# Patient Record
Sex: FEMALE | Race: WHITE | Employment: OTHER | URBAN - METROPOLITAN AREA
[De-identification: names, ages, dates, MRNs, and addresses within clinical notes are randomized per-mention and may not be internally consistent; named-entity substitution may affect disease eponyms.]

---

## 2017-07-26 ENCOUNTER — HOSPITAL ENCOUNTER (OUTPATIENT)
Dept: GENERAL RADIOLOGY | Age: 77
Discharge: HOME | End: 2017-07-26
Attending: FAMILY MEDICINE
Payer: MEDICARE

## 2017-07-26 ENCOUNTER — OFFICE VISIT (OUTPATIENT)
Dept: FAMILY MEDICINE CLINIC | Facility: CLINIC | Age: 77
End: 2017-07-26

## 2017-07-26 VITALS
DIASTOLIC BLOOD PRESSURE: 70 MMHG | WEIGHT: 167 LBS | HEART RATE: 80 BPM | HEIGHT: 59 IN | RESPIRATION RATE: 16 BRPM | BODY MASS INDEX: 33.67 KG/M2 | OXYGEN SATURATION: 97 % | SYSTOLIC BLOOD PRESSURE: 122 MMHG

## 2017-07-26 DIAGNOSIS — M25.511 ACUTE PAIN OF RIGHT SHOULDER: ICD-10-CM

## 2017-07-26 DIAGNOSIS — M25.511 ACUTE PAIN OF RIGHT SHOULDER: Primary | ICD-10-CM

## 2017-07-26 PROCEDURE — 99203 OFFICE O/P NEW LOW 30 MIN: CPT | Performed by: FAMILY MEDICINE

## 2017-07-26 PROCEDURE — 96372 THER/PROPH/DIAG INJ SC/IM: CPT | Performed by: FAMILY MEDICINE

## 2017-07-26 PROCEDURE — 73030 X-RAY EXAM OF SHOULDER: CPT | Performed by: FAMILY MEDICINE

## 2017-07-26 RX ORDER — SIMVASTATIN 40 MG
40 TABLET ORAL NIGHTLY
COMMUNITY
End: 2017-08-17

## 2017-07-26 RX ORDER — METHYLPREDNISOLONE SODIUM SUCCINATE 125 MG/2ML
125 INJECTION, POWDER, LYOPHILIZED, FOR SOLUTION INTRAMUSCULAR; INTRAVENOUS ONCE
Status: COMPLETED | OUTPATIENT
Start: 2017-07-26 | End: 2017-07-26

## 2017-07-26 RX ORDER — WARFARIN SODIUM 5 MG/1
5 TABLET ORAL NIGHTLY
COMMUNITY
End: 2017-08-17

## 2017-07-26 RX ORDER — ALBUTEROL SULFATE 90 UG/1
2 AEROSOL, METERED RESPIRATORY (INHALATION) EVERY 6 HOURS PRN
COMMUNITY
End: 2017-08-17

## 2017-07-26 RX ORDER — LEVOTHYROXINE SODIUM 88 UG/1
88 TABLET ORAL
COMMUNITY
End: 2017-08-17

## 2017-07-26 RX ORDER — LISINOPRIL 5 MG/1
5 TABLET ORAL DAILY
COMMUNITY
End: 2017-08-17

## 2017-07-26 RX ORDER — PREDNISONE 20 MG/1
40 TABLET ORAL DAILY
Qty: 10 TABLET | Refills: 0 | Status: SHIPPED | OUTPATIENT
Start: 2017-07-27 | End: 2017-08-01

## 2017-07-26 RX ADMIN — METHYLPREDNISOLONE SODIUM SUCCINATE 125 MG: 125 INJECTION, POWDER, LYOPHILIZED, FOR SOLUTION INTRAMUSCULAR; INTRAVENOUS at 14:20:00

## 2017-07-26 NOTE — PATIENT INSTRUCTIONS
Frozen Shoulder (Adhesive Capsulitis)    Frozen shoulder is when pain and stiffness make it difficult to move your shoulder normally. This condition is also called adhesive capsulitis. The shoulder is a ball-and-socket joint.  The ball on the upper arm b and increase your range of motion. In rare cases, surgery may be needed to remove the scar tissue. Over time, most people with frozen shoulder get back nearly all range of motion without pain. Recovery may take a few months up to 1 year.  You may still feel pain and restore shoulder flexibility. But it often takes a significant amount of time before you notice results. Try to be patient.   To warm up, do the “pendulum.” While standing, let the hand on your frozen side dangle freely as you hold the back of a ch

## 2017-07-26 NOTE — PROGRESS NOTES
[facility] Family Medicine Office Note  Chief Complaint:   Patient presents with:  Establish Care: c/o right shoulder pain started yesterday       HPI:   This is a 65year old female coming in for  HPI  Acute shoulder pain  The patient has compla mg by mouth daily. Disp:  Rfl:    Warfarin Sodium 5 MG Oral Tab Take 5 mg by mouth nightly. Disp:  Rfl:    Levothyroxine Sodium 88 MCG Oral Tab Take 88 mcg by mouth before breakfast. Disp:  Rfl:    simvastatin 40 MG Oral Tab Take 40 mg by mouth nightly.  Chava Francis equal, round, and reactive to light. No scleral icterus. Cardiovascular: Normal rate and regular rhythm. No murmur heard. Pulmonary/Chest: Effort normal and breath sounds normal. No respiratory distress. She has no wheezes. She has no rales.    Abdomi

## 2017-07-31 ENCOUNTER — TELEPHONE (OUTPATIENT)
Dept: CASE MANAGEMENT | Age: 77
End: 2017-07-31

## 2017-07-31 NOTE — TELEPHONE ENCOUNTER
----- Message from Eduarda Byrne MD sent at 7/28/2017  7:20 AM CDT -----  Results reviewed. XR shows arthritis and age related changes. Follow up in with PT then follow up in 1-2 weeks. Please inform patient.

## 2017-07-31 NOTE — TELEPHONE ENCOUNTER
Called patient, went over results below. Pt stated she is changing her insurance so once that is all figured out, she is going to start PT and FU in the office. Pt stated she has started home exercises and her shoulder does feel better.  Pt stated she is ab

## 2017-08-17 ENCOUNTER — OFFICE VISIT (OUTPATIENT)
Dept: FAMILY MEDICINE CLINIC | Facility: CLINIC | Age: 77
End: 2017-08-17

## 2017-08-17 ENCOUNTER — TELEPHONE (OUTPATIENT)
Dept: FAMILY MEDICINE CLINIC | Facility: CLINIC | Age: 77
End: 2017-08-17

## 2017-08-17 ENCOUNTER — APPOINTMENT (OUTPATIENT)
Dept: LAB | Age: 77
End: 2017-08-17
Attending: FAMILY MEDICINE
Payer: MEDICARE

## 2017-08-17 VITALS
DIASTOLIC BLOOD PRESSURE: 70 MMHG | WEIGHT: 167 LBS | SYSTOLIC BLOOD PRESSURE: 140 MMHG | HEART RATE: 76 BPM | HEIGHT: 59 IN | RESPIRATION RATE: 16 BRPM | OXYGEN SATURATION: 96 % | BODY MASS INDEX: 33.67 KG/M2

## 2017-08-17 DIAGNOSIS — I48.20 CHRONIC ATRIAL FIBRILLATION (HCC): Primary | ICD-10-CM

## 2017-08-17 DIAGNOSIS — E78.2 MIXED HYPERLIPIDEMIA: ICD-10-CM

## 2017-08-17 DIAGNOSIS — I48.20 CHRONIC ATRIAL FIBRILLATION (HCC): ICD-10-CM

## 2017-08-17 DIAGNOSIS — M19.011 PRIMARY OSTEOARTHRITIS OF RIGHT SHOULDER: Primary | ICD-10-CM

## 2017-08-17 DIAGNOSIS — E03.9 ACQUIRED HYPOTHYROIDISM: ICD-10-CM

## 2017-08-17 DIAGNOSIS — I10 ESSENTIAL HYPERTENSION: ICD-10-CM

## 2017-08-17 LAB
INR BLD: 1.72 (ref 0.89–1.11)
PSA SERPL DL<=0.01 NG/ML-MCNC: 20.4 SECONDS (ref 12–14.3)

## 2017-08-17 PROCEDURE — 99214 OFFICE O/P EST MOD 30 MIN: CPT | Performed by: FAMILY MEDICINE

## 2017-08-17 PROCEDURE — 36415 COLL VENOUS BLD VENIPUNCTURE: CPT | Performed by: FAMILY MEDICINE

## 2017-08-17 RX ORDER — LEVOTHYROXINE SODIUM 88 UG/1
88 TABLET ORAL
Qty: 30 TABLET | Refills: 0 | Status: SHIPPED | OUTPATIENT
Start: 2017-08-17 | End: 2017-08-17

## 2017-08-17 RX ORDER — WARFARIN SODIUM 5 MG/1
5 TABLET ORAL NIGHTLY
Qty: 30 TABLET | Refills: 0 | Status: SHIPPED | OUTPATIENT
Start: 2017-08-17 | End: 2017-09-12

## 2017-08-17 RX ORDER — SIMVASTATIN 40 MG
40 TABLET ORAL NIGHTLY
Qty: 30 TABLET | Refills: 0 | Status: SHIPPED | OUTPATIENT
Start: 2017-08-17 | End: 2017-08-17

## 2017-08-17 RX ORDER — LEVOTHYROXINE SODIUM 88 UG/1
88 TABLET ORAL
Qty: 90 TABLET | Refills: 0 | Status: SHIPPED | OUTPATIENT
Start: 2017-08-17 | End: 2017-08-21

## 2017-08-17 RX ORDER — SIMVASTATIN 40 MG
40 TABLET ORAL NIGHTLY
Qty: 90 TABLET | Refills: 0 | Status: SHIPPED | OUTPATIENT
Start: 2017-08-17 | End: 2017-08-21

## 2017-08-17 RX ORDER — LISINOPRIL 5 MG/1
5 TABLET ORAL DAILY
Qty: 30 TABLET | Refills: 0 | Status: SHIPPED | OUTPATIENT
Start: 2017-08-17 | End: 2017-08-17

## 2017-08-17 RX ORDER — ALBUTEROL SULFATE 90 UG/1
2 AEROSOL, METERED RESPIRATORY (INHALATION) EVERY 6 HOURS PRN
Qty: 1 INHALER | Refills: 0 | Status: SHIPPED | OUTPATIENT
Start: 2017-08-17 | End: 2017-08-17

## 2017-08-17 RX ORDER — ALBUTEROL SULFATE 90 UG/1
2 AEROSOL, METERED RESPIRATORY (INHALATION) EVERY 6 HOURS PRN
Qty: 2 INHALER | Refills: 3 | Status: SHIPPED | OUTPATIENT
Start: 2017-08-17 | End: 2017-08-21

## 2017-08-17 RX ORDER — LISINOPRIL 5 MG/1
5 TABLET ORAL DAILY
Qty: 90 TABLET | Refills: 0 | Status: SHIPPED | OUTPATIENT
Start: 2017-08-17 | End: 2017-08-21

## 2017-08-17 NOTE — TELEPHONE ENCOUNTER
Discussed INR results with patient - too low  Increase to 7.5mg once a week (Thursdays) and 5mg every other day  Repeat INR on 8/28/17. Pt verbalized understanding.

## 2017-08-17 NOTE — PROGRESS NOTES
[address] [facility] Family Medicine Office Note  Chief Complaint:   Patient presents with:   Follow - Up: follow up shoulder pain      HPI:   This is a 65year old female coming in for  HPI  Follow up Shoulder pain  Patient states pain in range of motion o Take 1 tablet (5 mg total) by mouth daily.  Disp: 90 tablet Rfl: 0   Levothyroxine Sodium 88 MCG Oral Tab Take 1 tablet (88 mcg total) by mouth before breakfast. Disp: 90 tablet Rfl: 0   simvastatin 40 MG Oral Tab Take 1 tablet (40 mg total) by mouth nightl Effort normal and breath sounds normal.   Musculoskeletal:        Right shoulder: She exhibits normal range of motion, no tenderness, no bony tenderness, no swelling, no crepitus, no pain, no spasm and normal strength.    Lymphadenopathy:     She has no cer effects or complications from the treatments as a result of today.      Problem List:  Patient Active Problem List:     Chronic atrial fibrillation ([facility] [address]     Acquired hypothyroidism     Mixed hyperlipidemia     Essential hypertension

## 2017-08-21 RX ORDER — WARFARIN SODIUM 5 MG/1
5 TABLET ORAL NIGHTLY
Qty: 90 TABLET | Refills: 0 | Status: CANCELLED | OUTPATIENT
Start: 2017-08-21

## 2017-08-21 NOTE — TELEPHONE ENCOUNTER
Patient is requesting medication be sent to [facility] mail order pharmacy

## 2017-08-21 NOTE — TELEPHONE ENCOUNTER
Did you call patient's daughter? She states that she is returning a call. No record of any other calls in triage. Please advise. Thank you!

## 2017-08-22 RX ORDER — ALBUTEROL SULFATE 90 UG/1
2 AEROSOL, METERED RESPIRATORY (INHALATION) EVERY 6 HOURS PRN
Qty: 3 INHALER | Refills: 3 | Status: SHIPPED | OUTPATIENT
Start: 2017-08-22 | End: 2018-05-25

## 2017-08-22 RX ORDER — LEVOTHYROXINE SODIUM 88 UG/1
88 TABLET ORAL
Qty: 90 TABLET | Refills: 3 | Status: SHIPPED | OUTPATIENT
Start: 2017-08-22 | End: 2018-05-21

## 2017-08-22 RX ORDER — SIMVASTATIN 40 MG
40 TABLET ORAL NIGHTLY
Qty: 90 TABLET | Refills: 3 | Status: SHIPPED | OUTPATIENT
Start: 2017-08-22 | End: 2018-05-21

## 2017-08-22 RX ORDER — LISINOPRIL 5 MG/1
5 TABLET ORAL DAILY
Qty: 90 TABLET | Refills: 3 | Status: SHIPPED | OUTPATIENT
Start: 2017-08-22 | End: 2018-05-21

## 2017-08-22 NOTE — TELEPHONE ENCOUNTER
I had to call both numbers listed but got ahold of patient as documented.  Have not called patient since 8/18

## 2017-08-22 NOTE — TELEPHONE ENCOUNTER
Not refilling warfarin at this time based on INR.    Pt has repeat INR in 1 week  Will adjust dose at that time and then refill

## 2017-08-22 NOTE — TELEPHONE ENCOUNTER
Spoke with pt's dtr in law, Yen Sorto. Yen Sorto informed of MD message below. Pt's dtr in law informed that pt can have all her labs drawn while she is here 8/28/17. Pt's dtr in law states understanding and has no further questions at this time.

## 2017-08-22 NOTE — TELEPHONE ENCOUNTER
Medications failed protocol please approve or deny  LOV- 8/17/2017, meds filled at local pharmacy, patient needs refills sent to Mail order pharmacy

## 2017-08-24 DIAGNOSIS — E03.9 ACQUIRED HYPOTHYROIDISM: ICD-10-CM

## 2017-08-24 DIAGNOSIS — E78.2 MIXED HYPERLIPIDEMIA: Primary | ICD-10-CM

## 2017-08-29 ENCOUNTER — APPOINTMENT (OUTPATIENT)
Dept: LAB | Age: 77
End: 2017-08-29
Attending: FAMILY MEDICINE
Payer: MEDICARE

## 2017-08-29 ENCOUNTER — PRIOR ORIGINAL RECORDS (OUTPATIENT)
Dept: OTHER | Age: 77
End: 2017-08-29

## 2017-08-29 DIAGNOSIS — E03.9 ACQUIRED HYPOTHYROIDISM: ICD-10-CM

## 2017-08-29 DIAGNOSIS — E78.2 MIXED HYPERLIPIDEMIA: ICD-10-CM

## 2017-08-29 DIAGNOSIS — I48.20 CHRONIC ATRIAL FIBRILLATION (HCC): ICD-10-CM

## 2017-08-29 LAB
ALBUMIN SERPL-MCNC: 3.3 G/DL (ref 3.5–4.8)
ALP LIVER SERPL-CCNC: 59 U/L (ref 55–142)
ALT SERPL-CCNC: 23 U/L (ref 14–54)
AST SERPL-CCNC: 16 U/L (ref 15–41)
BILIRUB SERPL-MCNC: 0.4 MG/DL (ref 0.1–2)
BUN BLD-MCNC: 30 MG/DL (ref 8–20)
CALCIUM BLD-MCNC: 9.5 MG/DL (ref 8.3–10.3)
CHLORIDE: 108 MMOL/L (ref 101–111)
CHOLEST SMN-MCNC: 160 MG/DL (ref ?–200)
CO2: 30 MMOL/L (ref 22–32)
CREAT BLD-MCNC: 1.29 MG/DL (ref 0.55–1.02)
GLUCOSE BLD-MCNC: 86 MG/DL (ref 70–99)
HDLC SERPL-MCNC: 62 MG/DL (ref 45–?)
HDLC SERPL: 2.58 {RATIO} (ref ?–4.44)
INR BLD: 1.99 (ref 0.89–1.11)
LDLC SERPL CALC-MCNC: 73 MG/DL (ref ?–130)
LDLC SERPL-MCNC: 25 MG/DL (ref 5–40)
M PROTEIN MFR SERPL ELPH: 6.6 G/DL (ref 6.1–8.3)
NONHDLC SERPL-MCNC: 98 MG/DL (ref ?–130)
POTASSIUM SERPL-SCNC: 4.9 MMOL/L (ref 3.6–5.1)
PSA SERPL DL<=0.01 NG/ML-MCNC: 22.9 SECONDS (ref 12–14.3)
SODIUM SERPL-SCNC: 140 MMOL/L (ref 136–144)
TRIGLYCERIDES: 126 MG/DL (ref ?–150)
TSI SER-ACNC: 2.34 MIU/ML (ref 0.35–5.5)

## 2017-08-29 PROCEDURE — 36415 COLL VENOUS BLD VENIPUNCTURE: CPT | Performed by: FAMILY MEDICINE

## 2017-08-30 ENCOUNTER — TELEPHONE (OUTPATIENT)
Dept: FAMILY MEDICINE CLINIC | Facility: CLINIC | Age: 77
End: 2017-08-30

## 2017-08-30 DIAGNOSIS — I48.20 CHRONIC ATRIAL FIBRILLATION (HCC): Primary | ICD-10-CM

## 2017-08-30 LAB
ALBUMIN: 3.3 G/DL
ALKALINE PHOSPHATATE(ALK PHOS): 59 IU/L
ALT (SGPT): 23 U/L
AST (SGOT): 16 U/L
BILIRUBIN TOTAL: 0.4 MG/DL
BUN: 30 MG/DL
CALCIUM: 9.5 MG/DL
CHLORIDE: 108 MEQ/L
CHOLESTEROL, TOTAL: 160 MG/DL
CREATININE, SERUM: 1.29 MG/DL
GLUCOSE: 86 MG/DL
GLUCOSE: 86 MG/DL
HDL CHOLESTEROL: 62 MG/DL
INR: 1.99
LDL CHOLESTEROL: 73 MG/DL
POTASSIUM, SERUM: 4.9 MEQ/L
PROTEIN, TOTAL: 6.6 G/DL
SGOT (AST): 16 IU/L
SGPT (ALT): 23 IU/L
SODIUM: 140 MEQ/L
THYROID STIMULATING HORMONE: 2.34 MLU/L
TRIGLYCERIDES: 126 MG/DL

## 2017-08-30 NOTE — TELEPHONE ENCOUNTER
Called patient and spoke with her. Advised patient of results and POC below. Patient states understanding. Future lab order placed in Epic and flowsheet updated for patient.

## 2017-08-30 NOTE — TELEPHONE ENCOUNTER
----- Message from Marla Diez MD sent at 8/29/2017  8:17 PM CDT -----  Results reviewed. Please inform patient normal cholesterol. INR too low - increase warfarin to 7.5mg Mondays and Thursdays.  Repeat INR 9/6

## 2017-09-06 ENCOUNTER — TELEPHONE (OUTPATIENT)
Dept: FAMILY MEDICINE CLINIC | Facility: CLINIC | Age: 77
End: 2017-09-06

## 2017-09-06 ENCOUNTER — APPOINTMENT (OUTPATIENT)
Dept: LAB | Age: 77
End: 2017-09-06
Attending: FAMILY MEDICINE
Payer: MEDICARE

## 2017-09-06 DIAGNOSIS — I48.20 CHRONIC ATRIAL FIBRILLATION (HCC): ICD-10-CM

## 2017-09-06 LAB
INR BLD: 2.12 (ref 0.89–1.11)
PSA SERPL DL<=0.01 NG/ML-MCNC: 24.1 SECONDS (ref 12–14.3)

## 2017-09-06 PROCEDURE — 36415 COLL VENOUS BLD VENIPUNCTURE: CPT | Performed by: FAMILY MEDICINE

## 2017-09-06 NOTE — TELEPHONE ENCOUNTER
----- Message from rEan Witt MD sent at 9/6/2017  4:20 PM CDT -----  Results reviewed. Within range - continue warfarin 7.5mg Mon/Thur. Repeat in 1 week. Please inform patient.

## 2017-09-06 NOTE — TELEPHONE ENCOUNTER
Called and spoke with pt. Pt informed of lab result below. Pt states understanding. Pt states that she will be seeing the cardiologist tomorrow and will let us know if he changes any of her meds.

## 2017-09-07 ENCOUNTER — PRIOR ORIGINAL RECORDS (OUTPATIENT)
Dept: OTHER | Age: 77
End: 2017-09-07

## 2017-09-07 NOTE — TELEPHONE ENCOUNTER
Pt completed ANIA to have 08/29/17 lab results faxed to Dr. Leanna Hardwick.  Confirmation @ 6:58pm.

## 2017-09-12 ENCOUNTER — LAB ENCOUNTER (OUTPATIENT)
Dept: LAB | Age: 77
End: 2017-09-12
Attending: FAMILY MEDICINE
Payer: MEDICARE

## 2017-09-12 ENCOUNTER — TELEPHONE (OUTPATIENT)
Dept: FAMILY MEDICINE CLINIC | Facility: CLINIC | Age: 77
End: 2017-09-12

## 2017-09-12 DIAGNOSIS — I48.20 CHRONIC ATRIAL FIBRILLATION (HCC): Primary | ICD-10-CM

## 2017-09-12 LAB
INR BLD: 1.83 (ref 0.89–1.11)
PSA SERPL DL<=0.01 NG/ML-MCNC: 21.4 SECONDS (ref 12–14.3)

## 2017-09-12 PROCEDURE — 36415 COLL VENOUS BLD VENIPUNCTURE: CPT | Performed by: FAMILY MEDICINE

## 2017-09-12 RX ORDER — WARFARIN SODIUM 2.5 MG/1
2.5 TABLET ORAL NIGHTLY
Qty: 30 TABLET | Refills: 0 | Status: SHIPPED | OUTPATIENT
Start: 2017-09-12 | End: 2017-11-02

## 2017-09-12 RX ORDER — WARFARIN SODIUM 5 MG/1
5 TABLET ORAL NIGHTLY
Qty: 30 TABLET | Refills: 0 | Status: SHIPPED | OUTPATIENT
Start: 2017-09-12 | End: 2017-11-02

## 2017-09-12 NOTE — TELEPHONE ENCOUNTER
Pt informed. Pt has 5mg at home. I sent in a RX for 2.5mg so pt doesn't have to split the 5mgs to make 7.5mg. Pt aware on MWF to take a 5mg and a 2.5mg tab to equal 7.5mg. Pt aware to repeat an INR in 1 week.

## 2017-09-12 NOTE — TELEPHONE ENCOUNTER
Called and spoke with pt. Pt informed of lab results below. Pt states that she saw Dr. Yeimi Chris and he informed her that he wanted Dr. Janina Ashley to continue managing her coumadin. Please advise.  Thank you

## 2017-09-12 NOTE — TELEPHONE ENCOUNTER
----- Message from Audrey Solo MD sent at 9/12/2017  3:40 PM CDT -----  Results reviewed. Subtherapeutic. Increase dose to 7.5mg MWF. Repeat INR in 1 week. Has she seen Cardiology - need to have coumadin clinic take over management.    Please inform patien

## 2017-09-13 NOTE — TELEPHONE ENCOUNTER
I spoke with Dr Petra Johnson about pt having ASA on her med list.  Dr Petra Johnson states pt should DC ASA. I called pt ans she states was was taking the ASA. Pt informed to DC. Pt states understanding and will DC it. Standing order placed-oked by Dr Petra Johnson.

## 2017-09-18 ENCOUNTER — APPOINTMENT (OUTPATIENT)
Dept: LAB | Age: 77
End: 2017-09-18
Attending: FAMILY MEDICINE
Payer: MEDICARE

## 2017-09-18 DIAGNOSIS — I48.20 CHRONIC ATRIAL FIBRILLATION (HCC): ICD-10-CM

## 2017-09-18 LAB
INR BLD: 2.17 (ref 0.89–1.11)
PSA SERPL DL<=0.01 NG/ML-MCNC: 24.5 SECONDS (ref 12–14.3)

## 2017-09-18 PROCEDURE — 36415 COLL VENOUS BLD VENIPUNCTURE: CPT | Performed by: FAMILY MEDICINE

## 2017-09-19 ENCOUNTER — TELEPHONE (OUTPATIENT)
Dept: FAMILY MEDICINE CLINIC | Facility: CLINIC | Age: 77
End: 2017-09-19

## 2017-09-19 NOTE — TELEPHONE ENCOUNTER
----- Message from Sybil Person MD sent at 9/19/2017  8:01 AM CDT -----  Results reviewed. Increase to 7.5mg MWF and Saturday. 5mg Sun,Tues,Thur. Repeat INR in 1 week  Please inform patient.

## 2017-09-19 NOTE — TELEPHONE ENCOUNTER
Spoke with patient regarding INR results and MD order for Coumadin dosing. Aware of INR recheck in 1 week. Verbalized understanding.

## 2017-09-25 ENCOUNTER — APPOINTMENT (OUTPATIENT)
Dept: LAB | Age: 77
End: 2017-09-25
Attending: FAMILY MEDICINE
Payer: MEDICARE

## 2017-09-25 DIAGNOSIS — I48.20 CHRONIC ATRIAL FIBRILLATION (HCC): ICD-10-CM

## 2017-09-25 LAB
INR BLD: 2.36 (ref 0.89–1.11)
PSA SERPL DL<=0.01 NG/ML-MCNC: 26.2 SECONDS (ref 12–14.3)

## 2017-09-25 PROCEDURE — 36415 COLL VENOUS BLD VENIPUNCTURE: CPT | Performed by: FAMILY MEDICINE

## 2017-09-26 ENCOUNTER — TELEPHONE (OUTPATIENT)
Dept: FAMILY MEDICINE CLINIC | Facility: CLINIC | Age: 77
End: 2017-09-26

## 2017-09-26 NOTE — TELEPHONE ENCOUNTER
----- Message from Corky Lara MD sent at 9/25/2017  7:01 PM CDT -----  Results reviewed. INR within goal. Repeat in 2 weeks.

## 2017-09-26 NOTE — TELEPHONE ENCOUNTER
Discussed INR result with pt. Verified current dose is M,W,F,Sa 7.5mg. Iniguez,Tu,Th 5mg. Pt instructed to continue current dose. Patient aware to repeat a INR in 2 weeks. Flowsheet updated.

## 2017-09-27 ENCOUNTER — OFFICE VISIT (OUTPATIENT)
Dept: FAMILY MEDICINE CLINIC | Facility: CLINIC | Age: 77
End: 2017-09-27

## 2017-09-27 VITALS
SYSTOLIC BLOOD PRESSURE: 130 MMHG | HEIGHT: 59 IN | WEIGHT: 165 LBS | DIASTOLIC BLOOD PRESSURE: 70 MMHG | HEART RATE: 76 BPM | RESPIRATION RATE: 16 BRPM | OXYGEN SATURATION: 98 % | TEMPERATURE: 98 F | BODY MASS INDEX: 33.26 KG/M2

## 2017-09-27 DIAGNOSIS — M15.9 PRIMARY OSTEOARTHRITIS INVOLVING MULTIPLE JOINTS: Primary | ICD-10-CM

## 2017-09-27 DIAGNOSIS — M16.11 ARTHRITIS OF RIGHT HIP: ICD-10-CM

## 2017-09-27 DIAGNOSIS — M25.361 KNEE INSTABILITY, RIGHT: ICD-10-CM

## 2017-09-27 PROCEDURE — 99214 OFFICE O/P EST MOD 30 MIN: CPT | Performed by: FAMILY MEDICINE

## 2017-09-27 RX ORDER — ACETAMINOPHEN AND CODEINE PHOSPHATE 300; 30 MG/1; MG/1
1 TABLET ORAL DAILY PRN
Qty: 30 TABLET | Refills: 0 | Status: SHIPPED
Start: 2017-09-27 | End: 2017-11-16 | Stop reason: ALTCHOICE

## 2017-09-27 RX ORDER — GLUCOSAMINE SULF/CHONDROITIN A 500-250 MG
CAPSULE ORAL
Refills: 0 | COMMUNITY
Start: 2017-09-27 | End: 2020-02-24

## 2017-09-27 NOTE — PROGRESS NOTES
Adam Cazares is a 65year old female. Patient presents with: Follow - Up: discuss arthritis pain increasing c/o back and hip pain      HPI:   Arthritis  The patient is here to recheck her arthritis.  Pt has pain in the following joints: right hip an MG Oral Tab Take 1 tablet (5 mg total) by mouth nightly. Disp: 30 tablet Rfl: 0   simvastatin 40 MG Oral Tab Take 1 tablet (40 mg total) by mouth nightly.  Disp: 90 tablet Rfl: 3   Albuterol Sulfate  (90 Base) MCG/ACT Inhalation Aero Soln Inhale 2 pu hip with full ROM, back is nontender to palpation, knees with no effusion or swelling, normal ROM, +crepitus bilaterally  PSYCH: normal mood and affect    ASSESSMENT AND PLAN:     1.  Primary osteoarthritis involving multiple joints  - start glucosamine-cho

## 2017-10-03 ENCOUNTER — TELEPHONE (OUTPATIENT)
Dept: FAMILY MEDICINE CLINIC | Facility: CLINIC | Age: 77
End: 2017-10-03

## 2017-10-03 ENCOUNTER — HOSPITAL ENCOUNTER (OUTPATIENT)
Dept: CV DIAGNOSTICS | Age: 77
Discharge: HOME | End: 2017-10-03
Attending: INTERNAL MEDICINE
Payer: MEDICARE

## 2017-10-03 ENCOUNTER — OFFICE VISIT (OUTPATIENT)
Dept: PHYSICAL THERAPY | Age: 77
End: 2017-10-03
Attending: FAMILY MEDICINE
Payer: MEDICARE

## 2017-10-03 DIAGNOSIS — I25.10 ATHEROSCLEROSIS OF NATIVE CORONARY ARTERY OF NATIVE HEART WITHOUT ANGINA PECTORIS: ICD-10-CM

## 2017-10-03 DIAGNOSIS — I65.23 CAROTID STENOSIS, BILATERAL: ICD-10-CM

## 2017-10-03 DIAGNOSIS — R06.00 DYSPNEA, PAROXYSMAL NOCTURNAL: ICD-10-CM

## 2017-10-03 DIAGNOSIS — I48.20 ATRIAL FIBRILLATION, CHRONIC (HCC): ICD-10-CM

## 2017-10-03 DIAGNOSIS — M25.361 KNEE INSTABILITY, RIGHT: ICD-10-CM

## 2017-10-03 DIAGNOSIS — I65.23 BILATERAL CAROTID ARTERY STENOSIS: ICD-10-CM

## 2017-10-03 DIAGNOSIS — M15.9 PRIMARY OSTEOARTHRITIS INVOLVING MULTIPLE JOINTS: ICD-10-CM

## 2017-10-03 PROCEDURE — 97162 PT EVAL MOD COMPLEX 30 MIN: CPT

## 2017-10-03 PROCEDURE — 97110 THERAPEUTIC EXERCISES: CPT

## 2017-10-03 NOTE — PROGRESS NOTES
LOWER EXTREMITY EVALUATION:   Referring Physician: Dr. 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 Pain provoked in R gluteal more with trunk movements other than piriformis tightness and tenderness.  Possible lumbar spine involvement in R gluteal pain with trunk ROM findings, symptoms tested/5  Abduction: R 3-/5; L 3-/5  ER: R 4/5; L 4/5  IR: R 4/5; L 4/5 Flexion: R 4+/5; L 4+/5  Extension: R 4/5; L 4/5    DF: R 5/5; L 5/5  PF: R 5/5; L 5/5       Balance:  To be tested    Today’s Treatment and Response:   Evaluation followed by patient e options and has agreed to actively participate in planning and for this course of care. Thank you for your referral. Please co-sign or sign and return this letter via fax as soon as possible to [phone].  If you have any questions, please contact me

## 2017-10-03 NOTE — TELEPHONE ENCOUNTER
I spoke with pt's daughter, she was confused as to whether or not PT, Dr. Altamirano Boards referrals were placed because she called [employer] & they stated they don't have any orders. I advised pt to contact [facility] to confirm authorization of referrals, # given to dtr.

## 2017-10-03 NOTE — TELEPHONE ENCOUNTER
Pt's daughter is caling about the referral for pt. She states it doesn't have a number on there. When I looked at it it shows cancelled. Pls call daughter.

## 2017-10-04 ENCOUNTER — TELEPHONE (OUTPATIENT)
Dept: FAMILY MEDICINE CLINIC | Facility: CLINIC | Age: 77
End: 2017-10-04

## 2017-10-04 DIAGNOSIS — M16.11 ARTHRITIS OF RIGHT HIP: Primary | ICD-10-CM

## 2017-10-04 NOTE — TELEPHONE ENCOUNTER
Patient aware that order for hip xray has been placed. Advised that she had xrays done on both knees 9/27.

## 2017-10-04 NOTE — TELEPHONE ENCOUNTER
Patient saw PT yesterday and would like pt to have a xray of her right hip done along with her knee. Please advised pt when order is in so she can schedule her appointment.

## 2017-10-05 ENCOUNTER — HOSPITAL ENCOUNTER (OUTPATIENT)
Dept: CV DIAGNOSTICS | Age: 77
Discharge: HOME | End: 2017-10-05
Attending: INTERNAL MEDICINE
Payer: MEDICARE

## 2017-10-05 DIAGNOSIS — I48.91 ATRIAL FIBRILLATION (HCC): ICD-10-CM

## 2017-10-05 DIAGNOSIS — I25.10 CORONARY ARTERY DISEASE: ICD-10-CM

## 2017-10-05 PROCEDURE — 93017 CV STRESS TEST TRACING ONLY: CPT | Performed by: INTERNAL MEDICINE

## 2017-10-05 PROCEDURE — 78452 HT MUSCLE IMAGE SPECT MULT: CPT | Performed by: INTERNAL MEDICINE

## 2017-10-05 PROCEDURE — 93018 CV STRESS TEST I&R ONLY: CPT | Performed by: INTERNAL MEDICINE

## 2017-10-06 ENCOUNTER — HOSPITAL ENCOUNTER (OUTPATIENT)
Dept: GENERAL RADIOLOGY | Age: 77
Discharge: HOME | End: 2017-10-06
Attending: FAMILY MEDICINE
Payer: MEDICARE

## 2017-10-06 DIAGNOSIS — M25.361 KNEE INSTABILITY, RIGHT: ICD-10-CM

## 2017-10-06 DIAGNOSIS — M16.11 ARTHRITIS OF RIGHT HIP: ICD-10-CM

## 2017-10-06 PROCEDURE — 73565 X-RAY EXAM OF KNEES: CPT | Performed by: FAMILY MEDICINE

## 2017-10-06 PROCEDURE — 73502 X-RAY EXAM HIP UNI 2-3 VIEWS: CPT | Performed by: FAMILY MEDICINE

## 2017-10-09 ENCOUNTER — TELEPHONE (OUTPATIENT)
Dept: FAMILY MEDICINE CLINIC | Facility: CLINIC | Age: 77
End: 2017-10-09

## 2017-10-09 NOTE — TELEPHONE ENCOUNTER
----- Message from Sybil Person MD sent at 10/7/2017  1:18 PM CDT -----  Results reviewed. Please inform patient mild arthritis and bone spur noted.  Cont tx plan    XR HIP W OR WO PELVIS 2 OR 3 VIEWS, RIGHT (CPT=73502)   Order: [id number]   Status:  Final r

## 2017-10-10 ENCOUNTER — PRIOR ORIGINAL RECORDS (OUTPATIENT)
Dept: OTHER | Age: 77
End: 2017-10-10

## 2017-10-10 ENCOUNTER — OFFICE VISIT (OUTPATIENT)
Dept: PHYSICAL THERAPY | Age: 77
End: 2017-10-10
Attending: FAMILY MEDICINE
Payer: MEDICARE

## 2017-10-10 DIAGNOSIS — M15.9 PRIMARY OSTEOARTHRITIS INVOLVING MULTIPLE JOINTS: ICD-10-CM

## 2017-10-10 DIAGNOSIS — M25.361 KNEE INSTABILITY, RIGHT: ICD-10-CM

## 2017-10-10 PROCEDURE — 97110 THERAPEUTIC EXERCISES: CPT

## 2017-10-10 NOTE — PROGRESS NOTES
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 Silvana Bonilla 11/16  Fall Risk: standard         Precautions: A- Fib, carotid stenosis, COPD         Sub TX#: 8/   VS:   /68mmHG  HR 66           Recumb stepper 5 min         Hamstring strumming  (Sore on R medial hamstring \"feel like vein.)         Hamstring stretching 30 sec x 4 B         Kinesiotape for patella fat pad/reduction of edema.  See climb stairs with less pain. Past medical history was reviewed with Marva Davenport.  Significant findings include atrial fib, R carotid stenosis per patient (receiving US to check this,) COPD, Hypothyroidism, HTN    Pain is Worse:   Knee:  Going down stairs vs up s 90; L 90  Extension: R 0; L 0  Abduction: R WNL; L WNL   Flexion: R 130; L 120  Extension: R 0; L -5        PROM:   Hip Knee   Flexion: R 95; L 95  Extension: R 5; L 5  Abduction: R WNL; L WNL   ER: R 30; L 30   Flexion: R 130; L 125  Extension: R 0; L -5 with shoulders over hip 1 block without increase pain in buttock. Frequency / Duration: Patient will be seen for 2 x/week or a total of 12 visits over a 90 day period. Treatment will include: Manual Therapy; Therapeutic Exercises;  Neuromuscular Re-ed

## 2017-10-11 ENCOUNTER — PRIOR ORIGINAL RECORDS (OUTPATIENT)
Dept: OTHER | Age: 77
End: 2017-10-11

## 2017-10-12 ENCOUNTER — OFFICE VISIT (OUTPATIENT)
Dept: PHYSICAL THERAPY | Age: 77
End: 2017-10-12
Attending: FAMILY MEDICINE
Payer: MEDICARE

## 2017-10-12 ENCOUNTER — TELEPHONE (OUTPATIENT)
Dept: FAMILY MEDICINE CLINIC | Facility: CLINIC | Age: 77
End: 2017-10-12

## 2017-10-12 DIAGNOSIS — M15.9 PRIMARY OSTEOARTHRITIS INVOLVING MULTIPLE JOINTS: ICD-10-CM

## 2017-10-12 DIAGNOSIS — I48.20 CHRONIC ATRIAL FIBRILLATION (HCC): Primary | ICD-10-CM

## 2017-10-12 DIAGNOSIS — M25.361 KNEE INSTABILITY, RIGHT: ICD-10-CM

## 2017-10-12 NOTE — TELEPHONE ENCOUNTER
Patient called back and spoke with her. Advised patient of results and POC below. Patient will follow-up with Dr. Anaya Suazo. Patient states understanding. Patient also needs another referral for Dr. Anaya Suazo.   Referral placed in Epic for patient as reque

## 2017-10-12 NOTE — TELEPHONE ENCOUNTER
Results received from Dr. Josiah Pantoja office for carotid ultrasound. Per Dr. Irena Corona patient needs follow-up with cardiology. Holding results in triage. LM on home phone for pt to call the office back.

## 2017-10-16 ENCOUNTER — APPOINTMENT (OUTPATIENT)
Dept: LAB | Age: 77
End: 2017-10-16
Attending: FAMILY MEDICINE
Payer: MEDICARE

## 2017-10-16 DIAGNOSIS — I48.20 CHRONIC ATRIAL FIBRILLATION (HCC): ICD-10-CM

## 2017-10-16 PROCEDURE — 85610 PROTHROMBIN TIME: CPT

## 2017-10-16 PROCEDURE — 36415 COLL VENOUS BLD VENIPUNCTURE: CPT

## 2017-10-17 ENCOUNTER — TELEPHONE (OUTPATIENT)
Dept: FAMILY MEDICINE CLINIC | Facility: CLINIC | Age: 77
End: 2017-10-17

## 2017-10-17 ENCOUNTER — OFFICE VISIT (OUTPATIENT)
Dept: PHYSICAL THERAPY | Age: 77
End: 2017-10-17
Attending: FAMILY MEDICINE
Payer: MEDICARE

## 2017-10-17 DIAGNOSIS — M15.9 PRIMARY OSTEOARTHRITIS INVOLVING MULTIPLE JOINTS: ICD-10-CM

## 2017-10-17 DIAGNOSIS — M25.361 KNEE INSTABILITY, RIGHT: ICD-10-CM

## 2017-10-17 PROCEDURE — 97110 THERAPEUTIC EXERCISES: CPT

## 2017-10-17 NOTE — TELEPHONE ENCOUNTER
----- Message from Daniella Morales MD sent at 10/16/2017  7:53 PM CDT -----  Results reviewed. At goal. Cont current dose - repeat in 2 weeks. Please inform patient.

## 2017-10-17 NOTE — PROGRESS NOTES
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 Maulik Newton 11/16  Fall Risk: standard         Precautions: A- Fib, carotid stenosis, COPD         Sub Recumb stepper 5 min 5 min       Hamstring strumming  (Sore on R medial hamstring \"feel like vein.) Hamstring Stretching 30 sec x 3 Shuttle DLP  6 cords 3 x 10       Hamstring stretching 30 sec x 4 B Hip flexor stretch sidelying 30 sec x 5 Standing hip ex order for R hip but not in system. Pt describes pain level 6/10. Better 0/10. Current functional limitations include walking, sitting, sit to stand transfers, stairs.    Rafael Escalera describes prior level of function no limitation with sit to stand and stairs ROM:   Lumbar: Forward bendin degrees with pain in Right gluteal. Report some dizziness after forward bending. Backward bendin degrees no pain  ROT: R 50% with pain on R lumbar spine; 50% Left rotation and no pain.   Sidebending: R 30 degree wi assist with gait and decrease strain in on spine and knees  Patient will be able to ascend and descend one flight of stairs with alternating sequencing and one hand hold assist.  Patient will demonstrate increase hip strength to 4+/5 to decrease strain on

## 2017-10-24 ENCOUNTER — OFFICE VISIT (OUTPATIENT)
Dept: PHYSICAL THERAPY | Age: 77
End: 2017-10-24
Attending: FAMILY MEDICINE
Payer: MEDICARE

## 2017-10-24 DIAGNOSIS — M15.9 PRIMARY OSTEOARTHRITIS INVOLVING MULTIPLE JOINTS: ICD-10-CM

## 2017-10-24 DIAGNOSIS — M25.361 KNEE INSTABILITY, RIGHT: ICD-10-CM

## 2017-10-24 PROCEDURE — 97110 THERAPEUTIC EXERCISES: CPT

## 2017-10-24 NOTE — PROGRESS NOTES
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 Petra Johnson 11/16  Fall Risk: standard         Precautions: A- Fib, carotid stenosis, COPD         Sub Date:               TX#: 7/ Date:               TX#: 8/   VS:   /68mmHG  HR 66   VS:   66 bpm VS: 72  4 in - 6in     Recumb stepper 5 min Recumb stepper 5 min 5 min  5 min      Hamstring strumming  (Sore on R medial hamstring \"feel like vein.) Hamst Heart 120 bpm in office and 140/84 mmHg. Feel short of breathing walking distance into PT office. Feel like pretzel stuck in throat. Have not told doctor about the throat symptoms that comes and goes.  Advise patient to address with MD that this symptom com and gait on even and uneven surfaces with stretching and strengthening. Pain should be expected to reduce 50% or more. Precautions:  Cardiac  OBJECTIVE:   Observation: L shoulder and pelvis higher.   Gait: Decrease step length  Palpation: Tender in glu Moderate Complexity, Ex1      Total Timed Treatment: 45 min     Total Treatment Time: 45 min     PLAN OF CARE:    Goals:    Patient will demonstrate independence in performing home exercise program.  Patient will demonstrate increase hamstring flexibility To:1/1/2018

## 2017-10-26 ENCOUNTER — OFFICE VISIT (OUTPATIENT)
Dept: PHYSICAL THERAPY | Age: 77
End: 2017-10-26
Attending: FAMILY MEDICINE
Payer: MEDICARE

## 2017-10-26 ENCOUNTER — TELEPHONE (OUTPATIENT)
Dept: FAMILY MEDICINE CLINIC | Facility: CLINIC | Age: 77
End: 2017-10-26

## 2017-10-26 DIAGNOSIS — I87.1 SUBCLAVIAN VEIN STENOSIS: Primary | ICD-10-CM

## 2017-10-26 DIAGNOSIS — M15.9 PRIMARY OSTEOARTHRITIS INVOLVING MULTIPLE JOINTS: ICD-10-CM

## 2017-10-26 DIAGNOSIS — M25.361 KNEE INSTABILITY, RIGHT: ICD-10-CM

## 2017-10-26 PROCEDURE — 97112 NEUROMUSCULAR REEDUCATION: CPT

## 2017-10-26 PROCEDURE — 97110 THERAPEUTIC EXERCISES: CPT

## 2017-10-26 NOTE — TELEPHONE ENCOUNTER
Pt's daughter-in-law Poncho Zaldivar (OK per HIPAA) called regarding referrals.     Pt was seen by Dr. Thi Mcgowan, who referred her to Dr. Dangelo Barrera in cardiac surgery at [facility]. Pt and daughter were confused about exact reason for visit, the paper

## 2017-10-26 NOTE — PROGRESS NOTES
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 Adriane Mcintosh 11/16  Fall Risk: standard         Precautions: A- Fib, carotid stenosis, COPD         Sub 120/68mmHG  HR 66   VS:   66 bpm VS: 72  See above     Recumb stepper 5 min Recumb stepper 5 min 5 min  5 min Bike 6 min     Hamstring strumming  (Sore on R medial hamstring \"feel like vein.) Hamstring Stretching 30 sec x 3 Shuttle DLP  6 cords 3 x 10 Charley can cause R knee to \"pop out of place. \" Pain in knee occurred for 1 year and deny known cause. Deny numbness and tingling in LE. Currently, receiving cardio tests due to racing heart. Had cardiac US today. Heart 120 bpm in office and 140/84 mmHg.  Feel s tenderness and tightness throughout hip ER muscles and ITB. Edema at knee and R greater trochanteric bursitis. Will monitor vital signs in PT and symptoms. Patient will benefit from PT in improving transfers and gait on even and uneven surfaces with stretch Patient has stress test tomorrow. Patient was instructed in and issued a HEP for: \"Motion is lotion. \" Knee and hip gentle ROM for in sitting to assist with decreasing pain when getting up.     Charges: PT Eval Moderate Complexity, Ex1      Total Timed T certify the need for these services furnished under this plan of treatment and while under my care.     X___________________________________________________ Date____________________    Certification From: 88/1/2574  To:1/1/2018

## 2017-10-30 ENCOUNTER — TELEPHONE (OUTPATIENT)
Dept: FAMILY MEDICINE CLINIC | Facility: CLINIC | Age: 77
End: 2017-10-30

## 2017-10-30 ENCOUNTER — OFFICE VISIT (OUTPATIENT)
Dept: PHYSICAL THERAPY | Age: 77
End: 2017-10-30
Attending: FAMILY MEDICINE
Payer: MEDICARE

## 2017-10-30 DIAGNOSIS — I48.20 CHRONIC ATRIAL FIBRILLATION (HCC): Primary | ICD-10-CM

## 2017-10-30 PROCEDURE — 97110 THERAPEUTIC EXERCISES: CPT

## 2017-10-30 NOTE — PROGRESS NOTES
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 Misa Hood 11/16  Fall Risk: standard         Precautions: A- Fib, carotid sten 40-59% impaired, limited, or restricted   Discharge G Code: Mobility: Walking and Moving Around CJ20-39% impaired, limited, or restricted      Plan:   Discharge to continue with HEP for functional strengthening and gluteal strengthening.        Patient was comfort. Instruct rep progression and to look for fatigue    Hip flex x 14 R,  X 12 L  Hip abd x 12 R, L x 12  Hip ext x 12 B 6 in step  X 15 B    4 in step down x 15 B Review step and band exercises    Kinesiotape for patella fat pad/reduction of edema. shin on stairs with a fall and landed on R hip. Sensitive in shin    Imaging ordered for knees. Patient thought she had order for R hip but not in system. Pt describes pain level 6/10. Better 0/10.    Current functional limitations include walking, sitt LE    VS:120 bpm after sitting for at least 20 minutes. MD is aware of elevated heart rate and part of getting testing. Trunk ROM:   Lumbar: Forward bendin degrees with pain in Right gluteal. Report some dizziness after forward bending.    Backward functional mobility and decrease hip, knee and lumbar strain.    Patient will demonstrate increase hip extension to 15 degrees to assist with gait and decrease strain in on spine and knees  Patient will be able to ascend and descend one flight of stairs wit

## 2017-10-30 NOTE — TELEPHONE ENCOUNTER
Camelia Tripathi from [employer] called regarding pt standing PT/INR order, need frequency of testing (daily, weekly, etc)    Dr. Yen Fuentes states that the order can be entered as weekly, as the pt has no need to have it checked more frequently than that.  lalo Jeffers

## 2017-10-31 ENCOUNTER — HOSPITAL ENCOUNTER (OUTPATIENT)
Dept: CT IMAGING | Age: 77
Discharge: HOME | End: 2017-10-31
Attending: INTERNAL MEDICINE
Payer: MEDICARE

## 2017-10-31 ENCOUNTER — APPOINTMENT (OUTPATIENT)
Dept: PHYSICAL THERAPY | Age: 77
End: 2017-10-31
Attending: FAMILY MEDICINE
Payer: MEDICARE

## 2017-10-31 DIAGNOSIS — I65.23 BILATERAL CAROTID ARTERY STENOSIS: ICD-10-CM

## 2017-10-31 PROCEDURE — 82565 ASSAY OF CREATININE: CPT

## 2017-10-31 PROCEDURE — 70498 CT ANGIOGRAPHY NECK: CPT | Performed by: INTERNAL MEDICINE

## 2017-11-01 ENCOUNTER — TELEPHONE (OUTPATIENT)
Dept: FAMILY MEDICINE CLINIC | Facility: CLINIC | Age: 77
End: 2017-11-01

## 2017-11-01 NOTE — TELEPHONE ENCOUNTER
I called pt back and pt states she started vitamin E about a week ago. Pt started it on her own, no physician told her to take it.       Should she stop the vitamin E and keep taking the same warfarin 7.5mg MWFSat or still decrease the warfarin dose and st

## 2017-11-01 NOTE — TELEPHONE ENCOUNTER
----- Message from Elsie Caldwell MD sent at 11/1/2017  8:22 AM CDT -----  Results reviewed. Please inform patient to decrease dose only take 7.5 mg M and F - not wednesday.  Repeat in 1 week

## 2017-11-01 NOTE — TELEPHONE ENCOUNTER
Pt informed to dc the Vitamin E and take 5mg of warfarin tonight instead of 7.5mg. Then pt should go back to normal dose of 7.5mg MWFSat and 5mg all other days and recheck an INR in 1 week.

## 2017-11-01 NOTE — TELEPHONE ENCOUNTER
Discussed INR result with pt. When I verified current dose, pt states she is taking 7.5mg on M,W,F & Sat.  5mg all other days. What day do you want pt only take 5mg? Sat so the days are spread out?      I spoke to Dr Shawn Wooten and she said lets do 7.5mg on M

## 2017-11-02 RX ORDER — WARFARIN SODIUM 2.5 MG/1
2.5 TABLET ORAL NIGHTLY
Qty: 30 TABLET | Refills: 5 | Status: SHIPPED | OUTPATIENT
Start: 2017-11-02 | End: 2017-12-22 | Stop reason: DRUGHIGH

## 2017-11-02 RX ORDER — WARFARIN SODIUM 5 MG/1
5 TABLET ORAL NIGHTLY
Qty: 30 TABLET | Refills: 5 | Status: SHIPPED | OUTPATIENT
Start: 2017-11-02 | End: 2017-12-22 | Stop reason: DRUGHIGH

## 2017-11-02 NOTE — TELEPHONE ENCOUNTER
Pt needs refill on both warfarin 2.5 mg and 5 mg tablets.  Advised pt to call pharmacy but pt states there are no refills and Dr must put in refill

## 2017-11-08 ENCOUNTER — PRIOR ORIGINAL RECORDS (OUTPATIENT)
Dept: OTHER | Age: 77
End: 2017-11-08

## 2017-11-09 ENCOUNTER — TELEPHONE (OUTPATIENT)
Dept: FAMILY MEDICINE CLINIC | Facility: CLINIC | Age: 77
End: 2017-11-09

## 2017-11-09 NOTE — TELEPHONE ENCOUNTER
----- Message from Saulo Monae MD sent at 11/9/2017  1:42 PM CST -----  Results reviewed. Please inform patient decrease dose again. Only 7.5mg MWF, 5mg all other days.  (Was 7.5mg MWFsat)

## 2017-11-16 ENCOUNTER — OFFICE VISIT (OUTPATIENT)
Dept: FAMILY MEDICINE CLINIC | Facility: CLINIC | Age: 77
End: 2017-11-16

## 2017-11-16 VITALS
DIASTOLIC BLOOD PRESSURE: 80 MMHG | WEIGHT: 166 LBS | RESPIRATION RATE: 16 BRPM | SYSTOLIC BLOOD PRESSURE: 126 MMHG | BODY MASS INDEX: 33.47 KG/M2 | HEART RATE: 71 BPM | TEMPERATURE: 98 F | OXYGEN SATURATION: 97 % | HEIGHT: 59 IN

## 2017-11-16 DIAGNOSIS — I77.9 BILATERAL CAROTID ARTERY DISEASE (HCC): ICD-10-CM

## 2017-11-16 DIAGNOSIS — M19.90 ARTHRITIS: ICD-10-CM

## 2017-11-16 DIAGNOSIS — N18.30 CKD (CHRONIC KIDNEY DISEASE) STAGE 3, GFR 30-59 ML/MIN (HCC): ICD-10-CM

## 2017-11-16 DIAGNOSIS — E03.9 ACQUIRED HYPOTHYROIDISM: ICD-10-CM

## 2017-11-16 DIAGNOSIS — E78.2 MIXED HYPERLIPIDEMIA: ICD-10-CM

## 2017-11-16 DIAGNOSIS — I10 ESSENTIAL HYPERTENSION: ICD-10-CM

## 2017-11-16 DIAGNOSIS — Z00.00 HEALTH MAINTENANCE EXAMINATION: Primary | ICD-10-CM

## 2017-11-16 DIAGNOSIS — I48.20 CHRONIC ATRIAL FIBRILLATION (HCC): ICD-10-CM

## 2017-11-16 DIAGNOSIS — Z23 NEEDS FLU SHOT: ICD-10-CM

## 2017-11-16 DIAGNOSIS — Z12.11 SCREENING FOR COLON CANCER: ICD-10-CM

## 2017-11-16 PROCEDURE — G0008 ADMIN INFLUENZA VIRUS VAC: HCPCS | Performed by: FAMILY MEDICINE

## 2017-11-16 PROCEDURE — 96160 PT-FOCUSED HLTH RISK ASSMT: CPT | Performed by: FAMILY MEDICINE

## 2017-11-16 PROCEDURE — 90653 IIV ADJUVANT VACCINE IM: CPT | Performed by: FAMILY MEDICINE

## 2017-11-16 NOTE — PROGRESS NOTES
HPI:   Cisco White is a 65year old female who presents for a MA (Med[employer]are Advantage) [facility] (Once per calendar year).     Annual Physical due on 01/30/1942  Fall Risk Screening due on 01/30/2004  Adult Pneumonia Vaccine(1 of 2 - PCV13) due on 01 with a 5mg tab to equal 7.5mg total   Warfarin Sodium 5 MG Oral Tab Take 1 tablet (5 mg total) by mouth nightly. Glucosamine-Chondroitin 500-250 MG Oral Cap Take 1 cap TID   simvastatin 40 MG Oral Tab Take 1 tablet (40 mg total) by mouth nightly.    [facility] denies hx of anemia  ENDOCRINE: denies thyroid history  ALL/ASTHMA: denies hx of allergy or asthma    EXAM:   /80   Pulse 71   Temp 98.2 °F (36.8 °C) (Oral)   Resp 16   Ht 59\"   Wt 166 lb   SpO2 97%   BMI 33.53 kg/m²  Estimated body mass index is 33 immunizations    Chronic atrial fibrillation (HCC)  - stable  - cont present management  -     CARDIO - INTERNAL    Acquired hypothyroidism  - stable  - cont present management    Mixed hyperlipidemia  - stable  - cont present management  -     CARDIO - IN positive mental well-being?: Visiting Family    If you are a male age 38-65 or a female age 47-67, do you take aspirin?: No    Have you had any immunizations at another office such as Influenza, Hepatitis B, Tetanus, or Pneumococcal?: No     Sulema Antoine No results found for: A1C No flowsheet data found.     Fasting Blood Sugar (FSB)Annually   Glucose (mg/dL)   Date Value   08/29/2017 86   ----------       Cardiovascular Disease Screening     LDL Annually LDL Cholesterol (mg/dL)   Date Value   08/29/2017 73 End-stage renal disease   Hemophiliacs who received Factor VIII or IX concentrates   Clients of institutions for the mentally retarded   Persons who live in the same house as a HepB virus carrier   Homosexual men   Illicit injectable drug abusers     Tet

## 2017-11-17 ENCOUNTER — TELEPHONE (OUTPATIENT)
Dept: FAMILY MEDICINE CLINIC | Facility: CLINIC | Age: 77
End: 2017-11-17

## 2017-11-25 ENCOUNTER — TELEPHONE (OUTPATIENT)
Dept: FAMILY MEDICINE CLINIC | Facility: CLINIC | Age: 77
End: 2017-11-25

## 2017-11-25 NOTE — TELEPHONE ENCOUNTER
INR low normal  Increase to 7.5mg MWFSat  Repeat INR around Dec 6    Patient verbalized understanding - will take 2.5mg tonight.

## 2017-11-27 NOTE — TELEPHONE ENCOUNTER
Alexandra Fontenot MD   [facility] Clinical Staff 2 days ago     Please update flow sheet or med hx (Routing comment)

## 2017-11-30 ENCOUNTER — PRIOR ORIGINAL RECORDS (OUTPATIENT)
Dept: OTHER | Age: 77
End: 2017-11-30

## 2017-12-08 ENCOUNTER — TELEPHONE (OUTPATIENT)
Dept: FAMILY MEDICINE CLINIC | Facility: CLINIC | Age: 77
End: 2017-12-08

## 2017-12-08 NOTE — TELEPHONE ENCOUNTER
Spoke with patient. Made her aware of current INR level. Patient says she's actually been taking 7.5mg of warfarin 4 days a week- on Monday, Wednesday, Friday, Saturday and 5mg on Tuesday, Thursday, Saturday.  According to flow sheet from 11/2217 patient wa

## 2017-12-08 NOTE — TELEPHONE ENCOUNTER
----- Message from Deni Bull DO sent at 12/7/2017  9:02 PM CST -----  Currently Monday Wednesday Friday 7.5 mg of Coumadin and other days 5 mg of Coumadin and still elevated at 3.4.   Patient to hold Coumadin ×2 days then decrease Monday Wednesday 7.5

## 2017-12-11 NOTE — TELEPHONE ENCOUNTER
Called patient and spoke with her. Advised patient of information below. Patient states understanding.

## 2017-12-11 NOTE — TELEPHONE ENCOUNTER
Based on INR  7.5mg MWF   5 mg all other days  Ok to start low dose aspirin per cardiology now that she had held coumadin x2 days.    Agree with repeat INR around 12/20

## 2017-12-21 ENCOUNTER — TELEPHONE (OUTPATIENT)
Dept: FAMILY MEDICINE CLINIC | Facility: CLINIC | Age: 77
End: 2017-12-21

## 2017-12-21 DIAGNOSIS — I48.20 CHRONIC ATRIAL FIBRILLATION (HCC): Primary | ICD-10-CM

## 2017-12-21 RX ORDER — WARFARIN SODIUM 6 MG/1
6 TABLET ORAL DAILY
Qty: 30 TABLET | Refills: 0 | Status: SHIPPED | OUTPATIENT
Start: 2017-12-21 | End: 2018-01-11

## 2017-12-21 NOTE — TELEPHONE ENCOUNTER
Pt states for the last 2 weeks she has been taking 7.5mg on M, W, F and 5mg all other days. Pt informed to take an extra 2.5mg tonight. Pt agrees but states she is worried about taking 7.5mg 3 days in a row-W, Th, Fri.   She wants to know if instead of Fr

## 2017-12-21 NOTE — TELEPHONE ENCOUNTER
Notes Recorded by Libby Gilford, MD on 12/21/2017 at 7:56 AM CST  For now extra 2.5mg tonight    ----- Message from Cordelia Alexandre MD sent at 12/21/2017  7:55 AM CST -----  Results reviewed.  Cannot seem to control INR well enough - refer to coumadin clinic

## 2017-12-22 NOTE — TELEPHONE ENCOUNTER
I called and spoke to pt and informed her of the following directions:  7.5 mg last night-already done  5mg Today  7.5mg Saturday  Starting Sunday 6mg per day-RX sent in  INR every Wednesday x4 weeks

## 2017-12-22 NOTE — TELEPHONE ENCOUNTER
7.5 mg tonight  5mg Friday  7.5mg Saturday  Starting Sunday 6mg per day  INR every Wednesday x4 weeks

## 2017-12-29 ENCOUNTER — TELEPHONE (OUTPATIENT)
Dept: FAMILY MEDICINE CLINIC | Facility: CLINIC | Age: 77
End: 2017-12-29

## 2017-12-29 NOTE — TELEPHONE ENCOUNTER
Spoke with patient. Advised of INR level. She did verify that she is taking Coumadin 6mg every day. Advised her to continue to take this dose every day and have her INR checked every Wednesday for the next 3 weeks. Verbalized understanding.

## 2018-01-04 ENCOUNTER — TELEPHONE (OUTPATIENT)
Dept: FAMILY MEDICINE CLINIC | Facility: CLINIC | Age: 78
End: 2018-01-04

## 2018-01-04 LAB
INR: 2.8
PT: 28.2 SEC (ref 9–11.5)

## 2018-01-04 NOTE — TELEPHONE ENCOUNTER
Called and informed patient of message below. Patient states she will repeat lab test in 1 week, she stated understanding and did not have any additional questions.

## 2018-01-04 NOTE — TELEPHONE ENCOUNTER
----- Message from Sanford Calderon MD sent at 1/4/2018 12:16 PM CST -----  Results reviewed. Tests show INR w/in goal. Repeat in 1 week - if still normal then every 2 weeks. Please inform patient.

## 2018-01-11 ENCOUNTER — MED REC SCAN ONLY (OUTPATIENT)
Dept: FAMILY MEDICINE CLINIC | Facility: CLINIC | Age: 78
End: 2018-01-11

## 2018-01-11 ENCOUNTER — TELEPHONE (OUTPATIENT)
Dept: FAMILY MEDICINE CLINIC | Facility: CLINIC | Age: 78
End: 2018-01-11

## 2018-01-11 DIAGNOSIS — Z79.01 ANTICOAGULATED ON COUMADIN: Primary | ICD-10-CM

## 2018-01-11 LAB
INR: 2.4
PT: 23.9 SEC (ref 9–11.5)

## 2018-01-11 RX ORDER — WARFARIN SODIUM 6 MG/1
6 TABLET ORAL DAILY
Qty: 30 TABLET | Refills: 0 | Status: SHIPPED | OUTPATIENT
Start: 2018-01-11 | End: 2018-02-13

## 2018-01-11 NOTE — TELEPHONE ENCOUNTER
----- Message from Zenon Spatz, MD sent at 1/11/2018  7:55 AM CST -----  Results reviewed.  wnl goal. Repeat in 2 weeks

## 2018-01-11 NOTE — TELEPHONE ENCOUNTER
Spoke to patient. Advised of results and plan of care below. Order placed for repeat INR in 2 weeks. Patient requesting refill of warfarin 6 mg. [id number] [address] Duenas for refill?

## 2018-01-25 ENCOUNTER — TELEPHONE (OUTPATIENT)
Dept: FAMILY MEDICINE CLINIC | Facility: CLINIC | Age: 78
End: 2018-01-25

## 2018-01-25 DIAGNOSIS — Z79.01 ANTICOAGULATED ON COUMADIN: Primary | ICD-10-CM

## 2018-01-25 LAB
INR: 1.9
PT: 19.4 SEC (ref 9–11.5)

## 2018-01-25 NOTE — TELEPHONE ENCOUNTER
----- Message from Kaylan Junior MD sent at 1/25/2018  8:07 AM CST -----  Results reviewed. Please inform patient INR too low - take extra 1/2 tab tonight (9mg) then resume 6mg daily - repeat INR in 1 week.

## 2018-01-25 NOTE — TELEPHONE ENCOUNTER
Spoke to patient. Advised of results and plan of care below. Flowsheet updated. Order placed for 1 week re-draw. Patient states understanding. No further questions.

## 2018-02-01 LAB
INR: 2.6
PT: 25.8 SEC (ref 9–11.5)

## 2018-02-02 ENCOUNTER — TELEPHONE (OUTPATIENT)
Dept: FAMILY MEDICINE CLINIC | Facility: CLINIC | Age: 78
End: 2018-02-02

## 2018-02-02 NOTE — TELEPHONE ENCOUNTER
Discussed INR result with pt. Verified current dose is 6mg daily. Pt instructed to continue current dose. Patient aware to repeat a INR in 1 week. Flowsheet updated.

## 2018-02-08 LAB
INR: 2.1
PT: 21.6 SEC (ref 9–11.5)

## 2018-02-10 ENCOUNTER — TELEPHONE (OUTPATIENT)
Dept: FAMILY MEDICINE CLINIC | Facility: CLINIC | Age: 78
End: 2018-02-10

## 2018-02-10 NOTE — TELEPHONE ENCOUNTER
INR was low normal - asked pt to repeat on Monday  She lost list of foods to avoid while taking coumadin - please review with patient  Grapefruit, dark green leafy vegetables (spinache, kale, sharita greens), broccoli, brussel sprouts,   Foods that are ok

## 2018-02-12 NOTE — TELEPHONE ENCOUNTER
Pt had her INR drawn today but uses [facility] so we will have the results until tomorrow. We discussed foods to avoid,  foods that are ok in moderation-consistency, and ok foods. Pt on 6mg daily and will continue for now. We will call tomorrow with results.

## 2018-02-12 NOTE — TELEPHONE ENCOUNTER
LMTCB.  I need to remind pt to do INR today. Also need to go over coumadin diet. Pt had a list from a previous Dr.   Dr Misa Hood states that pt mentioned (in the over the weekend call) that she loves certain vegetables such as pea pods.   Dr Misa Hood states it i

## 2018-02-13 ENCOUNTER — TELEPHONE (OUTPATIENT)
Dept: FAMILY MEDICINE CLINIC | Facility: CLINIC | Age: 78
End: 2018-02-13

## 2018-02-13 LAB
INR: 1.8
PT: 18.2 SEC (ref 9–11.5)

## 2018-02-13 RX ORDER — WARFARIN SODIUM 6 MG/1
6 TABLET ORAL DAILY
Qty: 30 TABLET | Refills: 1 | Status: SHIPPED | OUTPATIENT
Start: 2018-02-13 | End: 2018-03-08

## 2018-02-13 RX ORDER — WARFARIN SODIUM 1 MG/1
1 TABLET ORAL NIGHTLY
Qty: 30 TABLET | Refills: 1 | Status: SHIPPED | OUTPATIENT
Start: 2018-02-13 | End: 2018-04-11 | Stop reason: ALTCHOICE

## 2018-02-13 NOTE — TELEPHONE ENCOUNTER
Discussed INR result with pt. Verified current dose is 6mg daily. Pt instructed to take a total of 9mg tonight (1 1/2 tabs of 6mg). Pt aware to increase her dose to 7mg a day starting tomorrow. Pt needs a refill of 6mg. RXs of both sent in.   Patient a

## 2018-02-14 ENCOUNTER — TELEPHONE (OUTPATIENT)
Dept: FAMILY MEDICINE CLINIC | Facility: CLINIC | Age: 78
End: 2018-02-14

## 2018-02-14 ENCOUNTER — OFFICE VISIT (OUTPATIENT)
Dept: FAMILY MEDICINE CLINIC | Facility: CLINIC | Age: 78
End: 2018-02-14

## 2018-02-14 ENCOUNTER — HOSPITAL ENCOUNTER (OUTPATIENT)
Dept: GENERAL RADIOLOGY | Age: 78
Discharge: HOME | End: 2018-02-14
Attending: FAMILY MEDICINE
Payer: MEDICARE

## 2018-02-14 VITALS
RESPIRATION RATE: 18 BRPM | WEIGHT: 169 LBS | TEMPERATURE: 98 F | BODY MASS INDEX: 34 KG/M2 | SYSTOLIC BLOOD PRESSURE: 120 MMHG | HEART RATE: 69 BPM | DIASTOLIC BLOOD PRESSURE: 72 MMHG | OXYGEN SATURATION: 96 %

## 2018-02-14 DIAGNOSIS — R06.2 WHEEZING: ICD-10-CM

## 2018-02-14 DIAGNOSIS — R05.9 COUGH: Primary | ICD-10-CM

## 2018-02-14 DIAGNOSIS — I48.20 CHRONIC ATRIAL FIBRILLATION (HCC): ICD-10-CM

## 2018-02-14 DIAGNOSIS — R05.9 COUGH: ICD-10-CM

## 2018-02-14 PROBLEM — I77.9 BILATERAL CAROTID ARTERY DISEASE: Status: ACTIVE | Noted: 2018-02-14

## 2018-02-14 PROBLEM — I77.9 BILATERAL CAROTID ARTERY DISEASE (HCC): Status: ACTIVE | Noted: 2018-02-14

## 2018-02-14 PROCEDURE — 94640 AIRWAY INHALATION TREATMENT: CPT | Performed by: FAMILY MEDICINE

## 2018-02-14 PROCEDURE — 99214 OFFICE O/P EST MOD 30 MIN: CPT | Performed by: FAMILY MEDICINE

## 2018-02-14 PROCEDURE — 87798 DETECT AGENT NOS DNA AMP: CPT | Performed by: FAMILY MEDICINE

## 2018-02-14 PROCEDURE — 87581 M.PNEUMON DNA AMP PROBE: CPT | Performed by: FAMILY MEDICINE

## 2018-02-14 PROCEDURE — 87486 CHLMYD PNEUM DNA AMP PROBE: CPT | Performed by: FAMILY MEDICINE

## 2018-02-14 PROCEDURE — 71046 X-RAY EXAM CHEST 2 VIEWS: CPT | Performed by: FAMILY MEDICINE

## 2018-02-14 PROCEDURE — 87633 RESP VIRUS 12-25 TARGETS: CPT | Performed by: FAMILY MEDICINE

## 2018-02-14 PROCEDURE — 96372 THER/PROPH/DIAG INJ SC/IM: CPT | Performed by: FAMILY MEDICINE

## 2018-02-14 RX ORDER — METHYLPREDNISOLONE SODIUM SUCCINATE 125 MG/2ML
125 INJECTION, POWDER, LYOPHILIZED, FOR SOLUTION INTRAMUSCULAR; INTRAVENOUS ONCE
Status: COMPLETED | OUTPATIENT
Start: 2018-02-14 | End: 2018-02-14

## 2018-02-14 RX ORDER — CEFDINIR 300 MG/1
300 CAPSULE ORAL 2 TIMES DAILY
Qty: 20 CAPSULE | Refills: 0 | Status: SHIPPED | OUTPATIENT
Start: 2018-02-14 | End: 2018-02-16

## 2018-02-14 RX ORDER — ALBUTEROL SULFATE 2.5 MG/3ML
2.5 SOLUTION RESPIRATORY (INHALATION) EVERY 4 HOURS PRN
Qty: 1 BOX | Refills: 2 | Status: SHIPPED | OUTPATIENT
Start: 2018-02-14 | End: 2018-12-17

## 2018-02-14 RX ORDER — NEBULIZER ACCESSORIES
KIT MISCELLANEOUS
Qty: 1 PACKAGE | Refills: 0 | Status: SHIPPED | OUTPATIENT
Start: 2018-02-14 | End: 2019-07-29 | Stop reason: CLARIF

## 2018-02-14 RX ORDER — IPRATROPIUM BROMIDE AND ALBUTEROL SULFATE 2.5; .5 MG/3ML; MG/3ML
3 SOLUTION RESPIRATORY (INHALATION) ONCE
Status: COMPLETED | OUTPATIENT
Start: 2018-02-14 | End: 2018-02-14

## 2018-02-14 RX ORDER — AZITHROMYCIN 250 MG/1
TABLET, FILM COATED ORAL
Qty: 6 TABLET | Refills: 0 | Status: SHIPPED | OUTPATIENT
Start: 2018-02-14 | End: 2018-02-21

## 2018-02-14 RX ADMIN — IPRATROPIUM BROMIDE AND ALBUTEROL SULFATE 3 ML: 2.5; .5 SOLUTION RESPIRATORY (INHALATION) at 13:47:00

## 2018-02-14 RX ADMIN — METHYLPREDNISOLONE SODIUM SUCCINATE 125 MG: 125 INJECTION, POWDER, LYOPHILIZED, FOR SOLUTION INTRAMUSCULAR; INTRAVENOUS at 13:47:00

## 2018-02-14 NOTE — TELEPHONE ENCOUNTER
Spoke with patient's daughter Franca Delmar, [place] per HIPAA.  Informed her per Dr Misa Hood xray was negative for Pneumonia, however with patient's symptoms Dr would like her to start the antibiotic called into pharmacy, Franca Jacobsen states understanding and will pass informa

## 2018-02-14 NOTE — PROGRESS NOTES
Gloria Cohen is a 77year old female. Patient presents with:  Back Pain: lungs  Cough: x5 days  Wheezing      HPI:   Cough and wheezing  Patient complains of productive cough and wheezing x5 days. Sputum is green and yellow in color.  She denies feve directed Disp: 1 Package Rfl: 0   Warfarin Sodium 6 MG Oral Tab Take 1 tablet (6 mg total) by mouth daily.  (Patient taking differently: Take 7 mg by mouth daily.  ) Disp: 30 tablet Rfl: 1   Warfarin Sodium 1 MG Oral Tab Take 1 tablet (1 mg total) by mouth rashes,no suspicious lesions  HEENT: atraumatic, normocephalic,PERRL, EOMI, nl conjunctiva, ears and throat are clear  NECK: supple,no adenopathy   LUNGS: no tachypnea or retractions, diffuse wheezing; mild bibasilar rhonchi  CARDIO: RRR without murmur  EX Respiratory Therapy Supplies (NEBULIZER/TUBING/MOUTHPIECE) Does not apply Kit 1 Package 0      Sig: Use as directed             Patient/Caregiver Education: Patient/Caregiver Education: There are no barriers to learning. Medical education done.    Outcome:

## 2018-02-14 NOTE — TELEPHONE ENCOUNTER
Pt was placed on Zpack today so Dr Alysa Shen states to stay on 6mg daily while on the abt. Pharmacy aware ok to fill Z pack while on warfarin.

## 2018-02-15 LAB

## 2018-02-16 ENCOUNTER — TELEPHONE (OUTPATIENT)
Dept: FAMILY MEDICINE CLINIC | Facility: CLINIC | Age: 78
End: 2018-02-16

## 2018-02-16 RX ORDER — OSELTAMIVIR PHOSPHATE 75 MG/1
75 CAPSULE ORAL 2 TIMES DAILY
Qty: 10 CAPSULE | Refills: 0 | Status: SHIPPED | OUTPATIENT
Start: 2018-02-16 | End: 2018-02-21

## 2018-02-16 NOTE — TELEPHONE ENCOUNTER
----- Message from Sanford Calderon MD sent at 2/16/2018  7:58 AM CST -----  Results reviewed. Please inform patient stop abx and start tamiflu 75mg po bid x5 days.   Follow up next week

## 2018-02-16 NOTE — TELEPHONE ENCOUNTER
I called pt at [phone] and verified 2 patient identifiers: name & . I discussed results and recommendations at length with patient. All orders placed. All questions answered.   Pt aware to stop the cefdinir and finish the Zpak per conversation w

## 2018-02-21 ENCOUNTER — TELEPHONE (OUTPATIENT)
Dept: FAMILY MEDICINE CLINIC | Facility: CLINIC | Age: 78
End: 2018-02-21

## 2018-02-21 ENCOUNTER — OFFICE VISIT (OUTPATIENT)
Dept: FAMILY MEDICINE CLINIC | Facility: CLINIC | Age: 78
End: 2018-02-21

## 2018-02-21 VITALS
TEMPERATURE: 98 F | SYSTOLIC BLOOD PRESSURE: 120 MMHG | BODY MASS INDEX: 33.87 KG/M2 | RESPIRATION RATE: 16 BRPM | HEART RATE: 76 BPM | WEIGHT: 168 LBS | OXYGEN SATURATION: 98 % | HEIGHT: 59 IN | DIASTOLIC BLOOD PRESSURE: 70 MMHG

## 2018-02-21 DIAGNOSIS — R79.1 SUPRATHERAPEUTIC INR: ICD-10-CM

## 2018-02-21 DIAGNOSIS — I10 ESSENTIAL HYPERTENSION: ICD-10-CM

## 2018-02-21 DIAGNOSIS — E78.2 MIXED HYPERLIPIDEMIA: ICD-10-CM

## 2018-02-21 DIAGNOSIS — J11.1 INFLUENZA: Primary | ICD-10-CM

## 2018-02-21 DIAGNOSIS — I48.20 CHRONIC ATRIAL FIBRILLATION (HCC): ICD-10-CM

## 2018-02-21 LAB
INR: 4.1
PT: 40.1 SEC (ref 9–11.5)

## 2018-02-21 PROCEDURE — 99214 OFFICE O/P EST MOD 30 MIN: CPT | Performed by: FAMILY MEDICINE

## 2018-02-21 NOTE — TELEPHONE ENCOUNTER
I spoke to Dr Shawn Wooten regarding what dose to do until seen by Dr Lesley Martinez and established with coumadin clinic. Since pt was recently on Zpak and Tamiflu, Dr Shawn Wooten agrees to give it 2 more weeks to see if the INR can be controlled.   Pt was subtherapeutic pr

## 2018-02-21 NOTE — TELEPHONE ENCOUNTER
Patient without signs or symptoms of bleeding  Slight change to warfarin schedule -   No warfarin tonight  Then for next 3 days warfarin 6mg  Start 7mg on Sunday   Discussed with patient - handout corrected and given

## 2018-02-21 NOTE — TELEPHONE ENCOUNTER
----- Message from Zenon Spatz, MD sent at 2/21/2018  8:03 AM CST -----  Results reviewed. Please inform patient hold coumadin tonight - refer to coumadin clinic for further treatment - have not been able to keep controlled.

## 2018-02-26 ENCOUNTER — TELEPHONE (OUTPATIENT)
Dept: FAMILY MEDICINE CLINIC | Facility: CLINIC | Age: 78
End: 2018-02-26

## 2018-02-26 ENCOUNTER — APPOINTMENT (OUTPATIENT)
Dept: LAB | Age: 78
End: 2018-02-26
Attending: FAMILY MEDICINE
Payer: MEDICARE

## 2018-02-26 DIAGNOSIS — I48.20 CHRONIC ATRIAL FIBRILLATION (HCC): Primary | ICD-10-CM

## 2018-02-26 DIAGNOSIS — R79.1 SUPRATHERAPEUTIC INR: ICD-10-CM

## 2018-02-26 DIAGNOSIS — I48.20 CHRONIC ATRIAL FIBRILLATION (HCC): ICD-10-CM

## 2018-02-26 DIAGNOSIS — R04.0 BLEEDING FROM THE NOSE: ICD-10-CM

## 2018-02-26 LAB
INR BLD: 3.07 (ref 0.89–1.12)
PSA SERPL DL<=0.01 NG/ML-MCNC: 31.7 SECONDS (ref 11.8–14.1)

## 2018-02-26 PROCEDURE — 36415 COLL VENOUS BLD VENIPUNCTURE: CPT

## 2018-02-26 PROCEDURE — 85610 PROTHROMBIN TIME: CPT

## 2018-02-26 NOTE — TELEPHONE ENCOUNTER
Patient took 7mg coumadin last night, after she got out of bed she had nosebleed, Dr Janina Ashley told her to call if she had nosebleed, please f/u with pt

## 2018-02-26 NOTE — TELEPHONE ENCOUNTER
Discussed INR result with pt. Pt instructed to take 6mg daily  Patient aware to repeat a INR on Thurs. Flowsheet updated.

## 2018-02-26 NOTE — TELEPHONE ENCOUNTER
I spoke with Dr Julianna Sosa. Dr Julianna Sosa states ER if nose is actively bleeding. If not, [address] for STAT INR. I called and spoke to pt. Pt nose bleed has stopped. Pt aware to go to [facility] for STAT INR.

## 2018-03-02 ENCOUNTER — TELEPHONE (OUTPATIENT)
Dept: FAMILY MEDICINE CLINIC | Facility: CLINIC | Age: 78
End: 2018-03-02

## 2018-03-02 LAB
INR: 4
PT: 39.6 SEC (ref 9–11.5)

## 2018-03-02 NOTE — TELEPHONE ENCOUNTER
Discussed INR result with pt. Verified current dose is 6mg daily. Pt instructed to hold tonight's dose and decrease to 5mg a day. Patient aware to repeat a INR on Mon.  ER warnings given for high INR. Flowsheet updated.

## 2018-03-02 NOTE — TELEPHONE ENCOUNTER
----- Message from Ana Rojas MD sent at 3/2/2018  4:08 PM CST -----  Hold tonight's coumadin - starting tomorrow - 5mg daily - repeat INR on Monday

## 2018-03-06 ENCOUNTER — TELEPHONE (OUTPATIENT)
Dept: FAMILY MEDICINE CLINIC | Facility: CLINIC | Age: 78
End: 2018-03-06

## 2018-03-06 LAB
INR: 2.1
PT: 21.3 SEC (ref 9–11.5)

## 2018-03-06 NOTE — TELEPHONE ENCOUNTER
PSR ok to lynblanquita me. LMTCB on home and cell. I will try pt again before I leave and if no response, I isabel leave detailed instructions for pt.

## 2018-03-06 NOTE — TELEPHONE ENCOUNTER
Discussed INR result with pt. Verified current dose is 5mg daily. Pt instructed to increase to 6mg on T, Th, & Sat and 5 mg all other days  Patient aware to repeat a INR in 1 week. Flowsheet updated.

## 2018-03-06 NOTE — TELEPHONE ENCOUNTER
----- Message from Joe Durant MD sent at 3/6/2018  7:46 AM CST -----  Results reviewed. Tests show INR within goal. Tonight take 6mg. Tue/Thur/Sat - take 6mg   Mon/Wed/Fri/Sun - take 5mg   Repeat INR in 1 week  Please inform patient.

## 2018-03-08 RX ORDER — WARFARIN SODIUM 5 MG/1
5 TABLET ORAL NIGHTLY
Qty: 30 TABLET | Refills: 1 | Status: SHIPPED | OUTPATIENT
Start: 2018-03-08 | End: 2018-05-21

## 2018-03-08 RX ORDER — WARFARIN SODIUM 6 MG/1
6 TABLET ORAL DAILY
Qty: 30 TABLET | Refills: 1 | Status: SHIPPED | OUTPATIENT
Start: 2018-03-08 | End: 2018-05-21

## 2018-03-08 NOTE — TELEPHONE ENCOUNTER
Medication(s) to Refill:   Pending Prescriptions Disp Refills    Warfarin Sodium 6 MG Oral Tab 30 tablet 1     Sig: Take 1 tablet (6 mg total) by mouth daily.       Warfarin Sodium 5 MG Oral Tab 30 tablet 0     Sig: Take 1 tablet (5 mg total) by mouth night

## 2018-03-08 NOTE — TELEPHONE ENCOUNTER
Pt needs refills of Warfarin Sodium of both 5mg & 6mg patient stated. Pls send to [facility] [address] in [place].

## 2018-03-13 ENCOUNTER — TELEPHONE (OUTPATIENT)
Dept: FAMILY MEDICINE CLINIC | Facility: CLINIC | Age: 78
End: 2018-03-13

## 2018-03-13 LAB
INR: 2.3
PT: 23.6 SEC (ref 9–11.5)

## 2018-03-13 NOTE — TELEPHONE ENCOUNTER
----- Message from Maykel Barbosa MD sent at 3/13/2018  8:03 AM CDT -----  Results reviewed. INR stable - cont current dose - repeat in 1 week. Please inform patient.

## 2018-03-13 NOTE — TELEPHONE ENCOUNTER
Called and spoke with pt. Pt informed of lab results below. Pt states that she will be out of town next week until Tuesday and that she thought she would be able to wait one month before having her labs redrawn.  Spoke with Dr. Kirt Escobar and informed her of pat

## 2018-03-21 LAB
INR: 2.5
PT: 24.9 SEC (ref 9–11.5)

## 2018-03-22 ENCOUNTER — TELEPHONE (OUTPATIENT)
Dept: FAMILY MEDICINE CLINIC | Facility: CLINIC | Age: 78
End: 2018-03-22

## 2018-03-23 ENCOUNTER — TELEPHONE (OUTPATIENT)
Dept: FAMILY MEDICINE CLINIC | Facility: CLINIC | Age: 78
End: 2018-03-23

## 2018-03-23 NOTE — TELEPHONE ENCOUNTER
----- Message from Justo Meng DO sent at 3/23/2018  3:46 PM CDT -----  Results within goal for A. fib. To new current Coumadin dosing repeat in 1 week.

## 2018-03-23 NOTE — TELEPHONE ENCOUNTER
PSR I asked patient to call back just to verify she our message about her coumadin. Thanks. VM left for patient notifying her that her level is 2.5, continue current coumadin regimen, and repeat INR level in 1 week.  I asked her to call the office back

## 2018-03-26 NOTE — TELEPHONE ENCOUNTER
Discussed INR result with pt. Verified current dose is 6mg Iniguez, Tues, Thurs, & Sat. She takes 5mg MWF. Our records had her doing the 5mg on more day-Sun. Pt states she has been doing this for 2-3 weeks.   Pt instructed to continue the way she is taking i

## 2018-03-28 LAB
INR: 2.8
PT: 27.9 SEC (ref 9–11.5)

## 2018-03-30 ENCOUNTER — TELEPHONE (OUTPATIENT)
Dept: FAMILY MEDICINE CLINIC | Facility: CLINIC | Age: 78
End: 2018-03-30

## 2018-03-30 NOTE — TELEPHONE ENCOUNTER
----- Message from Keyla Bueno MD sent at 3/30/2018 12:19 PM CDT -----  inr  therapeutic  Continue with current coumadin dose Repeat inr in 2 weeks

## 2018-03-30 NOTE — TELEPHONE ENCOUNTER
Left message to continue same dose of coumadin and repeat in 2 weeks. Patient asked to call back to let us know she got this instruction.

## 2018-04-04 ENCOUNTER — HOSPITAL ENCOUNTER (OUTPATIENT)
Dept: GENERAL RADIOLOGY | Age: 78
Discharge: HOME | End: 2018-04-04
Attending: PHYSICIAN ASSISTANT
Payer: MEDICARE

## 2018-04-04 ENCOUNTER — OFFICE VISIT (OUTPATIENT)
Dept: FAMILY MEDICINE CLINIC | Facility: CLINIC | Age: 78
End: 2018-04-04

## 2018-04-04 VITALS
HEIGHT: 59 IN | OXYGEN SATURATION: 97 % | WEIGHT: 174 LBS | SYSTOLIC BLOOD PRESSURE: 122 MMHG | TEMPERATURE: 98 F | DIASTOLIC BLOOD PRESSURE: 80 MMHG | HEART RATE: 80 BPM | RESPIRATION RATE: 16 BRPM | BODY MASS INDEX: 35.08 KG/M2

## 2018-04-04 DIAGNOSIS — M54.41 ACUTE RIGHT-SIDED LOW BACK PAIN WITH RIGHT-SIDED SCIATICA: ICD-10-CM

## 2018-04-04 DIAGNOSIS — M54.41 ACUTE RIGHT-SIDED LOW BACK PAIN WITH RIGHT-SIDED SCIATICA: Primary | ICD-10-CM

## 2018-04-04 PROBLEM — E66.01 SEVERE OBESITY (BMI 35.0-39.9) WITH COMORBIDITY (HCC): Chronic | Status: ACTIVE | Noted: 2018-04-04

## 2018-04-04 PROCEDURE — 72110 X-RAY EXAM L-2 SPINE 4/>VWS: CPT | Performed by: PHYSICIAN ASSISTANT

## 2018-04-04 PROCEDURE — 99213 OFFICE O/P EST LOW 20 MIN: CPT | Performed by: PHYSICIAN ASSISTANT

## 2018-04-04 NOTE — PROGRESS NOTES
CHIEF COMPLAINT:     Patient presents with:  Hip Pain: right hip,fell 2 months ago. fell in the house. HPI:   Karyn Gan is a 77year old female who presents with complaints of right hip pain for two months.   The patient explains she fell 2 mo total) by mouth before breakfast. Disp: 90 tablet Rfl: 3   Multiple Vitamins-Minerals (COMPLETE DAILY/LUTEIN) Oral Tab Take by mouth. Disp:  Rfl:    Calcium Carb-Cholecalciferol (CALCIUM 600+D3 OR) Take by mouth.  Disp:  Rfl:    Omega-3 Fatty Acids (FISH OI not inflamed. No sinus tenderness. THROAT: oral mucosa pink, moist and intact. No visible dental caries. Posterior pharynx without erythema or exudates. NECK: supple, non-tender. LUNGS: clear to auscultation bilaterally, no wheezes or rhonchi.  Breathin agreed. ASSESSMENT AND PLAN:     ASSESSMENT:  Acute right-sided low back pain with right-sided sciatica  (primary encounter diagnosis)    PLAN:    Patient Instructions   1. Tylenol  2. Rest  3.  Follow up with PCP or ortho spine ASAP

## 2018-04-11 ENCOUNTER — OFFICE VISIT (OUTPATIENT)
Dept: FAMILY MEDICINE CLINIC | Facility: CLINIC | Age: 78
End: 2018-04-11

## 2018-04-11 ENCOUNTER — HOSPITAL ENCOUNTER (OUTPATIENT)
Dept: GENERAL RADIOLOGY | Age: 78
Discharge: HOME | End: 2018-04-11
Attending: PHYSICIAN ASSISTANT
Payer: MEDICARE

## 2018-04-11 ENCOUNTER — TELEPHONE (OUTPATIENT)
Dept: FAMILY MEDICINE CLINIC | Facility: CLINIC | Age: 78
End: 2018-04-11

## 2018-04-11 VITALS
OXYGEN SATURATION: 98 % | BODY MASS INDEX: 34.88 KG/M2 | HEIGHT: 59 IN | DIASTOLIC BLOOD PRESSURE: 68 MMHG | TEMPERATURE: 98 F | RESPIRATION RATE: 16 BRPM | SYSTOLIC BLOOD PRESSURE: 114 MMHG | WEIGHT: 173 LBS | HEART RATE: 84 BPM

## 2018-04-11 DIAGNOSIS — I48.20 CHRONIC ATRIAL FIBRILLATION (HCC): ICD-10-CM

## 2018-04-11 DIAGNOSIS — E03.9 ACQUIRED HYPOTHYROIDISM: ICD-10-CM

## 2018-04-11 DIAGNOSIS — E78.2 MIXED HYPERLIPIDEMIA: ICD-10-CM

## 2018-04-11 DIAGNOSIS — Z13.0 SCREENING FOR ENDOCRINE, METABOLIC AND IMMUNITY DISORDER: ICD-10-CM

## 2018-04-11 DIAGNOSIS — N18.30 CKD (CHRONIC KIDNEY DISEASE) STAGE 3, GFR 30-59 ML/MIN (HCC): ICD-10-CM

## 2018-04-11 DIAGNOSIS — I10 ESSENTIAL HYPERTENSION: ICD-10-CM

## 2018-04-11 DIAGNOSIS — M25.551 PAIN OF RIGHT HIP JOINT: Primary | ICD-10-CM

## 2018-04-11 DIAGNOSIS — Z13.228 SCREENING FOR ENDOCRINE, METABOLIC AND IMMUNITY DISORDER: ICD-10-CM

## 2018-04-11 DIAGNOSIS — M25.551 PAIN OF RIGHT HIP JOINT: ICD-10-CM

## 2018-04-11 DIAGNOSIS — Z13.29 SCREENING FOR ENDOCRINE, METABOLIC AND IMMUNITY DISORDER: ICD-10-CM

## 2018-04-11 PROCEDURE — 99214 OFFICE O/P EST MOD 30 MIN: CPT | Performed by: PHYSICIAN ASSISTANT

## 2018-04-11 PROCEDURE — 73502 X-RAY EXAM HIP UNI 2-3 VIEWS: CPT | Performed by: PHYSICIAN ASSISTANT

## 2018-04-11 RX ORDER — CYCLOBENZAPRINE HCL 5 MG
5 TABLET ORAL NIGHTLY
Qty: 20 TABLET | Refills: 0 | Status: SHIPPED | OUTPATIENT
Start: 2018-04-11 | End: 2018-04-11 | Stop reason: CLARIF

## 2018-04-11 NOTE — TELEPHONE ENCOUNTER
I spoke to Fabby and pt is ok to repeat in 1 month. Pt has had 4 therapeutic readings in a row since the dose change on 3/5/18.   Dr Leena Pedro had stated on 3/13/18 that if she had one more therapeutic reading its ok to go to a month but other providers hav

## 2018-04-11 NOTE — TELEPHONE ENCOUNTER
----- Message from Yonathan Chong PA-C sent at 4/11/2018 10:53 AM CDT -----  INR at goal. Continue current dose-repeat in two weeks

## 2018-04-11 NOTE — PROGRESS NOTES
Patient presents with:  Hip Pain: right hip, fell a few months ago     HPI:     Liliana Combs is a 77year old female who presents today with a chief complaint of right hip pain. Pain radiates to the thigh. She fell two months ago.  She fell as she ruperto HIP + PELVIS MIN 4 VIEWS RIGHT (CPT=73503);  Future-No acute fracture   -     OP REFERRAL TO [facility]  - Moist heat to area   - Tylenol prn pain   - Follow up in one month, sooner if worsening pain     Chronic atrial fibrillation ([address]

## 2018-04-24 ENCOUNTER — OFFICE VISIT (OUTPATIENT)
Dept: PHYSICAL THERAPY | Age: 78
End: 2018-04-24
Attending: INTERNAL MEDICINE
Payer: MEDICARE

## 2018-04-24 DIAGNOSIS — M25.551 PAIN OF RIGHT HIP JOINT: ICD-10-CM

## 2018-04-24 PROCEDURE — 97163 PT EVAL HIGH COMPLEX 45 MIN: CPT | Performed by: PHYSICAL THERAPIST

## 2018-04-24 PROCEDURE — 97110 THERAPEUTIC EXERCISES: CPT | Performed by: PHYSICAL THERAPIST

## 2018-04-24 NOTE — PROGRESS NOTES
LOWER EXTREMITY EVALUATION:   Referring Physician: Dr. Beryl Camara  Diagnosis: Pain of right hip      Date of Service: 4/24/2018     PATIENT SUMMARY   Marcus Mancilla is a 77year old y/o female who presents to therapy today with complaints of right hip pa mobility. There is right lateral hip pain. Right LE range at 25% of of normal for right hip   T/L ROM: Forward bend - hands to mid tibia, right thoracic rotation produced right hip pain.   Left SB - right hip pain    Flexibility:  Bilateral quad tightness to actively participate in planning and for this course of care.     Thank you for your referral. If you have any questions, please contact me at Dept: [phone]    Sincerely,  Electronically signed by therapist: Jl Talavera PT    Physician's certif

## 2018-04-27 ENCOUNTER — TELEPHONE (OUTPATIENT)
Dept: FAMILY MEDICINE CLINIC | Facility: CLINIC | Age: 78
End: 2018-04-27

## 2018-04-27 DIAGNOSIS — I48.20 CHRONIC ATRIAL FIBRILLATION (HCC): Primary | ICD-10-CM

## 2018-04-27 NOTE — TELEPHONE ENCOUNTER
There is a current standing order, however, I placed a new on just in case because pt states [facility] state they can not see one. I faxed a copy to [address] [place] [address] at [phone]. Pt aware.

## 2018-04-27 NOTE — TELEPHONE ENCOUNTER
Pt called to have Prothrombin Time test order entered into system for her Coumadin; she states she will have it done on either the 5th or the 14th but will be sure it is done before her MA Supervisit on the 21st. Please call on Home number once entered int

## 2018-04-30 ENCOUNTER — OFFICE VISIT (OUTPATIENT)
Dept: PHYSICAL THERAPY | Age: 78
End: 2018-04-30
Attending: FAMILY MEDICINE
Payer: MEDICARE

## 2018-04-30 PROCEDURE — 97140 MANUAL THERAPY 1/> REGIONS: CPT | Performed by: PHYSICAL THERAPIST

## 2018-04-30 PROCEDURE — 97110 THERAPEUTIC EXERCISES: CPT | Performed by: PHYSICAL THERAPIST

## 2018-04-30 NOTE — PROGRESS NOTES
Dx: Right hip pain          Authorized # of Visits:  8         Next MD visit: none scheduled  Fall Risk: standard         Precautions: n/a             Subjective: The patient states she performed her HEP and feels she is a bit better.  Pain is still with th

## 2018-05-02 ENCOUNTER — OFFICE VISIT (OUTPATIENT)
Dept: PHYSICAL THERAPY | Age: 78
End: 2018-05-02
Attending: FAMILY MEDICINE
Payer: MEDICARE

## 2018-05-02 PROCEDURE — 97110 THERAPEUTIC EXERCISES: CPT | Performed by: PHYSICAL THERAPIST

## 2018-05-02 PROCEDURE — 97140 MANUAL THERAPY 1/> REGIONS: CPT | Performed by: PHYSICAL THERAPIST

## 2018-05-02 NOTE — PROGRESS NOTES
Dx: Right hip pain          Authorized # of Visits:  8         Next MD visit: none scheduled  Fall Risk: standard         Precautions: n/a             Subjective: The patient states she is feeling much better.  She still has some pain when she first stands

## 2018-05-14 ENCOUNTER — PRIOR ORIGINAL RECORDS (OUTPATIENT)
Dept: OTHER | Age: 78
End: 2018-05-14

## 2018-05-15 ENCOUNTER — TELEPHONE (OUTPATIENT)
Dept: FAMILY MEDICINE CLINIC | Facility: CLINIC | Age: 78
End: 2018-05-15

## 2018-05-16 ENCOUNTER — OFFICE VISIT (OUTPATIENT)
Dept: PHYSICAL THERAPY | Age: 78
End: 2018-05-16
Attending: FAMILY MEDICINE
Payer: MEDICARE

## 2018-05-16 ENCOUNTER — TELEPHONE (OUTPATIENT)
Dept: FAMILY MEDICINE CLINIC | Facility: CLINIC | Age: 78
End: 2018-05-16

## 2018-05-16 PROCEDURE — 97140 MANUAL THERAPY 1/> REGIONS: CPT | Performed by: PHYSICAL THERAPIST

## 2018-05-16 PROCEDURE — 97110 THERAPEUTIC EXERCISES: CPT | Performed by: PHYSICAL THERAPIST

## 2018-05-16 NOTE — TELEPHONE ENCOUNTER
----- Message from Ni Corona MD sent at 5/16/2018  9:37 AM CDT -----  Running high, hold coumadin for 2 days, resume at current dose, recheck INR in 1 week.

## 2018-05-16 NOTE — TELEPHONE ENCOUNTER
Discussed INR result with pt. Verified current dose is 5mg MWF & 6mg all other days. Pt states she took new supplement for muscle cramps the other day-[facility] muscle cramps-she does not know whats in it, she [place]  She will hold off on chase

## 2018-05-16 NOTE — PROGRESS NOTES
Discharge Summary    Pt has attended 4 visits in Physical Therapy. Subjective: The patient states she is ready for discharge. She has no pain. Assessment: The patient's strength and ROM are WNL.  She has a slight deficit in right hip figure four RO Inf/lat glides, long axis distraction right and left hip Inf/lat glides, long axis distraction right and left hip Inf/lat glide, distraction right hip       Soft tissue mob bilat IT with the \"stick\"  Bridge 20 reps  Bridge 10 reps        Leg load with

## 2018-05-21 ENCOUNTER — APPOINTMENT (OUTPATIENT)
Dept: PHYSICAL THERAPY | Age: 78
End: 2018-05-21
Attending: FAMILY MEDICINE
Payer: MEDICARE

## 2018-05-21 ENCOUNTER — OFFICE VISIT (OUTPATIENT)
Dept: FAMILY MEDICINE CLINIC | Facility: CLINIC | Age: 78
End: 2018-05-21

## 2018-05-21 VITALS
SYSTOLIC BLOOD PRESSURE: 136 MMHG | HEART RATE: 81 BPM | RESPIRATION RATE: 16 BRPM | TEMPERATURE: 98 F | OXYGEN SATURATION: 98 % | BODY MASS INDEX: 35.88 KG/M2 | HEIGHT: 59 IN | WEIGHT: 178 LBS | DIASTOLIC BLOOD PRESSURE: 74 MMHG

## 2018-05-21 DIAGNOSIS — N18.30 CKD (CHRONIC KIDNEY DISEASE) STAGE 3, GFR 30-59 ML/MIN (HCC): ICD-10-CM

## 2018-05-21 DIAGNOSIS — I77.9 BILATERAL CAROTID ARTERY DISEASE (HCC): ICD-10-CM

## 2018-05-21 DIAGNOSIS — J44.9 CHRONIC OBSTRUCTIVE PULMONARY DISEASE, UNSPECIFIED COPD TYPE (HCC): ICD-10-CM

## 2018-05-21 DIAGNOSIS — M47.816 ARTHRITIS, LUMBAR SPINE: ICD-10-CM

## 2018-05-21 DIAGNOSIS — E66.01 SEVERE OBESITY (BMI 35.0-39.9) WITH COMORBIDITY (HCC): ICD-10-CM

## 2018-05-21 DIAGNOSIS — I48.20 CHRONIC ATRIAL FIBRILLATION (HCC): Primary | ICD-10-CM

## 2018-05-21 DIAGNOSIS — Z13.820 SCREENING FOR OSTEOPOROSIS: ICD-10-CM

## 2018-05-21 DIAGNOSIS — E78.2 MIXED HYPERLIPIDEMIA: ICD-10-CM

## 2018-05-21 DIAGNOSIS — E03.9 ACQUIRED HYPOTHYROIDISM: ICD-10-CM

## 2018-05-21 DIAGNOSIS — I10 ESSENTIAL HYPERTENSION: ICD-10-CM

## 2018-05-21 PROCEDURE — 96160 PT-FOCUSED HLTH RISK ASSMT: CPT | Performed by: PHYSICIAN ASSISTANT

## 2018-05-21 PROCEDURE — G0439 PPPS, SUBSEQ VISIT: HCPCS | Performed by: PHYSICIAN ASSISTANT

## 2018-05-21 RX ORDER — SIMVASTATIN 40 MG
40 TABLET ORAL NIGHTLY
Qty: 90 TABLET | Refills: 3 | Status: SHIPPED | OUTPATIENT
Start: 2018-05-21 | End: 2019-04-04

## 2018-05-21 RX ORDER — LISINOPRIL 5 MG/1
5 TABLET ORAL DAILY
Qty: 90 TABLET | Refills: 3 | Status: ON HOLD | OUTPATIENT
Start: 2018-05-21 | End: 2019-03-13

## 2018-05-21 RX ORDER — WARFARIN SODIUM 6 MG/1
6 TABLET ORAL DAILY
Qty: 90 TABLET | Refills: 0 | Status: SHIPPED | OUTPATIENT
Start: 2018-05-21 | End: 2018-06-14

## 2018-05-21 RX ORDER — WARFARIN SODIUM 5 MG/1
5 TABLET ORAL NIGHTLY
Qty: 90 TABLET | Refills: 0 | Status: SHIPPED | OUTPATIENT
Start: 2018-05-21 | End: 2018-06-14

## 2018-05-21 RX ORDER — LEVOTHYROXINE SODIUM 88 UG/1
88 TABLET ORAL
Qty: 90 TABLET | Refills: 3 | Status: SHIPPED | OUTPATIENT
Start: 2018-05-21 | End: 2019-04-04

## 2018-05-21 NOTE — PROGRESS NOTES
REASON FOR VISIT:    Sydnee Spurling is a 77year old female who presents for a MA Supervisit.        Patient Care Team: Patient Care Team:  Cassie Campa MD as PCP - General (Family Medicine)  Susan Motley PT as Physical Therapist  Pancho Brown ( MCG Oral Tab Take 1 tablet (88 mcg total) by mouth before breakfast. Disp: 90 tablet Rfl: 3   Multiple Vitamins-Minerals (COMPLETE DAILY/LUTEIN) Oral Tab Take by mouth. Disp:  Rfl:    Calcium Carb-Cholecalciferol (CALCIUM 600+D3 OR) Take by mouth.  Disp:  R without help  Taking medications as prescribed: Able without help  Are you able to afford your medications?: Yes  Hearing Problems?: No     Functional Status     Hearing Problems?: No  Vision Problems? : No  Difficulty walking?: Yes  Difficulty dressing or Sigmoidoscopy Screen every 5 years No results found for this or any previous visit.     Fecal Occult Blood Annually No results found for: FOB, OCCULTSTOOL   Glaucoma Screening     Ophthalmology Visit Annually 01/2018   Immunizations     Zoster (Not covered CARDIOVASCULAR: denies chest pain on exertion  GI: denies abdominal pain, denies heartburn  NEURO: denies headaches  PSYCHE: denies depression or anxiety    EXAM:   /74   Pulse 81   Temp 98 °F (36.7 °C) (Oral)   Resp 16   Ht 59\"   Wt 178 lb   SpO2 Future    Chronic obstructive pulmonary disease, unspecified COPD type ([facility] [address]        - Stable with albuterol prn    Acquired hypothyroidism  -     Levothyroxine Sodium 88 MCG Oral Tab;  Take 1 tablet (88 mcg total) by mouth before breakfast.  - Labs at goal

## 2018-05-23 ENCOUNTER — TELEPHONE (OUTPATIENT)
Dept: FAMILY MEDICINE CLINIC | Facility: CLINIC | Age: 78
End: 2018-05-23

## 2018-05-23 ENCOUNTER — APPOINTMENT (OUTPATIENT)
Dept: PHYSICAL THERAPY | Age: 78
End: 2018-05-23
Attending: FAMILY MEDICINE
Payer: MEDICARE

## 2018-05-23 DIAGNOSIS — I48.20 CHRONIC ATRIAL FIBRILLATION (HCC): Primary | ICD-10-CM

## 2018-05-23 NOTE — TELEPHONE ENCOUNTER
Order placed and faxed back to [address] [place] [address] at [phone]. Confirmation received.

## 2018-05-25 ENCOUNTER — TELEPHONE (OUTPATIENT)
Dept: FAMILY MEDICINE CLINIC | Facility: CLINIC | Age: 78
End: 2018-05-25

## 2018-05-25 DIAGNOSIS — I48.20 CHRONIC ATRIAL FIBRILLATION (HCC): Primary | ICD-10-CM

## 2018-05-25 NOTE — TELEPHONE ENCOUNTER
Notes recorded by Candace Farrar MD on 5/25/2018 at 2:24 PM CDT  Continue with current dose of coumadin, Repeat INR in 5 days

## 2018-05-25 NOTE — TELEPHONE ENCOUNTER
Discussed INR result with pt. Verified current dose is 5mg MWF & 6mg all other days. Pt instructed to continue her current dose. Patient aware to repeat a INR in 5 days. Flowsheet updated.

## 2018-05-29 ENCOUNTER — APPOINTMENT (OUTPATIENT)
Dept: PHYSICAL THERAPY | Age: 78
End: 2018-05-29
Attending: FAMILY MEDICINE
Payer: MEDICARE

## 2018-05-29 NOTE — TELEPHONE ENCOUNTER
Medication(s) to Refill:   Pending Prescriptions Disp Refills    VENTOLIN  (90 Base) MCG/ACT Inhalation Aero Soln [Pharmacy Med Name: VENTOLIN  (90 Base) MCG/ACT Aerosol Solution] 54 g 3     Sig: INHALE 2 PUFFS INTO THE LUNGS EVERY 6 (SIX) HO

## 2018-05-31 ENCOUNTER — TELEPHONE (OUTPATIENT)
Dept: FAMILY MEDICINE CLINIC | Facility: CLINIC | Age: 78
End: 2018-05-31

## 2018-05-31 NOTE — TELEPHONE ENCOUNTER
----- Message from Dave Laguerre PA-C sent at 5/31/2018  9:38 AM CDT -----  INR at goal. Continue current dose and repeat in one month

## 2018-05-31 NOTE — TELEPHONE ENCOUNTER
Patient advised that her INR is 2.4. I instructed her to continue her current regimen and repeat her INR in 1 month. Verbalized understanding. Patient verified that she is taking 5mg MWF and 6mg all other days.

## 2018-06-06 ENCOUNTER — HOSPITAL ENCOUNTER (OUTPATIENT)
Dept: BONE DENSITY | Age: 78
Discharge: HOME | End: 2018-06-06
Attending: PHYSICIAN ASSISTANT
Payer: MEDICARE

## 2018-06-06 DIAGNOSIS — Z13.820 SCREENING FOR OSTEOPOROSIS: ICD-10-CM

## 2018-06-06 DIAGNOSIS — M47.816 ARTHRITIS, LUMBAR SPINE: ICD-10-CM

## 2018-06-06 PROCEDURE — 77080 DXA BONE DENSITY AXIAL: CPT | Performed by: PHYSICIAN ASSISTANT

## 2018-06-07 ENCOUNTER — TELEPHONE (OUTPATIENT)
Dept: FAMILY MEDICINE CLINIC | Facility: CLINIC | Age: 78
End: 2018-06-07

## 2018-06-07 DIAGNOSIS — I48.20 CHRONIC ATRIAL FIBRILLATION (HCC): ICD-10-CM

## 2018-06-07 NOTE — TELEPHONE ENCOUNTER
Called and spoke with Larry with [facility]. Per Larry, pt does not need a PA complete however the pharmacist would like clarification of Rx.  Pharmacist informed that pt takes 5mg Monday, Wednesday and Friday and provider would like a 90 day s

## 2018-06-07 NOTE — TELEPHONE ENCOUNTER
Patient received a letter from [facility] for a prior authoization for the following two medications:  Warfarin Sodium 5 MG Oral Tab 90 tablet 0 5/21/2018 8/19/2018   Sig :  Take 1 tablet (5 mg total) by mouth nightly.      Route:   Oral       Warfar

## 2018-06-11 ENCOUNTER — TELEPHONE (OUTPATIENT)
Dept: FAMILY MEDICINE CLINIC | Facility: CLINIC | Age: 78
End: 2018-06-11

## 2018-06-11 NOTE — TELEPHONE ENCOUNTER
----- Message from Dank Hughes PA-C sent at 6/11/2018  1:53 PM CDT -----  Osteopenia -continue calcium and vitamin d.  Repeat in 2 years

## 2018-06-14 RX ORDER — WARFARIN SODIUM 5 MG/1
5 TABLET ORAL
COMMUNITY
Start: 2018-06-15 | End: 2018-08-09

## 2018-06-14 RX ORDER — WARFARIN SODIUM 6 MG/1
6 TABLET ORAL DAILY
Qty: 90 TABLET | Refills: 0 | COMMUNITY
Start: 2018-06-14 | End: 2018-08-09

## 2018-06-14 NOTE — TELEPHONE ENCOUNTER
Patient called back for a status of her Warfarin and when the medication will be at the pharmacy. Patient has only 1 week left. Please advise.

## 2018-06-14 NOTE — TELEPHONE ENCOUNTER
Called and spoke to [facility] at [address]. Per [facility], prescriptions were processed and shipped on 6/11/18. May take 3-5 days after shipment to receive. Estimated delivery date of tomorrow.  If not received after Monday, patient should call [facility]

## 2018-06-21 ENCOUNTER — PRIOR ORIGINAL RECORDS (OUTPATIENT)
Dept: OTHER | Age: 78
End: 2018-06-21

## 2018-06-25 LAB
ALBUMIN: 3.8 G/DL
ALKALINE PHOSPHATATE(ALK PHOS): 63 IU/L
BILIRUBIN TOTAL: 0.4 MG/DL
BUN: 28 MG/DL
CALCIUM: 9.1 MG/DL
CHLORIDE: 107 MEQ/L
CHOLESTEROL, TOTAL: 176 MG/DL
CREATININE, SERUM: 1.21 MG/DL
GLUCOSE: 88 MG/DL
HDL CHOLESTEROL: 60 MG/DL
HEMATOCRIT: 37.1 %
HEMOGLOBIN: 12.4 G/DL
LDL CHOLESTEROL: 92 MG/DL
PLATELETS: 249 K/UL
POTASSIUM, SERUM: 4.5 MEQ/L
PROTEIN, TOTAL: 6.4 G/DL
RED BLOOD COUNT: 4.18 X 10-6/U
SGOT (AST): 19 IU/L
SGPT (ALT): 14 IU/L
SODIUM: 142 MEQ/L
THYROID STIMULATING HORMONE: 1.39 MLU/L
TRIGLYCERIDES: 139 MG/DL
VITAMIN D 25-OH: 40 NG/ML
WHITE BLOOD COUNT: 7.1 X 10-3/U

## 2018-06-28 ENCOUNTER — TELEPHONE (OUTPATIENT)
Dept: FAMILY MEDICINE CLINIC | Facility: CLINIC | Age: 78
End: 2018-06-28

## 2018-06-28 NOTE — TELEPHONE ENCOUNTER
Called and spoke to patient. Reviewed results and plan of care below. Confirmed current Coumadin dose. Patient states understanding. No further questions. Flowsheet updated.

## 2018-06-28 NOTE — TELEPHONE ENCOUNTER
----- Message from Fabiano Vital MD sent at 6/28/2018 11:52 AM CDT -----  Results reviewed. INR stable at goal. Repeat in 1 mo  Please inform patient.

## 2018-07-03 ENCOUNTER — MED REC SCAN ONLY (OUTPATIENT)
Dept: FAMILY MEDICINE CLINIC | Facility: CLINIC | Age: 78
End: 2018-07-03

## 2018-07-16 ENCOUNTER — TELEPHONE (OUTPATIENT)
Dept: FAMILY MEDICINE CLINIC | Facility: CLINIC | Age: 78
End: 2018-07-16

## 2018-07-16 NOTE — TELEPHONE ENCOUNTER
Avis from [address] [place] [address] called and asked for diagnosis for the patient's Vitamin D test. She can be contacted at [phone].

## 2018-07-16 NOTE — TELEPHONE ENCOUNTER
I called [facility].  Pt had vitamin D lab done in May and not covered. Screening code was used. DX HTN and fatigue added.

## 2018-07-26 LAB
INR: 2.4
PT: 23.6 SEC (ref 9–11.5)

## 2018-07-27 ENCOUNTER — TELEPHONE (OUTPATIENT)
Dept: FAMILY MEDICINE CLINIC | Facility: CLINIC | Age: 78
End: 2018-07-27

## 2018-07-27 NOTE — TELEPHONE ENCOUNTER
Discussed INR result with pt. Verified current dose is 5mg MWF & 6mg all other days. Pt instructed to continue current dose. Patient aware to repeat a INR in 1 month. Flowsheet updated.

## 2018-08-09 DIAGNOSIS — I48.20 CHRONIC ATRIAL FIBRILLATION (HCC): ICD-10-CM

## 2018-08-09 RX ORDER — BLOOD GLUCOSE CONTROL HIGH,LOW
EACH MISCELLANEOUS
Qty: 1 EACH | Refills: 2 | Status: CANCELLED | OUTPATIENT
Start: 2018-08-09

## 2018-08-09 RX ORDER — BLOOD SUGAR DIAGNOSTIC
STRIP MISCELLANEOUS
Qty: 100 STRIP | Refills: 2 | Status: CANCELLED | OUTPATIENT
Start: 2018-08-09

## 2018-08-09 RX ORDER — LANCETS
EACH MISCELLANEOUS
Qty: 100 EACH | Refills: 2 | Status: CANCELLED | OUTPATIENT
Start: 2018-08-09

## 2018-08-10 RX ORDER — WARFARIN SODIUM 5 MG/1
5 TABLET ORAL
Qty: 30 TABLET | Refills: 0 | Status: SHIPPED | OUTPATIENT
Start: 2018-08-10 | End: 2018-10-11

## 2018-08-10 RX ORDER — WARFARIN SODIUM 6 MG/1
6 TABLET ORAL DAILY
Qty: 90 TABLET | Refills: 0 | Status: SHIPPED | OUTPATIENT
Start: 2018-08-10 | End: 2018-10-11

## 2018-08-10 NOTE — TELEPHONE ENCOUNTER
Medication(s) to Refill:   Pending Prescriptions Disp Refills    Warfarin Sodium 5 MG Oral Tab 30 tablet 0     Sig: Take 1 tablet (5 mg total) by mouth 3 (three) times a week. Monday, Wednesday, and Friday.       Warfarin Sodium 6 MG Oral Tab 90 tablet 0

## 2018-08-14 ENCOUNTER — HOSPITAL ENCOUNTER (OUTPATIENT)
Dept: CV DIAGNOSTICS | Age: 78
Discharge: HOME | End: 2018-08-14
Attending: INTERNAL MEDICINE
Payer: MEDICARE

## 2018-08-14 ENCOUNTER — PRIOR ORIGINAL RECORDS (OUTPATIENT)
Dept: OTHER | Age: 78
End: 2018-08-14

## 2018-08-14 ENCOUNTER — MYAURORA ACCOUNT LINK (OUTPATIENT)
Dept: OTHER | Age: 78
End: 2018-08-14

## 2018-08-14 DIAGNOSIS — I65.23 BILATERAL CAROTID ARTERY STENOSIS: ICD-10-CM

## 2018-08-14 DIAGNOSIS — R09.89 CAROTID BRUIT, UNSPECIFIED LATERALITY: ICD-10-CM

## 2018-08-16 ENCOUNTER — PRIOR ORIGINAL RECORDS (OUTPATIENT)
Dept: OTHER | Age: 78
End: 2018-08-16

## 2018-08-30 ENCOUNTER — TELEPHONE (OUTPATIENT)
Dept: FAMILY MEDICINE CLINIC | Facility: CLINIC | Age: 78
End: 2018-08-30

## 2018-08-30 DIAGNOSIS — I48.20 CHRONIC ATRIAL FIBRILLATION (HCC): Primary | ICD-10-CM

## 2018-08-30 LAB
INR: 3.1
PT: 29.9 SEC (ref 9–11.5)

## 2018-08-30 NOTE — TELEPHONE ENCOUNTER
Spoke with pt regarding results and instructions listed below. Pt verbalizes understanding. FYI: Pt states she may not be able to make it in on Tuesday to recheck PT/INR, pt states if so she will come into the lab Wednesday to have it drawn.

## 2018-08-30 NOTE — TELEPHONE ENCOUNTER
LVM for pt to call back to review test results. PSR: OK to [facility] triage. Thank you! PER CHEIKH Johnston FOR PT TO HAVE DONE Tuesday DUE TO THE HOLIDAY. Repeat Pt/INR placed for Tuesday.

## 2018-08-30 NOTE — TELEPHONE ENCOUNTER
----- Message from Lisa Topete MD sent at 8/30/2018  9:52 AM CDT -----  INR mildly elevated - no change in medication - watch diet closely - keep consistent  Repeat on Monday

## 2018-09-06 ENCOUNTER — TELEPHONE (OUTPATIENT)
Dept: FAMILY MEDICINE CLINIC | Facility: CLINIC | Age: 78
End: 2018-09-06

## 2018-09-06 LAB
INR: 2.6
PT: 25.7 SEC (ref 9–11.5)

## 2018-09-06 NOTE — TELEPHONE ENCOUNTER
----- Message from Eduarda Byrne MD sent at 9/6/2018  8:54 AM CDT -----  Results reviewed. INR at goal - repeat in 2 weeks. Please inform patient.

## 2018-09-20 ENCOUNTER — TELEPHONE (OUTPATIENT)
Dept: FAMILY MEDICINE CLINIC | Facility: CLINIC | Age: 78
End: 2018-09-20

## 2018-09-20 DIAGNOSIS — I48.91 ATRIAL FIBRILLATION, UNSPECIFIED TYPE (HCC): Primary | ICD-10-CM

## 2018-09-20 LAB
INR: 2.9
PT: 28.1 SEC (ref 9–11.5)

## 2018-09-20 NOTE — TELEPHONE ENCOUNTER
Spoke with patient regarding results below. Patient verbalizes understanding to all instructions and has no further questions.

## 2018-09-20 NOTE — TELEPHONE ENCOUNTER
----- Message from Daniella Morales MD sent at 9/20/2018  9:04 AM CDT -----  Results reviewed. Tests show no significant abnormalities.  At goal  Repeat in 2 weeks

## 2018-10-05 ENCOUNTER — TELEPHONE (OUTPATIENT)
Dept: FAMILY MEDICINE CLINIC | Facility: CLINIC | Age: 78
End: 2018-10-05

## 2018-10-05 DIAGNOSIS — I48.20 CHRONIC ATRIAL FIBRILLATION (HCC): Primary | ICD-10-CM

## 2018-10-05 NOTE — TELEPHONE ENCOUNTER
----- Message from Lisa Topete MD sent at 10/4/2018  9:46 PM CDT -----  No change in plan- repeat in 1 week

## 2018-10-05 NOTE — TELEPHONE ENCOUNTER
Spoke with pt regarding results and instructions listed below. Pt verbalizes understanding. Repeat INR placed for 1 week. Dose verified with pt. Flowsheet updated.

## 2018-10-11 ENCOUNTER — OFFICE VISIT (OUTPATIENT)
Dept: FAMILY MEDICINE CLINIC | Facility: CLINIC | Age: 78
End: 2018-10-11

## 2018-10-11 VITALS
HEART RATE: 88 BPM | HEIGHT: 59 IN | BODY MASS INDEX: 37.09 KG/M2 | TEMPERATURE: 98 F | SYSTOLIC BLOOD PRESSURE: 138 MMHG | OXYGEN SATURATION: 98 % | WEIGHT: 184 LBS | RESPIRATION RATE: 16 BRPM | DIASTOLIC BLOOD PRESSURE: 70 MMHG

## 2018-10-11 DIAGNOSIS — E03.9 ACQUIRED HYPOTHYROIDISM: Primary | ICD-10-CM

## 2018-10-11 DIAGNOSIS — J44.9 CHRONIC OBSTRUCTIVE PULMONARY DISEASE, UNSPECIFIED COPD TYPE (HCC): ICD-10-CM

## 2018-10-11 DIAGNOSIS — I10 ESSENTIAL HYPERTENSION: ICD-10-CM

## 2018-10-11 DIAGNOSIS — E78.2 MIXED HYPERLIPIDEMIA: ICD-10-CM

## 2018-10-11 DIAGNOSIS — I48.20 CHRONIC ATRIAL FIBRILLATION (HCC): ICD-10-CM

## 2018-10-11 PROCEDURE — 99214 OFFICE O/P EST MOD 30 MIN: CPT | Performed by: FAMILY MEDICINE

## 2018-10-11 PROCEDURE — G0008 ADMIN INFLUENZA VIRUS VAC: HCPCS | Performed by: FAMILY MEDICINE

## 2018-10-11 PROCEDURE — 90653 IIV ADJUVANT VACCINE IM: CPT | Performed by: FAMILY MEDICINE

## 2018-10-11 RX ORDER — WARFARIN SODIUM 5 MG/1
5 TABLET ORAL
Qty: 30 TABLET | Refills: 0 | Status: SHIPPED | OUTPATIENT
Start: 2018-10-12 | End: 2018-12-18

## 2018-10-11 RX ORDER — WARFARIN SODIUM 6 MG/1
6 TABLET ORAL DAILY
Qty: 30 TABLET | Refills: 0 | Status: SHIPPED | OUTPATIENT
Start: 2018-10-11 | End: 2018-12-18 | Stop reason: DRUGHIGH

## 2018-10-11 NOTE — PROGRESS NOTES
[facility] Office Note  Chief Complaint:   Patient presents with:  Medication Follow-Up      HPI:   This is a 77year old female coming in for  HPI  Afib  On anticoagulation   Within INR goal   No epistaxis, hematuria, hematochezi samplesLot: [id number]: 12/30/18 Disp: 2 Inhaler Rfl: 0   VENTOLIN  (90 Base) MCG/ACT Inhalation Aero Soln INHALE 2 PUFFS INTO THE LUNGS EVERY 6 (SIX) HOURS AS NEEDED FOR WHEEZING OR SHORTNESS OF BREATH.  Disp: 54 g Rfl: 3   Levothyroxine Sodium 8 97.7 °F (36.5 °C) (Oral)   Resp 16   Ht 59\"   Wt 184 lb   SpO2 98%   BMI 37.16 kg/m²  Estimated body mass index is 37.16 kg/m² as calculated from the following:    Height as of this encounter: 59\". Weight as of this encounter: 184 lb.    Vital signs re Return in about 3 months (around 1/11/2019) for COPD, blood pressure.     Meds & Refills for this Visit:  Requested Prescriptions     Signed Prescriptions Disp Refills   • Warfarin Sodium 5 MG Oral Tab 30 tablet 0     Sig: Take 1 tablet (5 mg total) b

## 2018-10-12 ENCOUNTER — TELEPHONE (OUTPATIENT)
Dept: FAMILY MEDICINE CLINIC | Facility: CLINIC | Age: 78
End: 2018-10-12

## 2018-10-12 DIAGNOSIS — I48.20 CHRONIC ATRIAL FIBRILLATION (HCC): ICD-10-CM

## 2018-10-12 NOTE — TELEPHONE ENCOUNTER
----- Message from Kena Edwards MD sent at 10/11/2018  9:12 AM CDT -----  Results reviewed. Tests show no significant abnormalities. INR at goal. - repeat in 2 weeks. Please inform patient.

## 2018-10-12 NOTE — TELEPHONE ENCOUNTER
Patient aware of lab result. Advised to make no change in her dose, recheck in 2 weeks. Verbalized understanding. Flow sheet updated.

## 2018-10-13 RX ORDER — WARFARIN SODIUM 6 MG/1
TABLET ORAL
Qty: 52 TABLET | Refills: 0 | OUTPATIENT
Start: 2018-10-13

## 2018-10-13 RX ORDER — WARFARIN SODIUM 5 MG/1
TABLET ORAL
Qty: 39 TABLET | Refills: 0 | OUTPATIENT
Start: 2018-10-13

## 2018-10-13 NOTE — TELEPHONE ENCOUNTER
Medication(s) to Refill:   Requested Prescriptions     Pending Prescriptions Disp Refills   • WARFARIN SODIUM 5 MG Oral Tab [Pharmacy Med Name: WARFARIN SODIUM 5 MG Tablet] 39 tablet 0     Sig: TAKE 1 TABLET ON MONDAY, WEDNESDAY AND FRIDAY   • WARFARIN SOD

## 2018-10-26 ENCOUNTER — TELEPHONE (OUTPATIENT)
Dept: FAMILY MEDICINE CLINIC | Facility: CLINIC | Age: 78
End: 2018-10-26

## 2018-10-26 NOTE — TELEPHONE ENCOUNTER
----- Message from Brooklynn Villanueva MD sent at 10/26/2018  8:20 AM CDT -----  At goal - no change - repeat in 2 weeks

## 2018-10-26 NOTE — TELEPHONE ENCOUNTER
Patient aware of INR results. No change to dose. Recheck in 2 weeks. Verbalized understanding. No questions.

## 2018-11-30 ENCOUNTER — TELEPHONE (OUTPATIENT)
Dept: FAMILY MEDICINE CLINIC | Facility: CLINIC | Age: 78
End: 2018-11-30

## 2018-11-30 DIAGNOSIS — I48.20 CHRONIC ATRIAL FIBRILLATION (HCC): Primary | ICD-10-CM

## 2018-11-30 NOTE — TELEPHONE ENCOUNTER
Pt aware warfarin 5mg every day and repeat an INR on Wed. Pt states she will not get her INR drawn until she is back to [place]. She states she will go on 12/17. I urged pt to go to the lab on Wed. She states she has no ride.   She states her son had to take

## 2018-11-30 NOTE — TELEPHONE ENCOUNTER
I called pt and she states she had an INR done in [place] on 11/26/18 and has not heard any response. She states she is taking 5mg M,W, F and 6mg all other days.   I called [facility] to request it because there is no INR in Epic and Dr Irena Corona did not receive anything

## 2018-12-03 ENCOUNTER — MED REC SCAN ONLY (OUTPATIENT)
Dept: FAMILY MEDICINE CLINIC | Facility: CLINIC | Age: 78
End: 2018-12-03

## 2018-12-17 ENCOUNTER — OFFICE VISIT (OUTPATIENT)
Dept: FAMILY MEDICINE CLINIC | Facility: CLINIC | Age: 78
End: 2018-12-17

## 2018-12-17 VITALS
HEIGHT: 59 IN | SYSTOLIC BLOOD PRESSURE: 132 MMHG | OXYGEN SATURATION: 98 % | RESPIRATION RATE: 16 BRPM | BODY MASS INDEX: 37.5 KG/M2 | TEMPERATURE: 98 F | WEIGHT: 186 LBS | DIASTOLIC BLOOD PRESSURE: 72 MMHG | HEART RATE: 78 BPM

## 2018-12-17 DIAGNOSIS — R06.2 WHEEZING: ICD-10-CM

## 2018-12-17 DIAGNOSIS — J40 BRONCHITIS: Primary | ICD-10-CM

## 2018-12-17 DIAGNOSIS — R05.9 COUGH: ICD-10-CM

## 2018-12-17 PROCEDURE — 96372 THER/PROPH/DIAG INJ SC/IM: CPT | Performed by: FAMILY MEDICINE

## 2018-12-17 PROCEDURE — 94640 AIRWAY INHALATION TREATMENT: CPT | Performed by: FAMILY MEDICINE

## 2018-12-17 PROCEDURE — 99214 OFFICE O/P EST MOD 30 MIN: CPT | Performed by: FAMILY MEDICINE

## 2018-12-17 RX ORDER — IPRATROPIUM BROMIDE AND ALBUTEROL SULFATE 2.5; .5 MG/3ML; MG/3ML
3 SOLUTION RESPIRATORY (INHALATION) ONCE
Status: COMPLETED | OUTPATIENT
Start: 2018-12-17 | End: 2018-12-17

## 2018-12-17 RX ORDER — METHYLPREDNISOLONE SODIUM SUCCINATE 125 MG/2ML
125 INJECTION, POWDER, LYOPHILIZED, FOR SOLUTION INTRAMUSCULAR; INTRAVENOUS ONCE
Status: COMPLETED | OUTPATIENT
Start: 2018-12-17 | End: 2018-12-17

## 2018-12-17 RX ORDER — PREDNISONE 20 MG/1
40 TABLET ORAL DAILY
Qty: 10 TABLET | Refills: 0 | Status: SHIPPED | OUTPATIENT
Start: 2018-12-18 | End: 2018-12-23

## 2018-12-17 RX ORDER — ALBUTEROL SULFATE 2.5 MG/3ML
2.5 SOLUTION RESPIRATORY (INHALATION) EVERY 4 HOURS PRN
Qty: 1 BOX | Refills: 2 | Status: ON HOLD | OUTPATIENT
Start: 2018-12-17 | End: 2019-03-14

## 2018-12-17 RX ADMIN — IPRATROPIUM BROMIDE AND ALBUTEROL SULFATE 3 ML: 2.5; .5 SOLUTION RESPIRATORY (INHALATION) at 16:22:00

## 2018-12-17 RX ADMIN — METHYLPREDNISOLONE SODIUM SUCCINATE 125 MG: 125 INJECTION, POWDER, LYOPHILIZED, FOR SOLUTION INTRAMUSCULAR; INTRAVENOUS at 16:23:00

## 2018-12-17 NOTE — PROGRESS NOTES
[facility] Family [facility] Office Note  Chief Complaint:   Patient presents with:  Cough: x2 weeks, wheezing  Chest Congestion      HPI:   This is a 77year old female coming in for  HPI  Cough and chest congestion   2 weeks  With hx of COPD  Wit tablet Rfl: 0   VENTOLIN  (90 Base) MCG/ACT Inhalation Aero Soln INHALE 2 PUFFS INTO THE LUNGS EVERY 6 (SIX) HOURS AS NEEDED FOR WHEEZING OR SHORTNESS OF BREATH.  Disp: 54 g Rfl: 3   Levothyroxine Sodium 88 MCG Oral Tab Take 1 tablet (88 mcg total) b well groomed. Physical Exam   Constitutional: She is oriented to person, place, and time. She appears well-developed and well-nourished.    HENT:   Right Ear: Tympanic membrane and ear canal normal.   Left Ear: Tympanic membrane and ear canal normal.   Nos Active Problem List:     Chronic atrial fibrillation (HCC)     Acquired hypothyroidism     Mixed hyperlipidemia     Essential hypertension     Primary osteoarthritis of right shoulder     CKD (chronic kidney disease) stage 3, GFR 30-59 ml/min ([facility])     Art

## 2018-12-17 NOTE — PATIENT INSTRUCTIONS
Use albuterol nebulizer every 4 hours   Viral Bronchitis (Adult)    You have a viral bronchitis. Bronchitis is inflammation and swelling of the lining of the lungs. This is often caused by an infection.  Symptoms include a dry, hacking cough that is worse a lungs.  · Over-the-counter cough, cold, and sore-throat medicines will not shorten the length of the illness, but they may help to reduce symptoms. Don't use decongestants if you have high blood pressure.   Follow-up care  Follow up with your healthcare pro

## 2018-12-18 ENCOUNTER — TELEPHONE (OUTPATIENT)
Dept: FAMILY MEDICINE CLINIC | Facility: CLINIC | Age: 78
End: 2018-12-18

## 2018-12-18 DIAGNOSIS — I48.20 CHRONIC ATRIAL FIBRILLATION (HCC): ICD-10-CM

## 2018-12-18 RX ORDER — WARFARIN SODIUM 5 MG/1
5 TABLET ORAL DAILY
Qty: 30 TABLET | Refills: 2 | Status: SHIPPED | OUTPATIENT
Start: 2018-12-18 | End: 2019-03-30

## 2018-12-18 NOTE — TELEPHONE ENCOUNTER
Discussed INR result with pt. Verified current dose is 5mg daily. Pt instructed to continue current dose. Patient aware to repeat a INR in 1 month. Flowsheet updated.

## 2018-12-18 NOTE — TELEPHONE ENCOUNTER
----- Message from Saulo Monae MD sent at 12/18/2018  3:20 PM CST -----  Results reviewed. INR at goal - continue current medication   Repeat in 1 mo  Please inform patient.

## 2018-12-19 ENCOUNTER — HOSPITAL ENCOUNTER (OUTPATIENT)
Dept: GENERAL RADIOLOGY | Age: 78
Discharge: HOME | End: 2018-12-19
Attending: FAMILY MEDICINE
Payer: MEDICARE

## 2018-12-19 ENCOUNTER — OFFICE VISIT (OUTPATIENT)
Dept: FAMILY MEDICINE CLINIC | Facility: CLINIC | Age: 78
End: 2018-12-19

## 2018-12-19 ENCOUNTER — TELEPHONE (OUTPATIENT)
Dept: FAMILY MEDICINE CLINIC | Facility: CLINIC | Age: 78
End: 2018-12-19

## 2018-12-19 VITALS
RESPIRATION RATE: 16 BRPM | DIASTOLIC BLOOD PRESSURE: 64 MMHG | WEIGHT: 186 LBS | BODY MASS INDEX: 37.5 KG/M2 | OXYGEN SATURATION: 95 % | SYSTOLIC BLOOD PRESSURE: 162 MMHG | HEART RATE: 92 BPM | HEIGHT: 59 IN | TEMPERATURE: 98 F

## 2018-12-19 DIAGNOSIS — J40 BRONCHITIS: ICD-10-CM

## 2018-12-19 DIAGNOSIS — R06.89 DECREASED BREATH SOUNDS: ICD-10-CM

## 2018-12-19 DIAGNOSIS — I48.20 CHRONIC ATRIAL FIBRILLATION (HCC): Primary | ICD-10-CM

## 2018-12-19 DIAGNOSIS — R07.89 OTHER CHEST PAIN: ICD-10-CM

## 2018-12-19 DIAGNOSIS — J40 BRONCHITIS: Primary | ICD-10-CM

## 2018-12-19 DIAGNOSIS — I10 ESSENTIAL HYPERTENSION: ICD-10-CM

## 2018-12-19 DIAGNOSIS — I48.20 CHRONIC ATRIAL FIBRILLATION (HCC): ICD-10-CM

## 2018-12-19 PROCEDURE — 99214 OFFICE O/P EST MOD 30 MIN: CPT | Performed by: FAMILY MEDICINE

## 2018-12-19 PROCEDURE — 71046 X-RAY EXAM CHEST 2 VIEWS: CPT | Performed by: FAMILY MEDICINE

## 2018-12-19 RX ORDER — CEFDINIR 300 MG/1
300 CAPSULE ORAL 2 TIMES DAILY
Qty: 20 CAPSULE | Refills: 0 | Status: SHIPPED | OUTPATIENT
Start: 2018-12-19 | End: 2018-12-29

## 2018-12-19 NOTE — TELEPHONE ENCOUNTER
I called pt at [phone] and verified 2 patient identifiers: name & . I discussed results and recommendations at length with patient. Encouraged her to take some deep breaths several times a day. All orders placed.      Do we need to check an INR s

## 2018-12-19 NOTE — PATIENT INSTRUCTIONS
Uncertain Causes of Chest Pain    Chest pain can happen for a number of reasons. Sometimes the cause can't be determined.  If your condition does not seem serious, and your pain does not appear to be coming from your heart, your healthcare provider may re [address] [facility] [place], [address] [place] [address]. All rights reserved. This information is not intended as a substitute for professional medical care. Always follow your healthcare professional's instructions.

## 2018-12-19 NOTE — TELEPHONE ENCOUNTER
----- Message from Sari Malcolm MD sent at 12/19/2018  3:48 PM CST -----  Results reviewed. Please inform patient we will start antibiotic to cover for possible pneumonia - cefdinir 300mg po bid x10 day.

## 2018-12-20 NOTE — TELEPHONE ENCOUNTER
Cefdinir does not interact with warfarin - but we can check in 2 weeks - if INR still 2-3 then monthly

## 2018-12-23 NOTE — PROGRESS NOTES
[facility] Family [facility] Office Note  Chief Complaint:   Patient presents with:  Cough: f/u, still wheezing      HPI:   This is a 77year old female coming in for  HPI  Bronchitis  Pt states she is improving  Cough is getting better  Some wheez daily. Disp: 90 tablet Rfl: 3   simvastatin 40 MG Oral Tab Take 1 tablet (40 mg total) by mouth nightly.  Disp: 90 tablet Rfl: 3   Respiratory Therapy Supplies (NEBULIZER/TUBING/MOUTHPIECE) Does not apply Kit Use as directed Disp: 1 Package Rfl: 0   Glucosa rhythm. No murmur heard. Pulmonary/Chest: Effort normal. No accessory muscle usage. No tachypnea. No respiratory distress. She has decreased breath sounds in the left upper field and the left lower field. She has wheezes.    Neurological: She is alert an ([facility].)

## 2018-12-24 ENCOUNTER — TELEPHONE (OUTPATIENT)
Dept: FAMILY MEDICINE CLINIC | Facility: CLINIC | Age: 78
End: 2018-12-24

## 2018-12-24 NOTE — TELEPHONE ENCOUNTER
I called pt at [phone] and verified 2 patient identifiers: name & . I discussed results and recommendations at length with patient. All questions answered. home

## 2018-12-24 NOTE — TELEPHONE ENCOUNTER
----- Message from Ana Rojas MD sent at 12/21/2018  4:12 PM CST -----  Results reviewed. Tests show no significant abnormalities. Please inform patient.

## 2018-12-26 ENCOUNTER — NURSE ONLY (OUTPATIENT)
Dept: FAMILY MEDICINE CLINIC | Facility: CLINIC | Age: 78
End: 2018-12-26

## 2018-12-26 ENCOUNTER — MED REC SCAN ONLY (OUTPATIENT)
Dept: FAMILY MEDICINE CLINIC | Facility: CLINIC | Age: 78
End: 2018-12-26

## 2018-12-26 VITALS — SYSTOLIC BLOOD PRESSURE: 120 MMHG | DIASTOLIC BLOOD PRESSURE: 68 MMHG

## 2018-12-31 ENCOUNTER — TELEPHONE (OUTPATIENT)
Dept: FAMILY MEDICINE CLINIC | Facility: CLINIC | Age: 78
End: 2018-12-31

## 2018-12-31 DIAGNOSIS — I48.20 CHRONIC ATRIAL FIBRILLATION (HCC): ICD-10-CM

## 2018-12-31 NOTE — TELEPHONE ENCOUNTER
Discussed with [facility]. Pt tests anywhere from 1 week-1 month. She will put the order as weekly so there will be no issues.

## 2018-12-31 NOTE — TELEPHONE ENCOUNTER
Patient is supposed to have her Coumadin done today and there is no order. Please advise patient when is so she can go and get it done.

## 2018-12-31 NOTE — TELEPHONE ENCOUNTER
See TE 12/31/18 [facility] called and needs clairfication for standing order for lab work. Please call them back at [phone].

## 2019-01-01 LAB
INR: 2.7
PT: 26.1 SEC (ref 9–11.5)

## 2019-01-02 ENCOUNTER — TELEPHONE (OUTPATIENT)
Dept: FAMILY MEDICINE CLINIC | Facility: CLINIC | Age: 79
End: 2019-01-02

## 2019-01-09 DIAGNOSIS — I48.20 CHRONIC ATRIAL FIBRILLATION (HCC): ICD-10-CM

## 2019-01-09 RX ORDER — WARFARIN SODIUM 5 MG/1
TABLET ORAL
Qty: 30 TABLET | Refills: 0 | OUTPATIENT
Start: 2019-01-09

## 2019-01-09 NOTE — TELEPHONE ENCOUNTER
Medication(s) to Refill:   Requested Prescriptions     Pending Prescriptions Disp Refills   • WARFARIN SODIUM 5 MG Oral Tab [Pharmacy Med Name: WARFARIN SODIUM 5 MG Tablet] 30 tablet 0     Sig: TAKE 1 TABLET (5 MG TOTAL) 3 (THREE) TIMES A WEEK ON MONDAY, W

## 2019-01-10 NOTE — TELEPHONE ENCOUNTER
Patient is on a different dose - 5mg daily  It looks like this was sent on 12/18 - pharmacy should have this rx on file with 2 refills - please verify

## 2019-01-11 NOTE — TELEPHONE ENCOUNTER
Called the pharmacy and patient does have 2 refills. Contacted patient to let her know that she does have two refills. [facility] had called her to see if she wanted to fill her warfarin through them.  Patient let them know she doesn't have a 90 day prescriptio

## 2019-01-12 ENCOUNTER — OFFICE VISIT (OUTPATIENT)
Dept: FAMILY MEDICINE CLINIC | Facility: CLINIC | Age: 79
End: 2019-01-12
Payer: MEDICARE

## 2019-01-12 ENCOUNTER — HOSPITAL ENCOUNTER (OUTPATIENT)
Dept: GENERAL RADIOLOGY | Age: 79
Discharge: HOME | End: 2019-01-12
Attending: FAMILY MEDICINE
Payer: MEDICARE

## 2019-01-12 ENCOUNTER — LAB ENCOUNTER (OUTPATIENT)
Dept: LAB | Age: 79
End: 2019-01-12
Attending: FAMILY MEDICINE
Payer: MEDICARE

## 2019-01-12 VITALS
BODY MASS INDEX: 38 KG/M2 | WEIGHT: 186 LBS | TEMPERATURE: 98 F | RESPIRATION RATE: 18 BRPM | SYSTOLIC BLOOD PRESSURE: 132 MMHG | HEART RATE: 63 BPM | OXYGEN SATURATION: 95 % | DIASTOLIC BLOOD PRESSURE: 78 MMHG

## 2019-01-12 DIAGNOSIS — R60.0 BILATERAL LEG EDEMA: ICD-10-CM

## 2019-01-12 DIAGNOSIS — R05.8 COUGH WITH SPUTUM: ICD-10-CM

## 2019-01-12 DIAGNOSIS — R06.02 SHORTNESS OF BREATH: ICD-10-CM

## 2019-01-12 DIAGNOSIS — R06.02 SHORTNESS OF BREATH: Primary | ICD-10-CM

## 2019-01-12 LAB
ALBUMIN SERPL-MCNC: 3.3 G/DL (ref 3.1–4.5)
ALBUMIN/GLOB SERPL: 1 {RATIO} (ref 1–2)
ALP LIVER SERPL-CCNC: 72 U/L (ref 55–142)
ALT SERPL-CCNC: 18 U/L (ref 14–54)
ANION GAP SERPL CALC-SCNC: 8 MMOL/L (ref 0–18)
AST SERPL-CCNC: 21 U/L (ref 15–41)
BASOPHILS # BLD AUTO: 0.03 X10(3) UL (ref 0–0.1)
BASOPHILS NFR BLD AUTO: 0.4 %
BILIRUB SERPL-MCNC: 0.4 MG/DL (ref 0.1–2)
BNP SERPL-MCNC: 169 PG/ML (ref 2–99)
BUN BLD-MCNC: 40 MG/DL (ref 8–20)
BUN/CREAT SERPL: 27 (ref 10–20)
CALCIUM BLD-MCNC: 8.7 MG/DL (ref 8.3–10.3)
CHLORIDE SERPL-SCNC: 109 MMOL/L (ref 101–111)
CO2 SERPL-SCNC: 25 MMOL/L (ref 22–32)
CREAT BLD-MCNC: 1.48 MG/DL (ref 0.55–1.02)
EOSINOPHIL # BLD AUTO: 0.47 X10(3) UL (ref 0–0.3)
EOSINOPHIL NFR BLD AUTO: 6.1 %
ERYTHROCYTE [DISTWIDTH] IN BLOOD BY AUTOMATED COUNT: 12.7 % (ref 11.5–16)
GLOBULIN PLAS-MCNC: 3.2 G/DL (ref 2.8–4.4)
GLUCOSE BLD-MCNC: 113 MG/DL (ref 70–99)
HCT VFR BLD AUTO: 38.2 % (ref 34–50)
HGB BLD-MCNC: 12 G/DL (ref 12–16)
IMMATURE GRANULOCYTE COUNT: 0.01 X10(3) UL (ref 0–1)
IMMATURE GRANULOCYTE RATIO %: 0.1 %
LYMPHOCYTES # BLD AUTO: 1.76 X10(3) UL (ref 0.9–4)
LYMPHOCYTES NFR BLD AUTO: 22.8 %
M PROTEIN MFR SERPL ELPH: 6.5 G/DL (ref 6.4–8.2)
MCH RBC QN AUTO: 29.3 PG (ref 27–33.2)
MCHC RBC AUTO-ENTMCNC: 31.4 G/DL (ref 31–37)
MCV RBC AUTO: 93.2 FL (ref 81–100)
MONOCYTES # BLD AUTO: 1.15 X10(3) UL (ref 0.1–1)
MONOCYTES NFR BLD AUTO: 14.9 %
NEUTROPHIL ABS PRELIM: 4.3 X10 (3) UL (ref 1.3–6.7)
NEUTROPHILS # BLD AUTO: 4.3 X10(3) UL (ref 1.3–6.7)
NEUTROPHILS NFR BLD AUTO: 55.7 %
OSMOLALITY SERPL CALC.SUM OF ELEC: 305 MOSM/KG (ref 275–295)
PLATELET # BLD AUTO: 263 10(3)UL (ref 150–450)
POTASSIUM SERPL-SCNC: 4.2 MMOL/L (ref 3.6–5.1)
RBC # BLD AUTO: 4.1 X10(6)UL (ref 3.8–5.1)
RED CELL DISTRIBUTION WIDTH-SD: 43.5 FL (ref 35.1–46.3)
SODIUM SERPL-SCNC: 142 MMOL/L (ref 136–144)
WBC # BLD AUTO: 7.7 X10(3) UL (ref 4–13)

## 2019-01-12 PROCEDURE — 80053 COMPREHEN METABOLIC PANEL: CPT

## 2019-01-12 PROCEDURE — 85025 COMPLETE CBC W/AUTO DIFF WBC: CPT

## 2019-01-12 PROCEDURE — 99214 OFFICE O/P EST MOD 30 MIN: CPT | Performed by: FAMILY MEDICINE

## 2019-01-12 PROCEDURE — 36415 COLL VENOUS BLD VENIPUNCTURE: CPT

## 2019-01-12 PROCEDURE — 83880 ASSAY OF NATRIURETIC PEPTIDE: CPT

## 2019-01-12 PROCEDURE — 71046 X-RAY EXAM CHEST 2 VIEWS: CPT | Performed by: FAMILY MEDICINE

## 2019-01-12 RX ORDER — PREDNISONE 10 MG/1
TABLET ORAL
Qty: 22 TABLET | Refills: 0 | Status: SHIPPED
Start: 2019-01-12 | End: 2019-01-22

## 2019-01-12 NOTE — PROGRESS NOTES
Keyur Damon is a 77year old female who presents for cough and wheezing. Pt was seen by Dr. Bel Coffman on 12/17/18 and 12/19/18 for cough and wheezing. Treated for bronchitis. Was treated with prednisone and Cefdinir.   Pt reports sx improved but did

## 2019-01-12 NOTE — PROGRESS NOTES
[facility] Family Medicine Office Note  Chief Complaint:   Patient presents with:  Wheezing: started 3 weeks ago, after taking antibiotics it came back   Breathing Problem: albuterol at home, works for a little while       HPI:   This is a 77 yea daily. Disp: 30 tablet Rfl: 2   albuterol sulfate (2.5 MG/3ML) 0.083% Inhalation Nebu Soln Take 3 mL (2.5 mg total) by nebulization every 4 (four) hours as needed for Wheezing or Shortness of Breath.  Disp: 1 Box Rfl: 2   VENTOLIN  (90 Base) MCG/ACT Wt 186 lb   SpO2 95%   BMI 37.57 kg/m²  Estimated body mass index is 37.57 kg/m² as calculated from the following:    Height as of 12/19/18: 59\". Weight as of this encounter: 186 lb. Vital signs reviewed. Appears stated age, well groomed.   Physical Ex Refills for this Visit:  Requested Prescriptions     Signed Prescriptions Disp Refills   • predniSONE 10 MG Oral Tab 22 tablet 0     Sig: Take 4 tablets (40 mg total) by mouth daily for 3 days, THEN 2 tablets (20 mg total) daily for 3 days, THEN 1 tablet (

## 2019-01-15 ENCOUNTER — TELEPHONE (OUTPATIENT)
Dept: FAMILY MEDICINE CLINIC | Facility: CLINIC | Age: 79
End: 2019-01-15

## 2019-01-15 DIAGNOSIS — N18.30 CKD (CHRONIC KIDNEY DISEASE) STAGE 3, GFR 30-59 ML/MIN (HCC): Primary | ICD-10-CM

## 2019-01-15 DIAGNOSIS — I48.20 CHRONIC ATRIAL FIBRILLATION (HCC): ICD-10-CM

## 2019-01-15 NOTE — TELEPHONE ENCOUNTER
----- Message from Kena Edwards MD sent at 1/13/2019  8:57 PM CST -----  Results reviewed. Acute kidney disease - finish oral steroids.  Keep self hydrated - water and small amounts of electrolyte hydration solution like pedialyte or gatorade  Repeat bmp in

## 2019-01-15 NOTE — TELEPHONE ENCOUNTER
It was a venous draw. I called [facility] and it wont be ready until tomorrow. It hasnt even been picked up and taken to the lab yet. It can not be made STAT now, its too later for that.   Do you want a fingerstick INR done today at [facility]?  If so, I will a

## 2019-01-15 NOTE — TELEPHONE ENCOUNTER
Spoke with pt regarding results and instructions listed below. Pt states she had a bad nose bleed last night. Pt had INR drawn today at [facility] location.    Pt denies increased bruising and dizziness, states it lasted about two hours and this has onl

## 2019-01-15 NOTE — TELEPHONE ENCOUNTER
Can we check with [facility] about [facility]R result - was it finger stick or lab draw  Please have them call with results

## 2019-01-15 NOTE — TELEPHONE ENCOUNTER
Due to symptomatic bleeding - have patient take half tablet tonight (2.5mg)  Will await for rsults tomorrow  If any further bleeding occurs - go to ER

## 2019-01-16 LAB
INR: 4.2
PT: 39.6 SEC (ref 9–11.5)

## 2019-01-16 NOTE — TELEPHONE ENCOUNTER
Hold dose tonight, then resume tomorrow.  New regimen 2.5MG Mon/Fri, 5mg all other days  Repeat Tues

## 2019-01-16 NOTE — TELEPHONE ENCOUNTER
Discussed INR result with pt. Verified pt took only 2.5mg last night. Pt instructed to hold today's dose, then take 2.5mg M & F and 5 mg all other days. Patient aware to repeat a INR on Tues. Flowsheet updated.

## 2019-01-23 LAB
INR: 2
PT: 19.5 SEC (ref 9–11.5)

## 2019-01-24 ENCOUNTER — TELEPHONE (OUTPATIENT)
Dept: FAMILY MEDICINE CLINIC | Facility: CLINIC | Age: 79
End: 2019-01-24

## 2019-01-24 NOTE — TELEPHONE ENCOUNTER
Spoke to pt regarding INR and recheck in 1 week. Pt will CPM - warfarin 2.5mg M/Fr, 5mg all other days. Pt verbalizes understanding. She also states she will get BMP done as well.

## 2019-01-24 NOTE — TELEPHONE ENCOUNTER
----- Message from Gwen Diez MD sent at 1/24/2019  9:01 AM CST -----  Repeat in 1 week   INR at goal

## 2019-02-01 ENCOUNTER — APPOINTMENT (OUTPATIENT)
Dept: LAB | Age: 79
End: 2019-02-01
Attending: FAMILY MEDICINE
Payer: MEDICARE

## 2019-02-01 DIAGNOSIS — N18.30 CKD (CHRONIC KIDNEY DISEASE) STAGE 3, GFR 30-59 ML/MIN (HCC): ICD-10-CM

## 2019-02-01 DIAGNOSIS — I48.20 CHRONIC ATRIAL FIBRILLATION (HCC): ICD-10-CM

## 2019-02-01 LAB
ANION GAP SERPL CALC-SCNC: 6 MMOL/L (ref 0–18)
BUN BLD-MCNC: 24 MG/DL (ref 8–20)
BUN/CREAT SERPL: 19.8 (ref 10–20)
CALCIUM BLD-MCNC: 9.1 MG/DL (ref 8.3–10.3)
CHLORIDE SERPL-SCNC: 110 MMOL/L (ref 101–111)
CO2 SERPL-SCNC: 27 MMOL/L (ref 22–32)
CREAT BLD-MCNC: 1.21 MG/DL (ref 0.55–1.02)
GLUCOSE BLD-MCNC: 83 MG/DL (ref 70–99)
INR BLD: 2.2 (ref 0.9–1.1)
OSMOLALITY SERPL CALC.SUM OF ELEC: 299 MOSM/KG (ref 275–295)
POTASSIUM SERPL-SCNC: 4.4 MMOL/L (ref 3.6–5.1)
PSA SERPL DL<=0.01 NG/ML-MCNC: 25.2 SECONDS (ref 12.4–14.7)
SODIUM SERPL-SCNC: 143 MMOL/L (ref 136–144)

## 2019-02-01 PROCEDURE — 36415 COLL VENOUS BLD VENIPUNCTURE: CPT

## 2019-02-01 PROCEDURE — 80048 BASIC METABOLIC PNL TOTAL CA: CPT

## 2019-02-01 PROCEDURE — 85610 PROTHROMBIN TIME: CPT

## 2019-02-04 ENCOUNTER — TELEPHONE (OUTPATIENT)
Dept: FAMILY MEDICINE CLINIC | Facility: CLINIC | Age: 79
End: 2019-02-04

## 2019-02-04 DIAGNOSIS — I48.20 CHRONIC ATRIAL FIBRILLATION (HCC): ICD-10-CM

## 2019-02-04 NOTE — TELEPHONE ENCOUNTER
----- Message from Leni Constantino MD sent at 2/4/2019 12:32 PM CST -----  INR at goal, repeat in 1 month   Cont current regimen

## 2019-02-04 NOTE — TELEPHONE ENCOUNTER
I called pt at [phone] and verified 2 patient identifiers: name & . I discussed results with pt. I asked to confirm her dose and Pt admits that for the last week she took 5mg every day.   She states she forgot and accidentally took a whole tab (5m

## 2019-02-19 ENCOUNTER — APPOINTMENT (OUTPATIENT)
Dept: LAB | Age: 79
End: 2019-02-19
Attending: FAMILY MEDICINE
Payer: MEDICARE

## 2019-02-19 ENCOUNTER — TELEPHONE (OUTPATIENT)
Dept: FAMILY MEDICINE CLINIC | Facility: CLINIC | Age: 79
End: 2019-02-19

## 2019-02-19 DIAGNOSIS — I48.20 CHRONIC ATRIAL FIBRILLATION (HCC): ICD-10-CM

## 2019-02-19 LAB
INR BLD: 2.28 (ref 0.9–1.1)
PSA SERPL DL<=0.01 NG/ML-MCNC: 25.9 SECONDS (ref 12.4–14.7)

## 2019-02-19 PROCEDURE — 85610 PROTHROMBIN TIME: CPT

## 2019-02-19 PROCEDURE — 36415 COLL VENOUS BLD VENIPUNCTURE: CPT

## 2019-02-19 NOTE — TELEPHONE ENCOUNTER
Discussed INR result with pt. Verified current dose is 5mg daily. Pt instructed to continue current dose. Patient aware to repeat a INR in 4 weeks. Flowsheet updated.

## 2019-02-19 NOTE — TELEPHONE ENCOUNTER
----- Message from Cordelia Alexandre MD sent at 2/19/2019  3:25 PM CST -----  INR at goal, repeat in 4 weeks.     Cont current regimen

## 2019-02-28 ENCOUNTER — PRIOR ORIGINAL RECORDS (OUTPATIENT)
Dept: OTHER | Age: 79
End: 2019-02-28

## 2019-02-28 VITALS
HEART RATE: 80 BPM | BODY MASS INDEX: 33.67 KG/M2 | WEIGHT: 167 LBS | SYSTOLIC BLOOD PRESSURE: 138 MMHG | DIASTOLIC BLOOD PRESSURE: 76 MMHG | HEIGHT: 59 IN

## 2019-02-28 VITALS
SYSTOLIC BLOOD PRESSURE: 128 MMHG | HEIGHT: 59 IN | DIASTOLIC BLOOD PRESSURE: 64 MMHG | BODY MASS INDEX: 36.08 KG/M2 | HEART RATE: 78 BPM | WEIGHT: 179 LBS

## 2019-02-28 VITALS
SYSTOLIC BLOOD PRESSURE: 130 MMHG | WEIGHT: 167 LBS | BODY MASS INDEX: 33.67 KG/M2 | HEART RATE: 76 BPM | HEIGHT: 59 IN | DIASTOLIC BLOOD PRESSURE: 70 MMHG

## 2019-03-01 ENCOUNTER — LAB ENCOUNTER (OUTPATIENT)
Dept: LAB | Age: 79
End: 2019-03-01
Attending: FAMILY MEDICINE
Payer: MEDICARE

## 2019-03-01 DIAGNOSIS — E78.00 PURE HYPERCHOLESTEROLEMIA: Primary | ICD-10-CM

## 2019-03-01 LAB
CHOLEST SMN-MCNC: 162 MG/DL (ref ?–200)
HDLC SERPL-MCNC: 59 MG/DL (ref 40–59)
LDLC SERPL CALC-MCNC: 79 MG/DL (ref ?–100)
NONHDLC SERPL-MCNC: 103 MG/DL (ref ?–130)
TRIGL SERPL-MCNC: 120 MG/DL (ref 30–149)
VLDLC SERPL CALC-MCNC: 24 MG/DL (ref 0–30)

## 2019-03-01 PROCEDURE — 80061 LIPID PANEL: CPT

## 2019-03-01 PROCEDURE — 36415 COLL VENOUS BLD VENIPUNCTURE: CPT

## 2019-03-02 ENCOUNTER — NURSE ONLY (OUTPATIENT)
Dept: FAMILY MEDICINE CLINIC | Facility: CLINIC | Age: 79
End: 2019-03-02
Payer: MEDICARE

## 2019-03-02 VITALS
HEIGHT: 59 IN | TEMPERATURE: 98 F | HEART RATE: 116 BPM | BODY MASS INDEX: 38.3 KG/M2 | SYSTOLIC BLOOD PRESSURE: 80 MMHG | DIASTOLIC BLOOD PRESSURE: 42 MMHG | OXYGEN SATURATION: 98 % | WEIGHT: 190 LBS

## 2019-03-02 DIAGNOSIS — N30.01 ACUTE CYSTITIS WITH HEMATURIA: Primary | ICD-10-CM

## 2019-03-02 DIAGNOSIS — J44.1 CHRONIC OBSTRUCTIVE PULMONARY DISEASE WITH ACUTE EXACERBATION (HCC): ICD-10-CM

## 2019-03-02 DIAGNOSIS — R07.9 CHEST PAIN, UNSPECIFIED TYPE: ICD-10-CM

## 2019-03-02 DIAGNOSIS — I95.9 HYPOTENSION, UNSPECIFIED HYPOTENSION TYPE: ICD-10-CM

## 2019-03-02 DIAGNOSIS — R06.02 SOB (SHORTNESS OF BREATH): ICD-10-CM

## 2019-03-02 DIAGNOSIS — R00.0 TACHYCARDIA: ICD-10-CM

## 2019-03-02 LAB
BILIRUBIN: NEGATIVE
GLUCOSE (URINE DIPSTICK): NEGATIVE MG/DL
KETONES (URINE DIPSTICK): NEGATIVE MG/DL
MULTISTIX LOT#: ABNORMAL NUMERIC
NITRITE, URINE: NEGATIVE
PH, URINE: 5.5 (ref 4.5–8)
PROTEIN (URINE DIPSTICK): 100 MG/DL
SPECIFIC GRAVITY: 1.01 (ref 1–1.03)
URINE-COLOR: YELLOW
UROBILINOGEN,SEMI-QN: 0.2 MG/DL (ref 0–1.9)

## 2019-03-02 PROCEDURE — 87186 SC STD MICRODIL/AGAR DIL: CPT | Performed by: PHYSICIAN ASSISTANT

## 2019-03-02 PROCEDURE — 87088 URINE BACTERIA CULTURE: CPT | Performed by: PHYSICIAN ASSISTANT

## 2019-03-02 PROCEDURE — 81003 URINALYSIS AUTO W/O SCOPE: CPT | Performed by: PHYSICIAN ASSISTANT

## 2019-03-02 PROCEDURE — 99214 OFFICE O/P EST MOD 30 MIN: CPT | Performed by: PHYSICIAN ASSISTANT

## 2019-03-02 PROCEDURE — 87086 URINE CULTURE/COLONY COUNT: CPT | Performed by: PHYSICIAN ASSISTANT

## 2019-03-02 PROCEDURE — 94640 AIRWAY INHALATION TREATMENT: CPT | Performed by: PHYSICIAN ASSISTANT

## 2019-03-02 RX ORDER — ALBUTEROL SULFATE 2.5 MG/3ML
2.5 SOLUTION RESPIRATORY (INHALATION) ONCE
Status: COMPLETED | OUTPATIENT
Start: 2019-03-02 | End: 2019-03-02

## 2019-03-02 RX ORDER — CEPHALEXIN 500 MG/1
500 TABLET ORAL 2 TIMES DAILY
Qty: 14 TABLET | Refills: 0 | Status: SHIPPED | OUTPATIENT
Start: 2019-03-02 | End: 2019-03-09

## 2019-03-02 RX ORDER — PREDNISONE 20 MG/1
20 TABLET ORAL 2 TIMES DAILY
Qty: 10 TABLET | Refills: 0 | Status: SHIPPED | OUTPATIENT
Start: 2019-03-02 | End: 2019-03-07

## 2019-03-02 RX ADMIN — ALBUTEROL SULFATE 2.5 MG: 2.5 SOLUTION RESPIRATORY (INHALATION) at 11:21:00

## 2019-03-02 NOTE — PATIENT INSTRUCTIONS
Steroid with food   Albuterol neb every 4-6 hours as needed   Rest   Push Fluids   Monitor blood pressure at home.  If continues to be low/headache/vision changes/dizziness/weakness -Go to ED   ED for worsening chest pan or SOB   Close follow up with PCP

## 2019-03-02 NOTE — PROGRESS NOTES
CHIEF COMPLAINT:   Patient presents with:  UTI: Pain after voiding X 2 days. Patient denies any blood in urine, abdominal pain. Chest Pressure: Chest pressure started today. Jaw pain started this morning. hx of COPD.  Patient denies any cough        HPI: albuterol sulfate (2.5 MG/3ML) 0.083% Inhalation Nebu Soln Take 3 mL (2.5 mg total) by nebulization every 4 (four) hours as needed for Wheezing or Shortness of Breath.  Disp: 1 Box Rfl: 2   VENTOLIN  (90 Base) MCG/ACT Inhalation Aero Soln INHALE 2 GENERAL: well developed. Audible wheezing   SKIN: no rashes, no suspicious lesions  EYES: Conjunctiva clear. PERRL  HENT: Atraumatic, normocephalic. TM's pearly gray, no bulging, no fluid b/l. Nostrils patent, nasal mucosa pink and non-inflamed.   No e Patient Instructions. Meds & Refills for this Visit:  Requested Prescriptions     Signed Prescriptions Disp Refills   • predniSONE 20 MG Oral Tab 10 tablet 0     Sig: Take 1 tablet (20 mg total) by mouth 2 (two) times daily for 5 days.    • Cephalexin

## 2019-03-07 VITALS
DIASTOLIC BLOOD PRESSURE: 60 MMHG | SYSTOLIC BLOOD PRESSURE: 130 MMHG | BODY MASS INDEX: 38.1 KG/M2 | HEIGHT: 59 IN | HEART RATE: 72 BPM | WEIGHT: 189 LBS

## 2019-03-12 ENCOUNTER — HOSPITAL ENCOUNTER (INPATIENT)
Facility: HOSPITAL | Age: 79
LOS: 2 days | Discharge: HOME | DRG: 309 | End: 2019-03-14
Attending: EMERGENCY MEDICINE | Admitting: HOSPITALIST
Payer: MEDICARE

## 2019-03-12 ENCOUNTER — TELEPHONE (OUTPATIENT)
Dept: CARDIOLOGY | Age: 79
End: 2019-03-12

## 2019-03-12 ENCOUNTER — OFFICE VISIT (OUTPATIENT)
Dept: FAMILY MEDICINE CLINIC | Facility: CLINIC | Age: 79
End: 2019-03-12
Payer: MEDICARE

## 2019-03-12 ENCOUNTER — APPOINTMENT (OUTPATIENT)
Dept: GENERAL RADIOLOGY | Facility: HOSPITAL | Age: 79
DRG: 309 | End: 2019-03-12
Attending: EMERGENCY MEDICINE
Payer: MEDICARE

## 2019-03-12 ENCOUNTER — APPOINTMENT (OUTPATIENT)
Dept: LAB | Age: 79
DRG: 309 | End: 2019-03-12
Attending: FAMILY MEDICINE
Payer: MEDICARE

## 2019-03-12 VITALS
HEART RATE: 121 BPM | OXYGEN SATURATION: 97 % | HEIGHT: 59 IN | RESPIRATION RATE: 16 BRPM | TEMPERATURE: 98 F | DIASTOLIC BLOOD PRESSURE: 64 MMHG | SYSTOLIC BLOOD PRESSURE: 125 MMHG | WEIGHT: 190 LBS | BODY MASS INDEX: 38.3 KG/M2

## 2019-03-12 DIAGNOSIS — E03.9 ACQUIRED HYPOTHYROIDISM: ICD-10-CM

## 2019-03-12 DIAGNOSIS — E66.01 SEVERE OBESITY (BMI 35.0-39.9) WITH COMORBIDITY (HCC): ICD-10-CM

## 2019-03-12 DIAGNOSIS — I48.20 CHRONIC ATRIAL FIBRILLATION (HCC): ICD-10-CM

## 2019-03-12 DIAGNOSIS — Z00.00 ENCOUNTER FOR ANNUAL HEALTH EXAMINATION: Primary | ICD-10-CM

## 2019-03-12 DIAGNOSIS — M19.011 PRIMARY OSTEOARTHRITIS OF RIGHT SHOULDER: ICD-10-CM

## 2019-03-12 DIAGNOSIS — I48.91 ATRIAL FIBRILLATION WITH RVR (HCC): ICD-10-CM

## 2019-03-12 DIAGNOSIS — J41.0 SIMPLE CHRONIC BRONCHITIS (HCC): ICD-10-CM

## 2019-03-12 DIAGNOSIS — M47.816 ARTHRITIS, LUMBAR SPINE: ICD-10-CM

## 2019-03-12 DIAGNOSIS — E78.2 MIXED HYPERLIPIDEMIA: ICD-10-CM

## 2019-03-12 DIAGNOSIS — I10 ESSENTIAL HYPERTENSION: ICD-10-CM

## 2019-03-12 DIAGNOSIS — M19.90 ARTHRITIS: ICD-10-CM

## 2019-03-12 DIAGNOSIS — N18.30 CKD (CHRONIC KIDNEY DISEASE) STAGE 3, GFR 30-59 ML/MIN (HCC): ICD-10-CM

## 2019-03-12 DIAGNOSIS — I65.23 BILATERAL CAROTID ARTERY STENOSIS: ICD-10-CM

## 2019-03-12 DIAGNOSIS — I48.91 ATRIAL FIBRILLATION WITH RAPID VENTRICULAR RESPONSE (HCC): Primary | ICD-10-CM

## 2019-03-12 LAB
ALBUMIN SERPL-MCNC: 3.1 G/DL (ref 3.4–5)
ALBUMIN/GLOB SERPL: 0.8 {RATIO} (ref 1–2)
ALP LIVER SERPL-CCNC: 69 U/L (ref 55–142)
ALT SERPL-CCNC: 21 U/L (ref 13–56)
ANION GAP SERPL CALC-SCNC: 8 MMOL/L (ref 0–18)
AST SERPL-CCNC: 20 U/L (ref 15–37)
BASOPHILS # BLD AUTO: 0.02 X10(3) UL (ref 0–0.2)
BASOPHILS NFR BLD AUTO: 0.2 %
BILIRUB SERPL-MCNC: 0.2 MG/DL (ref 0.1–2)
BUN BLD-MCNC: 32 MG/DL (ref 7–18)
BUN/CREAT SERPL: 24.8 (ref 10–20)
CALCIUM BLD-MCNC: 8.9 MG/DL (ref 8.5–10.1)
CHLORIDE SERPL-SCNC: 109 MMOL/L (ref 98–107)
CO2 SERPL-SCNC: 26 MMOL/L (ref 21–32)
CREAT BLD-MCNC: 1.29 MG/DL (ref 0.55–1.02)
DEPRECATED RDW RBC AUTO: 43.3 FL (ref 35.1–46.3)
EOSINOPHIL # BLD AUTO: 0.72 X10(3) UL (ref 0–0.7)
EOSINOPHIL NFR BLD AUTO: 7.1 %
ERYTHROCYTE [DISTWIDTH] IN BLOOD BY AUTOMATED COUNT: 13.1 % (ref 11–15)
GLOBULIN PLAS-MCNC: 3.7 G/DL (ref 2.8–4.4)
GLUCOSE BLD-MCNC: 91 MG/DL (ref 70–99)
HCT VFR BLD AUTO: 39.6 % (ref 35–48)
HGB BLD-MCNC: 13.1 G/DL (ref 12–16)
IMM GRANULOCYTES # BLD AUTO: 0.04 X10(3) UL (ref 0–1)
IMM GRANULOCYTES NFR BLD: 0.4 %
INR BLD: 2.88 (ref 0.9–1.1)
INR BLD: 2.96 (ref 0.9–1.1)
LYMPHOCYTES # BLD AUTO: 2.37 X10(3) UL (ref 1–4)
LYMPHOCYTES NFR BLD AUTO: 23.2 %
M PROTEIN MFR SERPL ELPH: 6.8 G/DL (ref 6.4–8.2)
MCH RBC QN AUTO: 29.9 PG (ref 26–34)
MCHC RBC AUTO-ENTMCNC: 33.1 G/DL (ref 31–37)
MCV RBC AUTO: 90.4 FL (ref 80–100)
MONOCYTES # BLD AUTO: 1.38 X10(3) UL (ref 0.1–1)
MONOCYTES NFR BLD AUTO: 13.5 %
NEUTROPHILS # BLD AUTO: 5.68 X10 (3) UL (ref 1.5–7.7)
NEUTROPHILS # BLD AUTO: 5.68 X10(3) UL (ref 1.5–7.7)
NEUTROPHILS NFR BLD AUTO: 55.6 %
OSMOLALITY SERPL CALC.SUM OF ELEC: 302 MOSM/KG (ref 275–295)
PLATELET # BLD AUTO: 235 10(3)UL (ref 150–450)
POTASSIUM SERPL-SCNC: 4.5 MMOL/L (ref 3.5–5.1)
PSA SERPL DL<=0.01 NG/ML-MCNC: 32.1 SECONDS (ref 12.5–14.7)
PSA SERPL DL<=0.01 NG/ML-MCNC: 32.9 SECONDS (ref 12.5–14.7)
RBC # BLD AUTO: 4.38 X10(6)UL (ref 3.8–5.3)
SODIUM SERPL-SCNC: 143 MMOL/L (ref 136–145)
TROPONIN I SERPL-MCNC: <0.045 NG/ML (ref ?–0.04)
TSI SER-ACNC: 1.83 MIU/ML (ref 0.36–3.74)
WBC # BLD AUTO: 10.2 X10(3) UL (ref 4–11)

## 2019-03-12 PROCEDURE — 85610 PROTHROMBIN TIME: CPT

## 2019-03-12 PROCEDURE — 96160 PT-FOCUSED HLTH RISK ASSMT: CPT | Performed by: FAMILY MEDICINE

## 2019-03-12 PROCEDURE — 99397 PER PM REEVAL EST PAT 65+ YR: CPT | Performed by: FAMILY MEDICINE

## 2019-03-12 PROCEDURE — G0439 PPPS, SUBSEQ VISIT: HCPCS | Performed by: FAMILY MEDICINE

## 2019-03-12 PROCEDURE — 99222 1ST HOSP IP/OBS MODERATE 55: CPT | Performed by: HOSPITALIST

## 2019-03-12 PROCEDURE — 71045 X-RAY EXAM CHEST 1 VIEW: CPT | Performed by: EMERGENCY MEDICINE

## 2019-03-12 PROCEDURE — 84443 ASSAY THYROID STIM HORMONE: CPT

## 2019-03-12 PROCEDURE — 93000 ELECTROCARDIOGRAM COMPLETE: CPT | Performed by: FAMILY MEDICINE

## 2019-03-12 PROCEDURE — 36415 COLL VENOUS BLD VENIPUNCTURE: CPT

## 2019-03-12 RX ORDER — PRAVASTATIN SODIUM 10 MG
10 TABLET ORAL NIGHTLY
Status: DISCONTINUED | OUTPATIENT
Start: 2019-03-12 | End: 2019-03-14

## 2019-03-12 RX ORDER — LEVOTHYROXINE SODIUM 88 UG/1
88 TABLET ORAL
Status: DISCONTINUED | OUTPATIENT
Start: 2019-03-13 | End: 2019-03-14

## 2019-03-12 RX ORDER — WARFARIN SODIUM 5 MG/1
5 TABLET ORAL NIGHTLY
Status: DISCONTINUED | OUTPATIENT
Start: 2019-03-12 | End: 2019-03-14

## 2019-03-12 RX ORDER — LISINOPRIL 5 MG/1
5 TABLET ORAL DAILY
Status: DISCONTINUED | OUTPATIENT
Start: 2019-03-12 | End: 2019-03-13

## 2019-03-12 NOTE — ED PROVIDER NOTES
Patient Seen in: [facility] Emergency Department    History   Patient presents with:  Arrythmia/Palpitations (cardiovascular)    Stated Complaint: Palpitations    HPI    21-year-old female who has a history of COPD as well as atrial fibrillation has BMI 38.17 kg/m²         Physical Exam    HEENT : NCAT, EOMI, PEERL, throat clear, neck supple, no JVD, trachea midline, No LAD  Heart: S1S2 normal. No murmurs, irregular rate and rhythm, tachycardia  Lungs: Clear to auscultation bilaterally  Abdomen: Soft Agreed              Xr Chest Ap Portable  (cpt=71045)    Result Date: 3/12/2019  PROCEDURE:  XR CHEST AP PORTABLE  (CPT=71045)  TECHNIQUE:  AP chest radiograph was obtained.   COMPARISON:  Mervin Panchal, XR CHEST PA + LAT CHEST (CPT=71046), [facility]14[facility]2018[facility] I48.91 3/12/2019 Unknown

## 2019-03-12 NOTE — ED NOTES
Nurse to Nurse report given to [facility]. Pt to floor via cart on monitor accompanied by RN.

## 2019-03-12 NOTE — CONSULTS
[facility]  Cardiology Consultation    [facility] Patient Status:  Emergency    1940 MRN [id number]   Location [address] Attending Jose Rebollar MD   Hosp Day # 0 PCP Jovani Crystal MD     [facility] for [facility] SERVICES, [facility] m)   Wt 189 lb (85.7 kg)   SpO2 97%   BMI 38.17 kg/m²   Temp (24hrs), Av.7 °F (36.5 °C), Min:97.7 °F (36.5 °C), Max:97.7 °F (36.5 °C)     No intake or output data in the 24 hours ending 19 1830  Wt Readings from Last 3 Encounters:  19 : 189

## 2019-03-12 NOTE — PATIENT INSTRUCTIONS
[employer] [facility] SCREENING SCHEDULE   Tests on this list are recommended by your physician but may not be covered, or covered at this frequency, by your insurer. Please check with your insurance carrier before scheduling to verify coverage.    PREVENT are 73-68 years old and have smoked more than 100 cigarettes in their lifetime   • Anyone with a family history    Colorectal Cancer Screening  Covered up to Age 76     Colonoscopy Screen   Covered every 10 years- more often if abnormal There are no preven Immunizations      Influenza  Covered Annually No orders found for this or any previous visit. Please get every year    Pneumococcal 13 (Prevnar)  Covered Once after 65 No orders found for this or any previous visit.  Please get once after your 65th birth

## 2019-03-12 NOTE — ED INITIAL ASSESSMENT (HPI)
Hx of afib. States she woke up this am feeling like her pulse was elevated, noted it to be 140 on her finger pulse ox. Denies chest pain or any increase of shortness of breath beyond her COPD baseline.

## 2019-03-12 NOTE — PROGRESS NOTES
HPI:   Magy Oleary is a [de-identified]year old female who presents for a MA (Medicare Advantage) [facility] (Once per calendar year).     Pneumococcal PPSV23/PCV13 65+ Years / Low and Medium Risk(1 of 2 - PCV13) due on 01/30/2004  Fall Risk Screening due on 0 all  Feeling down, depressed, or hopeless (over the last two weeks)?: Not at all  PHQ-2 SCORE: 0     Advanced Directive: She has a Living Will on file in [address] [place] [address] Rd. She does NOT have a Power of  for [employer] on file in [address] [place] [address] Rd.    Advance care plannin 03/01/2019          Last Chemistry Labs:   Lab Results   Component Value Date    AST 21 01/12/2019    ALT 18 01/12/2019    CA 9.1 02/01/2019    ALB 3.3 01/12/2019    TSH 2.340 08/29/2017    CREATSERUM 1.21 (H) 02/01/2019    GLU 83 02/01/2019        CBC  (m hernia surgery; Carotid endarterectomy (Right, 2015); other surgical history; and other surgical history. Her family history includes Cancer in her brother; Heart Disorder in her father and sister.    SOCIAL HISTORY:   She  reports that she quit smoking midline,mucosa normal, no drainage or sinus tenderness   Throat: Lips, mucosa, and tongue normal   Neck: Supple, symmetrical, trachea midline, no adenopathy;  thyroid: not enlarged, symmetric, no tenderness/mass/nodules; no carotid bruit     Back:   Symmet with RVR ([address]  ER eval for RVR  Will likely need rate or rhythm control - possibly amiodarone or diltiazem   Did not tolerate BB in the past  Recheck INR - confirm therapeutic   Treat any underlying cause - COPD exacerbation or thyroid change            D visit. No flowsheet data found. Fecal Occult Blood Annually No results found for: FOB No flowsheet data found. Glaucoma Screening      Ophthalmology Visit Annually: Diabetics, FHx Glaucoma, AA>50, > 65 No flowsheet data found.     Bone Densit SPECIFIC DISEASE MONITORING Internal Lab or Procedure External Lab or Procedure      Annual Monitoring of Persistent     Medications (ACE/ARB, digoxin diuretics, anticonvulsants.)    Potassium  Annually Potassium (mmol/L)   Date Value   02/01/2019 4.4

## 2019-03-13 ENCOUNTER — APPOINTMENT (OUTPATIENT)
Dept: GENERAL RADIOLOGY | Facility: HOSPITAL | Age: 79
DRG: 309 | End: 2019-03-13
Attending: HOSPITALIST
Payer: MEDICARE

## 2019-03-13 ENCOUNTER — APPOINTMENT (OUTPATIENT)
Dept: CV DIAGNOSTICS | Facility: HOSPITAL | Age: 79
DRG: 309 | End: 2019-03-13
Attending: HOSPITALIST
Payer: MEDICARE

## 2019-03-13 ENCOUNTER — MED REC SCAN ONLY (OUTPATIENT)
Dept: FAMILY MEDICINE CLINIC | Facility: CLINIC | Age: 79
End: 2019-03-13

## 2019-03-13 PROCEDURE — 71045 X-RAY EXAM CHEST 1 VIEW: CPT | Performed by: HOSPITALIST

## 2019-03-13 PROCEDURE — 99233 SBSQ HOSP IP/OBS HIGH 50: CPT | Performed by: HOSPITALIST

## 2019-03-13 PROCEDURE — 93306 TTE W/DOPPLER COMPLETE: CPT | Performed by: HOSPITALIST

## 2019-03-13 RX ORDER — LISINOPRIL 10 MG/1
10 TABLET ORAL ONCE
Status: COMPLETED | OUTPATIENT
Start: 2019-03-13 | End: 2019-03-13

## 2019-03-13 RX ORDER — LISINOPRIL 10 MG/1
10 TABLET ORAL DAILY
Status: DISCONTINUED | OUTPATIENT
Start: 2019-03-13 | End: 2019-03-14

## 2019-03-13 RX ORDER — LISINOPRIL 10 MG/1
10 TABLET ORAL DAILY
Qty: 30 TABLET | Refills: 1 | Status: SHIPPED | OUTPATIENT
Start: 2019-03-13 | End: 2019-05-29 | Stop reason: DRUGHIGH

## 2019-03-13 RX ORDER — METHYLPREDNISOLONE SODIUM SUCCINATE 125 MG/2ML
80 INJECTION, POWDER, LYOPHILIZED, FOR SOLUTION INTRAMUSCULAR; INTRAVENOUS ONCE
Status: COMPLETED | OUTPATIENT
Start: 2019-03-13 | End: 2019-03-13

## 2019-03-13 RX ORDER — AMIODARONE HYDROCHLORIDE 200 MG/1
400 TABLET ORAL 2 TIMES DAILY WITH MEALS
Status: DISCONTINUED | OUTPATIENT
Start: 2019-03-13 | End: 2019-03-14

## 2019-03-13 RX ORDER — PREDNISONE 20 MG/1
40 TABLET ORAL
Status: DISCONTINUED | OUTPATIENT
Start: 2019-03-14 | End: 2019-03-14

## 2019-03-13 RX ORDER — IPRATROPIUM BROMIDE AND ALBUTEROL SULFATE 2.5; .5 MG/3ML; MG/3ML
3 SOLUTION RESPIRATORY (INHALATION)
Status: DISCONTINUED | OUTPATIENT
Start: 2019-03-13 | End: 2019-03-13

## 2019-03-13 RX ORDER — AMIODARONE HYDROCHLORIDE 200 MG/1
TABLET ORAL
Qty: 45 TABLET | Refills: 2 | Status: SHIPPED | OUTPATIENT
Start: 2019-03-13 | End: 2019-05-12

## 2019-03-13 RX ORDER — IPRATROPIUM BROMIDE AND ALBUTEROL SULFATE 2.5; .5 MG/3ML; MG/3ML
3 SOLUTION RESPIRATORY (INHALATION)
Status: DISCONTINUED | OUTPATIENT
Start: 2019-03-13 | End: 2019-03-14

## 2019-03-13 NOTE — HISTORICAL OFFICE NOTE
Luisa Escoto MD - 2019 12:00 AM CST  Alexus Babcock  : 1940  ACCOUNT: [id number]  [phone]  PCP: Dr. Diogo Monk  vascular surgery: dr Raman [place]: 2019  DICTATED BY: [Dr. Augustine Baires Weight Family  CAD Equivalent - Carotid Artery Disease    REVIEW OF SYSTEMS:   CONS: no fever, chills, or recent weight changes. EYES: denies significant visual changes. ENMT: denies difficulties with hearing, otherwise negative.  CV: see HPI; denies claudi rhythm, normal S1, S2 without S3; no pathologic murmurs. CAROTIDS: carotid bruit right. ABD AORTA: abdominal aorta not enlarged. FEM: femoral pulses intact. PEDAL: pedal pulses intact. EXT: no peripheral edema; right arm pulses slightly diminished.      DEC

## 2019-03-13 NOTE — PAYOR COMM NOTE
--------------  ADMISSION REVIEW     Melisa Cantrell MA [facility]  Subscriber #:  [id number]  Authorization Number: Pend    Admit date: 3/12/19  Admit time: 1902       Admitting Physician: Mone De Santiago MD  Attending Physician:  Leandra Rodriguez MD  Primary Care Phys Positive for stated complaint: Palpitations  Other systems are as noted in HPI. Constitutional and vital signs reviewed. All other systems reviewed and negative except as noted above.     Physical Exam     ED Triage Vitals [03/12/19 1622]   BP (!) 166 Abnormality         Status                     ---------                               -----------         ------                     CBC W/ DIFFERENTIAL[[id number]]          Abnormal            Final result                 Please view res MDM   As above  Admission disposition: 3/12/2019  6:09 PM                 Disposition and Plan     Clinical Impression:  Atrial fibrillation with rapid ventricular response ([facility] [address]  (primary encounter diagnosis)    Disposition:  Admit  3/12/2019  6:09 pm impairment         Past Surgical History:   Past Surgical History:   Procedure Laterality Date   • CAROTID ENDARTERECTOMY Right 2015   • HERNIA SURGERY     • OTHER SURGICAL HISTORY      lower right leg, skin graph   • OTHER SURGICAL HISTORY      cataract s Calcium Carb-Cholecalciferol (CALCIUM 600+D3 OR) Take by mouth. Disp:  Rfl:    Omega-3 Fatty Acids (FISH OIL) 1200 MG Oral Capsule Delayed Release Take by mouth.  Disp:  Rfl:        Review of Systems:   A comprehensive 14 point review of systems was complet ASSESSMENT / PLAN:     2 [de-identified]years old female presented with palpitations chest discomfort A. fib with rapid ventricular response that responded to IV Cardizem. 2. Continue cardiac telemetry monitoring with EKG and troponin in a.m.   3. Continue anticoagul Route User    3/13/2019 0950 Given 10 mg Oral Deborah Heart, RN      methylPREDNISolone Sodium Succ (Solu-MEDROL) injection 80 mg     Date Action Dose Route User    3/13/2019 1045 Given 80 mg Intravenous Deborah Heart, RN      Pravastatin Sodium (PRAVA cataract surgery            Family History   Problem Relation Age of Onset   • Heart Disorder Father     • Heart Disorder Sister     • Cancer Brother           lung cancer       she has never used smokeless tobacco. She reports that she does not drink alco 69 03/12/2019     BILT 0.2 03/12/2019     TP 6.8 03/12/2019     AST 20 03/12/2019     ALT 21 03/12/2019     INR 2.88 03/12/2019     PTP 32.1 03/12/2019     TROP <0.045 03/12/2019         Imaging:  ECG afib with RVR     Impression:  Active Problems:    Mary Enciso

## 2019-03-13 NOTE — PROGRESS NOTES
[facility]  Progress Note     Clemencia Gregg Patient Status:  Inpatient    1940 MRN [id number]   [facility] 8NE-A Attending Raissa Corona MD   Hosp Day # 1 PCP John Baeza MD     Chief Complaint: AF > NSR    S: Patient a mL Nebulization Q4H WA (5 times daily)   • Levothyroxine Sodium  88 mcg Oral Before breakfast   • lisinopril  5 mg Oral Daily   • Fluticasone Furoate-Vilanterol  1 puff Inhalation Daily   • Pravastatin Sodium  10 mg Oral Nightly   • Warfarin Sodium  5 mg O

## 2019-03-13 NOTE — PROGRESS NOTES
Pt. Alert and o x 4 , on RA. Resp. Pattern easy and non labored. Pt. Had a 4.20 sec pause , then conceretd to SR/SB ; EKG done. Cardizem gtt discontinued ; HR 58 to 70's. Pt. felt slight ligheadedness with the pause episode. Cont.  Monitor per tele/l

## 2019-03-13 NOTE — PROGRESS NOTES
[facility]  Cardiology Progress Note    [facility] Patient Status:  Inpatient    1940 MRN [id number]   [facility] 8NE-A Attending Leandra Rodriguez MD   Hosp Day # 1 PCP Artie Rush MD     Subjective: still with mild SOB, d normal   Lungs: Diminished bases  Abdomen: Soft, non-tender, + BS  Extremities:  BLE edema, mild   Skin: Warm and dry.      Medications:  • ipratropium-albuterol  3 mL Nebulization Q4H WA (5 times daily)   • amiodarone HCl  400 mg Oral BID with meals   • li

## 2019-03-13 NOTE — H&P
[facility]  History and Physical     [facility] Patient Status:  Inpatient    1940 MRN [id number]   [facility] 8NE-A Attending Clementina Morales MD   Hosp Day # 0 PCP Morris Henley MD     Chief Complaint: Palpitations    Hi (two) times daily. Disp: 1 Inhaler Rfl: 0   Warfarin Sodium 5 MG Oral Tab Take 1 tablet (5 mg total) by mouth daily.  Disp: 30 tablet Rfl: 2   albuterol sulfate (2.5 MG/3ML) 0.083% Inhalation Nebu Soln Take 3 mL (2.5 mg total) by nebulization every 4 (four) gallops. Equal pulses. Chest and Back: No tenderness or deformity. Abdomen: Soft, nontender, nondistended. Positive bowel sounds. No rebound, guarding or organomegaly. Neurologic: No focal neurological deficits. CNII-XII grossly intact.   Musculoskelet the clinical documentation in H+P. Based on patients current state of illness, I anticipate that, after discharge, patient will require TBD.

## 2019-03-13 NOTE — PROGRESS NOTES
CXR negative  Discussed with cardiology, no diuretics  Will give Solumedrol, continue Breo  Nebs ordered but yet to receive first treatment  Discussed with MICHELE Harding MD

## 2019-03-14 VITALS
RESPIRATION RATE: 16 BRPM | WEIGHT: 187.63 LBS | DIASTOLIC BLOOD PRESSURE: 46 MMHG | HEIGHT: 59 IN | OXYGEN SATURATION: 94 % | TEMPERATURE: 98 F | SYSTOLIC BLOOD PRESSURE: 158 MMHG | HEART RATE: 90 BPM | BODY MASS INDEX: 37.83 KG/M2

## 2019-03-14 LAB
ATRIAL RATE: 134 BPM
ATRIAL RATE: 48 BPM
P AXIS: 59 DEGREES
P AXIS: 89 DEGREES
P-R INTERVAL: 126 MS
P-R INTERVAL: 174 MS
Q-T INTERVAL: 254 MS
Q-T INTERVAL: 388 MS
QRS DURATION: 74 MS
QRS DURATION: 74 MS
QTC CALCULATION (BEZET): 346 MS
QTC CALCULATION (BEZET): 379 MS
R AXIS: 39 DEGREES
R AXIS: 44 DEGREES
T AXIS: 57 DEGREES
T AXIS: 74 DEGREES
VENTRICULAR RATE: 134 BPM
VENTRICULAR RATE: 48 BPM

## 2019-03-14 PROCEDURE — 99239 HOSP IP/OBS DSCHRG MGMT >30: CPT | Performed by: HOSPITALIST

## 2019-03-14 RX ORDER — PREDNISONE 20 MG/1
TABLET ORAL
Qty: 11 TABLET | Refills: 0 | Status: SHIPPED | OUTPATIENT
Start: 2019-03-15 | End: 2019-03-24

## 2019-03-14 RX ORDER — IPRATROPIUM BROMIDE AND ALBUTEROL SULFATE 2.5; .5 MG/3ML; MG/3ML
3 SOLUTION RESPIRATORY (INHALATION) EVERY 4 HOURS PRN
Qty: 60 VIAL | Refills: 0 | Status: SHIPPED | OUTPATIENT
Start: 2019-03-14 | End: 2019-06-17

## 2019-03-14 NOTE — PROGRESS NOTES
Discharge     Patient cleared for discharge by all services. Discharge education and handout provided to patient. Follow-up appointments reviewed. Medications reviewed and prescriptions given to patient. All questions answered.  Patient and family v

## 2019-03-14 NOTE — PROGRESS NOTES
[facility]  Progress Note     [facility] Patient Status:  Inpatient    1940 MRN [id number]   [facility] 8NE-A Attending Rishi Nick MD   Hosp Day # 2 PCP Kirill Lara MD     Chief Complaint: AF > NSR    S: Patient d Levothyroxine Sodium  88 mcg Oral Before breakfast   • Fluticasone Furoate-Vilanterol  1 puff Inhalation Daily   • Pravastatin Sodium  10 mg Oral Nightly   • Warfarin Sodium  5 mg Oral Nightly       ASSESSMENT / PLAN:     1.  Dyspnea may be related to mild

## 2019-03-14 NOTE — PROGRESS NOTES
[facility]  Cardiology Progress Note    [facility] Patient Status:  Inpatient    1940 MRN [id number]   [facility] 8NE-A Attending Vannessa Soria MD   Hosp Day # 2 PCP Margarette Morrison MD     Subjective:  Sitting up in bed, re dilated. 6. Pulmonary arteries: Systolic pressure was moderately to markedly     increased, in the range of 55mm Hg to 60mm Hg.     Impressions:  This study is compared with previous dated 10-03-17: Compared  to the previous study, systemic pressure has in

## 2019-03-14 NOTE — DISCHARGE SUMMARY
[facility] HOSPITALIST  DISCHARGE SUMMARY     [facility] Patient Status:  Inpatient    1940 MRN [id number]   [facility] 8NE-A Attending Dalila Ahumada, MD   Hosp Day # 2 PCP Giovanni Hope MD     Date of Admission: 3/12/2019  Date of THEN 1 tablet (200 mg total) daily for 16 days, THEN 1 tablet (200 mg total) daily.    Stop taking on:  5/12/2019  Quantity:  45 tablet  Refills:  2     ipratropium-albuterol 0.5-2.5 (3) MG/3ML Soln  Commonly known as:  DUONEB      Take 3 mL by nebulization 2 (two) times daily. Quantity:  1 Inhaler  Refills:  0     Nebulizer/Tubing/Mouthpiece Kit      Use as directed   Quantity:  1 Package  Refills:  0     simvastatin 40 MG Tabs  Commonly known as:  ZOCOR      Take 1 tablet (40 mg total) by mouth nightly.

## 2019-03-15 ENCOUNTER — TELEPHONE (OUTPATIENT)
Dept: FAMILY MEDICINE CLINIC | Facility: CLINIC | Age: 79
End: 2019-03-15

## 2019-03-15 ENCOUNTER — PATIENT OUTREACH (OUTPATIENT)
Dept: CASE MANAGEMENT | Age: 79
End: 2019-03-15

## 2019-03-15 DIAGNOSIS — I48.91 ATRIAL FIBRILLATION WITH RAPID VENTRICULAR RESPONSE (HCC): ICD-10-CM

## 2019-03-15 DIAGNOSIS — Z02.9 ENCOUNTERS FOR UNSPECIFIED ADMINISTRATIVE PURPOSE: ICD-10-CM

## 2019-03-15 PROCEDURE — 1111F DSCHRG MED/CURRENT MED MERGE: CPT

## 2019-03-15 NOTE — PROGRESS NOTES
Initial Post Discharge Follow Up   Discharge Date: 3/14/19  Contact Date: 3/15/2019    Consent Verification:  Assessment Completed With: Patient  HIPAA Verified?  Yes    Discharge Dx:     Dyspnea may be related to mild volume overload d/t AF with RVR and days, THEN 1 tablet (20 mg total) daily with breakfast for 3 days, THEN 0.5 tablets (10 mg total) daily with breakfast for 3 days. Disp: 11 tablet Rfl: 0   lisinopril 10 MG Oral Tab Take 1 tablet (10 mg total) by mouth daily.  Disp: 30 tablet Rfl: 1   amiod Yes  • May I go over your medications with you to make sure we are not missing anything? yes  • Are there any reasons that keep you from taking your medication as prescribed? No  Are you having any concerns with constipation?  No complaints    Referrals/orde or orders sent to PCP.      For patients with TCC appointments:     []  Advised patient to bring all medications and blood glucose meter/supplies if applicable

## 2019-03-15 NOTE — TELEPHONE ENCOUNTER
NCM spoke with pt for TCM today. Pt states she is doing fine at this time and no complaints of pain or discomfort. Pt states her breathing is ok has used her ventolin inhaler once this morning. Reviewed medications with pt and taking as prescribed.   Pt

## 2019-03-18 ENCOUNTER — TELEPHONE (OUTPATIENT)
Dept: FAMILY MEDICINE CLINIC | Facility: CLINIC | Age: 79
End: 2019-03-18

## 2019-03-18 NOTE — TELEPHONE ENCOUNTER
Ok to hold simvastatin for now -  ON tapering dose of prednisone so she can resume in simvastatin on Thursday 3/21 (will be on 10mg then)  Follow up with cardiology    FYI   Simvastatin likely to have greater interaction with other cardiac medications than

## 2019-03-18 NOTE — TELEPHONE ENCOUNTER
Please disregard below message. Pt states she was put on prednisone and the pharmacist told her to hold the simvastatin while on it,. She has held the simvastatin for 2 days now. She just wants to confirm that is what she is supposed to do.     Also pt c

## 2019-03-18 NOTE — TELEPHONE ENCOUNTER
Patient is supposed to double up on her Cholesterol medicine for the two weeks then go back to one. Patient was put on new heart medication. Patient has been jittery and wants to know what she needs to do. Please advise.

## 2019-03-19 RX ORDER — AMOXICILLIN 500 MG
1 CAPSULE ORAL DAILY
COMMUNITY
Start: 2017-09-07 | End: 2019-08-23 | Stop reason: SDUPTHER

## 2019-03-19 RX ORDER — ALBUTEROL SULFATE 90 UG/1
AEROSOL, METERED RESPIRATORY (INHALATION)
COMMUNITY
End: 2019-06-06 | Stop reason: SDUPTHER

## 2019-03-19 RX ORDER — EZETIMIBE 10 MG/1
1 TABLET ORAL DAILY
COMMUNITY
Start: 2018-06-21 | End: 2019-12-05 | Stop reason: ALTCHOICE

## 2019-03-19 RX ORDER — IBUPROFEN 200 MG
CAPSULE ORAL
COMMUNITY
Start: 2017-09-07

## 2019-03-19 RX ORDER — LISINOPRIL 5 MG/1
1 TABLET ORAL DAILY
COMMUNITY
Start: 2017-09-07 | End: 2019-06-06 | Stop reason: SDUPTHER

## 2019-03-19 RX ORDER — WARFARIN SODIUM 5 MG/1
TABLET ORAL
COMMUNITY
Start: 2018-06-21 | End: 2019-06-06 | Stop reason: SDUPTHER

## 2019-03-19 RX ORDER — WARFARIN SODIUM 6 MG/1
TABLET ORAL
COMMUNITY
Start: 2018-06-21 | End: 2019-08-23 | Stop reason: SDUPTHER

## 2019-03-19 RX ORDER — LEVOTHYROXINE SODIUM 88 UG/1
TABLET ORAL
COMMUNITY
End: 2019-08-23 | Stop reason: SDUPTHER

## 2019-03-19 RX ORDER — SIMVASTATIN 40 MG
1 TABLET ORAL DAILY
COMMUNITY
Start: 2017-09-07 | End: 2019-04-04 | Stop reason: ALTCHOICE

## 2019-03-20 ENCOUNTER — OFFICE VISIT (OUTPATIENT)
Dept: FAMILY MEDICINE CLINIC | Facility: CLINIC | Age: 79
End: 2019-03-20
Payer: MEDICARE

## 2019-03-20 VITALS
WEIGHT: 187 LBS | DIASTOLIC BLOOD PRESSURE: 60 MMHG | TEMPERATURE: 98 F | HEIGHT: 59 IN | RESPIRATION RATE: 16 BRPM | OXYGEN SATURATION: 97 % | SYSTOLIC BLOOD PRESSURE: 120 MMHG | BODY MASS INDEX: 37.7 KG/M2 | HEART RATE: 69 BPM

## 2019-03-20 DIAGNOSIS — I48.20 CHRONIC ATRIAL FIBRILLATION (HCC): ICD-10-CM

## 2019-03-20 DIAGNOSIS — J44.1 COPD EXACERBATION (HCC): Primary | ICD-10-CM

## 2019-03-20 PROBLEM — I48.91 ATRIAL FIBRILLATION WITH RAPID VENTRICULAR RESPONSE (HCC): Status: RESOLVED | Noted: 2019-03-12 | Resolved: 2019-03-20

## 2019-03-20 PROCEDURE — 99496 TRANSJ CARE MGMT HIGH F2F 7D: CPT | Performed by: FAMILY MEDICINE

## 2019-03-20 PROCEDURE — 1111F DSCHRG MED/CURRENT MED MERGE: CPT | Performed by: FAMILY MEDICINE

## 2019-03-20 NOTE — PAYOR COMM NOTE
--------------  DISCHARGE REVIEW    Dashawn [place]ment[place] [facility]  [place] #:  [id number]  Authorization Number: Pend    Admit date: 3/12/19  Admit time:  1902  Discharge Date: 3/14/2019  2:40 PM     Admitting Physician: Eliazar Schneider MD  Attending Physician:  Sasha initiated. Coumadin continued, monitor INR. Additionally, patient was given steroids and nebs with improvement in respiratory status. Home today with steroid taper and nebs. Lace+ Score: 65  59-90 High Risk  29-58 Medium Risk  0-28   Low Risk.     TCM F BREATH. Quantity:  54 g  Refills:  3        CONTINUE taking these medications      Instructions Prescription details   CALCIUM 600+D3 OR      Take by mouth. Refills:  0     COMPLETE DAILY/LUTEIN Tabs      Take by mouth.    Refills:  0     Fish Oil 1200 COMMENTS

## 2019-03-20 NOTE — PATIENT INSTRUCTIONS
Treatment for COPD    Your healthcare provider will prescribe the best treatments for your COPD. Treatment  Recommendations include the following:  · Medicines. Some medicines help relieve symptoms when you have them.  Others are taken daily to control i balanced portions of whole grains, lean meats and fish, and low-fat dairy products. Date Last Reviewed: 5/1/2016  © 3090-6356 [employer]. [address] [facility], [address] [place] [address]. All rights reserved.  This information is not intended as a

## 2019-03-21 NOTE — PROGRESS NOTES
HPI:    Karli Gray is a [de-identified]year old female here today for hospital follow up.    She was discharged from Inpatient hospital, [facility] to Home   Admission Date: 3/12/19   Discharge Date: 3/14/19  Hospital Discharge Diagnoses (since 2/18/2019) : 186 lb  12/19/18 : 186 lb        Allergies:  She has No Known Allergies.     Current Meds:    Current Outpatient Medications on File Prior to Visit:  ipratropium-albuterol 0.5-2.5 (3) MG/3ML Inhalation Solution Take 3 mL by nebulization every 4 (four) judith Hearing impairment, High blood pressure, High cholesterol, Hyperlipidemia, Hypothyroidism, Osteoarthritis, and Visual impairment. She  has a past surgical history that includes hernia surgery;  Carotid endarterectomy (Right, 2015); other surgical history extremities  EXTREMITIES: no cyanosis, clubbing or edema  NEURO: Oriented times three, cranial nerves are intact, motor and sensory are grossly intact    ASSESSMENT/ PLAN:   Diagnoses and all orders for this visit:    [facility]  -     Blaine

## 2019-03-28 ENCOUNTER — OFFICE VISIT (OUTPATIENT)
Dept: FAMILY MEDICINE CLINIC | Facility: CLINIC | Age: 79
End: 2019-03-28
Payer: MEDICARE

## 2019-03-28 ENCOUNTER — HOSPITAL ENCOUNTER (OUTPATIENT)
Dept: ULTRASOUND IMAGING | Age: 79
Discharge: HOME | End: 2019-03-28
Attending: FAMILY MEDICINE
Payer: MEDICARE

## 2019-03-28 VITALS
WEIGHT: 188 LBS | BODY MASS INDEX: 37.9 KG/M2 | HEART RATE: 76 BPM | TEMPERATURE: 98 F | RESPIRATION RATE: 16 BRPM | HEIGHT: 59 IN | SYSTOLIC BLOOD PRESSURE: 130 MMHG | DIASTOLIC BLOOD PRESSURE: 70 MMHG | OXYGEN SATURATION: 96 %

## 2019-03-28 DIAGNOSIS — M79.89 RIGHT LEG SWELLING: ICD-10-CM

## 2019-03-28 DIAGNOSIS — I73.9 PERIPHERAL VASCULAR DISEASE (HCC): ICD-10-CM

## 2019-03-28 DIAGNOSIS — I48.20 CHRONIC ATRIAL FIBRILLATION (HCC): Primary | ICD-10-CM

## 2019-03-28 DIAGNOSIS — J41.0 SIMPLE CHRONIC BRONCHITIS (HCC): ICD-10-CM

## 2019-03-28 PROCEDURE — 93971 EXTREMITY STUDY: CPT | Performed by: FAMILY MEDICINE

## 2019-03-28 PROCEDURE — 99214 OFFICE O/P EST MOD 30 MIN: CPT | Performed by: FAMILY MEDICINE

## 2019-03-30 DIAGNOSIS — I48.20 CHRONIC ATRIAL FIBRILLATION (HCC): ICD-10-CM

## 2019-03-31 NOTE — PROGRESS NOTES
[address] [facility] Office Note  Chief Complaint:   Patient presents with:  COPD: f/u      HPI:   This is a [de-identified]year old female coming in for  HPI  COPD - follow up   Chronic cough   Acutely dyspneic 3-4 weeks ago  At [facility] appt - started on Furo-Formoterol Fum (DULERA) 200-5 MCG/ACT Inhalation Aerosol Inhale 1 puff into the lungs 2 (two) times daily.  1 sampleLot: [id number]: 04/28/20 Disp: 1 Inhaler Rfl: 0   ipratropium-albuterol 0.5-2.5 (3) MG/3ML Inhalation Solution Take 3 mL by nebulizatio swelling. Negative for chest pain and palpitations. Gastrointestinal: Negative for abdominal pain, nausea and vomiting. Genitourinary: Negative for dysuria, frequency and urgency. Musculoskeletal: Negative for arthralgias and myalgias.    Skin: Solis Route Inhalation Aero Soln 2 Inhaler 0     Sig: Inhale 2 puffs into the lungs daily. 2 samples  Lot: [id number]  Exp: 09/20         Patient/Caregiver Education: Patient/Caregiver Education: There are no barriers to learning. Medical education done.    Outcome: Torin

## 2019-04-01 RX ORDER — WARFARIN SODIUM 5 MG/1
TABLET ORAL
Qty: 30 TABLET | Refills: 2 | Status: SHIPPED | OUTPATIENT
Start: 2019-04-01 | End: 2019-05-29

## 2019-04-01 NOTE — TELEPHONE ENCOUNTER
Medication(s) to Refill:   Requested Prescriptions     Pending Prescriptions Disp Refills   • WARFARIN SODIUM 5 MG Oral Tab [Pharmacy Med Name: WARFARIN 5MG        TAB] 30 tablet 2     Sig: TAKE 1 TABLET BY MOUTH ONCE DAILY         Reason for Medication Re

## 2019-04-04 ENCOUNTER — OFFICE VISIT (OUTPATIENT)
Dept: CARDIOLOGY | Age: 79
End: 2019-04-04

## 2019-04-04 VITALS
SYSTOLIC BLOOD PRESSURE: 112 MMHG | BODY MASS INDEX: 38.3 KG/M2 | HEART RATE: 64 BPM | WEIGHT: 190 LBS | HEIGHT: 59 IN | DIASTOLIC BLOOD PRESSURE: 60 MMHG

## 2019-04-04 DIAGNOSIS — Z51.81 ENCOUNTER FOR MONITORING AMIODARONE THERAPY: ICD-10-CM

## 2019-04-04 DIAGNOSIS — Z79.899 ENCOUNTER FOR MONITORING AMIODARONE THERAPY: ICD-10-CM

## 2019-04-04 DIAGNOSIS — E78.2 MIXED HYPERLIPIDEMIA: ICD-10-CM

## 2019-04-04 DIAGNOSIS — I25.10 CORONARY ARTERY DISEASE INVOLVING NATIVE CORONARY ARTERY OF NATIVE HEART WITHOUT ANGINA PECTORIS: ICD-10-CM

## 2019-04-04 DIAGNOSIS — I48.0 PAF (PAROXYSMAL ATRIAL FIBRILLATION) (CMD): Primary | ICD-10-CM

## 2019-04-04 DIAGNOSIS — I65.23 ASYMPTOMATIC CAROTID ARTERY STENOSIS, BILATERAL: ICD-10-CM

## 2019-04-04 DIAGNOSIS — E03.9 ACQUIRED HYPOTHYROIDISM: ICD-10-CM

## 2019-04-04 DIAGNOSIS — E78.00 PURE HYPERCHOLESTEROLEMIA: ICD-10-CM

## 2019-04-04 DIAGNOSIS — I48.3 TYPICAL ATRIAL FLUTTER (CMD): ICD-10-CM

## 2019-04-04 PROCEDURE — 99214 OFFICE O/P EST MOD 30 MIN: CPT | Performed by: INTERNAL MEDICINE

## 2019-04-04 RX ORDER — AMIODARONE HYDROCHLORIDE 200 MG/1
200 TABLET ORAL DAILY
Qty: 90 TABLET | Refills: 3 | Status: SHIPPED | OUTPATIENT
Start: 2019-04-04 | End: 2019-06-06 | Stop reason: SDUPTHER

## 2019-04-04 RX ORDER — AMIODARONE HYDROCHLORIDE 200 MG/1
200 TABLET ORAL DAILY
COMMUNITY
Start: 2019-03-13 | End: 2019-04-04 | Stop reason: SDUPTHER

## 2019-04-04 RX ORDER — PRAVASTATIN SODIUM 20 MG
20 TABLET ORAL DAILY
Qty: 90 TABLET | Refills: 3 | Status: SHIPPED | OUTPATIENT
Start: 2019-04-04 | End: 2019-06-06 | Stop reason: SDUPTHER

## 2019-04-04 RX ORDER — FUROSEMIDE 20 MG/1
20 TABLET ORAL DAILY
Qty: 90 TABLET | Refills: 3 | Status: SHIPPED | OUTPATIENT
Start: 2019-04-04 | End: 2019-06-06 | Stop reason: SDUPTHER

## 2019-04-04 SDOH — HEALTH STABILITY: MENTAL HEALTH: HOW OFTEN DO YOU HAVE A DRINK CONTAINING ALCOHOL?: NEVER

## 2019-04-04 NOTE — TELEPHONE ENCOUNTER
Medication(s) to Refill:   Requested Prescriptions     Pending Prescriptions Disp Refills   • SIMVASTATIN 40 MG Oral Tab [Pharmacy Med Name: SIMVASTATIN 40 MG Tablet] 90 tablet 3     Sig: TAKE 1 TABLET (40 MG TOTAL) BY MOUTH NIGHTLY.    • LISINOPRIL 5 MG Or

## 2019-04-05 RX ORDER — SIMVASTATIN 40 MG
40 TABLET ORAL NIGHTLY
Qty: 90 TABLET | Refills: 3 | Status: SHIPPED | OUTPATIENT
Start: 2019-04-05 | End: 2019-04-11 | Stop reason: ALTCHOICE

## 2019-04-05 RX ORDER — ALBUTEROL SULFATE 90 UG/1
AEROSOL, METERED RESPIRATORY (INHALATION)
Qty: 54 G | Refills: 3 | Status: SHIPPED | OUTPATIENT
Start: 2019-04-05 | End: 2019-04-11 | Stop reason: ALTCHOICE

## 2019-04-05 RX ORDER — LISINOPRIL 5 MG/1
TABLET ORAL
Qty: 90 TABLET | Refills: 3 | Status: SHIPPED | OUTPATIENT
Start: 2019-04-05 | End: 2019-07-29 | Stop reason: CLARIF

## 2019-04-05 RX ORDER — LEVOTHYROXINE SODIUM 88 UG/1
88 TABLET ORAL
Qty: 90 TABLET | Refills: 3 | Status: SHIPPED | OUTPATIENT
Start: 2019-04-05 | End: 2019-07-29 | Stop reason: CLARIF

## 2019-04-11 ENCOUNTER — TELEPHONE (OUTPATIENT)
Dept: FAMILY MEDICINE CLINIC | Facility: CLINIC | Age: 79
End: 2019-04-11

## 2019-04-11 RX ORDER — ALBUTEROL SULFATE 90 UG/1
2 AEROSOL, METERED RESPIRATORY (INHALATION) EVERY 6 HOURS PRN
Qty: 54 G | Refills: 3 | Status: SHIPPED | OUTPATIENT
Start: 2019-04-11 | End: 2019-10-18 | Stop reason: ALTCHOICE

## 2019-04-11 RX ORDER — PRAVASTATIN SODIUM 20 MG
20 TABLET ORAL NIGHTLY
Refills: 0 | COMMUNITY
Start: 2019-04-11 | End: 2019-05-29

## 2019-04-11 NOTE — TELEPHONE ENCOUNTER
D/c simvastatin then - cont pravastatin per cardiology  Have pharmacy d/c simvastatin order   [id number] [address]

## 2019-04-11 NOTE — TELEPHONE ENCOUNTER
PA received from [facility] [employer] for Simvastatin 40mg daily. Spoke to Waqas at [facility] - she states the reason for PA request was that pt had a pravastatin 20mg claim in the system from [facility] in [place].  She also states Albuterol order just needs to be changed to

## 2019-04-12 ENCOUNTER — TELEPHONE (OUTPATIENT)
Dept: FAMILY MEDICINE CLINIC | Facility: CLINIC | Age: 79
End: 2019-04-12

## 2019-04-12 ENCOUNTER — APPOINTMENT (OUTPATIENT)
Dept: LAB | Age: 79
End: 2019-04-12
Attending: FAMILY MEDICINE
Payer: MEDICARE

## 2019-04-12 DIAGNOSIS — I48.20 CHRONIC ATRIAL FIBRILLATION (HCC): Primary | ICD-10-CM

## 2019-04-12 DIAGNOSIS — I48.20 CHRONIC ATRIAL FIBRILLATION (HCC): ICD-10-CM

## 2019-04-12 PROCEDURE — 85610 PROTHROMBIN TIME: CPT

## 2019-04-12 PROCEDURE — 36415 COLL VENOUS BLD VENIPUNCTURE: CPT

## 2019-04-15 ENCOUNTER — TELEPHONE (OUTPATIENT)
Dept: FAMILY MEDICINE CLINIC | Facility: CLINIC | Age: 79
End: 2019-04-15

## 2019-04-15 NOTE — TELEPHONE ENCOUNTER
Discussed INR result with pt. Verified current dose is 5mg daily.   Since the last INR, pt has stopped the simvastatin, started Pravastatin, decreased amiodarone from 400mg bid down to 200mg daily, started Biotin 10,000, started lasix 20mg for 3 days and t

## 2019-04-15 NOTE — TELEPHONE ENCOUNTER
----- Message from Celso Chen MD sent at 4/15/2019  2:02 PM CDT -----  Results reviewed. Please inform patient - INR elevated - did she start any new medications or change her diet?   Hold coumadin today - INR on Wednesday - go to ER if any bleeding sx

## 2019-04-16 ENCOUNTER — OFFICE VISIT (OUTPATIENT)
Dept: FAMILY MEDICINE CLINIC | Facility: CLINIC | Age: 79
End: 2019-04-16
Payer: MEDICARE

## 2019-04-16 ENCOUNTER — APPOINTMENT (OUTPATIENT)
Dept: LAB | Age: 79
End: 2019-04-16
Attending: FAMILY MEDICINE
Payer: MEDICARE

## 2019-04-16 VITALS
SYSTOLIC BLOOD PRESSURE: 120 MMHG | WEIGHT: 188 LBS | HEART RATE: 84 BPM | HEIGHT: 59 IN | RESPIRATION RATE: 16 BRPM | OXYGEN SATURATION: 98 % | DIASTOLIC BLOOD PRESSURE: 60 MMHG | TEMPERATURE: 98 F | BODY MASS INDEX: 37.9 KG/M2

## 2019-04-16 DIAGNOSIS — I48.20 CHRONIC ATRIAL FIBRILLATION (HCC): ICD-10-CM

## 2019-04-16 DIAGNOSIS — R39.9 UTI SYMPTOMS: ICD-10-CM

## 2019-04-16 DIAGNOSIS — N39.0 URINARY TRACT INFECTION WITH HEMATURIA, SITE UNSPECIFIED: ICD-10-CM

## 2019-04-16 DIAGNOSIS — R39.9 UTI SYMPTOMS: Primary | ICD-10-CM

## 2019-04-16 DIAGNOSIS — R79.1 SUPRATHERAPEUTIC INR: ICD-10-CM

## 2019-04-16 DIAGNOSIS — R31.9 URINARY TRACT INFECTION WITH HEMATURIA, SITE UNSPECIFIED: ICD-10-CM

## 2019-04-16 PROCEDURE — 36415 COLL VENOUS BLD VENIPUNCTURE: CPT

## 2019-04-16 PROCEDURE — 81003 URINALYSIS AUTO W/O SCOPE: CPT | Performed by: FAMILY MEDICINE

## 2019-04-16 PROCEDURE — 80048 BASIC METABOLIC PNL TOTAL CA: CPT

## 2019-04-16 PROCEDURE — 87086 URINE CULTURE/COLONY COUNT: CPT | Performed by: FAMILY MEDICINE

## 2019-04-16 PROCEDURE — 85610 PROTHROMBIN TIME: CPT

## 2019-04-16 PROCEDURE — 99214 OFFICE O/P EST MOD 30 MIN: CPT | Performed by: FAMILY MEDICINE

## 2019-04-16 PROCEDURE — 87088 URINE BACTERIA CULTURE: CPT | Performed by: FAMILY MEDICINE

## 2019-04-16 PROCEDURE — 81001 URINALYSIS AUTO W/SCOPE: CPT

## 2019-04-16 PROCEDURE — 87186 SC STD MICRODIL/AGAR DIL: CPT | Performed by: FAMILY MEDICINE

## 2019-04-16 RX ORDER — CEFDINIR 300 MG/1
300 CAPSULE ORAL 2 TIMES DAILY
Qty: 14 CAPSULE | Refills: 0 | Status: SHIPPED | OUTPATIENT
Start: 2019-04-16 | End: 2019-04-23

## 2019-04-16 NOTE — PATIENT INSTRUCTIONS
Special diet considerations for Warfarin (Coumadin)  Grapefruit juice, cranberry juice, bernardo juice, and pomegranate juice may interact with warfarin and lead to unwanted side effects. Avoid the use of these juice products while taking warfarin.     Foods th

## 2019-04-16 NOTE — PROGRESS NOTES
[facility] Office Note  Chief Complaint:   Patient presents with:  Dysuria: x1 week, no discharge      HPI:   This is a [de-identified]year old female coming in for  HPI  Dysuria x1 week  +frequent urination, no frequent thirst  +right flank Disp:  Rfl: 0   LISINOPRIL 5 MG Oral Tab TAKE 1 TABLET (5 MG TOTAL) BY MOUTH DAILY. Disp: 90 tablet Rfl: 3   LEVOTHYROXINE SODIUM 88 MCG Oral Tab TAKE 1 TABLET (88 MCG TOTAL) BY MOUTH BEFORE BREAKFAST.  Disp: 90 tablet Rfl: 3   WARFARIN SODIUM 5 MG Oral Tab palpitations. Gastrointestinal: Negative for abdominal pain, blood in stool, nausea and vomiting. Genitourinary: Positive for dysuria, flank pain, frequency and urgency. Negative for hematuria. Musculoskeletal: Positive for back pain.  Negative for ar cefdinir 300 MG Oral Cap 14 capsule 0     Sig: Take 1 capsule (300 mg total) by mouth 2 (two) times daily for 7 days. Patient/Caregiver Education: Patient/Caregiver Education: There are no barriers to learning. Medical education done.    Outcome: Pa

## 2019-04-17 ENCOUNTER — TELEPHONE (OUTPATIENT)
Dept: FAMILY MEDICINE CLINIC | Facility: CLINIC | Age: 79
End: 2019-04-17

## 2019-04-17 DIAGNOSIS — N17.9 AKI (ACUTE KIDNEY INJURY) (HCC): Primary | ICD-10-CM

## 2019-04-17 NOTE — TELEPHONE ENCOUNTER
----- Message from Aydin Teresa MD sent at 4/17/2019  8:47 AM CDT -----  Results reviewed.  Please inform patient   INR improving - held last night's dose, resume previous dosing 5mg daily  Repeat inr and bmp next Monday morning   ABIGAIL - possibly from recent

## 2019-04-17 NOTE — TELEPHONE ENCOUNTER
I called pt at [phone] and verified 2 patient identifiers: name & . I discussed results and recommendations at length with patient. Pt aware not to hold today's warfarin dose like originally planned. Ok to resume 5mg daily.   Continue lasix per D

## 2019-04-19 ENCOUNTER — TELEPHONE (OUTPATIENT)
Dept: FAMILY MEDICINE CLINIC | Facility: CLINIC | Age: 79
End: 2019-04-19

## 2019-04-19 NOTE — TELEPHONE ENCOUNTER
----- Message from Ana Rojas MD sent at 4/19/2019 11:04 AM CDT -----  Results reviewed. Please inform patient to finish abx- should treat UTI effectively.

## 2019-04-22 ENCOUNTER — APPOINTMENT (OUTPATIENT)
Dept: LAB | Age: 79
End: 2019-04-22
Attending: FAMILY MEDICINE
Payer: MEDICARE

## 2019-04-22 DIAGNOSIS — I48.20 CHRONIC ATRIAL FIBRILLATION (HCC): ICD-10-CM

## 2019-04-22 DIAGNOSIS — N17.9 AKI (ACUTE KIDNEY INJURY) (HCC): ICD-10-CM

## 2019-04-22 PROCEDURE — 80048 BASIC METABOLIC PNL TOTAL CA: CPT

## 2019-04-22 PROCEDURE — 36415 COLL VENOUS BLD VENIPUNCTURE: CPT

## 2019-04-22 PROCEDURE — 85610 PROTHROMBIN TIME: CPT

## 2019-04-24 ENCOUNTER — MED REC SCAN ONLY (OUTPATIENT)
Dept: FAMILY MEDICINE CLINIC | Facility: CLINIC | Age: 79
End: 2019-04-24

## 2019-04-24 ENCOUNTER — ANTI-COAG VISIT (OUTPATIENT)
Dept: FAMILY MEDICINE CLINIC | Facility: CLINIC | Age: 79
End: 2019-04-24

## 2019-04-24 ENCOUNTER — TELEPHONE (OUTPATIENT)
Dept: FAMILY MEDICINE CLINIC | Facility: CLINIC | Age: 79
End: 2019-04-24

## 2019-04-24 DIAGNOSIS — I48.20 CHRONIC ATRIAL FIBRILLATION (HCC): ICD-10-CM

## 2019-04-24 DIAGNOSIS — N17.9 AKI (ACUTE KIDNEY INJURY) (HCC): Primary | ICD-10-CM

## 2019-04-24 PROCEDURE — 93793 ANTICOAG MGMT PT WARFARIN: CPT | Performed by: FAMILY MEDICINE

## 2019-04-24 NOTE — PROGRESS NOTES
Notes recorded by Zofia Shahid MD on 4/24/2019 at 12:17 PM CDT  INR elevated, pt should only take 2.5mg Wednesdays (starting tonight) repeat Tuesday

## 2019-04-24 NOTE — TELEPHONE ENCOUNTER
Notes recorded by Hayder Pressley MD on 4/24/2019 at 12:17 PM CDT  INR elevated, pt should only take 2.5mg Wednesdays (starting tonight) repeat Tuesday    Still with ABIGAIL - track weights, if has worsening swelling - followup in clinic, repeat bmp next week as

## 2019-04-24 NOTE — PROGRESS NOTES
Discussed INR result with pt. Verified current dose is 5mg daily except for she held dose on 4/15/19. Pt instructed to take 2.5mg on W and 5mg all other days. Patient aware to repeat a INR on Tues 4/30/19. Flowsheet updated.

## 2019-04-24 NOTE — TELEPHONE ENCOUNTER
I called pt at [phone] and verified 2 patient identifiers: name & . I discussed results and recommendations at length with patient. All orders placed. Pt will monitor her weight every am and monitor for swelling.   She will f/up if it is worsenin

## 2019-04-30 ENCOUNTER — APPOINTMENT (OUTPATIENT)
Dept: LAB | Age: 79
End: 2019-04-30
Attending: FAMILY MEDICINE
Payer: MEDICARE

## 2019-04-30 DIAGNOSIS — N17.9 AKI (ACUTE KIDNEY INJURY) (HCC): ICD-10-CM

## 2019-04-30 PROCEDURE — 80048 BASIC METABOLIC PNL TOTAL CA: CPT

## 2019-05-01 ENCOUNTER — TELEPHONE (OUTPATIENT)
Dept: FAMILY MEDICINE CLINIC | Facility: CLINIC | Age: 79
End: 2019-05-01

## 2019-05-01 ENCOUNTER — ANTI-COAG VISIT (OUTPATIENT)
Dept: FAMILY MEDICINE CLINIC | Facility: CLINIC | Age: 79
End: 2019-05-01

## 2019-05-01 DIAGNOSIS — N17.9 AKI (ACUTE KIDNEY INJURY) (HCC): Primary | ICD-10-CM

## 2019-05-01 DIAGNOSIS — I48.20 CHRONIC ATRIAL FIBRILLATION (HCC): ICD-10-CM

## 2019-05-01 PROCEDURE — 93793 ANTICOAG MGMT PT WARFARIN: CPT | Performed by: FAMILY MEDICINE

## 2019-05-01 NOTE — PROGRESS NOTES
Discussed INR result with pt. Verified current dose is 2.5mg Wed and 5mg all other days. Pt instructed to hold today's dose and then decrease to 2.5mg on Mon/Thurs and 5mg all other days. Patient aware to repeat a INR on Tues 5/7/19 .   Flowsheet updated

## 2019-05-01 NOTE — TELEPHONE ENCOUNTER
I called pt at [phone] and verified 2 patient identifiers: name & . I discussed results and recommendations at length with patient. All orders placed. All questions answered.

## 2019-05-01 NOTE — TELEPHONE ENCOUNTER
----- Message from Saulo Monae MD sent at 5/1/2019 12:52 PM CDT -----  Results reviewed. Please inform patient mild hypoglycemia - avoid prlonged fasting  Renal function improving back to baseline - repeat bmp in 3 mo.     PROTHROMBIN TIME (PT)   Notes rec

## 2019-05-01 NOTE — PROGRESS NOTES
INR still too high  Hold today's dose  Change to warfarin Monday/Thursday 2.5mg (starting tomorrow)  5mg all other days  Repeat INR on Tuesday

## 2019-05-07 ENCOUNTER — ANTI-COAG VISIT (OUTPATIENT)
Dept: FAMILY MEDICINE CLINIC | Facility: CLINIC | Age: 79
End: 2019-05-07

## 2019-05-07 ENCOUNTER — APPOINTMENT (OUTPATIENT)
Dept: LAB | Age: 79
End: 2019-05-07
Attending: FAMILY MEDICINE
Payer: MEDICARE

## 2019-05-07 DIAGNOSIS — I48.20 CHRONIC ATRIAL FIBRILLATION (HCC): ICD-10-CM

## 2019-05-07 PROCEDURE — 36415 COLL VENOUS BLD VENIPUNCTURE: CPT

## 2019-05-07 PROCEDURE — 85610 PROTHROMBIN TIME: CPT

## 2019-05-07 PROCEDURE — 93793 ANTICOAG MGMT PT WARFARIN: CPT | Performed by: FAMILY MEDICINE

## 2019-05-07 NOTE — PROGRESS NOTES
Discussed INR result with pt. Verified current dose is 2.5mg M & Th & 5mg all other days. Pt instructed to continue current dose. Patient aware to repeat a INR in 1 week. Flowsheet updated.

## 2019-05-07 NOTE — PROGRESS NOTES
Notes recorded by Heraclio Cadena MD on 5/7/2019 at 3:22 PM CDT  INR at goal, repeat in 1 week    Cont current regimen

## 2019-05-14 ENCOUNTER — APPOINTMENT (OUTPATIENT)
Dept: LAB | Age: 79
End: 2019-05-14
Attending: FAMILY MEDICINE
Payer: MEDICARE

## 2019-05-14 DIAGNOSIS — I48.20 CHRONIC ATRIAL FIBRILLATION (HCC): ICD-10-CM

## 2019-05-14 PROCEDURE — 85610 PROTHROMBIN TIME: CPT

## 2019-05-14 PROCEDURE — 36415 COLL VENOUS BLD VENIPUNCTURE: CPT

## 2019-05-16 ENCOUNTER — ANTI-COAG VISIT (OUTPATIENT)
Dept: FAMILY MEDICINE CLINIC | Facility: CLINIC | Age: 79
End: 2019-05-16

## 2019-05-16 DIAGNOSIS — I48.20 CHRONIC ATRIAL FIBRILLATION (HCC): ICD-10-CM

## 2019-05-16 PROCEDURE — 93793 ANTICOAG MGMT PT WARFARIN: CPT | Performed by: FAMILY MEDICINE

## 2019-05-16 NOTE — PROGRESS NOTES
LM for pt to callback. Pt called back - notified to continue current dose (m/th 2.5mg, 5mg all other days), recheck INR in 2 weeks. Pt verbalizes understanding.

## 2019-05-28 ENCOUNTER — APPOINTMENT (OUTPATIENT)
Dept: LAB | Age: 79
End: 2019-05-28
Attending: FAMILY MEDICINE
Payer: MEDICARE

## 2019-05-28 DIAGNOSIS — I48.20 CHRONIC ATRIAL FIBRILLATION (HCC): ICD-10-CM

## 2019-05-28 PROCEDURE — 85610 PROTHROMBIN TIME: CPT

## 2019-05-28 PROCEDURE — 36415 COLL VENOUS BLD VENIPUNCTURE: CPT

## 2019-05-29 ENCOUNTER — TELEPHONE (OUTPATIENT)
Dept: FAMILY MEDICINE CLINIC | Facility: CLINIC | Age: 79
End: 2019-05-29

## 2019-05-29 ENCOUNTER — ANTI-COAG VISIT (OUTPATIENT)
Dept: FAMILY MEDICINE CLINIC | Facility: CLINIC | Age: 79
End: 2019-05-29

## 2019-05-29 DIAGNOSIS — I48.20 CHRONIC ATRIAL FIBRILLATION (HCC): ICD-10-CM

## 2019-05-29 PROCEDURE — 93793 ANTICOAG MGMT PT WARFARIN: CPT | Performed by: FAMILY MEDICINE

## 2019-05-29 RX ORDER — PRAVASTATIN SODIUM 20 MG
20 TABLET ORAL NIGHTLY
Qty: 90 TABLET | Refills: 1 | Status: SHIPPED | OUTPATIENT
Start: 2019-05-29 | End: 2019-06-11 | Stop reason: DRUGHIGH

## 2019-05-29 RX ORDER — WARFARIN SODIUM 5 MG/1
TABLET ORAL
Qty: 90 TABLET | Refills: 1 | Status: SHIPPED | OUTPATIENT
Start: 2019-05-29 | End: 2019-07-29 | Stop reason: CLARIF

## 2019-05-29 NOTE — PROGRESS NOTES
Discussed INR result with pt. Verified current dose is 2.5mg M & Th and 5mg all other days  Pt instructed to continue current dose. Patient aware to repeat a INR in 2 weeks. Flowsheet updated.

## 2019-05-29 NOTE — TELEPHONE ENCOUNTER
Pt asking for Lisinopril 5mg to be sent to [facility] mail order for a year supply. It actually already was sent and pt was informed to call [facility] and request a refill. However, I noticed on her med list we also list 10mg from Dr Angie Mitchell in March.     Pt st

## 2019-06-03 ENCOUNTER — OFFICE VISIT (OUTPATIENT)
Dept: FAMILY MEDICINE CLINIC | Facility: CLINIC | Age: 79
End: 2019-06-03
Payer: MEDICARE

## 2019-06-03 ENCOUNTER — HOSPITAL ENCOUNTER (OUTPATIENT)
Dept: GENERAL RADIOLOGY | Age: 79
Discharge: HOME | End: 2019-06-03
Attending: NURSE PRACTITIONER
Payer: MEDICARE

## 2019-06-03 VITALS
OXYGEN SATURATION: 94 % | HEIGHT: 59 IN | HEART RATE: 82 BPM | SYSTOLIC BLOOD PRESSURE: 128 MMHG | TEMPERATURE: 98 F | RESPIRATION RATE: 20 BRPM | WEIGHT: 195 LBS | BODY MASS INDEX: 39.31 KG/M2 | DIASTOLIC BLOOD PRESSURE: 70 MMHG

## 2019-06-03 DIAGNOSIS — S99.922A INJURY OF TOE ON LEFT FOOT, INITIAL ENCOUNTER: ICD-10-CM

## 2019-06-03 DIAGNOSIS — S99.922A INJURY OF TOE ON LEFT FOOT, INITIAL ENCOUNTER: Primary | ICD-10-CM

## 2019-06-03 DIAGNOSIS — L03.115 CELLULITIS OF RIGHT LOWER EXTREMITY: ICD-10-CM

## 2019-06-03 PROCEDURE — 99214 OFFICE O/P EST MOD 30 MIN: CPT | Performed by: NURSE PRACTITIONER

## 2019-06-03 PROCEDURE — 73660 X-RAY EXAM OF TOE(S): CPT | Performed by: NURSE PRACTITIONER

## 2019-06-03 RX ORDER — FUROSEMIDE 20 MG/1
TABLET ORAL
Refills: 3 | COMMUNITY
Start: 2019-04-04 | End: 2019-07-17

## 2019-06-03 RX ORDER — CEPHALEXIN 500 MG/1
500 CAPSULE ORAL 2 TIMES DAILY
Qty: 20 CAPSULE | Refills: 0 | Status: SHIPPED | OUTPATIENT
Start: 2019-06-03 | End: 2019-06-13

## 2019-06-03 NOTE — PROGRESS NOTES
Sri Officer is a [de-identified]year old female.   HPI:   Patient's presenting with an injury to: left great toe  Cause - Pt reports she dropped a 1/2 gallon container of ice cream on her left foot 4 days ago  Onset - 4 days ago  Location of pain - left great to Carb-Cholecalciferol (CALCIUM 600+D3 OR) Take by mouth. Disp:  Rfl:    Omega-3 Fatty Acids (FISH OIL) 1200 MG Oral Capsule Delayed Release Take by mouth.  Disp:  Rfl:    Tiotropium Bromide Monohydrate (SPIRIVA RESPIMAT) 1.25 MCG/ACT Inhalation Aero Soln Inh distress  SKIN: dime sized fluid filled blister noted to left great toe. There is a quarter sized area of surrounding erythema and the area feels warm to touch. There is also bruising noted to the left 1st, 2nd, and 3rd toes.   HEENT: atraumatic, normocepha Cellulitis of right lower extremity      Plan:     Injury of toe: Xray ordered. No fracture noted. Discussed results with pt. Advised comfort care. Cellulitis: bandaid applied over blister. Anitbiotic as below, cellulitis appears mild at this time.  Gareth Moon while sitting. This will help to reduce swelling. · Take all of the antibiotic medicine exactly as directed until it is gone. Do not miss any doses, especially during the first 7 days. Don’t stop taking the medicine when your symptoms get better.   · Keep sandal, be careful not to strike your foot against anything. Another injury could make the fracture worse. If you were given crutches, don’t put full weight on the injured foot until you can do so without pain, or as directed by your healthcare provider. Bad odor from the cast/splint or wound fluid stains the cast  · Tightness or pressure under the cast/splint gets worse  · Toe becomes cold, blue, numb, or tingly  · You can’t move the toe  · Signs of infection: fever, redness, warmth, swelling, or drainage

## 2019-06-03 NOTE — PATIENT INSTRUCTIONS
Cellulitis  Cellulitis is an infection of the deep layers of skin. A break in the skin, such as a cut or scratch, can let bacteria under the skin. If the bacteria get to deep layers of the skin, it can be serious.  If not treated, cellulitis can get into on antibiotics  Date Last Reviewed: 9/1/2016  © 8038-1606 [employer]. [address] [facility], [facility] [place]. All rights reserved. This information is not intended as a substitute for professional medical care.  Always follow your healthca used and it becomes wet or dirty, change it. You may replace it with paper, plastic, or cloth tape. Cloth tape and paper tapes must be kept dry. · You may use acetaminophen or ibuprofen to control pain, unless another pain medicine was prescribed.  If you

## 2019-06-06 ENCOUNTER — OFFICE VISIT (OUTPATIENT)
Dept: CARDIOLOGY | Age: 79
End: 2019-06-06

## 2019-06-06 VITALS
WEIGHT: 191 LBS | SYSTOLIC BLOOD PRESSURE: 110 MMHG | HEIGHT: 59 IN | BODY MASS INDEX: 38.51 KG/M2 | HEART RATE: 85 BPM | DIASTOLIC BLOOD PRESSURE: 58 MMHG

## 2019-06-06 DIAGNOSIS — I10 ESSENTIAL HYPERTENSION: ICD-10-CM

## 2019-06-06 DIAGNOSIS — E78.00 PURE HYPERCHOLESTEROLEMIA: Primary | ICD-10-CM

## 2019-06-06 DIAGNOSIS — I48.3 TYPICAL ATRIAL FLUTTER (CMD): ICD-10-CM

## 2019-06-06 DIAGNOSIS — I25.10 CORONARY ARTERY DISEASE INVOLVING NATIVE CORONARY ARTERY OF NATIVE HEART WITHOUT ANGINA PECTORIS: ICD-10-CM

## 2019-06-06 DIAGNOSIS — I65.23 ASYMPTOMATIC CAROTID ARTERY STENOSIS, BILATERAL: ICD-10-CM

## 2019-06-06 PROCEDURE — 3074F SYST BP LT 130 MM HG: CPT | Performed by: INTERNAL MEDICINE

## 2019-06-06 PROCEDURE — 3078F DIAST BP <80 MM HG: CPT | Performed by: INTERNAL MEDICINE

## 2019-06-06 PROCEDURE — 99214 OFFICE O/P EST MOD 30 MIN: CPT | Performed by: INTERNAL MEDICINE

## 2019-06-06 RX ORDER — AMIODARONE HYDROCHLORIDE 200 MG/1
100 TABLET ORAL DAILY
Qty: 45 TABLET | Refills: 0 | Status: SHIPPED | OUTPATIENT
Start: 2019-06-06 | End: 2019-07-10 | Stop reason: ALTCHOICE

## 2019-06-06 RX ORDER — LISINOPRIL 5 MG/1
5 TABLET ORAL DAILY
COMMUNITY
End: 2019-06-06 | Stop reason: SDUPTHER

## 2019-06-06 RX ORDER — FUROSEMIDE 20 MG/1
20 TABLET ORAL DAILY
COMMUNITY
End: 2019-06-06 | Stop reason: SDUPTHER

## 2019-06-06 RX ORDER — LISINOPRIL 5 MG/1
5 TABLET ORAL DAILY
Qty: 90 TABLET | Refills: 3 | Status: SHIPPED | OUTPATIENT
Start: 2019-06-06 | End: 2019-06-20 | Stop reason: DRUGHIGH

## 2019-06-06 RX ORDER — FUROSEMIDE 20 MG/1
20 TABLET ORAL DAILY
Qty: 90 TABLET | Refills: 3 | Status: SHIPPED | OUTPATIENT
Start: 2019-06-06 | End: 2019-08-23 | Stop reason: SDUPTHER

## 2019-06-07 ENCOUNTER — OFFICE VISIT (OUTPATIENT)
Dept: FAMILY MEDICINE CLINIC | Facility: CLINIC | Age: 79
End: 2019-06-07
Payer: MEDICARE

## 2019-06-07 VITALS
SYSTOLIC BLOOD PRESSURE: 160 MMHG | OXYGEN SATURATION: 97 % | DIASTOLIC BLOOD PRESSURE: 72 MMHG | WEIGHT: 194 LBS | RESPIRATION RATE: 16 BRPM | HEART RATE: 75 BPM | BODY MASS INDEX: 39.11 KG/M2 | HEIGHT: 59 IN

## 2019-06-07 DIAGNOSIS — L03.032 CELLULITIS OF GREAT TOE OF LEFT FOOT: Primary | ICD-10-CM

## 2019-06-07 DIAGNOSIS — I48.20 CHRONIC ATRIAL FIBRILLATION (HCC): ICD-10-CM

## 2019-06-07 DIAGNOSIS — E78.2 MIXED HYPERLIPIDEMIA: ICD-10-CM

## 2019-06-07 DIAGNOSIS — I10 ESSENTIAL HYPERTENSION: ICD-10-CM

## 2019-06-07 PROCEDURE — 99214 OFFICE O/P EST MOD 30 MIN: CPT | Performed by: NURSE PRACTITIONER

## 2019-06-07 RX ORDER — FUROSEMIDE 20 MG/1
20 TABLET ORAL DAILY
Status: ON HOLD | COMMUNITY
Start: 2019-06-06 | End: 2019-07-31

## 2019-06-07 RX ORDER — AMIODARONE HYDROCHLORIDE 200 MG/1
100 TABLET ORAL
COMMUNITY
Start: 2019-06-06 | End: 2019-07-29 | Stop reason: CLARIF

## 2019-06-07 RX ORDER — GINSENG 100 MG
1 CAPSULE ORAL 2 TIMES DAILY
Qty: 1 TUBE | Refills: 0 | Status: SHIPPED | OUTPATIENT
Start: 2019-06-07 | End: 2019-08-05 | Stop reason: ALTCHOICE

## 2019-06-07 NOTE — PROGRESS NOTES
Chief Complaint:   Patient presents with: Toe Pain: follow up from [facility]    HPI:   This is a [de-identified]year old female presenting for f/u of injury and infection to left great toe. 1 week ago she dropped a 1/2 gallon of ice cream onto her toe.  It developed a large 20 MG Oral Tab  Disp:  Rfl: 3   aspirin 81 MG Oral Tab Take 81 mg by mouth daily. Disp:  Rfl:    cephALEXin 500 MG Oral Cap Take 1 capsule (500 mg total) by mouth 2 (two) times daily for 10 days.  Disp: 20 capsule Rfl: 0   Warfarin Sodium 5 MG Oral Tab Take Constitutional: Negative for chills, fatigue and fever. HENT: Negative for congestion, ear discharge, ear pain, postnasal drip, sinus pressure, sore throat and trouble swallowing. Eyes: Negative for photophobia, pain, redness and visual disturbance. adenopathy. Neurological: She is alert and oriented to person, place, and time. She has normal strength. No sensory deficit. Skin: Skin is warm and dry. She is not diaphoretic. ASSESSMENT AND PLAN:   1.  Cellulitis of great toe of left foot  -Sebastiánbl

## 2019-06-07 NOTE — PATIENT INSTRUCTIONS
Bandage Change  If the bandage becomes wet or dirty, replace it. Otherwise, leave it in place for the first 24 hours. Then once a day:  · Remove the bandage and wash the area with soap and water.  Use a wet cotton swab to loosen and remove any blood or eber

## 2019-06-10 ENCOUNTER — LAB ENCOUNTER (OUTPATIENT)
Dept: LAB | Age: 79
End: 2019-06-10
Attending: FAMILY MEDICINE
Payer: MEDICARE

## 2019-06-10 DIAGNOSIS — I48.3 TYPICAL ATRIAL FLUTTER (HCC): Primary | ICD-10-CM

## 2019-06-10 DIAGNOSIS — I10 ESSENTIAL HYPERTENSION: ICD-10-CM

## 2019-06-10 DIAGNOSIS — I48.20 CHRONIC ATRIAL FIBRILLATION (HCC): ICD-10-CM

## 2019-06-10 DIAGNOSIS — E78.00 PURE HYPERCHOLESTEROLEMIA: ICD-10-CM

## 2019-06-10 DIAGNOSIS — E78.2 MIXED HYPERLIPIDEMIA: ICD-10-CM

## 2019-06-10 LAB
ALBUMIN SERPL-MCNC: 3.5 G/DL
ALBUMIN/GLOB SERPL: NORMAL {RATIO}
ALP SERPL-CCNC: 70 U/L
ALT SERPL-CCNC: 24 U/L
ANION GAP SERPL CALC-SCNC: NORMAL MMOL/L
AST SERPL-CCNC: 21 U/L
BILIRUB SERPL-MCNC: 0.3 MG/DL
BUN SERPL-MCNC: 43 MG/DL
BUN/CREAT SERPL: NORMAL
CALCIUM SERPL-MCNC: 9.1 MG/DL
CHLORIDE SERPL-SCNC: 106 MMOL/L
CHOLEST SERPL-MCNC: 193 MG/DL
CHOLEST/HDLC SERPL: NORMAL {RATIO}
CO2 SERPL-SCNC: NORMAL MMOL/L
CREAT SERPL-MCNC: 1.76 MG/DL
GLOBULIN SER-MCNC: 3.4 G/DL
GLUCOSE SERPL-MCNC: 87 MG/DL
HDLC SERPL-MCNC: 69 MG/DL
LDLC SERPL CALC-MCNC: 104 MG/DL
LENGTH OF FAST TIME PATIENT: NORMAL H
LENGTH OF FAST TIME PATIENT: NORMAL H
NONHDLC SERPL-MCNC: NORMAL MG/DL
POTASSIUM SERPL-SCNC: 4.5 MMOL/L
PROT SERPL-MCNC: 6.9 G/DL
SODIUM SERPL-SCNC: 143 MMOL/L
TRIGL SERPL-MCNC: 98 MG/DL
TSH SERPL-ACNC: 2.08 M[IU]/L
VLDLC SERPL CALC-MCNC: NORMAL MG/DL

## 2019-06-10 PROCEDURE — 84443 ASSAY THYROID STIM HORMONE: CPT

## 2019-06-10 PROCEDURE — 83880 ASSAY OF NATRIURETIC PEPTIDE: CPT

## 2019-06-10 PROCEDURE — 85610 PROTHROMBIN TIME: CPT

## 2019-06-10 PROCEDURE — 36415 COLL VENOUS BLD VENIPUNCTURE: CPT

## 2019-06-10 PROCEDURE — 80061 LIPID PANEL: CPT

## 2019-06-10 PROCEDURE — 80053 COMPREHEN METABOLIC PANEL: CPT

## 2019-06-11 ENCOUNTER — ANTI-COAG VISIT (OUTPATIENT)
Dept: FAMILY MEDICINE CLINIC | Facility: CLINIC | Age: 79
End: 2019-06-11

## 2019-06-11 ENCOUNTER — TELEPHONE (OUTPATIENT)
Dept: FAMILY MEDICINE CLINIC | Facility: CLINIC | Age: 79
End: 2019-06-11

## 2019-06-11 DIAGNOSIS — E78.00 HYPERCHOLESTEREMIA: Primary | ICD-10-CM

## 2019-06-11 DIAGNOSIS — I48.20 CHRONIC ATRIAL FIBRILLATION (HCC): ICD-10-CM

## 2019-06-11 PROCEDURE — 93793 ANTICOAG MGMT PT WARFARIN: CPT | Performed by: FAMILY MEDICINE

## 2019-06-11 RX ORDER — PRAVASTATIN SODIUM 40 MG
40 TABLET ORAL NIGHTLY
Qty: 90 TABLET | Refills: 0 | Status: SHIPPED | OUTPATIENT
Start: 2019-06-11 | End: 2019-08-26

## 2019-06-11 NOTE — TELEPHONE ENCOUNTER
----- Message from Shanthi Hernandez MD sent at 6/11/2019 12:31 PM CDT -----  Keep current dose of coumadin for now  Recheck pt/INRin 2 weeks    LDL not at goal  Increase Pravastatin to 40 mg daily  Recheck lipids in 1-2 months

## 2019-06-11 NOTE — PROGRESS NOTES
----- Message from Percy Chaudhary MD sent at 6/11/2019 12:31 PM CDT -----  Keep current dose of coumadin for now  Recheck pt/INRin 2 weeks     LDL not at goal  Increase Pravastatin to 40 mg daily  Recheck lipids in 1-2 months

## 2019-06-11 NOTE — PROGRESS NOTES
Discussed INR result with pt. Verified current dose is 2.5mg M &Th & 5mg all other days. Pt instructed to continue current dose. Patient aware to repeat a INR in 2 weeks. Flowsheet updated.

## 2019-06-11 NOTE — TELEPHONE ENCOUNTER
I called pt at [phone] and verified 2 patient identifiers: name & . I discussed results and recommendations at length with patient. All orders placed. All questions answered.

## 2019-06-13 ENCOUNTER — OFFICE VISIT (OUTPATIENT)
Dept: FAMILY MEDICINE CLINIC | Facility: CLINIC | Age: 79
End: 2019-06-13
Payer: MEDICARE

## 2019-06-13 VITALS
DIASTOLIC BLOOD PRESSURE: 58 MMHG | WEIGHT: 191 LBS | BODY MASS INDEX: 38.51 KG/M2 | OXYGEN SATURATION: 97 % | SYSTOLIC BLOOD PRESSURE: 118 MMHG | HEIGHT: 59 IN | HEART RATE: 75 BPM | RESPIRATION RATE: 16 BRPM

## 2019-06-13 DIAGNOSIS — L03.032 CELLULITIS OF GREAT TOE OF LEFT FOOT: Primary | ICD-10-CM

## 2019-06-13 PROCEDURE — 99213 OFFICE O/P EST LOW 20 MIN: CPT | Performed by: NURSE PRACTITIONER

## 2019-06-13 NOTE — PROGRESS NOTES
Chief Complaint:   Patient presents with: Toe Pain: follow up     HPI:   This is a [de-identified]year old female presenting for f/u of cellulitis of left great toe. Completed 10 day course of Keflex this morning. Using bacitracin ointment BID.  Reports improvement in Oral Cap Take 1 capsule (500 mg total) by mouth 2 (two) times daily for 10 days. Disp: 20 capsule Rfl: 0   Warfarin Sodium 5 MG Oral Tab Take as directed by Dr Mauro Holter based on INR readings.   Currently taking 2.5mg on M & Th, and 5mg all other days Disp: 90 disturbance. Respiratory: Negative for cough, chest tightness, shortness of breath and wheezing. Cardiovascular: Positive for leg swelling. Negative for chest pain and palpitations.    Gastrointestinal: Negative for heartburn, nausea, abdominal pain, c Non-pitting. ASSESSMENT AND PLAN:   1.  Cellulitis of great toe of left foot  -Continue bacitracin ointment BID to top of toe for the next 7 days.  -Keep clean and dry.   -May use ice prn.   -Monitor for signs of worsening infection: increasing rednes

## 2019-06-17 RX ORDER — IPRATROPIUM BROMIDE AND ALBUTEROL SULFATE 2.5; .5 MG/3ML; MG/3ML
3 SOLUTION RESPIRATORY (INHALATION) EVERY 4 HOURS PRN
Qty: 50 VIAL | Refills: 1 | Status: SHIPPED | OUTPATIENT
Start: 2019-06-17

## 2019-06-17 NOTE — TELEPHONE ENCOUNTER
Last office visit:  6-13-19      Requested medication: Ipratropium-Albuterol 0.5-3(2.5) Mg/3 Ml  She thinks this was what it was it was prescribed when she was in the hospital     Pharmacy:[facility] [address], [address] [place] [address]

## 2019-06-20 ENCOUNTER — TELEPHONE (OUTPATIENT)
Dept: CARDIOLOGY | Age: 79
End: 2019-06-20

## 2019-06-20 RX ORDER — LISINOPRIL 5 MG/1
2.5 TABLET ORAL DAILY
COMMUNITY
End: 2019-12-05 | Stop reason: ALTCHOICE

## 2019-06-20 RX ORDER — PRAVASTATIN SODIUM 20 MG
20 TABLET ORAL DAILY
COMMUNITY
End: 2019-06-24 | Stop reason: DRUGHIGH

## 2019-06-20 NOTE — TELEPHONE ENCOUNTER
----- Message from Corky Lara MD sent at 2/2/2018  7:38 AM CST -----  Results reviewed. INR within goal - repeat in 1 week. Please inform patient. normal performance

## 2019-06-24 RX ORDER — PRAVASTATIN SODIUM 40 MG
40 TABLET ORAL DAILY
COMMUNITY
End: 2019-08-23 | Stop reason: SDUPTHER

## 2019-06-25 ENCOUNTER — APPOINTMENT (OUTPATIENT)
Dept: LAB | Age: 79
End: 2019-06-25
Attending: FAMILY MEDICINE
Payer: MEDICARE

## 2019-06-25 DIAGNOSIS — I48.20 CHRONIC ATRIAL FIBRILLATION (HCC): ICD-10-CM

## 2019-06-25 PROCEDURE — 85610 PROTHROMBIN TIME: CPT

## 2019-06-25 PROCEDURE — 36415 COLL VENOUS BLD VENIPUNCTURE: CPT

## 2019-06-25 RX ORDER — AMLODIPINE BESYLATE 2.5 MG/1
2.5 TABLET ORAL DAILY
Qty: 90 TABLET | Refills: 1 | Status: SHIPPED | OUTPATIENT
Start: 2019-06-25 | End: 2020-06-04 | Stop reason: DRUGHIGH

## 2019-06-26 ENCOUNTER — ANTI-COAG VISIT (OUTPATIENT)
Dept: FAMILY MEDICINE CLINIC | Facility: CLINIC | Age: 79
End: 2019-06-26

## 2019-06-26 ENCOUNTER — CLINICAL ABSTRACT (OUTPATIENT)
Dept: CARDIOLOGY | Age: 79
End: 2019-06-26

## 2019-06-26 DIAGNOSIS — I48.20 CHRONIC ATRIAL FIBRILLATION (HCC): ICD-10-CM

## 2019-06-26 PROCEDURE — 93793 ANTICOAG MGMT PT WARFARIN: CPT | Performed by: FAMILY MEDICINE

## 2019-07-09 ENCOUNTER — TELEPHONE (OUTPATIENT)
Dept: CARDIOLOGY | Age: 79
End: 2019-07-09

## 2019-07-17 ENCOUNTER — OFFICE VISIT (OUTPATIENT)
Dept: FAMILY MEDICINE CLINIC | Facility: CLINIC | Age: 79
End: 2019-07-17
Payer: MEDICARE

## 2019-07-17 ENCOUNTER — HOSPITAL ENCOUNTER (OUTPATIENT)
Dept: GENERAL RADIOLOGY | Age: 79
Discharge: HOME | End: 2019-07-17
Attending: NURSE PRACTITIONER
Payer: MEDICARE

## 2019-07-17 VITALS
SYSTOLIC BLOOD PRESSURE: 126 MMHG | BODY MASS INDEX: 38.51 KG/M2 | TEMPERATURE: 98 F | HEART RATE: 64 BPM | RESPIRATION RATE: 19 BRPM | DIASTOLIC BLOOD PRESSURE: 66 MMHG | WEIGHT: 191 LBS | HEIGHT: 59 IN | OXYGEN SATURATION: 96 %

## 2019-07-17 DIAGNOSIS — M25.561 ACUTE PAIN OF RIGHT KNEE: ICD-10-CM
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
## 2019-07-17 PROCEDURE — 73590 X-RAY EXAM OF LOWER LEG: CPT | Performed by: NURSE PRACTITIONER

## 2019-07-17 PROCEDURE — 99213 OFFICE O/P EST LOW 20 MIN: CPT | Performed by: NURSE PRACTITIONER

## 2019-07-17 PROCEDURE — 73560 X-RAY EXAM OF KNEE 1 OR 2: CPT | Performed by: NURSE PRACTITIONER

## 2019-07-17 RX ORDER — AMLODIPINE BESYLATE 2.5 MG/1
2.5 TABLET ORAL DAILY
Status: ON HOLD | COMMUNITY
Start: 2019-06-25 | End: 2019-07-31

## 2019-07-17 NOTE — PROGRESS NOTES
Jaimee Murillo is a [de-identified]year old female. HPI:   Patient's presenting with an injury to: right knee. Pt reports he was coming up the steps and she missed a step yesterday. States she fell forward and hit her right knee on the steps.  She denies any head 1   amiodarone HCl 200 MG Oral Tab Take 100 mg by mouth. Disp:  Rfl:    bacitracin 500 UNIT/GM External Ointment Apply 1 Application topically 2 (two) times daily.  Disp: 1 Tube Rfl: 0   Albuterol Sulfate HFA (VENTOLIN HFA) 108 (90 Base) MCG/ACT Inhalation tingling sensation.      EXAM:   /66   Pulse 64   Temp 97.6 °F (36.4 °C) (Oral)   Resp 19   Ht 59\"   Wt 191 lb   SpO2 96%   BMI 38.58 kg/m²   GENERAL: well developed, well nourished,in no apparent distress  SKIN: bruising to right knee   HEENT: atrau fall, initial encounter     PATIENT STATED HISTORY: (As transcribed by Technologist)  Patient fell yesterday brusing to the medial aspect of knee with swelling.      FINDINGS:        No evidence of fracture or dislocation     Small suprapatellar joint effu towel or cloth. Never put ice or an ice pack directly on the skin. · Keeping your leg raised above your heart. This helps excess fluid flow out of your knee joint to reduce swelling.   · Compression. This means wrapping an elastic bandage or neoprene sleev

## 2019-07-17 NOTE — PATIENT INSTRUCTIONS
Your x-ray shows no fracture. You can use tylenol for pain. Rest, ice, and elevate your right leg. Follow up with Dr. Gerald Davalos for new or worsening symptoms or if no improvement over the next 2-3 days.      Reducing Knee Pain and Swelling    Many treatments

## 2019-07-18 ENCOUNTER — HOSPITAL ENCOUNTER (EMERGENCY)
Age: 79
Discharge: HOME | End: 2019-07-18
Attending: EMERGENCY MEDICINE
Payer: MEDICARE

## 2019-07-18 ENCOUNTER — TELEPHONE (OUTPATIENT)
Dept: FAMILY MEDICINE CLINIC | Facility: CLINIC | Age: 79
End: 2019-07-18

## 2019-07-18 VITALS
SYSTOLIC BLOOD PRESSURE: 112 MMHG | DIASTOLIC BLOOD PRESSURE: 48 MMHG | WEIGHT: 188 LBS | OXYGEN SATURATION: 98 % | TEMPERATURE: 98 F | RESPIRATION RATE: 18 BRPM | HEIGHT: 59 IN | HEART RATE: 80 BPM | BODY MASS INDEX: 37.9 KG/M2

## 2019-07-18 DIAGNOSIS — R79.1 SUPRATHERAPEUTIC INR: ICD-10-CM

## 2019-07-18 DIAGNOSIS — S80.11XA CONTUSION OF RIGHT LOWER EXTREMITY, INITIAL ENCOUNTER: Primary | ICD-10-CM

## 2019-07-18 LAB
INR BLD: 4.64 (ref 0.9–1.1)
PSA SERPL DL<=0.01 NG/ML-MCNC: 46.3 SECONDS (ref 12.2–14.4)

## 2019-07-18 PROCEDURE — 36415 COLL VENOUS BLD VENIPUNCTURE: CPT

## 2019-07-18 PROCEDURE — 85610 PROTHROMBIN TIME: CPT | Performed by: EMERGENCY MEDICINE

## 2019-07-18 PROCEDURE — 99283 EMERGENCY DEPT VISIT LOW MDM: CPT

## 2019-07-18 RX ORDER — ONDANSETRON 4 MG/1
4 TABLET, ORALLY DISINTEGRATING ORAL EVERY 6 HOURS PRN
Qty: 10 TABLET | Refills: 0 | Status: SHIPPED | OUTPATIENT
Start: 2019-07-18 | End: 2019-07-25

## 2019-07-18 RX ORDER — ONDANSETRON 4 MG/1
4 TABLET, ORALLY DISINTEGRATING ORAL ONCE
Status: COMPLETED | OUTPATIENT
Start: 2019-07-18 | End: 2019-07-18

## 2019-07-18 RX ORDER — HYDROCODONE BITARTRATE AND ACETAMINOPHEN 5; 325 MG/1; MG/1
1 TABLET ORAL ONCE
Status: COMPLETED | OUTPATIENT
Start: 2019-07-18 | End: 2019-07-18

## 2019-07-18 RX ORDER — HYDROCODONE BITARTRATE AND ACETAMINOPHEN 5; 325 MG/1; MG/1
1 TABLET ORAL EVERY 6 HOURS PRN
Qty: 12 TABLET | Refills: 0 | Status: SHIPPED | OUTPATIENT
Start: 2019-07-18 | End: 2019-07-22

## 2019-07-18 NOTE — TELEPHONE ENCOUNTER
Received message that pt's leg is now very swollen. Pt was seen yesterday for fall and knee injury. Pt did not have swelling at that time however is on warfarin and could have developed a hematoma. I called pt back twice with no answer.  I left message blaise

## 2019-07-18 NOTE — ED PROVIDER NOTES
Patient Seen in: [facility] [place]    History   Patient presents with:  Lower Extremity Injury (musculoskeletal)    Stated Complaint: fell yesterday;right leg pain    HPI    Patient is an [de-identified] female who is on Coumadin for systems reviewed and negative except as noted above.     Physical Exam     ED Triage Vitals [07/18/19 1807]   /40   Pulse 79   Resp 18   Temp 98 °F (36.7 °C)   Temp src    SpO2 96 %   O2 Device None (Room air)       Current:/40   Pulse 79   Temp extensive ecchymosis. There is mild tenderness but the compartments are soft. Distal pulses are good. There is no pain with passive range of motion of the lower extremity and do not have concern for compartment syndrome at this time.   Will try Norco for

## 2019-07-18 NOTE — ED INITIAL ASSESSMENT (HPI)
Pt states she fell down 1 step injuring her left lower leg on Tuesday. Pt was [facility] yesterday and states bruising is getting worse.

## 2019-07-19 ENCOUNTER — TELEPHONE (OUTPATIENT)
Dept: FAMILY MEDICINE CLINIC | Facility: CLINIC | Age: 79
End: 2019-07-19

## 2019-07-19 NOTE — TELEPHONE ENCOUNTER
Patient left a message that said she was in the ER on 7/18/19 and they told her not to take her coumadin yesterday. Patient is wondering if she is supposed to take in tonight. Please call patient.

## 2019-07-19 NOTE — TELEPHONE ENCOUNTER
I spoke to pt and daughter, [facility]. I confirmed that pt would normally take the 2.5mg yesterday. Pt aware to take 2.5mg tomorrow and start new regimen of 2.5mg MWF & 5mg all other days. Recheck an INR on Tues.

## 2019-07-19 NOTE — TELEPHONE ENCOUNTER
So she held a 2.5mg   I would like her to only take 2.5mg tonight, and change dosing to 2.5mg MWF, 5mg STRS

## 2019-07-22 ENCOUNTER — TELEPHONE (OUTPATIENT)
Dept: FAMILY MEDICINE CLINIC | Facility: CLINIC | Age: 79
End: 2019-07-22

## 2019-07-22 RX ORDER — HYDROCODONE BITARTRATE AND ACETAMINOPHEN 5; 325 MG/1; MG/1
1 TABLET ORAL EVERY 6 HOURS PRN
Qty: 28 TABLET | Refills: 0 | Status: SHIPPED | OUTPATIENT
Start: 2019-07-22 | End: 2019-07-29 | Stop reason: CLARIF

## 2019-07-22 NOTE — TELEPHONE ENCOUNTER
Dora Abreu is aware. She is going to her her son in law-Diogo Kurtz pick it up since he is off today. I strongly encouraged pt to make the appt for tomorrow and if no, go to IC or ER. Pt voiced understanding.

## 2019-07-22 NOTE — TELEPHONE ENCOUNTER
Patient went to the ER on Thursday because she fell and hurt her back and legs. She said she is bruised and in pain all over.  I asked pain scale right now and she stated she took her last pill and she is sitting down down so she does not have pain at this

## 2019-07-22 NOTE — TELEPHONE ENCOUNTER
We can't call or fax - refill would have to be picked up - is that something she will be able to get?

## 2019-07-22 NOTE — TELEPHONE ENCOUNTER
Pt was given #12 of Norco on 7/18/19 and is asking for a refill before her appt tomorrow. With 10 out of 10 pain on weight bearing, should I advise pt to go back to the ER/IC or do you want to see her tomorrow?

## 2019-07-23 ENCOUNTER — APPOINTMENT (OUTPATIENT)
Dept: LAB | Age: 79
End: 2019-07-23
Attending: FAMILY MEDICINE
Payer: MEDICARE

## 2019-07-23 ENCOUNTER — OFFICE VISIT (OUTPATIENT)
Dept: FAMILY MEDICINE CLINIC | Facility: CLINIC | Age: 79
End: 2019-07-23
Payer: MEDICARE

## 2019-07-23 VITALS
TEMPERATURE: 98 F | HEART RATE: 68 BPM | RESPIRATION RATE: 16 BRPM | OXYGEN SATURATION: 98 % | SYSTOLIC BLOOD PRESSURE: 112 MMHG | DIASTOLIC BLOOD PRESSURE: 60 MMHG

## 2019-07-23 DIAGNOSIS — R79.1 SUPRATHERAPEUTIC INR: ICD-10-CM

## 2019-07-23 DIAGNOSIS — Z79.01 CHRONIC ANTICOAGULATION: ICD-10-CM

## 2019-07-23 DIAGNOSIS — S80.11XA CONTUSION OF RIGHT LOWER LEG, INITIAL ENCOUNTER: Primary | ICD-10-CM

## 2019-07-23 DIAGNOSIS — I48.20 CHRONIC ATRIAL FIBRILLATION (HCC): ICD-10-CM

## 2019-07-23 LAB
INR BLD: 2.62 (ref 0.9–1.1)
PSA SERPL DL<=0.01 NG/ML-MCNC: 29.8 SECONDS (ref 12.5–14.7)

## 2019-07-23 PROCEDURE — 85610 PROTHROMBIN TIME: CPT

## 2019-07-23 PROCEDURE — 36415 COLL VENOUS BLD VENIPUNCTURE: CPT

## 2019-07-23 PROCEDURE — 99214 OFFICE O/P EST MOD 30 MIN: CPT | Performed by: FAMILY MEDICINE

## 2019-07-24 ENCOUNTER — ANTI-COAG VISIT (OUTPATIENT)
Dept: FAMILY MEDICINE CLINIC | Facility: CLINIC | Age: 79
End: 2019-07-24

## 2019-07-24 ENCOUNTER — TELEPHONE (OUTPATIENT)
Dept: FAMILY MEDICINE CLINIC | Facility: CLINIC | Age: 79
End: 2019-07-24

## 2019-07-24 ENCOUNTER — HOSPITAL ENCOUNTER (OUTPATIENT)
Dept: ULTRASOUND IMAGING | Age: 79
Discharge: HOME | End: 2019-07-24
Attending: FAMILY MEDICINE
Payer: MEDICARE

## 2019-07-24 DIAGNOSIS — S80.11XA CONTUSION OF RIGHT LOWER LEG, INITIAL ENCOUNTER: ICD-10-CM

## 2019-07-24 DIAGNOSIS — I48.20 CHRONIC ATRIAL FIBRILLATION (HCC): ICD-10-CM

## 2019-07-24 PROCEDURE — 93793 ANTICOAG MGMT PT WARFARIN: CPT | Performed by: FAMILY MEDICINE

## 2019-07-24 PROCEDURE — 93971 EXTREMITY STUDY: CPT | Performed by: FAMILY MEDICINE

## 2019-07-24 NOTE — TELEPHONE ENCOUNTER
I called pt at [phone] and verified 2 patient identifiers: name & . I discussed results and recommendations at length with patient. I offered to make pt her ortho appt. Pt prefers the [place] office on Friday.

## 2019-07-24 NOTE — TELEPHONE ENCOUNTER
A male from intake called and asked for a demographic sheet, OV note, order, ins card, med list, snap shot. All faxed to [phone] and confirmation received.

## 2019-07-24 NOTE — TELEPHONE ENCOUNTER
I called again asking for an update. I spoke to Charito Houston at the [facility] office and she took the message and will forward it to intake to put a rush on it.

## 2019-07-24 NOTE — TELEPHONE ENCOUNTER
Notes recorded by Kaya Herman MD on 7/24/2019 at 3:26 PM CDT  Large hematoma's - due to size and pain - recommend referral to ortho for possible drainage - warfarin use may complicate drainage but please see if she can see ortho physician or PA this week

## 2019-07-24 NOTE — PROGRESS NOTES
Notes recorded by Jamar Jones MD on 7/24/2019 at 12:37 PM CDT  INR at goal, repeat in 1 week    Cont  2.5mg MWF & 5mg other days

## 2019-07-24 NOTE — TELEPHONE ENCOUNTER
I called [facility] at [phone] to request that home health be started asap as pt is unable to ambulate. I was transferred to the supervisor and got her VM twice.   I left a detailed message and requested a call back and let us know when the pt

## 2019-07-24 NOTE — TELEPHONE ENCOUNTER
I called [facility]. No one is in [place] this week. Appt made with Dr Fara Paulino for [facility] at 11:00am [address] [place] [address]     Pt and daughter aware of appt.   Aware to inform Dr Fara Paulino of the hematoma and her pain in her knee and a

## 2019-07-24 NOTE — PROGRESS NOTES
[facility] Family Medicine Office Note  Chief Complaint:   Patient presents with:  Fall: occured 6 days ago, back and leg pain      HPI:   This is a [de-identified]year old female coming in for  HPI  Fall  Right leg contusion   Patient fell at home 6 days ag Family History   Problem Relation Age of Onset   • Heart Disorder Father    • Heart Disorder Sister    • Cancer Brother         lung cancer     Allergies:  No Known Allergies  Current Meds:    Current Outpatient Medications:  HYDROcodone-acetaminophen 5-32 Disp: 2 Inhaler Rfl: 0   Mometasone Furo-Formoterol Fum (DULERA) 200-5 MCG/ACT Inhalation Aerosol Inhale 1 puff into the lungs 2 (two) times daily.  1 sampleLot: [id number]: 04/28/20 Disp: 1 Inhaler Rfl: 0   Respiratory Therapy Supplies (NEBULIZER/TUBING/MO (63cm circumference). Right lower leg: She exhibits tenderness, swelling (53cm circumference) and edema. Left lower leg: She exhibits no tenderness, no swelling (45 cm circumference) and no edema.    Neurological: She is alert and oriented to

## 2019-07-26 ENCOUNTER — TELEPHONE (OUTPATIENT)
Dept: FAMILY MEDICINE CLINIC | Facility: CLINIC | Age: 79
End: 2019-07-26

## 2019-07-26 NOTE — TELEPHONE ENCOUNTER
Snow Garcia from [facility] reached out to pt to schedule her.  (we asked for them to put on a rush on the initial appt-see TE 7/24/19). Pt requested that they not come until Mon and they need the ok from us to delay care.   I stated-yes, that fine,

## 2019-07-29 ENCOUNTER — TELEPHONE (OUTPATIENT)
Dept: FAMILY MEDICINE CLINIC | Facility: CLINIC | Age: 79
End: 2019-07-29

## 2019-07-29 ENCOUNTER — HOSPITAL ENCOUNTER (OUTPATIENT)
Facility: HOSPITAL | Age: 79
Setting detail: OBSERVATION
Discharge: NURSING, REHAB OR LONG-TERM CARE | End: 2019-08-01
Attending: EMERGENCY MEDICINE | Admitting: HOSPITALIST
Payer: MEDICARE

## 2019-07-29 DIAGNOSIS — D64.9 ANEMIA, UNSPECIFIED TYPE: Primary | ICD-10-CM

## 2019-07-29 DIAGNOSIS — R79.1 SUPRATHERAPEUTIC INR: ICD-10-CM

## 2019-07-29 DIAGNOSIS — S80.11XD LEG HEMATOMA, RIGHT, SUBSEQUENT ENCOUNTER: ICD-10-CM

## 2019-07-29 PROBLEM — N17.9 ACUTE KIDNEY INJURY: Status: ACTIVE | Noted: 2019-07-29

## 2019-07-29 PROBLEM — N17.9 ACUTE KIDNEY INJURY (HCC): Status: ACTIVE | Noted: 2019-07-29

## 2019-07-29 PROBLEM — R79.89 AZOTEMIA: Status: ACTIVE | Noted: 2019-07-29

## 2019-07-29 PROBLEM — R73.9 HYPERGLYCEMIA: Status: ACTIVE | Noted: 2019-07-29

## 2019-07-29 LAB
ALBUMIN SERPL-MCNC: 2.9 G/DL (ref 3.4–5)
ALBUMIN/GLOB SERPL: 0.8 {RATIO} (ref 1–2)
ALP LIVER SERPL-CCNC: 75 U/L (ref 55–142)
ALT SERPL-CCNC: 22 U/L (ref 13–56)
ANION GAP SERPL CALC-SCNC: 6 MMOL/L (ref 0–18)
AST SERPL-CCNC: 26 U/L (ref 15–37)
BASOPHILS # BLD AUTO: 0.01 X10(3) UL (ref 0–0.2)
BASOPHILS NFR BLD AUTO: 0.1 %
BILIRUB SERPL-MCNC: 0.9 MG/DL (ref 0.1–2)
BUN BLD-MCNC: 41 MG/DL (ref 7–18)
BUN/CREAT SERPL: 22.5 (ref 10–20)
CALCIUM BLD-MCNC: 9.3 MG/DL (ref 8.5–10.1)
CHLORIDE SERPL-SCNC: 105 MMOL/L (ref 98–112)
CO2 SERPL-SCNC: 28 MMOL/L (ref 21–32)
CREAT BLD-MCNC: 1.82 MG/DL (ref 0.55–1.02)
DEPRECATED RDW RBC AUTO: 52.2 FL (ref 35.1–46.3)
EOSINOPHIL # BLD AUTO: 0.03 X10(3) UL (ref 0–0.7)
EOSINOPHIL NFR BLD AUTO: 0.2 %
ERYTHROCYTE [DISTWIDTH] IN BLOOD BY AUTOMATED COUNT: 17.4 % (ref 11–15)
GLOBULIN PLAS-MCNC: 3.7 G/DL (ref 2.8–4.4)
GLUCOSE BLD-MCNC: 113 MG/DL (ref 70–99)
HCT VFR BLD AUTO: 27.5 % (ref 35–48)
HGB BLD-MCNC: 8.4 G/DL (ref 12–16)
IMM GRANULOCYTES # BLD AUTO: 0.1 X10(3) UL (ref 0–1)
IMM GRANULOCYTES NFR BLD: 0.8 %
INR BLD: 5.33 (ref 0.9–1.1)
LYMPHOCYTES # BLD AUTO: 1.31 X10(3) UL (ref 1–4)
LYMPHOCYTES NFR BLD AUTO: 10 %
M PROTEIN MFR SERPL ELPH: 6.6 G/DL (ref 6.4–8.2)
MCH RBC QN AUTO: 28.9 PG (ref 26–34)
MCHC RBC AUTO-ENTMCNC: 30.5 G/DL (ref 31–37)
MCV RBC AUTO: 94.5 FL (ref 80–100)
MONOCYTES # BLD AUTO: 1.17 X10(3) UL (ref 0.1–1)
MONOCYTES NFR BLD AUTO: 8.9 %
NEUTROPHILS # BLD AUTO: 10.53 X10 (3) UL (ref 1.5–7.7)
NEUTROPHILS # BLD AUTO: 10.53 X10(3) UL (ref 1.5–7.7)
NEUTROPHILS NFR BLD AUTO: 80 %
OSMOLALITY SERPL CALC.SUM OF ELEC: 299 MOSM/KG (ref 275–295)
PLATELET # BLD AUTO: 463 10(3)UL (ref 150–450)
POTASSIUM SERPL-SCNC: 4.7 MMOL/L (ref 3.5–5.1)
PSA SERPL DL<=0.01 NG/ML-MCNC: 53 SECONDS (ref 12.5–14.7)
RBC # BLD AUTO: 2.91 X10(6)UL (ref 3.8–5.3)
SODIUM SERPL-SCNC: 139 MMOL/L (ref 136–145)
WBC # BLD AUTO: 13.2 X10(3) UL (ref 4–11)

## 2019-07-29 PROCEDURE — 99220 INITIAL OBSERVATION CARE,LEVL III: CPT | Performed by: HOSPITALIST

## 2019-07-29 RX ORDER — WARFARIN SODIUM 5 MG/1
2.5 TABLET ORAL
Status: ON HOLD | COMMUNITY
End: 2019-07-31

## 2019-07-29 RX ORDER — SODIUM CHLORIDE 9 MG/ML
INJECTION, SOLUTION INTRAVENOUS CONTINUOUS
Status: ACTIVE | OUTPATIENT
Start: 2019-07-29 | End: 2019-07-29

## 2019-07-29 RX ORDER — WARFARIN SODIUM 5 MG/1
5 TABLET ORAL SEE ADMIN INSTRUCTIONS
Status: ON HOLD | COMMUNITY
End: 2019-07-31

## 2019-07-29 RX ORDER — AMLODIPINE BESYLATE 2.5 MG/1
2.5 TABLET ORAL DAILY
Status: DISCONTINUED | OUTPATIENT
Start: 2019-07-30 | End: 2019-07-30

## 2019-07-29 RX ORDER — ATORVASTATIN CALCIUM 10 MG/1
10 TABLET, FILM COATED ORAL NIGHTLY
Status: DISCONTINUED | OUTPATIENT
Start: 2019-07-29 | End: 2019-08-01

## 2019-07-29 RX ORDER — IPRATROPIUM BROMIDE AND ALBUTEROL SULFATE 2.5; .5 MG/3ML; MG/3ML
3 SOLUTION RESPIRATORY (INHALATION) EVERY 4 HOURS PRN
Status: DISCONTINUED | OUTPATIENT
Start: 2019-07-29 | End: 2019-08-01

## 2019-07-29 RX ORDER — HYDROCODONE BITARTRATE AND ACETAMINOPHEN 5; 325 MG/1; MG/1
1 TABLET ORAL EVERY 6 HOURS PRN
Status: ON HOLD | COMMUNITY
End: 2019-08-01

## 2019-07-29 RX ORDER — ONDANSETRON 2 MG/ML
4 INJECTION INTRAMUSCULAR; INTRAVENOUS EVERY 4 HOURS PRN
Status: DISCONTINUED | OUTPATIENT
Start: 2019-07-29 | End: 2019-07-29

## 2019-07-29 RX ORDER — AMIODARONE HYDROCHLORIDE 200 MG/1
200 TABLET ORAL DAILY
Status: DISCONTINUED | OUTPATIENT
Start: 2019-07-30 | End: 2019-08-01

## 2019-07-29 RX ORDER — LISINOPRIL 5 MG/1
2.5 TABLET ORAL DAILY
Status: ON HOLD | COMMUNITY
End: 2019-07-31

## 2019-07-29 RX ORDER — LEVOTHYROXINE SODIUM 88 UG/1
88 TABLET ORAL
Status: DISCONTINUED | OUTPATIENT
Start: 2019-07-30 | End: 2019-08-01

## 2019-07-29 RX ORDER — ALBUTEROL SULFATE 90 UG/1
2 AEROSOL, METERED RESPIRATORY (INHALATION) EVERY 6 HOURS PRN
Status: DISCONTINUED | OUTPATIENT
Start: 2019-07-29 | End: 2019-08-01

## 2019-07-29 RX ORDER — LEVOTHYROXINE SODIUM 88 UG/1
88 TABLET ORAL
COMMUNITY
End: 2019-08-26

## 2019-07-29 RX ORDER — IPRATROPIUM BROMIDE AND ALBUTEROL SULFATE 2.5; .5 MG/3ML; MG/3ML
3 SOLUTION RESPIRATORY (INHALATION) EVERY 6 HOURS PRN
Status: ON HOLD | COMMUNITY
End: 2019-07-31

## 2019-07-29 RX ORDER — FUROSEMIDE 20 MG/1
20 TABLET ORAL
Status: DISCONTINUED | OUTPATIENT
Start: 2019-07-29 | End: 2019-07-30

## 2019-07-29 RX ORDER — HYDROCODONE BITARTRATE AND ACETAMINOPHEN 5; 325 MG/1; MG/1
1 TABLET ORAL EVERY 6 HOURS PRN
Status: DISCONTINUED | OUTPATIENT
Start: 2019-07-29 | End: 2019-07-30

## 2019-07-29 RX ORDER — LISINOPRIL 5 MG/1
5 TABLET ORAL DAILY
Status: DISCONTINUED | OUTPATIENT
Start: 2019-07-29 | End: 2019-07-31

## 2019-07-29 NOTE — ED PROVIDER NOTES
Patient Seen in: [facility] Emergency Department    History   Patient presents with:  Abnormal Labs    Stated Complaint: INR = 8.0    HPI    Patient is an 76-year-old female, presenting for evaluation of an elevated INR.     Patient is anticoagulated (Room air)       Current:/38   Pulse 86   Temp 97.8 °F (36.6 °C) (Oral)   Resp 16   Ht 149.9 cm (4' 11\")   Wt 85.3 kg   SpO2 95%   BMI 37.97 kg/m²     Physical Exam    Vital signs noted.    GENERAL: Patient is awake and alert, resting comfortably on WITH PLATELET    Narrative: The following orders were created for panel order CBC WITH DIFFERENTIAL WITH PLATELET.   Procedure                               Abnormality         Status                     ---------                               --------- ventral aspect of the distal 1/3 of the right lower extremity is noted.    Dictated by: Susie Lopez MD on 7/17/2019 at 10:40     Approved by: Susie Lopez MD            Us Venous Doppler Leg Right - Diag Img (cpt=93971)    Result Date: 7/24/201 favor a hematoma/seroma. Please correlate clinically. Dictated by: Rafael Conley MD on 7/24/2019 at 13:19     Approved by: Rafael Conley MD              [facility]     Patient was placed on the cart and evaluated.   Blood was drawn to confirm report hematoma, right, subsequent encounter S80.11XD 7/29/2019     Supratherapeutic INR R79.1 7/29/2019

## 2019-07-29 NOTE — TELEPHONE ENCOUNTER
Dr Levine Corporal states send her to the ER. I called pts home number and nurse Yulia answered. She states pt is coherent, A&O x3, not bleeding, /80. She states pt has a 3 x 1.2 cm wound on her right shin that looks like it could be infected.   It is pink a

## 2019-07-29 NOTE — TELEPHONE ENCOUNTER
Brenden Forbes, [facility] RN called. Pt's fingerstick INR = 8.0. Pt currently takes Coumadin 2.5mg MWF, 5 mg all other days. She states pt has no signs or c/o of bleeding.     Brenden oFrbes [phone]

## 2019-07-29 NOTE — TELEPHONE ENCOUNTER
Daughter, Jayda Perera, on HIPAA called the office while Miquel Gold working in the phone room. Kelsey figueroa stating that Jayda Perera wants to know if someone can call the ER and let them know they are coming. I asked Miquel Gold to send me the call immediatly.    I asked if I coul

## 2019-07-30 ENCOUNTER — APPOINTMENT (OUTPATIENT)
Dept: MRI IMAGING | Facility: HOSPITAL | Age: 79
End: 2019-07-30
Attending: PHYSICIAN ASSISTANT
Payer: MEDICARE

## 2019-07-30 LAB
ATRIAL RATE: 133 BPM
BASOPHILS # BLD AUTO: 0.01 X10(3) UL (ref 0–0.2)
BASOPHILS NFR BLD AUTO: 0.1 %
DEPRECATED RDW RBC AUTO: 54.1 FL (ref 35.1–46.3)
EOSINOPHIL # BLD AUTO: 0.2 X10(3) UL (ref 0–0.7)
EOSINOPHIL NFR BLD AUTO: 2.3 %
ERYTHROCYTE [DISTWIDTH] IN BLOOD BY AUTOMATED COUNT: 17.6 % (ref 11–15)
HCT VFR BLD AUTO: 25.4 % (ref 35–48)
HGB BLD-MCNC: 7.8 G/DL (ref 12–16)
IMM GRANULOCYTES # BLD AUTO: 0.05 X10(3) UL (ref 0–1)
IMM GRANULOCYTES NFR BLD: 0.6 %
INR BLD: 5.78 (ref 0.9–1.1)
LYMPHOCYTES # BLD AUTO: 1.7 X10(3) UL (ref 1–4)
LYMPHOCYTES NFR BLD AUTO: 19.7 %
MCH RBC QN AUTO: 29.3 PG (ref 26–34)
MCHC RBC AUTO-ENTMCNC: 30.7 G/DL (ref 31–37)
MCV RBC AUTO: 95.5 FL (ref 80–100)
MONOCYTES # BLD AUTO: 0.65 X10(3) UL (ref 0.1–1)
MONOCYTES NFR BLD AUTO: 7.5 %
NEUTROPHILS # BLD AUTO: 6 X10 (3) UL (ref 1.5–7.7)
NEUTROPHILS # BLD AUTO: 6 X10(3) UL (ref 1.5–7.7)
NEUTROPHILS NFR BLD AUTO: 69.8 %
P-R INTERVAL: 176 MS
PLATELET # BLD AUTO: 407 10(3)UL (ref 150–450)
PSA SERPL DL<=0.01 NG/ML-MCNC: 56.6 SECONDS (ref 12.5–14.7)
Q-T INTERVAL: 312 MS
QRS DURATION: 76 MS
QTC CALCULATION (BEZET): 464 MS
R AXIS: 40 DEGREES
RBC # BLD AUTO: 2.66 X10(6)UL (ref 3.8–5.3)
T AXIS: 75 DEGREES
TROPONIN I SERPL-MCNC: <0.045 NG/ML (ref ?–0.04)
VENTRICULAR RATE: 133 BPM
WBC # BLD AUTO: 8.6 X10(3) UL (ref 4–11)

## 2019-07-30 PROCEDURE — 99225 SUBSEQUENT OBSERVATION CARE: CPT | Performed by: INTERNAL MEDICINE

## 2019-07-30 PROCEDURE — 73721 MRI JNT OF LWR EXTRE W/O DYE: CPT | Performed by: PHYSICIAN ASSISTANT

## 2019-07-30 RX ORDER — METOPROLOL TARTRATE 5 MG/5ML
5 INJECTION INTRAVENOUS ONCE
Status: COMPLETED | OUTPATIENT
Start: 2019-07-30 | End: 2019-07-30

## 2019-07-30 RX ORDER — DIPHENHYDRAMINE HCL 25 MG
25 CAPSULE ORAL EVERY 6 HOURS PRN
Status: DISCONTINUED | OUTPATIENT
Start: 2019-07-30 | End: 2019-08-01

## 2019-07-30 NOTE — PROGRESS NOTES
[facility]  Progress Note     Karyn Gan Patient Status:  Observation    1940 MRN [id number]   [facility] 4NW-A Attending Claude Herzog,    Hosp Day # 0 PCP Sandra Louis MD     Chief Complaint: CP    S: Patient with a Imaging data reviewed in Epic.     Medications:   • sodium chloride 0.9%  250 mL Intravenous Once   • amiodarone HCl  200 mg Oral Daily   • amLODIPine Besylate  2.5 mg Oral Daily   • furosemide  20 mg Oral BID (Diuretic)   • Levothyroxine Sodium  88 mcg Ora Hypotension: Fluid bolus, hold lasix  HFpEF  HTN    Yobani Pantoja, 07/30/19, 12:46 PM

## 2019-07-30 NOTE — PROGRESS NOTES
07/30/19 0830 07/30/19 0833   Vital Signs   Pulse (!) 162 116   Heart Rate Source Monitor  --    Resp 24  --    Respiratory Quality Irregular  --    BP (!) 149/131 (!) 88/49   BP Location Left arm Left arm   BP Method Automatic Automatic   Patient Posit

## 2019-07-30 NOTE — H&P
[facility]  History and Physical     [facility] Patient Status:  Observation    1940 MRN [id number]   [facility] 4NW-A Attending Mayuri Celis MD   Hosp Day # 0 PCP Michelle Maldonado MD     Chief Complaint: Generalized weak not use drugs.     Family History:   Family History   Problem Relation Age of Onset   • Heart Disorder Father    • Hypertension Mother    • Heart Disorder Sister    • Cancer Brother         lung cancer    reviewed    Allergies:   Hydrocodone             HIV mouth daily. Disp:  Rfl:    Omega-3 Fatty Acids (FISH OIL) 1200 MG Oral Capsule Delayed Release Take 1 capsule by mouth daily.    Disp:  Rfl:    Glucosamine-Chondroitin 500-250 MG Oral Cap Take 1 cap TID Disp:  Rfl: 0       Review of Systems:   A comprehe Epic.      ASSESSMENT / PLAN:     2 [de-identified]years old female presents with supratherapeutic INR anemia and right leg contusion status post fall 2 weeks ago.   2. Coumadin on hold today and rechecked INR is 5.3.  3. Anemia most likely anemia of chronic disease a

## 2019-07-30 NOTE — DIETARY NOTE
Melchor Adams 89     Admitting diagnosis:  Supratherapeutic INR [R79.1]  Leg hematoma, right, subsequent encounter [S80.11XD]  Anemia, unspecified type [D64.9]    Ht: 149.9 cm (4' 11\")  Wt: 86.6 kg (191 lb).  This

## 2019-07-30 NOTE — CM/SW NOTE
07/30/19 1200   CM/SW Referral Data   Referral Source Social Work (self-referral)   Reason for Referral Discharge planning   Informant Patient; Children   Patient Info   Patient's Mental Status Alert;Oriented   Patient's Home Environment House   Patient

## 2019-07-30 NOTE — PHYSICAL THERAPY NOTE
PT attempted to see the pt for an eval, however pt currently awaiting MRI of hips to r/o fx and pt also has INR of 5.78 which is trending up from last reading. PT will HOLD and f/u if pt is medically appropriate on 7/31/19.

## 2019-07-30 NOTE — PROGRESS NOTES
NURSING ADMISSION NOTE      Patient admitted via Cart  Oriented to room. Safety precautions initiated. Bed in low position. Call light in reach. Pt admitted from ED, aaox4, family at bedside, admission process completed.

## 2019-07-30 NOTE — PLAN OF CARE
Pt A&OX4; 2L o2- baseline RA. Pt c/o dyspnea and dizziness in AM when attempting to use restroom- MD notified. Tele monitoring ordered- pt in AFib with 's. EKG ordered.   Troponin (-) Orthostatics completed- and positive; 250ml bolus given and BP'

## 2019-07-30 NOTE — PLAN OF CARE
Pt alert and oriented x4. VSS, afebrile. Pt denies pain at this time, states she occasionally has pain to her right knee. Denies SOB or nausea. Ambulatory to the bathroom with assist and walker.  Wound noted to pts right leg, per pt this is from a skin merlyn

## 2019-07-31 LAB
ANION GAP SERPL CALC-SCNC: 6 MMOL/L (ref 0–18)
BASOPHILS # BLD AUTO: 0.01 X10(3) UL (ref 0–0.2)
BASOPHILS NFR BLD AUTO: 0.1 %
BUN BLD-MCNC: 42 MG/DL (ref 7–18)
BUN/CREAT SERPL: 19.1 (ref 10–20)
CALCIUM BLD-MCNC: 8.3 MG/DL (ref 8.5–10.1)
CHLORIDE SERPL-SCNC: 108 MMOL/L (ref 98–112)
CO2 SERPL-SCNC: 26 MMOL/L (ref 21–32)
CREAT BLD-MCNC: 2.2 MG/DL (ref 0.55–1.02)
DEPRECATED RDW RBC AUTO: 57.1 FL (ref 35.1–46.3)
EOSINOPHIL # BLD AUTO: 0.18 X10(3) UL (ref 0–0.7)
EOSINOPHIL NFR BLD AUTO: 2 %
ERYTHROCYTE [DISTWIDTH] IN BLOOD BY AUTOMATED COUNT: 17.9 % (ref 11–15)
GLUCOSE BLD-MCNC: 97 MG/DL (ref 70–99)
HCT VFR BLD AUTO: 26.5 % (ref 35–48)
HGB BLD-MCNC: 8.2 G/DL (ref 12–16)
IMM GRANULOCYTES # BLD AUTO: 0.05 X10(3) UL (ref 0–1)
IMM GRANULOCYTES NFR BLD: 0.5 %
INR BLD: 1.96 (ref 0.9–1.1)
LYMPHOCYTES # BLD AUTO: 1.65 X10(3) UL (ref 1–4)
LYMPHOCYTES NFR BLD AUTO: 18 %
MCH RBC QN AUTO: 29.7 PG (ref 26–34)
MCHC RBC AUTO-ENTMCNC: 30.9 G/DL (ref 31–37)
MCV RBC AUTO: 96 FL (ref 80–100)
MONOCYTES # BLD AUTO: 0.5 X10(3) UL (ref 0.1–1)
MONOCYTES NFR BLD AUTO: 5.5 %
NEUTROPHILS # BLD AUTO: 6.78 X10 (3) UL (ref 1.5–7.7)
NEUTROPHILS # BLD AUTO: 6.78 X10(3) UL (ref 1.5–7.7)
NEUTROPHILS NFR BLD AUTO: 73.9 %
OSMOLALITY SERPL CALC.SUM OF ELEC: 300 MOSM/KG (ref 275–295)
PLATELET # BLD AUTO: 432 10(3)UL (ref 150–450)
POTASSIUM SERPL-SCNC: 4.2 MMOL/L (ref 3.5–5.1)
PSA SERPL DL<=0.01 NG/ML-MCNC: 23.5 SECONDS (ref 12.5–14.7)
RBC # BLD AUTO: 2.76 X10(6)UL (ref 3.8–5.3)
SODIUM SERPL-SCNC: 140 MMOL/L (ref 136–145)
WBC # BLD AUTO: 9.2 X10(3) UL (ref 4–11)

## 2019-07-31 PROCEDURE — 99225 SUBSEQUENT OBSERVATION CARE: CPT | Performed by: INTERNAL MEDICINE

## 2019-07-31 RX ORDER — ACETAMINOPHEN 325 MG/1
650 TABLET ORAL EVERY 6 HOURS PRN
Status: DISCONTINUED | OUTPATIENT
Start: 2019-07-31 | End: 2019-08-01

## 2019-07-31 RX ORDER — ACETAMINOPHEN 325 MG/1
650 TABLET ORAL EVERY 6 HOURS PRN
Qty: 30 TABLET | Refills: 0 | Status: SHIPPED | OUTPATIENT
Start: 2019-07-31 | End: 2019-08-30

## 2019-07-31 RX ORDER — AMIODARONE HYDROCHLORIDE 200 MG/1
200 TABLET ORAL DAILY
Qty: 30 TABLET | Refills: 0 | Status: SHIPPED | OUTPATIENT
Start: 2019-08-01 | End: 2019-08-26

## 2019-07-31 RX ORDER — WARFARIN SODIUM 3 MG/1
3 TABLET ORAL NIGHTLY
Qty: 30 TABLET | Refills: 0 | Status: SHIPPED | OUTPATIENT
Start: 2019-07-31 | End: 2019-08-26 | Stop reason: DRUGHIGH

## 2019-07-31 NOTE — PHYSICAL THERAPY NOTE
PHYSICAL THERAPY EVALUATION - INPATIENT     Room Number: 402/402-A  Evaluation Date: 7/31/2019  Type of Evaluation: Initial  Physician Order: PT Eval and Treat    Presenting Problem: elevated INR, anemia  Reason for Therapy: Mobility Dysfunction and [place]: Per daughter-in-law, pt is typically independent for functional mobilities without use of assistive device. The past few months, pt has occasionalyl used cane at modified independence.  Pt with fall two weeks ago; since then, pt has used a rol need...   -   Moving to and from a bed to a chair (including a wheelchair)?: A Lot   -   Need to walk in hospital room?: A Lot   -   Climbing 3-5 steps with a railing?: Total       AM-PAC Score:  Raw Score: 14   Approx Degree of Impairment: 61.29%   Tricia Chaudhry decreased balance, impaired safety awareness with rolling walker, orthostatic hypotension. Functional outcome measures completed include AM-PAC.   Based on this evaluation, patient's clinical presentation is evolving and overall the evaluation complexity i

## 2019-07-31 NOTE — PLAN OF CARE
Pt AOx4. Orthostatic hypotension with physical therapy this morning. Was feeling dizzy when she stood up. BP meds held. INR 1.96 this AM--resuming coumadin tonight. 1430--seen by woundcare, right shin dressed. PT recommending INOCENCIO.  SW coordinating with

## 2019-07-31 NOTE — CONSULTS
[facility]  Report of Inpatient Wound Care Consultation     [facility] Patient Status:  Observation    1940 MRN [id number]   [facility] 4NW-A Attending Ronit Marte, DO   Hosp Day # 0 PCP Michael Denise MD     REASON FOR 7. 8* 8.2*   HCT 27.5* 25.4* 26.5*   .0* 407.0 432.0   K 4.7  --  4.2   CREATSERUM 1.82*  --  2.20*   BUN 41*  --  42*   *  --  97   CA 9.3  --  8.3*   ALB 2.9*  --   --    TP 6.6  --   --    INR 5.33* 5.78* 1.96*       Imaging:   PROCEDURE: Doppler flow would favor a hematoma/seroma. Please correlate clinically. Microbiology:   See EMR    ASSESSMENT/ RECOMMENDATIONS:  Received verbal order from [address] To Dana Quintero, PT, MPT for \"Physical Therapy wound care evaluate and treat. \"  P [phone]  Spectralink: [phone]  Pager: [phone]  VM: [phone]

## 2019-07-31 NOTE — DISCHARGE SUMMARY
[facility]OSPITAL[facility]  DISCHARGE SUMMARY     Duane Foy [facility] Patient Status:  Observation    1940 MRN [id number]   [facility] 4NW-A Attending Dr. Ree Smith # 0 PCP Linda Villela MD     Date of Admission: 2019  Date of this stay. Hemoglobin stable. Due to recent fall and RLE pain we ordered MRI hip - No fracture but noted labral tear. Routed results to ortho. PT recommended subacute rehab. INR improved now less than 2.   Wound care evaluated right lower extremity and much to take  · when to take this  · additional instructions  · Another medication with the same name was removed. Continue taking this medication, and follow the directions you see here. Take as directed.  If you are unsure how to take this medication the lungs daily.  2 samples Lot: [id number] Exp: 09/20   Quantity:  2 Inhaler  Refills:  0        STOP taking these medications    amLODIPine Besylate 2.5 MG Tabs  Commonly known as:  NORVASC        furosemide 20 MG Tabs  Commonly known as:  LASIX        lisin

## 2019-07-31 NOTE — PLAN OF CARE
Pt alert and oriented x4. VSS, afebrile. Pt went down for MRI this evening. Denies pain or SOB. Ambulatory to the bathroom with assist and walker. Dressing to the right lower leg changed, now clean, dry and intact. Continuous telemetry monitoring on.  SpO2 isolation precautions as appropriate  - Initiate Pressure Ulcer prevention bundle as indicated  Outcome: Progressing     Problem: HEMATOLOGIC - ADULT  Goal: Free from bleeding injury  Description  (Example usage: patient with low platelets)  INTERVENTIONS:

## 2019-07-31 NOTE — CM/SW NOTE
CM met with patient and family to discuss INOCENCIO placement. Referral sent to [facility] as requested by patient. DON called.     Ila GÓMEZ, 07/31/19, 2:47 PM

## 2019-07-31 NOTE — PROGRESS NOTES
[facility]  Progress Note     Delta Jointer Patient Status:  Observation    1940 MRN [id number]   [facility] 4NW-A Attending Radha Ocasio, DO   Hosp Day # 0 PCP Margarette Morrison MD     Chief Complaint: weakness    S: Patient 1.96*       Recent Labs   Lab 07/30/19  0843   TROP <0.045            Imaging: Imaging data reviewed in Epic.     Medications:   • amiodarone HCl  200 mg Oral Daily   • Levothyroxine Sodium  88 mcg Oral Before breakfast   • lisinopril  5 mg Oral Daily   • F disposition pending  Atrial Fibrillation with RVR: HR controlled  Orthostatic Hypotension: Hold home BP meds  HFpEF  HTN: Will discontinue antihypertensives at discharge as has been orthostatic likely contributing to her fall. Also has CKD.      Follow up w

## 2019-08-01 VITALS
HEART RATE: 76 BPM | SYSTOLIC BLOOD PRESSURE: 122 MMHG | TEMPERATURE: 98 F | WEIGHT: 188 LBS | HEIGHT: 59 IN | OXYGEN SATURATION: 97 % | DIASTOLIC BLOOD PRESSURE: 89 MMHG | BODY MASS INDEX: 37.9 KG/M2 | RESPIRATION RATE: 19 BRPM

## 2019-08-01 LAB
ANION GAP SERPL CALC-SCNC: 5 MMOL/L (ref 0–18)
BASOPHILS # BLD AUTO: 0.01 X10(3) UL (ref 0–0.2)
BASOPHILS NFR BLD AUTO: 0.1 %
BUN BLD-MCNC: 44 MG/DL (ref 7–18)
BUN/CREAT SERPL: 22 (ref 10–20)
CALCIUM BLD-MCNC: 8.4 MG/DL (ref 8.5–10.1)
CHLORIDE SERPL-SCNC: 111 MMOL/L (ref 98–112)
CO2 SERPL-SCNC: 28 MMOL/L (ref 21–32)
CREAT BLD-MCNC: 2 MG/DL (ref 0.55–1.02)
DEPRECATED RDW RBC AUTO: 57.7 FL (ref 35.1–46.3)
EOSINOPHIL # BLD AUTO: 0.12 X10(3) UL (ref 0–0.7)
EOSINOPHIL NFR BLD AUTO: 1.7 %
ERYTHROCYTE [DISTWIDTH] IN BLOOD BY AUTOMATED COUNT: 17.9 % (ref 11–15)
GLUCOSE BLD-MCNC: 99 MG/DL (ref 70–99)
HCT VFR BLD AUTO: 25.4 % (ref 35–48)
HGB BLD-MCNC: 7.8 G/DL (ref 12–16)
IMM GRANULOCYTES # BLD AUTO: 0.03 X10(3) UL (ref 0–1)
IMM GRANULOCYTES NFR BLD: 0.4 %
INR BLD: 1.77 (ref 0.9–1.1)
LYMPHOCYTES # BLD AUTO: 2.02 X10(3) UL (ref 1–4)
LYMPHOCYTES NFR BLD AUTO: 28.1 %
MCH RBC QN AUTO: 29.4 PG (ref 26–34)
MCHC RBC AUTO-ENTMCNC: 30.7 G/DL (ref 31–37)
MCV RBC AUTO: 95.8 FL (ref 80–100)
MONOCYTES # BLD AUTO: 0.4 X10(3) UL (ref 0.1–1)
MONOCYTES NFR BLD AUTO: 5.6 %
NEUTROPHILS # BLD AUTO: 4.6 X10 (3) UL (ref 1.5–7.7)
NEUTROPHILS # BLD AUTO: 4.6 X10(3) UL (ref 1.5–7.7)
NEUTROPHILS NFR BLD AUTO: 64.1 %
OSMOLALITY SERPL CALC.SUM OF ELEC: 309 MOSM/KG (ref 275–295)
PLATELET # BLD AUTO: 365 10(3)UL (ref 150–450)
POTASSIUM SERPL-SCNC: 4.4 MMOL/L (ref 3.5–5.1)
PSA SERPL DL<=0.01 NG/ML-MCNC: 21.6 SECONDS (ref 12.5–14.7)
RBC # BLD AUTO: 2.65 X10(6)UL (ref 3.8–5.3)
SODIUM SERPL-SCNC: 144 MMOL/L (ref 136–145)
WBC # BLD AUTO: 7.2 X10(3) UL (ref 4–11)

## 2019-08-01 PROCEDURE — 99217 OBSERVATION CARE DISCHARGE: CPT | Performed by: HOSPITALIST

## 2019-08-01 RX ORDER — DIPHENHYDRAMINE HYDROCHLORIDE, ZINC ACETATE 2; .1 G/100G; G/100G
1 CREAM TOPICAL 2 TIMES DAILY PRN
Status: DISCONTINUED | OUTPATIENT
Start: 2019-08-01 | End: 2019-08-01

## 2019-08-01 RX ORDER — HYDROCODONE BITARTRATE AND ACETAMINOPHEN 5; 325 MG/1; MG/1
1 TABLET ORAL EVERY 6 HOURS PRN
Qty: 20 TABLET | Refills: 0 | Status: SHIPPED | OUTPATIENT
Start: 2019-08-01 | End: 2019-08-12

## 2019-08-01 NOTE — CM/SW NOTE
Katheryn Ortiz is out of network. Alexandrea roman able to accept. Sent updated clinicals and asked them to obtain auth. Will inform family of this.

## 2019-08-01 NOTE — PLAN OF CARE
Pt alert and oriented x4. VSS, afebrile. Denies pain, some tenderness noted to RLE. Denies nausea. Dyspnea with exertion. SpO2 within normal limits on room air. RLE dressing clean, dry and intact. Coumadin resumed this evening.  Pt still c/o itching, declin Initiate Pressure Ulcer prevention bundle as indicated  Outcome: Progressing     Problem: HEMATOLOGIC - ADULT  Goal: Free from bleeding injury  Description  (Example usage: patient with low platelets)  INTERVENTIONS:  - Avoid intramuscular injections, enem

## 2019-08-01 NOTE — OCCUPATIONAL THERAPY NOTE
OCCUPATIONAL THERAPY EVALUATION - INPATIENT     Room Number: 402/402-A  Evaluation Date: 8/1/2019  Type of Evaluation: Initial  Presenting Problem: anemia    Physician Order: IP Consult to Occupational Therapy  Reason for Therapy: ADL/IADL Dysfunction and prior to admit. Pt had a fall approximately 2 weeks ago and has been using RW since that time. SUBJECTIVE   DIL states \"how much therapy would she get if she goes home? \"    Patient self-stated goal is not stated this session     OBJECTIVE  Precautio Pt seen semi supine. BP monitored during session, stable throughout. Supine to sit supervision. Sit to stand supervision via RW, cueing for hand placement on RW. Ambulates to bathroom supervision via RW.  Supervision toilet transfer, toileting, and grooming patient’s ability to return safely to her prior level of function.     Patient Complexity  Occupational Profile/Medical History LOW - Brief history including review of medical or therapy records    Specific performance deficits impacting engagement in ADL/I

## 2019-08-01 NOTE — CM/SW NOTE
08/01/19 1600   Discharge disposition   Expected discharge disposition Skilled Nurs   Name of [address]   Discharge transportation Private car     Daughter Heather Parker will transport to [facility] at 5:30.  RN to call report

## 2019-08-01 NOTE — PROGRESS NOTES
[facility]  Progress Note     Mak Hawkins Patient Status:  Observation    1940 MRN [id number]   [facility] 4NW-A Attending Petty Miramontes MD   Hosp Day # 0 PCP Sari Calderon MD     Chief Complaint: RLE swelling    S: Patie --   --    TP 6.6  --   --        Estimated Creatinine Clearance: 30.2 mL/min (A) (based on SCr of 2 mg/dL (H)).     Recent Labs   Lab 07/30/19  0610 07/31/19  0623 08/01/19  0547   PTP 56.6* 23.5* 21.6*   INR 5.78* 1.96* 1.77*       Recent Labs   Lab 07/30 tj, resume coumadin  -orthostatics still positive but improving. Cont to hold lasix and resume in 3-4 days if improved.  May need to stay off longer    /89   Pulse 76   Temp 97.6 °F (36.4 °C) (Oral)   Resp 19   Ht 149.9 cm (4' 11\")   Wt 188 lb (

## 2019-08-02 ENCOUNTER — TELEPHONE (OUTPATIENT)
Dept: CARDIOLOGY | Age: 79
End: 2019-08-02

## 2019-08-02 ENCOUNTER — PATIENT OUTREACH (OUTPATIENT)
Dept: CASE MANAGEMENT | Age: 79
End: 2019-08-02

## 2019-08-02 NOTE — PROGRESS NOTES
NURSING DISCHARGE NOTE    Discharged Nursing home via Wheelchair. Accompanied by Family member  Belongings Taken by patient/family.

## 2019-08-03 ENCOUNTER — NURSE ONLY (OUTPATIENT)
Dept: LAB | Age: 79
End: 2019-08-03
Attending: FAMILY MEDICINE
Payer: MEDICARE

## 2019-08-03 DIAGNOSIS — Z79.01 LONG TERM CURRENT USE OF ANTICOAGULANT THERAPY: Primary | ICD-10-CM

## 2019-08-03 LAB
INR BLD: 1.81 (ref 0.9–1.1)
PSA SERPL DL<=0.01 NG/ML-MCNC: 22 SECONDS (ref 12.5–14.7)

## 2019-08-03 PROCEDURE — 36415 COLL VENOUS BLD VENIPUNCTURE: CPT

## 2019-08-03 PROCEDURE — 85610 PROTHROMBIN TIME: CPT

## 2019-08-05 ENCOUNTER — INITIAL APN SNF VISIT (OUTPATIENT)
Dept: INTERNAL MEDICINE CLINIC | Age: 79
End: 2019-08-05

## 2019-08-05 ENCOUNTER — NURSE ONLY (OUTPATIENT)
Dept: LAB | Age: 79
End: 2019-08-05
Attending: FAMILY MEDICINE
Payer: MEDICARE

## 2019-08-05 VITALS
RESPIRATION RATE: 18 BRPM | BODY MASS INDEX: 38 KG/M2 | DIASTOLIC BLOOD PRESSURE: 52 MMHG | TEMPERATURE: 97 F | SYSTOLIC BLOOD PRESSURE: 134 MMHG | WEIGHT: 188.19 LBS | OXYGEN SATURATION: 97 % | HEART RATE: 72 BPM

## 2019-08-05 DIAGNOSIS — D64.9 ANEMIA, UNSPECIFIED TYPE: ICD-10-CM

## 2019-08-05 DIAGNOSIS — E78.2 MIXED HYPERLIPIDEMIA: ICD-10-CM

## 2019-08-05 DIAGNOSIS — M19.011 PRIMARY OSTEOARTHRITIS OF RIGHT SHOULDER: ICD-10-CM

## 2019-08-05 DIAGNOSIS — E03.9 ACQUIRED HYPOTHYROIDISM: ICD-10-CM

## 2019-08-05 DIAGNOSIS — J44.9 COPD (CHRONIC OBSTRUCTIVE PULMONARY DISEASE) (HCC): ICD-10-CM

## 2019-08-05 DIAGNOSIS — J42 CHRONIC BRONCHITIS, UNSPECIFIED CHRONIC BRONCHITIS TYPE (HCC): ICD-10-CM

## 2019-08-05 DIAGNOSIS — I48.20 CHRONIC ATRIAL FIBRILLATION (HCC): Primary | ICD-10-CM

## 2019-08-05 DIAGNOSIS — I10 HTN (HYPERTENSION): Primary | ICD-10-CM

## 2019-08-05 DIAGNOSIS — N18.30 CKD (CHRONIC KIDNEY DISEASE) STAGE 3, GFR 30-59 ML/MIN (HCC): ICD-10-CM

## 2019-08-05 DIAGNOSIS — R53.1 WEAKNESS GENERALIZED: ICD-10-CM

## 2019-08-05 DIAGNOSIS — S73.191A ACETABULAR LABRUM TEAR, RIGHT, INITIAL ENCOUNTER: ICD-10-CM

## 2019-08-05 DIAGNOSIS — I10 ESSENTIAL HYPERTENSION: ICD-10-CM

## 2019-08-05 DIAGNOSIS — S80.11XD LEG HEMATOMA, RIGHT, SUBSEQUENT ENCOUNTER: ICD-10-CM

## 2019-08-05 DIAGNOSIS — I48.91 ATRIAL FIBRILLATION WITH RAPID VENTRICULAR RESPONSE (HCC): ICD-10-CM

## 2019-08-05 LAB
ANION GAP SERPL CALC-SCNC: 5 MMOL/L (ref 0–18)
BASOPHILS # BLD AUTO: 0.02 X10(3) UL (ref 0–0.2)
BASOPHILS NFR BLD AUTO: 0.3 %
BUN BLD-MCNC: 28 MG/DL (ref 7–18)
BUN/CREAT SERPL: 19 (ref 10–20)
CALCIUM BLD-MCNC: 8.5 MG/DL (ref 8.5–10.1)
CHLORIDE SERPL-SCNC: 113 MMOL/L (ref 98–112)
CO2 SERPL-SCNC: 27 MMOL/L (ref 21–32)
CREAT BLD-MCNC: 1.47 MG/DL (ref 0.55–1.02)
DEPRECATED RDW RBC AUTO: 66.9 FL (ref 35.1–46.3)
EOSINOPHIL # BLD AUTO: 0.56 X10(3) UL (ref 0–0.7)
EOSINOPHIL NFR BLD AUTO: 7 %
ERYTHROCYTE [DISTWIDTH] IN BLOOD BY AUTOMATED COUNT: 18.3 % (ref 11–15)
GLUCOSE BLD-MCNC: 86 MG/DL (ref 70–99)
HCT VFR BLD AUTO: 30 % (ref 35–48)
HGB BLD-MCNC: 8.7 G/DL (ref 12–16)
IMM GRANULOCYTES # BLD AUTO: 0.03 X10(3) UL (ref 0–1)
IMM GRANULOCYTES NFR BLD: 0.4 %
INR BLD: 2.07 (ref 0.9–1.1)
LYMPHOCYTES # BLD AUTO: 2.31 X10(3) UL (ref 1–4)
LYMPHOCYTES NFR BLD AUTO: 28.9 %
MCH RBC QN AUTO: 29.5 PG (ref 26–34)
MCHC RBC AUTO-ENTMCNC: 29 G/DL (ref 31–37)
MCV RBC AUTO: 101.7 FL (ref 80–100)
MONOCYTES # BLD AUTO: 0.64 X10(3) UL (ref 0.1–1)
MONOCYTES NFR BLD AUTO: 8 %
NEUTROPHILS # BLD AUTO: 4.44 X10 (3) UL (ref 1.5–7.7)
NEUTROPHILS # BLD AUTO: 4.44 X10(3) UL (ref 1.5–7.7)
NEUTROPHILS NFR BLD AUTO: 55.4 %
OSMOLALITY SERPL CALC.SUM OF ELEC: 305 MOSM/KG (ref 275–295)
PLATELET # BLD AUTO: 413 10(3)UL (ref 150–450)
PLATELET MORPHOLOGY: NORMAL
POTASSIUM SERPL-SCNC: 4.6 MMOL/L (ref 3.5–5.1)
PSA SERPL DL<=0.01 NG/ML-MCNC: 24.6 SECONDS (ref 12.5–14.7)
RBC # BLD AUTO: 2.95 X10(6)UL (ref 3.8–5.3)
SODIUM SERPL-SCNC: 145 MMOL/L (ref 136–145)
WBC # BLD AUTO: 8 X10(3) UL (ref 4–11)

## 2019-08-05 PROCEDURE — 36415 COLL VENOUS BLD VENIPUNCTURE: CPT

## 2019-08-05 PROCEDURE — 80048 BASIC METABOLIC PNL TOTAL CA: CPT

## 2019-08-05 PROCEDURE — 99310 SBSQ NF CARE HIGH MDM 45: CPT | Performed by: NURSE PRACTITIONER

## 2019-08-05 PROCEDURE — 85610 PROTHROMBIN TIME: CPT

## 2019-08-05 PROCEDURE — 85025 COMPLETE CBC W/AUTO DIFF WBC: CPT

## 2019-08-05 RX ORDER — DOCUSATE SODIUM 100 MG/1
100 CAPSULE, LIQUID FILLED ORAL 2 TIMES DAILY
COMMUNITY
End: 2020-02-24 | Stop reason: ALTCHOICE

## 2019-08-05 NOTE — PROGRESS NOTES
Karyn Gan  : 1940  Age [de-identified]year old  female patient is admitted to Facility: [place] [address] for  INOCENCIO after A Fib w/ RVR/Fall resulting in RLE hematoma/Coagulopathy    [address] [place] [address] Drive date:    19  Discharge date to INOCENCIO: impairment      Past Surgical History:   Procedure Laterality Date   • CAROTID ENDARTERECTOMY Right 2015   • COLONOSCOPY     • HERNIA SURGERY     • OTHER SURGICAL HISTORY      lower right leg, skin graph   • OTHER SURGICAL HISTORY      cataract surgery MCG Oral Tab Take 88 mcg by mouth before breakfast. Disp:  Rfl:    ipratropium-albuterol 0.5-2.5 (3) MG/3ML Inhalation Solution Take 3 mL by nebulization every 4 (four) hours as needed.  Disp: 50 vial Rfl: 1   Pravastatin Sodium 40 MG Oral Tab Take 1 tablet anemia, denies bruising, denies excessive bleeding  ENDOCRINE: denies excessive thirst or urination; denies unexpected wt gain or wt loss  ALLERGY/IMM.: denies food or seasonal allergies      PHYSICAL EXAM:  GENERAL HEALTH: well developed, well nourished, 1.47 (H) 08/05/2019    ANIONGAP 5 08/05/2019    GFR 40 (L) 08/29/2017    GFRNAA 33 (L) 08/05/2019    GFRAA 39 (L) 08/05/2019    CA 8.5 08/05/2019    OSMOCALC 305 (H) 08/05/2019    ALKPHO 75 07/29/2019    AST 26 07/29/2019    ALT 22 07/29/2019    BILT 0.9 0 meds as able  2. Baseline Cr ~1.5-1.7    *Greater than 65 minutes spent w/ patient and family, reviewing medical records, labs, completing medication reconciliation and entering orders to establish plan of care in [facility].     Emma Choe, APRN  08/05/19   10

## 2019-08-06 ENCOUNTER — TELEPHONE (OUTPATIENT)
Dept: FAMILY MEDICINE CLINIC | Facility: CLINIC | Age: 79
End: 2019-08-06

## 2019-08-06 DIAGNOSIS — Z79.01 CHRONIC ANTICOAGULATION: ICD-10-CM

## 2019-08-06 DIAGNOSIS — I48.20 CHRONIC ATRIAL FIBRILLATION (HCC): Primary | ICD-10-CM
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
## 2019-08-06 DIAGNOSIS — R79.1 SUPRATHERAPEUTIC INR: ICD-10-CM

## 2019-08-06 NOTE — TELEPHONE ENCOUNTER
Pt is at [facility]. Pt saw Antwan KAPLAN who states in her OV note:  \"CAD/A Fib/HTN/HL  1. VS q shift  2. Low Na and cardiac diet  3. Fish oil 1,200 mg daily  4. ASA 81 mg daily  5. Warfarin 3 mg nightly  6. PT/INR prn  7.  Amiodarone 200 mg daily

## 2019-08-06 NOTE — TELEPHONE ENCOUNTER
Pt instructed of the importance of seeing cardiology and not waiting to the end of the month. Pt verbalizes that she will go see them as directed.

## 2019-08-06 NOTE — TELEPHONE ENCOUNTER
Patient is wanting to know if she can wait to see Dr. Mil Tran since she is still in rehab.     Patient would like to discuss further with a  Nurse,

## 2019-08-07 ENCOUNTER — EXTERNAL FACILITY (OUTPATIENT)
Dept: FAMILY MEDICINE CLINIC | Facility: CLINIC | Age: 79
End: 2019-08-07

## 2019-08-07 ENCOUNTER — SNF VISIT (OUTPATIENT)
Dept: INTERNAL MEDICINE CLINIC | Age: 79
End: 2019-08-07

## 2019-08-07 VITALS
DIASTOLIC BLOOD PRESSURE: 72 MMHG | WEIGHT: 188 LBS | SYSTOLIC BLOOD PRESSURE: 138 MMHG | BODY MASS INDEX: 38 KG/M2 | HEART RATE: 79 BPM | RESPIRATION RATE: 18 BRPM | OXYGEN SATURATION: 94 %

## 2019-08-07 DIAGNOSIS — R79.1 SUPRATHERAPEUTIC INR: ICD-10-CM

## 2019-08-07 DIAGNOSIS — E78.2 MIXED HYPERLIPIDEMIA: ICD-10-CM

## 2019-08-07 DIAGNOSIS — I77.9 BILATERAL CAROTID ARTERY DISEASE, UNSPECIFIED TYPE (HCC): ICD-10-CM

## 2019-08-07 DIAGNOSIS — I10 ESSENTIAL HYPERTENSION: ICD-10-CM

## 2019-08-07 DIAGNOSIS — I48.20 CHRONIC ATRIAL FIBRILLATION (HCC): ICD-10-CM

## 2019-08-07 DIAGNOSIS — R79.89 AZOTEMIA: ICD-10-CM

## 2019-08-07 DIAGNOSIS — N18.30 CKD (CHRONIC KIDNEY DISEASE) STAGE 3, GFR 30-59 ML/MIN (HCC): ICD-10-CM

## 2019-08-07 DIAGNOSIS — D63.1 ANEMIA DUE TO CHRONIC KIDNEY DISEASE, UNSPECIFIED CKD STAGE: ICD-10-CM

## 2019-08-07 DIAGNOSIS — J42 CHRONIC BRONCHITIS, UNSPECIFIED CHRONIC BRONCHITIS TYPE (HCC): ICD-10-CM

## 2019-08-07 DIAGNOSIS — M19.011 PRIMARY OSTEOARTHRITIS OF RIGHT SHOULDER: ICD-10-CM

## 2019-08-07 DIAGNOSIS — E66.01 SEVERE OBESITY (BMI 35.0-39.9) WITH COMORBIDITY (HCC): Chronic | ICD-10-CM

## 2019-08-07 DIAGNOSIS — S80.11XD LEG HEMATOMA, RIGHT, SUBSEQUENT ENCOUNTER: Primary | ICD-10-CM

## 2019-08-07 DIAGNOSIS — S80.11XD LEG HEMATOMA, RIGHT, SUBSEQUENT ENCOUNTER: ICD-10-CM

## 2019-08-07 DIAGNOSIS — M47.816 ARTHRITIS, LUMBAR SPINE: ICD-10-CM

## 2019-08-07 DIAGNOSIS — J44.1 COPD EXACERBATION (HCC): ICD-10-CM

## 2019-08-07 DIAGNOSIS — I48.91 ATRIAL FIBRILLATION WITH RAPID VENTRICULAR RESPONSE (HCC): ICD-10-CM

## 2019-08-07 DIAGNOSIS — N18.9 ANEMIA DUE TO CHRONIC KIDNEY DISEASE, UNSPECIFIED CKD STAGE: ICD-10-CM

## 2019-08-07 DIAGNOSIS — E03.9 ACQUIRED HYPOTHYROIDISM: ICD-10-CM

## 2019-08-07 DIAGNOSIS — D64.9 ANEMIA, UNSPECIFIED TYPE: ICD-10-CM

## 2019-08-07 DIAGNOSIS — R53.1 WEAKNESS GENERALIZED: ICD-10-CM

## 2019-08-07 DIAGNOSIS — I48.20 CHRONIC ATRIAL FIBRILLATION (HCC): Primary | ICD-10-CM

## 2019-08-07 PROCEDURE — 99306 1ST NF CARE HIGH MDM 50: CPT | Performed by: FAMILY MEDICINE

## 2019-08-07 NOTE — PROGRESS NOTES
Jazlynclement Kirby, 1/30/1940, [de-identified]year old, female    Chief Complaint:  Patient presents with:   Follow - Up: A Fib w/ RVR/Fall resulting in RLE hematoma/Coagulopathy  Weakness       Subjective:    PMH significant for Hypothyroid, COPD, CAD, A Fib, HTN, HL, to rehab and improve strength, endurance and independence w/ ADLs  EXTREMITIES/VASCULAR:no cyanosis or clubbing, radial pulses 2+ and dorsalis pedal pulses 2+  RLE w/ 1-2+ edema  NEUROLOGIC: intact; no sensorimotor deficit, cranial nerves intact II-XII, fo

## 2019-08-09 ENCOUNTER — SNF VISIT (OUTPATIENT)
Dept: INTERNAL MEDICINE CLINIC | Age: 79
End: 2019-08-09

## 2019-08-09 ENCOUNTER — NURSE ONLY (OUTPATIENT)
Dept: LAB | Age: 79
End: 2019-08-09
Attending: FAMILY MEDICINE
Payer: MEDICARE

## 2019-08-09 VITALS
SYSTOLIC BLOOD PRESSURE: 134 MMHG | DIASTOLIC BLOOD PRESSURE: 56 MMHG | HEART RATE: 75 BPM | OXYGEN SATURATION: 95 % | RESPIRATION RATE: 18 BRPM | TEMPERATURE: 97 F

## 2019-08-09 DIAGNOSIS — Z79.01 CHRONIC ANTICOAGULATION: ICD-10-CM

## 2019-08-09 DIAGNOSIS — S80.11XD LEG HEMATOMA, RIGHT, SUBSEQUENT ENCOUNTER: ICD-10-CM

## 2019-08-09 DIAGNOSIS — L02.415 CELLULITIS AND ABSCESS OF RIGHT LEG: ICD-10-CM

## 2019-08-09 DIAGNOSIS — J44.9 OBSTRUCTIVE CHRONIC BRONCHITIS WITHOUT EXACERBATION (HCC): ICD-10-CM

## 2019-08-09 DIAGNOSIS — L03.115 CELLULITIS AND ABSCESS OF RIGHT LEG: ICD-10-CM

## 2019-08-09 DIAGNOSIS — I48.20 CHRONIC ATRIAL FIBRILLATION (HCC): Primary | ICD-10-CM

## 2019-08-09 DIAGNOSIS — I10 ESSENTIAL HYPERTENSION, MALIGNANT: Primary | ICD-10-CM

## 2019-08-09 LAB
HCT VFR BLD AUTO: 30 % (ref 35–48)
HGB BLD-MCNC: 8.8 G/DL (ref 12–16)
INR BLD: 2.07 (ref 0.9–1.1)
PSA SERPL DL<=0.01 NG/ML-MCNC: 24.6 SECONDS (ref 12.5–14.7)

## 2019-08-09 PROCEDURE — 85018 HEMOGLOBIN: CPT

## 2019-08-09 PROCEDURE — 99309 SBSQ NF CARE MODERATE MDM 30: CPT | Performed by: NURSE PRACTITIONER

## 2019-08-09 PROCEDURE — 36415 COLL VENOUS BLD VENIPUNCTURE: CPT

## 2019-08-09 PROCEDURE — 85610 PROTHROMBIN TIME: CPT

## 2019-08-09 PROCEDURE — 85014 HEMATOCRIT: CPT

## 2019-08-09 NOTE — PROGRESS NOTES
Priscila Simpson, 1/30/1940, [de-identified]year old, female    Chief Complaint:  Patient presents with:   Follow - Up: A Fib w/ RVR/Fall resulting in RLE hematoma/Coagulopathy  Wound Recheck  Lab Results  Anticoagulation       Subjective:    PMH significant for Hypo lower extremity.   Weakness R/T recent hospitalization/diagnoses/sequelae; will undergo therapies to rehab and improve strength, endurance and independence w/ ADLs  EXTREMITIES/VASCULAR:no cyanosis or clubbing, radial pulses 2+ and dorsalis pedal pulses 2+ daily  8. Pravastatin 40 mg q HS  9. Amlodipine, lasix, and lisinopril stopped in hospital; resume as able  10. F/U w/ Dr Mir Delgado in one wk  11. F/U w/ John Mack, APN/TCC on 8.6.19     GERD  1. Monitor for sxs     OA/Back problems  1.  Clucosamine cho

## 2019-08-12 ENCOUNTER — APPOINTMENT (OUTPATIENT)
Dept: CARDIOLOGY | Age: 79
End: 2019-08-12

## 2019-08-12 ENCOUNTER — SNF DISCHARGE (OUTPATIENT)
Dept: INTERNAL MEDICINE CLINIC | Age: 79
End: 2019-08-12

## 2019-08-12 ENCOUNTER — NURSE ONLY (OUTPATIENT)
Dept: LAB | Age: 79
End: 2019-08-12
Attending: FAMILY MEDICINE
Payer: MEDICARE

## 2019-08-12 VITALS — HEART RATE: 76 BPM | OXYGEN SATURATION: 97 %

## 2019-08-12 DIAGNOSIS — L02.415 CELLULITIS AND ABSCESS OF RIGHT LEG: ICD-10-CM

## 2019-08-12 DIAGNOSIS — M19.011 PRIMARY OSTEOARTHRITIS OF RIGHT SHOULDER: ICD-10-CM

## 2019-08-12 DIAGNOSIS — L03.115 CELLULITIS AND ABSCESS OF RIGHT LEG: ICD-10-CM

## 2019-08-12 DIAGNOSIS — D64.9 ANEMIA, UNSPECIFIED TYPE: ICD-10-CM

## 2019-08-12 DIAGNOSIS — E78.2 MIXED HYPERLIPIDEMIA: ICD-10-CM

## 2019-08-12 DIAGNOSIS — I48.20 CHRONIC ATRIAL FIBRILLATION (HCC): Primary | ICD-10-CM

## 2019-08-12 DIAGNOSIS — Z79.01 LONG TERM CURRENT USE OF ANTICOAGULANT THERAPY: ICD-10-CM

## 2019-08-12 DIAGNOSIS — N18.30 CKD (CHRONIC KIDNEY DISEASE) STAGE 3, GFR 30-59 ML/MIN (HCC): ICD-10-CM

## 2019-08-12 DIAGNOSIS — S80.11XD LEG HEMATOMA, RIGHT, SUBSEQUENT ENCOUNTER: ICD-10-CM

## 2019-08-12 DIAGNOSIS — R53.1 WEAKNESS: Primary | ICD-10-CM

## 2019-08-12 DIAGNOSIS — E03.9 ACQUIRED HYPOTHYROIDISM: ICD-10-CM

## 2019-08-12 DIAGNOSIS — S73.191A ACETABULAR LABRUM TEAR, RIGHT, INITIAL ENCOUNTER: ICD-10-CM

## 2019-08-12 DIAGNOSIS — J42 CHRONIC BRONCHITIS, UNSPECIFIED CHRONIC BRONCHITIS TYPE (HCC): ICD-10-CM

## 2019-08-12 DIAGNOSIS — I10 ESSENTIAL HYPERTENSION: ICD-10-CM

## 2019-08-12 DIAGNOSIS — R53.1 WEAKNESS GENERALIZED: ICD-10-CM

## 2019-08-12 DIAGNOSIS — Z79.01 CHRONIC ANTICOAGULATION: ICD-10-CM

## 2019-08-12 LAB
ALBUMIN SERPL-MCNC: 2.6 G/DL (ref 3.4–5)
ALBUMIN/GLOB SERPL: 0.8 {RATIO} (ref 1–2)
ALP LIVER SERPL-CCNC: 68 U/L (ref 55–142)
ALT SERPL-CCNC: 14 U/L (ref 13–56)
ANION GAP SERPL CALC-SCNC: 4 MMOL/L (ref 0–18)
AST SERPL-CCNC: 16 U/L (ref 15–37)
BASOPHILS # BLD AUTO: 0.02 X10(3) UL (ref 0–0.2)
BASOPHILS NFR BLD AUTO: 0.3 %
BILIRUB SERPL-MCNC: 0.3 MG/DL (ref 0.1–2)
BUN BLD-MCNC: 29 MG/DL (ref 7–18)
BUN/CREAT SERPL: 17.8 (ref 10–20)
CALCIUM BLD-MCNC: 8.4 MG/DL (ref 8.5–10.1)
CHLORIDE SERPL-SCNC: 110 MMOL/L (ref 98–112)
CO2 SERPL-SCNC: 29 MMOL/L (ref 21–32)
CREAT BLD-MCNC: 1.63 MG/DL (ref 0.55–1.02)
DEPRECATED RDW RBC AUTO: 61.5 FL (ref 35.1–46.3)
EOSINOPHIL # BLD AUTO: 0.38 X10(3) UL (ref 0–0.7)
EOSINOPHIL NFR BLD AUTO: 5.8 %
ERYTHROCYTE [DISTWIDTH] IN BLOOD BY AUTOMATED COUNT: 16.9 % (ref 11–15)
GLOBULIN PLAS-MCNC: 3.3 G/DL (ref 2.8–4.4)
GLUCOSE BLD-MCNC: 91 MG/DL (ref 70–99)
HCT VFR BLD AUTO: 27.8 % (ref 35–48)
HGB BLD-MCNC: 8.1 G/DL (ref 12–16)
IMM GRANULOCYTES # BLD AUTO: 0.02 X10(3) UL (ref 0–1)
IMM GRANULOCYTES NFR BLD: 0.3 %
INR BLD: 2.03 (ref 0.9–1.1)
LYMPHOCYTES # BLD AUTO: 1.78 X10(3) UL (ref 1–4)
LYMPHOCYTES NFR BLD AUTO: 27.3 %
M PROTEIN MFR SERPL ELPH: 5.9 G/DL (ref 6.4–8.2)
MCH RBC QN AUTO: 28.9 PG (ref 26–34)
MCHC RBC AUTO-ENTMCNC: 29.1 G/DL (ref 31–37)
MCV RBC AUTO: 99.3 FL (ref 80–100)
MONOCYTES # BLD AUTO: 0.78 X10(3) UL (ref 0.1–1)
MONOCYTES NFR BLD AUTO: 12 %
NEUTROPHILS # BLD AUTO: 3.53 X10 (3) UL (ref 1.5–7.7)
NEUTROPHILS # BLD AUTO: 3.53 X10(3) UL (ref 1.5–7.7)
NEUTROPHILS NFR BLD AUTO: 54.3 %
OSMOLALITY SERPL CALC.SUM OF ELEC: 301 MOSM/KG (ref 275–295)
PLATELET # BLD AUTO: 378 10(3)UL (ref 150–450)
POTASSIUM SERPL-SCNC: 4.3 MMOL/L (ref 3.5–5.1)
PSA SERPL DL<=0.01 NG/ML-MCNC: 24.2 SECONDS (ref 12.5–14.7)
RBC # BLD AUTO: 2.8 X10(6)UL (ref 3.8–5.3)
SODIUM SERPL-SCNC: 143 MMOL/L (ref 136–145)
WBC # BLD AUTO: 6.5 X10(3) UL (ref 4–11)

## 2019-08-12 PROCEDURE — 99316 NF DSCHRG MGMT 30 MIN+: CPT | Performed by: NURSE PRACTITIONER

## 2019-08-12 PROCEDURE — 85610 PROTHROMBIN TIME: CPT

## 2019-08-12 PROCEDURE — 36415 COLL VENOUS BLD VENIPUNCTURE: CPT

## 2019-08-12 PROCEDURE — 80053 COMPREHEN METABOLIC PANEL: CPT

## 2019-08-12 PROCEDURE — 85025 COMPLETE CBC W/AUTO DIFF WBC: CPT

## 2019-08-12 RX ORDER — CEPHALEXIN 500 MG/1
500 CAPSULE ORAL 3 TIMES DAILY
COMMUNITY
Start: 2019-08-09 | End: 2019-08-20

## 2019-08-12 RX ORDER — WARFARIN SODIUM 1 MG/1
0.5 TABLET ORAL NIGHTLY
COMMUNITY
End: 2019-08-14

## 2019-08-12 RX ORDER — FUROSEMIDE 20 MG/1
20 TABLET ORAL DAILY
COMMUNITY
End: 2019-09-04

## 2019-08-12 NOTE — PROGRESS NOTES
[address] Author: Justo Mathias MD     1940 MRN [id number]   [facility]  Admission 19      Last Hospital Discharge 19 PCP Flavia Cartwright 15 of Discharge 19       Date of Admission:  Inhalation Aerosol Inhale 1 puff into the lungs 2 (two) times daily. 1 sample Lot: [id number] Exp: 04/28/20   Tiotropium Bromide Monohydrate (SPIRIVA RESPIMAT) 1.25 MCG/ACT Inhalation Aero Soln Inhale 2 puffs into the lungs daily.  2 samples Lot: [id number] Exp: Hyperlipidemia, Hypothyroidism, Osteoarthritis, and Visual impairment. She  has a past surgical history that includes hernia surgery; Carotid endarterectomy (Right, 2015); other surgical history; other surgical history; and colonoscopy.     She family hi Neurological: She is alert and oriented to person, place, and time. She has normal reflexes. She displays normal reflexes. No cranial nerve deficit. She exhibits normal muscle tone. Coordination normal.   Skin: Skin is warm and dry. No rash noted.  She is IMPRESSION: No fracture or dislocation. Soft tissue defect along the ventral aspect of the distal 1/3 of the right lower extremity is noted.    Dictated by: Mikala Fernandez MD on 7/17/2019 at 10:40     Approved by: Mikala Fernandez MD            [facility] ([id number])    Result Date: 7/24/2019  PROCEDURE:  US VENOUS DOPPLER LEG RIGHT - DIAG IMG (CPT=93971)  COMPARISON:  [facility] US VENOUS DOPPLER LEG RIGHT - DIAG IMG (CPT=93971), 3/28/2019, 16:46.   INDICATIONS:  S80.11XA Contusion of right lower leg, init ([id number])    Result Date: 7/30/2019  X-rays AP of the pelvis and AP and lateral of the right hip show mild bilateral hip osteoarthritis. No fractures dislocations or loose bodies are present.       Recent Results (from the past 72 hour(s))   PROTHROMBIN 0.00 - 1.00 x10(3) uL    Neutrophil % 54.3 %    Lymphocyte % 27.3 %    Monocyte % 12.0 %    Eosinophil % 5.8 %    Basophil % 0.3 %    Immature Granulocyte % 0.3 %           ASSESSMENT/ PLAN:   [de-identified]year old female presenting to [facility] with right leg hematoma an

## 2019-08-12 NOTE — PROGRESS NOTES
Sydnee Spurling, 1/30/1940, [de-identified]year old, female is being discharged from Facility: [address]    Date of Admission:  8.1.19    Date of Discharge:  Anticipated on 8.13.19                                 Admitting Diag triangular shaped w/ 1 x 1.5 inch area in the center w/ purulent yellow drainage and superiorly and medially there is a fluid filled blister.   Distally to wound is increased edema and erythema w/ mild warmth to palpation--[facility] drsg  EYES: PERRLA, EOMI, scle 378.0 08/12/2019     Lab Results   Component Value Date    GLU 91 08/12/2019    BUN 29 (H) 08/12/2019    BUNCREA 17.8 08/12/2019    CREATSERUM 1.63 (H) 08/12/2019    ANIONGAP 4 08/12/2019    GFR 40 (L) 08/29/2017    GFRNAA 30 (L) 08/12/2019    GFRAA 34 (L) prn  3. Norco 5/325 mg q 6 hrs prn     Mild constipation  1. Colace 100 mg BID  2. Miralax 17 gm daily prn     AOCD  1. CBC prn     CKD 3-4  1. Minimize nephrotoxic meds as able  2.  Baseline Cr ~1.5-1.7    Medication Reconciliation Completed:  Yes    Song

## 2019-08-14 ENCOUNTER — PATIENT OUTREACH (OUTPATIENT)
Dept: CASE MANAGEMENT | Age: 79
End: 2019-08-14

## 2019-08-14 ENCOUNTER — TELEPHONE (OUTPATIENT)
Dept: FAMILY MEDICINE CLINIC | Facility: CLINIC | Age: 79
End: 2019-08-14

## 2019-08-14 DIAGNOSIS — Z02.9 ENCOUNTERS FOR UNSPECIFIED ADMINISTRATIVE PURPOSE: ICD-10-CM

## 2019-08-14 RX ORDER — WARFARIN SODIUM 1 MG/1
1 TABLET ORAL NIGHTLY
Qty: 30 TABLET | Refills: 1 | Status: SHIPPED | OUTPATIENT
Start: 2019-08-14 | End: 2019-11-27

## 2019-08-14 NOTE — TELEPHONE ENCOUNTER
LOV 8/12/19 at [facility] states: \"Increase Warfarin to 3.5 mg nightly  Home health to check PT/INR 8.14.19 and relay results to PCP\"    I spoke to pt and confirmed she is doing 3.5mg nightly. She has 2.5mg at home but cant find the 1mg and needs a refill.   R

## 2019-08-14 NOTE — PROGRESS NOTES
Attempted to reach pt for TCM. Pt states  RN is there now and requested NCM call back later or tomorrow.   [facility] RN requested to speak to me and states pt is refusing [place] stating she doesn't feel she needs any nursing or PT/OT now that she's home but it see

## 2019-08-14 NOTE — TELEPHONE ENCOUNTER
Pt instructed to continue the warfarin 3.5mg daily. HH will check another INR on Mon. Flowsheet updated. I called [facility] [phone]. And informed her as well. She will do an INR on Mon.

## 2019-08-14 NOTE — TELEPHONE ENCOUNTER
Patient [facility]  Warifin 1 MG.

## 2019-08-15 ENCOUNTER — TELEPHONE (OUTPATIENT)
Dept: FAMILY MEDICINE CLINIC | Facility: CLINIC | Age: 79
End: 2019-08-15

## 2019-08-15 NOTE — TELEPHONE ENCOUNTER
Plan of care    Once a wk for 1 week   2x time 3 weeks  Adjusting weekness, transfers, balance, endurance, gait. It's ok to leave a message for the nurse. Please leave your name.

## 2019-08-15 NOTE — PROGRESS NOTES
Attempted to reach pt again for TCM. Pt states she does not have time to talk right now and will call NCM back. NCM # given to pt to call back. She verbalized understanding. Will wait for call back from pt.

## 2019-08-16 ENCOUNTER — TELEPHONE (OUTPATIENT)
Dept: FAMILY MEDICINE CLINIC | Facility: CLINIC | Age: 79
End: 2019-08-16

## 2019-08-16 NOTE — TELEPHONE ENCOUNTER
Andrea Corea from [facility]  called to update Dr. Janina Ashley with POC, she did home eval & pt does not need further OT at homes. She meets all her goals. She will fax paperwork for Dr. Janina Ashley to sign off.

## 2019-08-19 ENCOUNTER — TELEPHONE (OUTPATIENT)
Dept: FAMILY MEDICINE CLINIC | Facility: CLINIC | Age: 79
End: 2019-08-19

## 2019-08-19 NOTE — TELEPHONE ENCOUNTER
Check INR later this week (Thursday preferred)  - may need to inc warfarin since abx could have increased its effect.      CHECK lasix dosing - it is 20mg daily in chart    Inc to 20mg bid x3 days and RN can reassess at next visit   Cont to monitor weight

## 2019-08-19 NOTE — TELEPHONE ENCOUNTER
[facility] RN called. Patient's INR today was 2.1. Patient is currently on 3.5mg daily and just finished her antibiotic yesterday. Hudspeth will be back later in the week if recheck is needed.   Also, patient wants to know if she should take an ext

## 2019-08-19 NOTE — TELEPHONE ENCOUNTER
I called and spoke to [place],  Place Jaiden Brown RN. She confirmed that pt is on Lasix 20mg. She is aware of the instructions and will be there on Thurs to eval pt, weight, and do an INR. She states she will call pt and give instructions now.

## 2019-08-22 ENCOUNTER — TELEPHONE (OUTPATIENT)
Dept: FAMILY MEDICINE CLINIC | Facility: CLINIC | Age: 79
End: 2019-08-22

## 2019-08-22 NOTE — TELEPHONE ENCOUNTER
Spoke to [facility] RN (she was currently driving) - she thinks the pt has warfarin 2mg and 1.5mg tabs, but is not fully certain. Based off of Med list - she should have 1mg tablets.     Malia Lisa on update of warfarin dosage - confirmation read back co

## 2019-08-22 NOTE — TELEPHONE ENCOUNTER
What does tablet of warfarin does she have     Would like 4.5mg STRS and 3mg MWF   Total weekly dose of 27mg     Currently she takes 3.5mg daily, total weekly dose of 24.5mg

## 2019-08-22 NOTE — TELEPHONE ENCOUNTER
Rosita Sky from [facility] called. Patient's INR today is 2.0. Rosita Sky also states that today is patient's first day resuming Lasix once daily. Patient today is down 4 pounds. Both lower extremities are also smaller by 2cm. Please advise. Thank you!

## 2019-08-23 ENCOUNTER — OFFICE VISIT (OUTPATIENT)
Dept: CARDIOLOGY | Age: 79
End: 2019-08-23

## 2019-08-23 DIAGNOSIS — Z09 HOSPITAL DISCHARGE FOLLOW-UP: Primary | ICD-10-CM

## 2019-08-23 DIAGNOSIS — I48.0 PAF (PAROXYSMAL ATRIAL FIBRILLATION) (CMD): ICD-10-CM

## 2019-08-23 PROCEDURE — 99215 OFFICE O/P EST HI 40 MIN: CPT | Performed by: NURSE PRACTITIONER

## 2019-08-23 PROCEDURE — 3078F DIAST BP <80 MM HG: CPT | Performed by: NURSE PRACTITIONER

## 2019-08-23 PROCEDURE — 3077F SYST BP >= 140 MM HG: CPT | Performed by: NURSE PRACTITIONER

## 2019-08-23 RX ORDER — DOCUSATE SODIUM 100 MG/1
100 CAPSULE, LIQUID FILLED ORAL 2 TIMES DAILY
COMMUNITY
End: 2020-06-04 | Stop reason: ALTCHOICE

## 2019-08-23 RX ORDER — IPRATROPIUM BROMIDE AND ALBUTEROL SULFATE 2.5; .5 MG/3ML; MG/3ML
3 SOLUTION RESPIRATORY (INHALATION)
COMMUNITY
Start: 2019-06-17

## 2019-08-23 RX ORDER — WARFARIN SODIUM 2 MG/1
TABLET ORAL
Qty: 30 TABLET | Refills: 1 | Status: SHIPPED | OUTPATIENT
Start: 2019-08-23 | End: 2019-10-18

## 2019-08-23 RX ORDER — WARFARIN SODIUM 1 MG/1
1 TABLET ORAL
COMMUNITY
Start: 2019-08-14 | End: 2020-06-04 | Stop reason: ALTCHOICE

## 2019-08-23 RX ORDER — WARFARIN SODIUM 2 MG/1
TABLET ORAL
COMMUNITY
Start: 2019-08-23 | End: 2021-06-10 | Stop reason: DRUGHIGH

## 2019-08-23 RX ORDER — PRAVASTATIN SODIUM 40 MG
40 TABLET ORAL
COMMUNITY
Start: 2019-06-11 | End: 2020-06-04 | Stop reason: ALTCHOICE

## 2019-08-23 RX ORDER — ACETAMINOPHEN 325 MG/1
650 TABLET ORAL EVERY 6 HOURS PRN
COMMUNITY
Start: 2019-07-31 | End: 2019-08-30

## 2019-08-23 RX ORDER — LEVOTHYROXINE SODIUM 88 UG/1
88 TABLET ORAL DAILY
COMMUNITY

## 2019-08-23 RX ORDER — AMIODARONE HYDROCHLORIDE 200 MG/1
200 TABLET ORAL DAILY
COMMUNITY
Start: 2019-08-01 | End: 2019-12-05 | Stop reason: ALTCHOICE

## 2019-08-23 RX ORDER — WARFARIN SODIUM 3 MG/1
3 TABLET ORAL
COMMUNITY
Start: 2019-07-31 | End: 2019-08-30

## 2019-08-23 RX ORDER — FUROSEMIDE 20 MG/1
20 TABLET ORAL DAILY
COMMUNITY

## 2019-08-23 SDOH — HEALTH STABILITY: PHYSICAL HEALTH: ON AVERAGE, HOW MANY MINUTES DO YOU ENGAGE IN EXERCISE AT THIS LEVEL?: 30 MIN

## 2019-08-23 SDOH — HEALTH STABILITY: PHYSICAL HEALTH: ON AVERAGE, HOW MANY DAYS PER WEEK DO YOU ENGAGE IN MODERATE TO STRENUOUS EXERCISE (LIKE A BRISK WALK)?: 2 DAYS

## 2019-08-23 SDOH — HEALTH STABILITY: MENTAL HEALTH: HOW OFTEN DO YOU HAVE A DRINK CONTAINING ALCOHOL?: NEVER

## 2019-08-23 ASSESSMENT — PATIENT HEALTH QUESTIONNAIRE - PHQ9
SUM OF ALL RESPONSES TO PHQ9 QUESTIONS 1 AND 2: 0
2. FEELING DOWN, DEPRESSED OR HOPELESS: NOT AT ALL
1. LITTLE INTEREST OR PLEASURE IN DOING THINGS: NOT AT ALL
SUM OF ALL RESPONSES TO PHQ9 QUESTIONS 1 AND 2: 0

## 2019-08-23 ASSESSMENT — ENCOUNTER SYMPTOMS
BRUISES/BLEEDS EASILY: 1
RESPIRATORY NEGATIVE: 1
ENDOCRINE NEGATIVE: 1
NUMBNESS: 1
GASTROINTESTINAL NEGATIVE: 1
BACK PAIN: 1
EYES NEGATIVE: 1

## 2019-08-23 ASSESSMENT — PAIN SCALES - GENERAL: PAINLEVEL: 1-2

## 2019-08-23 NOTE — TELEPHONE ENCOUNTER
Spoke to nurse Jerri Dunbar and she is aware. She states pt will needs RX sent in because she wont be able to make the below dose with what she has. She has 2.5mg and 1mg at home.     So if we send in 2mg tabs:  Pt can get to 4.5mg by taking a 2 & 2.5mg    And

## 2019-08-26 ENCOUNTER — OFFICE VISIT (OUTPATIENT)
Dept: FAMILY MEDICINE CLINIC | Facility: CLINIC | Age: 79
End: 2019-08-26
Payer: MEDICARE

## 2019-08-26 ENCOUNTER — APPOINTMENT (OUTPATIENT)
Dept: LAB | Age: 79
End: 2019-08-26
Attending: FAMILY MEDICINE
Payer: MEDICARE

## 2019-08-26 VITALS
HEART RATE: 73 BPM | DIASTOLIC BLOOD PRESSURE: 60 MMHG | OXYGEN SATURATION: 96 % | SYSTOLIC BLOOD PRESSURE: 138 MMHG | WEIGHT: 187 LBS | BODY MASS INDEX: 37.7 KG/M2 | HEIGHT: 59 IN | RESPIRATION RATE: 16 BRPM

## 2019-08-26 DIAGNOSIS — I48.20 CHRONIC ATRIAL FIBRILLATION (HCC): ICD-10-CM

## 2019-08-26 DIAGNOSIS — J42 CHRONIC BRONCHITIS, UNSPECIFIED CHRONIC BRONCHITIS TYPE (HCC): ICD-10-CM

## 2019-08-26 DIAGNOSIS — R79.1 SUPRATHERAPEUTIC INR: ICD-10-CM

## 2019-08-26 DIAGNOSIS — I48.20 CHRONIC ATRIAL FIBRILLATION (HCC): Primary | ICD-10-CM

## 2019-08-26 DIAGNOSIS — S80.11XD LEG HEMATOMA, RIGHT, SUBSEQUENT ENCOUNTER: ICD-10-CM

## 2019-08-26 LAB
INR BLD: 2.03 (ref 0.9–1.1)
PSA SERPL DL<=0.01 NG/ML-MCNC: 24.2 SECONDS (ref 12.5–14.7)

## 2019-08-26 PROCEDURE — 36415 COLL VENOUS BLD VENIPUNCTURE: CPT

## 2019-08-26 PROCEDURE — 1111F DSCHRG MED/CURRENT MED MERGE: CPT | Performed by: FAMILY MEDICINE

## 2019-08-26 PROCEDURE — 99495 TRANSJ CARE MGMT MOD F2F 14D: CPT | Performed by: FAMILY MEDICINE

## 2019-08-26 PROCEDURE — 85610 PROTHROMBIN TIME: CPT

## 2019-08-26 RX ORDER — PRAVASTATIN SODIUM 40 MG
40 TABLET ORAL NIGHTLY
Qty: 90 TABLET | Refills: 3 | Status: SHIPPED | OUTPATIENT
Start: 2019-08-26 | End: 2020-07-28

## 2019-08-26 RX ORDER — AMIODARONE HYDROCHLORIDE 200 MG/1
200 TABLET ORAL DAILY
Qty: 90 TABLET | Refills: 3 | Status: SHIPPED | OUTPATIENT
Start: 2019-08-26

## 2019-08-26 RX ORDER — LEVOTHYROXINE SODIUM 88 UG/1
88 TABLET ORAL
Qty: 90 TABLET | Refills: 3 | Status: SHIPPED | OUTPATIENT
Start: 2019-08-26 | End: 2020-11-24

## 2019-08-26 NOTE — PATIENT INSTRUCTIONS
I would like you to use Dulera (or a brand preferred by insurance) and Spiriva to prevent shortness of breath and decrease the need to use rescue inhaler    To help hematoma resolve   -- wear the ace wrap during the day  -- Use a heating pad for 20 min sev

## 2019-08-26 NOTE — PROGRESS NOTES
HPI:    Marcus Mancilla is a [de-identified]year old female here today for hospital follow up.    She was discharged from Inpatient hospital, [facility] to [facility] care)  Admission Date: 7/29/19   Discharge Date: 8/1/19  Hospital Discharge Diagnoses (since 7 Pain is mild       Allergies:  She is allergic to hydrocodone. Current Meds:    Current Outpatient Medications on File Prior to Visit:  Warfarin Sodium 2 MG Oral Tab 4.5mg (take 2mg + 2.5mg tabs) Sun,Tue,Th, Sat and 3mg (2mg + 1mg) M,W,F.   Pt has 2.5m Visual impairment. She  has a past surgical history that includes hernia surgery; Carotid endarterectomy (Right, 2015); other surgical history; other surgical history; and colonoscopy.     She family history includes Cancer in her brother; Heart Disorder no masses, HSM or tenderness  MUSCULOSKELETAL: back is not tender, FROM of the extremities  EXTREMITIES: no cyanosis, clubbing or edema  NEURO: Oriented times three, cranial nerves are intact, motor and sensory are grossly intact  PSYCH: normal mood and af Aerosol 1 Inhaler 1     Sig: Inhale 1 puff into the lungs 2 (two) times daily. • Pravastatin Sodium 40 MG Oral Tab 90 tablet 3     Sig: Take 1 tablet (40 mg total) by mouth nightly.    • mupirocin 2 % External Ointment 15 g 0     Sig: Apply 1 Application

## 2019-08-27 ENCOUNTER — ANTI-COAG VISIT (OUTPATIENT)
Dept: FAMILY MEDICINE CLINIC | Facility: CLINIC | Age: 79
End: 2019-08-27

## 2019-08-27 ENCOUNTER — TELEPHONE (OUTPATIENT)
Dept: FAMILY MEDICINE CLINIC | Facility: CLINIC | Age: 79
End: 2019-08-27

## 2019-08-27 DIAGNOSIS — I48.20 CHRONIC ATRIAL FIBRILLATION (HCC): ICD-10-CM

## 2019-08-27 PROCEDURE — 93793 ANTICOAG MGMT PT WARFARIN: CPT | Performed by: FAMILY MEDICINE

## 2019-08-27 NOTE — TELEPHONE ENCOUNTER
AGUILA below. Thank you! Alexandra Cantor with [facility] called. Patient has done well with physical therapy. Patient has met all of her goals. Patient will be discharged from just PT early. Patient's last PT visit will be on Thursday.   I did veri

## 2019-08-27 NOTE — PROGRESS NOTES
Jeanine Pizano RN was informed of INR and dosage and will repeat an INR on Fri. Discussed INR result with pt. Verified current dose is 4.5mg (takes a 2.5mg & 2mg) Sun, T, Th, Sat and 3mg (2mg and 1mg) on all other days.     Pt instructed to continue current dos

## 2019-08-27 NOTE — PROGRESS NOTES
Notes recorded by Pippa Nunes MD on 8/27/2019 at 8:40 AM CDT  Cont current regimen -STRS 4.5mg and MWF 3mg  reviewed with patient yesterday   Please have home health check later this week - Thursday or Friday if possible

## 2019-08-29 ENCOUNTER — APPOINTMENT (OUTPATIENT)
Dept: CARDIOLOGY | Age: 79
End: 2019-08-29

## 2019-08-30 ENCOUNTER — TELEPHONE (OUTPATIENT)
Dept: FAMILY MEDICINE CLINIC | Facility: CLINIC | Age: 79
End: 2019-08-30

## 2019-08-30 NOTE — TELEPHONE ENCOUNTER
INR is good. Cont current regimen and recheck next week. After HH done - she will return to [facility] lab for INR monitoring.    Patient will need to cont to weigh self daily

## 2019-08-30 NOTE — TELEPHONE ENCOUNTER
Sabina from home health called. Pts INR was 2.4. Sabina was informed to have pt continue her current dose of 4.5mg STRS and 3mg MWF and I will call back and let her know when to recheck. She will be there next Wed. Ok to recheck then?   Then pt will be

## 2019-09-04 ENCOUNTER — TELEPHONE (OUTPATIENT)
Dept: FAMILY MEDICINE CLINIC | Facility: CLINIC | Age: 79
End: 2019-09-04

## 2019-09-04 RX ORDER — FUROSEMIDE 20 MG/1
20 TABLET ORAL 2 TIMES DAILY
Qty: 60 TABLET | Refills: 0 | COMMUNITY
Start: 2019-09-04 | End: 2019-10-18

## 2019-09-04 NOTE — TELEPHONE ENCOUNTER
Pt aware of plan below. Pt aware to continue to monitor her weight,  breathing and leg swelling and let us know if she does not get better or worsens. Flow sheet updated.

## 2019-09-04 NOTE — TELEPHONE ENCOUNTER
Repeat INR in 2 weeks - ideally 1-2 days before appt    For weight and SOB  Inc furosemide 20mb bid  (currently daily)

## 2019-09-04 NOTE — TELEPHONE ENCOUNTER
Stacie Herrera RN called/  INR was 2.7  Pt has been therapeutic since 8/14/19. Pt is taking 4.5mg STRS and 3mg MWF. This is home health's last day. Pt has a appt with Dr Sasha Lucas on 9/20/19. When do you want to repeat an INR?     The pts wound is looking better in

## 2019-09-06 ENCOUNTER — MED REC SCAN ONLY (OUTPATIENT)
Dept: FAMILY MEDICINE CLINIC | Facility: CLINIC | Age: 79
End: 2019-09-06

## 2019-09-18 ENCOUNTER — TELEPHONE (OUTPATIENT)
Dept: FAMILY MEDICINE CLINIC | Facility: CLINIC | Age: 79
End: 2019-09-18

## 2019-09-18 ENCOUNTER — ANTI-COAG VISIT (OUTPATIENT)
Dept: FAMILY MEDICINE CLINIC | Facility: CLINIC | Age: 79
End: 2019-09-18

## 2019-09-18 ENCOUNTER — APPOINTMENT (OUTPATIENT)
Dept: LAB | Age: 79
End: 2019-09-18
Attending: FAMILY MEDICINE
Payer: MEDICARE

## 2019-09-18 DIAGNOSIS — I48.20 CHRONIC ATRIAL FIBRILLATION (HCC): ICD-10-CM

## 2019-09-18 LAB
INR BLD: 2.53 (ref 0.9–1.1)
PSA SERPL DL<=0.01 NG/ML-MCNC: 28.9 SECONDS (ref 12.5–14.7)

## 2019-09-18 PROCEDURE — 85610 PROTHROMBIN TIME: CPT

## 2019-09-18 PROCEDURE — 36415 COLL VENOUS BLD VENIPUNCTURE: CPT

## 2019-09-18 NOTE — TELEPHONE ENCOUNTER
----- Message from Mehdi Welch MD sent at 9/18/2019  5:07 PM CDT -----  INR at goal, repeat in 4 weeks    Cont current regimen

## 2019-09-18 NOTE — PROGRESS NOTES
Notes recorded by Samanta Gabriel MD on 9/18/2019 at 5:07 PM CDT  INR at goal, repeat in 4 weeks    Cont current regimen

## 2019-09-18 NOTE — PROGRESS NOTES
Called patient and spoke with her. Advised her of results and POC below. Patient states understanding. Flowsheet updated for patient.

## 2019-09-18 NOTE — TELEPHONE ENCOUNTER
Called patient and spoke with her. Advised her of results and POC below. Patient states understanding. Flowsheet also updated.

## 2019-09-20 ENCOUNTER — OFFICE VISIT (OUTPATIENT)
Dept: FAMILY MEDICINE CLINIC | Facility: CLINIC | Age: 79
End: 2019-09-20
Payer: MEDICARE

## 2019-09-20 VITALS
HEART RATE: 70 BPM | OXYGEN SATURATION: 98 % | DIASTOLIC BLOOD PRESSURE: 70 MMHG | RESPIRATION RATE: 16 BRPM | HEIGHT: 59 IN | WEIGHT: 193 LBS | SYSTOLIC BLOOD PRESSURE: 150 MMHG | TEMPERATURE: 98 F | BODY MASS INDEX: 38.91 KG/M2

## 2019-09-20 DIAGNOSIS — M25.471 BILATERAL SWELLING OF FEET AND ANKLES: ICD-10-CM

## 2019-09-20 DIAGNOSIS — M25.472 BILATERAL SWELLING OF FEET AND ANKLES: ICD-10-CM

## 2019-09-20 DIAGNOSIS — L03.115 CELLULITIS OF RIGHT LOWER EXTREMITY: Primary | ICD-10-CM

## 2019-09-20 DIAGNOSIS — Z23 NEED FOR VACCINATION: ICD-10-CM

## 2019-09-20 DIAGNOSIS — I50.32 CHRONIC DIASTOLIC HEART FAILURE (HCC): ICD-10-CM

## 2019-09-20 DIAGNOSIS — M25.475 BILATERAL SWELLING OF FEET AND ANKLES: ICD-10-CM

## 2019-09-20 DIAGNOSIS — J42 CHRONIC BRONCHITIS, UNSPECIFIED CHRONIC BRONCHITIS TYPE (HCC): ICD-10-CM

## 2019-09-20 DIAGNOSIS — M25.474 BILATERAL SWELLING OF FEET AND ANKLES: ICD-10-CM

## 2019-09-20 PROCEDURE — 90662 IIV NO PRSV INCREASED AG IM: CPT | Performed by: FAMILY MEDICINE

## 2019-09-20 PROCEDURE — 99214 OFFICE O/P EST MOD 30 MIN: CPT | Performed by: FAMILY MEDICINE

## 2019-09-20 PROCEDURE — G0008 ADMIN INFLUENZA VIRUS VAC: HCPCS | Performed by: FAMILY MEDICINE

## 2019-09-20 NOTE — PATIENT INSTRUCTIONS
Increase your furosemide (water pill) to twice a day until your weight is steady at 187-188 lbs     Monitor for weight change or swelling every day     I will have my nurse call you about a prescription for compression stockings - to wear during the day to

## 2019-09-22 ENCOUNTER — TELEPHONE (OUTPATIENT)
Dept: FAMILY MEDICINE CLINIC | Facility: CLINIC | Age: 79
End: 2019-09-22

## 2019-09-22 DIAGNOSIS — M25.471 BILATERAL SWELLING OF FEET AND ANKLES: ICD-10-CM

## 2019-09-22 DIAGNOSIS — I50.32 CHRONIC DIASTOLIC HEART FAILURE (HCC): Primary | ICD-10-CM

## 2019-09-22 DIAGNOSIS — M25.474 BILATERAL SWELLING OF FEET AND ANKLES: ICD-10-CM

## 2019-09-22 DIAGNOSIS — M25.475 BILATERAL SWELLING OF FEET AND ANKLES: ICD-10-CM

## 2019-09-22 DIAGNOSIS — I48.20 CHRONIC ATRIAL FIBRILLATION (HCC): ICD-10-CM

## 2019-09-22 DIAGNOSIS — L03.115 CELLULITIS OF RIGHT LOWER EXTREMITY: ICD-10-CM

## 2019-09-22 DIAGNOSIS — M25.472 BILATERAL SWELLING OF FEET AND ANKLES: ICD-10-CM

## 2019-09-22 NOTE — PROGRESS NOTES
[facility] Family Medicine Office Note  Chief Complaint:   Patient presents with:  Wound Recheck: right leg      HPI:   This is a [de-identified]year old female coming in for  HPI  Wound check - cellulitis right leg   Has been elevating leg   States it is im mouth 2 (two) times daily. Disp: 60 tablet Rfl: 0   amiodarone HCl 200 MG Oral Tab Take 1 tablet (200 mg total) by mouth daily.  Disp: 90 tablet Rfl: 3   Levothyroxine Sodium 88 MCG Oral Tab Take 1 tablet (88 mcg total) by mouth before breakfast. Disp: 90 t tablet by mouth daily. Disp:  Rfl:    Omega-3 Fatty Acids (FISH OIL) 1200 MG Oral Capsule Delayed Release Take 1 capsule by mouth daily.    Disp:  Rfl:       Counseling given: Not Answered       REVIEW OF SYSTEMS:   Review of Systems   Constitutional: Neg Psychiatric: She has a normal mood and affect. ASSESSMENT AND PLAN:   1. Cellulitis of right lower extremity  Resolved   Monitor and control LE swelling     2.  Chronic diastolic heart failure (HCC)  Mild volume overload   Diurese now - target w

## 2019-09-23 NOTE — TELEPHONE ENCOUNTER
Patient needs compression stockings - will medicare cover?    Please ask pt which pharmacy she wants us to fax order to

## 2019-09-23 NOTE — TELEPHONE ENCOUNTER
Tiffanie is aware. She said we can send the order to [facility] but she may shop around. Pt is worried about the pricing. Pt was informed that she needs to make sure it says 20-30mmHg on it, [employer] is a good brand. She plans of going to [facility] tomorrow.

## 2019-10-17 ENCOUNTER — TELEPHONE (OUTPATIENT)
Dept: FAMILY MEDICINE CLINIC | Facility: CLINIC | Age: 79
End: 2019-10-17

## 2019-10-17 ENCOUNTER — APPOINTMENT (OUTPATIENT)
Dept: LAB | Age: 79
End: 2019-10-17
Attending: FAMILY MEDICINE
Payer: MEDICARE

## 2019-10-17 DIAGNOSIS — I48.20 CHRONIC ATRIAL FIBRILLATION (HCC): ICD-10-CM

## 2019-10-17 DIAGNOSIS — N28.9 IMPAIRED RENAL FUNCTION: Primary | ICD-10-CM

## 2019-10-17 DIAGNOSIS — N17.9 AKI (ACUTE KIDNEY INJURY) (HCC): ICD-10-CM

## 2019-10-17 DIAGNOSIS — E78.00 HYPERCHOLESTEREMIA: ICD-10-CM

## 2019-10-17 PROCEDURE — 80061 LIPID PANEL: CPT

## 2019-10-17 PROCEDURE — 80048 BASIC METABOLIC PNL TOTAL CA: CPT

## 2019-10-17 PROCEDURE — 36415 COLL VENOUS BLD VENIPUNCTURE: CPT

## 2019-10-17 PROCEDURE — 85610 PROTHROMBIN TIME: CPT

## 2019-10-17 NOTE — TELEPHONE ENCOUNTER
----- Message from Joan Schneider MD sent at 10/17/2019  4:12 PM CDT -----  INR at goal, repeat in 4 weeks    Cont current regimen    Cholesterol stable.  Slightly worsened kidney function - take lasix bid x1 week and repeat bmp    msg sent to pt and lab in s

## 2019-10-18 ENCOUNTER — OFFICE VISIT (OUTPATIENT)
Dept: FAMILY MEDICINE CLINIC | Facility: CLINIC | Age: 79
End: 2019-10-18
Payer: MEDICARE

## 2019-10-18 VITALS
OXYGEN SATURATION: 98 % | HEART RATE: 70 BPM | RESPIRATION RATE: 16 BRPM | DIASTOLIC BLOOD PRESSURE: 60 MMHG | TEMPERATURE: 98 F | BODY MASS INDEX: 37.9 KG/M2 | WEIGHT: 188 LBS | SYSTOLIC BLOOD PRESSURE: 138 MMHG | HEIGHT: 59 IN

## 2019-10-18 DIAGNOSIS — N18.4 CKD (CHRONIC KIDNEY DISEASE) STAGE 4, GFR 15-29 ML/MIN (HCC): ICD-10-CM

## 2019-10-18 DIAGNOSIS — N17.9 AKI (ACUTE KIDNEY INJURY) (HCC): ICD-10-CM

## 2019-10-18 DIAGNOSIS — L03.115 CELLULITIS OF RIGHT LOWER EXTREMITY: ICD-10-CM

## 2019-10-18 DIAGNOSIS — I50.32 CHRONIC DIASTOLIC HEART FAILURE (HCC): Primary | ICD-10-CM

## 2019-10-18 PROCEDURE — 99214 OFFICE O/P EST MOD 30 MIN: CPT | Performed by: FAMILY MEDICINE

## 2019-10-18 RX ORDER — WARFARIN SODIUM 1 MG/1
1 TABLET ORAL NIGHTLY
Qty: 30 TABLET | Refills: 1 | Status: CANCELLED | OUTPATIENT
Start: 2019-10-18

## 2019-10-18 RX ORDER — FUROSEMIDE 20 MG/1
20 TABLET ORAL 2 TIMES DAILY
Qty: 90 TABLET | Refills: 0 | COMMUNITY
Start: 2019-10-18 | End: 2020-10-09

## 2019-10-18 RX ORDER — CEPHALEXIN 500 MG/1
500 TABLET ORAL 3 TIMES DAILY
Qty: 30 TABLET | Refills: 0 | Status: SHIPPED | OUTPATIENT
Start: 2019-10-18 | End: 2019-10-28 | Stop reason: SINTOL

## 2019-10-18 RX ORDER — WARFARIN SODIUM 2 MG/1
TABLET ORAL
Qty: 30 TABLET | Refills: 1 | Status: SHIPPED | OUTPATIENT
Start: 2019-10-18 | End: 2019-11-27

## 2019-10-18 NOTE — PROGRESS NOTES
[facility] Family Medicine Office Note  Chief Complaint:   Patient presents with:  Blood Pressure: f/u  Weight Check: f/u      HPI:   This is a [de-identified]year old female coming in for  HPI   CHF/BP follow up   Patient was asked to take lasix bid x3 days Heart Disorder Father    • Hypertension Mother    • Heart Disorder Sister    • Cancer Brother         lung cancer     Allergies:    Hydrocodone             HIVES  Current Meds:  Warfarin Sodium 2 MG Oral Tab, Take as directed. (take 4.5mg Sun,Tue,Th, Sat a Take 1 capsule by mouth daily. , Disp: , Rfl:   Glucosamine-Chondroitin 500-250 MG Oral Cap, Take 1 cap TID, Disp: , Rfl: 0       Counseling given: Not Answered       REVIEW OF SYSTEMS:   Review of Systems   Constitutional: Negative for chills and fever. cephalexin      Return in about 2 weeks (around 11/1/2019).     Meds & Refills for this Visit:  Requested Prescriptions     Signed Prescriptions Disp Refills   • Warfarin Sodium 2 MG Oral Tab 30 tablet 1     Sig: Take as directed. (take 4.5mg Sun,Tue,Th, Sa

## 2019-10-18 NOTE — PATIENT INSTRUCTIONS
Leg Swelling in Both Legs    Swelling of the feet, ankles, and legs is called edema. It is caused by excess fluid that has collected in the tissues. Extra fluid in the body settles in the lowest part because of gravity.  This is why the legs and feet are circulate the blood that has collected in your leg. Talk with your provider about using support stockings to stop daytime leg swelling.   · If your provider says that heart disease is causing your leg swelling, follow a low-salt diet to stop extra fluid fro

## 2019-10-20 PROBLEM — N18.4 CKD (CHRONIC KIDNEY DISEASE) STAGE 4, GFR 15-29 ML/MIN (HCC): Chronic | Status: ACTIVE | Noted: 2019-10-20

## 2019-10-25 ENCOUNTER — APPOINTMENT (OUTPATIENT)
Dept: LAB | Age: 79
End: 2019-10-25
Attending: FAMILY MEDICINE
Payer: MEDICARE

## 2019-10-25 ENCOUNTER — TELEPHONE (OUTPATIENT)
Dept: FAMILY MEDICINE CLINIC | Facility: CLINIC | Age: 79
End: 2019-10-25

## 2019-10-25 DIAGNOSIS — N18.4 CKD (CHRONIC KIDNEY DISEASE) STAGE 4, GFR 15-29 ML/MIN (HCC): ICD-10-CM

## 2019-10-25 DIAGNOSIS — N28.9 IMPAIRED RENAL FUNCTION: ICD-10-CM

## 2019-10-25 DIAGNOSIS — N17.9 AKI (ACUTE KIDNEY INJURY) (HCC): ICD-10-CM

## 2019-10-25 DIAGNOSIS — I48.20 CHRONIC ATRIAL FIBRILLATION (HCC): ICD-10-CM

## 2019-10-25 PROCEDURE — 80048 BASIC METABOLIC PNL TOTAL CA: CPT

## 2019-10-25 PROCEDURE — 36415 COLL VENOUS BLD VENIPUNCTURE: CPT

## 2019-10-25 PROCEDURE — 85610 PROTHROMBIN TIME: CPT

## 2019-10-25 NOTE — TELEPHONE ENCOUNTER
I called pt at [phone] and verified 2 patient identifiers: name & . I discussed results and recommendations at length with patient. All orders placed. All questions answered.       Referral placed at the  per pts request.

## 2019-10-25 NOTE — TELEPHONE ENCOUNTER
----- Message from Abbie Bland MD sent at 10/25/2019  3:31 PM CDT -----  Results reviewed. Tests show worsening renal function - refer to Dr. Rae Díaz  INR - did not need to be checked - but still w/in range - repeat in 4 weeks   Please inform patient.

## 2019-10-28 ENCOUNTER — TELEPHONE (OUTPATIENT)
Dept: FAMILY MEDICINE CLINIC | Facility: CLINIC | Age: 79
End: 2019-10-28

## 2019-10-28 DIAGNOSIS — N28.9 WORSENING RENAL FUNCTION: Primary | ICD-10-CM

## 2019-10-28 NOTE — TELEPHONE ENCOUNTER
Pt has developed a rash on her arms. She feels she is having an allergic reaction to Cephalexin started on 10/18. Please advise on orders for pt. Also, should pt be taking Lasix 20mg QD or BID now, until she sees Dr. Sherren Bellis?

## 2019-10-28 NOTE — TELEPHONE ENCOUNTER
Stop cephalexin and mupirocin   Take benadryl or allegra for allergic reaction   Monitor RLE wound and follow up this week   Will send silvadene to pharmacy     Pt stated she was not taking lasix bid - so since her weight was back to her baseline I told he

## 2019-10-28 NOTE — TELEPHONE ENCOUNTER
Pt notified of below orders. Pt reminded to schedule OV with Dr. Riley Strauss. All questions answered, pt expresses understanding.

## 2019-10-28 NOTE — TELEPHONE ENCOUNTER
Pt states she is having a reaction to the antibiotic that was prescribed to her. Pt states she has a rash on her arm. Pt also would like to know what she should to about her kidney function. Pt denies an appt.

## 2019-10-28 NOTE — TELEPHONE ENCOUNTER
----- Message from Audrey Solo MD sent at 10/25/2019  3:31 PM CDT -----  Results reviewed. Tests show worsening renal function - refer to Dr. Colten Green  INR - did not need to be checked - but still w/in range - repeat in 4 weeks   Please inform patient.     msg

## 2019-10-30 NOTE — TELEPHONE ENCOUNTER
Faxed requested OV notes and medical necessity letter to [employer] for gauze dressings supplies. Confirmation received.

## 2019-11-13 ENCOUNTER — HOSPITAL ENCOUNTER (OUTPATIENT)
Dept: GENERAL RADIOLOGY | Age: 79
Discharge: HOME | End: 2019-11-13
Attending: FAMILY MEDICINE
Payer: MEDICARE

## 2019-11-13 ENCOUNTER — LAB ENCOUNTER (OUTPATIENT)
Dept: LAB | Age: 79
End: 2019-11-13
Attending: FAMILY MEDICINE
Payer: MEDICARE

## 2019-11-13 ENCOUNTER — HOSPITAL ENCOUNTER (OUTPATIENT)
Dept: ULTRASOUND IMAGING | Age: 79
Discharge: HOME | End: 2019-11-13
Attending: FAMILY MEDICINE
Payer: MEDICARE

## 2019-11-13 ENCOUNTER — OFFICE VISIT (OUTPATIENT)
Dept: FAMILY MEDICINE CLINIC | Facility: CLINIC | Age: 79
End: 2019-11-13
Payer: MEDICARE

## 2019-11-13 VITALS
RESPIRATION RATE: 16 BRPM | DIASTOLIC BLOOD PRESSURE: 60 MMHG | OXYGEN SATURATION: 96 % | WEIGHT: 184 LBS | BODY MASS INDEX: 37.09 KG/M2 | TEMPERATURE: 98 F | SYSTOLIC BLOOD PRESSURE: 130 MMHG | HEIGHT: 59 IN | HEART RATE: 72 BPM

## 2019-11-13 DIAGNOSIS — M79.661 PAIN AND SWELLING OF RIGHT LOWER LEG: ICD-10-CM

## 2019-11-13 DIAGNOSIS — R09.89 BILATERAL RALES: ICD-10-CM

## 2019-11-13 DIAGNOSIS — M79.89 PAIN AND SWELLING OF RIGHT LOWER LEG: ICD-10-CM

## 2019-11-13 DIAGNOSIS — M79.661 PAIN AND SWELLING OF RIGHT LOWER LEG: Primary | ICD-10-CM

## 2019-11-13 DIAGNOSIS — M79.89 PAIN AND SWELLING OF RIGHT LOWER LEG: Primary | ICD-10-CM

## 2019-11-13 PROCEDURE — 83880 ASSAY OF NATRIURETIC PEPTIDE: CPT

## 2019-11-13 PROCEDURE — 80053 COMPREHEN METABOLIC PANEL: CPT

## 2019-11-13 PROCEDURE — 99214 OFFICE O/P EST MOD 30 MIN: CPT | Performed by: FAMILY MEDICINE

## 2019-11-13 PROCEDURE — 71046 X-RAY EXAM CHEST 2 VIEWS: CPT | Performed by: FAMILY MEDICINE

## 2019-11-13 PROCEDURE — 85025 COMPLETE CBC W/AUTO DIFF WBC: CPT

## 2019-11-13 PROCEDURE — 93971 EXTREMITY STUDY: CPT | Performed by: FAMILY MEDICINE

## 2019-11-13 PROCEDURE — 36415 COLL VENOUS BLD VENIPUNCTURE: CPT

## 2019-11-14 ENCOUNTER — TELEPHONE (OUTPATIENT)
Dept: FAMILY MEDICINE CLINIC | Facility: CLINIC | Age: 79
End: 2019-11-14

## 2019-11-14 NOTE — TELEPHONE ENCOUNTER
----- Message from Kena Edwards MD sent at 11/14/2019  8:07 AM CST -----  Results reviewed.  Please inform patient BNP mildly elevated - inc furosemide to bid x3 days only - then daily

## 2019-11-14 NOTE — TELEPHONE ENCOUNTER
----- Message from Lisa Topete MD sent at 11/14/2019  8:09 AM CST -----  Results reviewed. Tests show no clot. No significant edema. Please inform patient.

## 2019-11-17 NOTE — PROGRESS NOTES
[facility] Family Medicine Office Note  Chief Complaint:   Patient presents with:  Leg Pain: f/u, right lower leg, swelling and purple bruising      HPI:   This is a [de-identified]year old female coming in for  HPI  Swelling right leg  Complains of swelling topically 2 (two) times daily. 25 g 1   • Warfarin Sodium 2 MG Oral Tab Take as directed. (take 4.5mg Sun,Tue,Th, Sat and 3mg M,W,F) 30 tablet 1   • furosemide 20 MG Oral Tab Take 1 tablet (20 mg total) by mouth daily.  90 tablet 0   • amiodarone HCl 200 MG palpitations and leg swelling. Negative for chest pain. Gastrointestinal: Negative for abdominal pain, nausea and vomiting. Genitourinary: Negative for dysuria, frequency and urgency. Musculoskeletal: Negative for arthralgias and myalgias.    Skin: Po Return in about 2 weeks (around 11/27/2019), or if symptoms worsen or fail to improve.     Meds & Refills for this Visit:  Requested Prescriptions      No prescriptions requested or ordered in this encounter           Health Maintenance:  Pneumococcal PPSV2

## 2019-11-19 ENCOUNTER — OFFICE VISIT (OUTPATIENT)
Dept: FAMILY MEDICINE CLINIC | Facility: CLINIC | Age: 79
End: 2019-11-19
Payer: MEDICARE

## 2019-11-19 VITALS
HEIGHT: 59 IN | DIASTOLIC BLOOD PRESSURE: 70 MMHG | OXYGEN SATURATION: 98 % | WEIGHT: 184 LBS | HEART RATE: 90 BPM | TEMPERATURE: 98 F | RESPIRATION RATE: 16 BRPM | BODY MASS INDEX: 37.09 KG/M2 | SYSTOLIC BLOOD PRESSURE: 160 MMHG

## 2019-11-19 DIAGNOSIS — M79.661 PAIN AND SWELLING OF RIGHT LOWER LEG: Primary | ICD-10-CM

## 2019-11-19 DIAGNOSIS — M79.89 PAIN AND SWELLING OF RIGHT LOWER LEG: Primary | ICD-10-CM

## 2019-11-19 DIAGNOSIS — I50.32 CHRONIC DIASTOLIC HEART FAILURE (HCC): ICD-10-CM

## 2019-11-19 PROCEDURE — 99213 OFFICE O/P EST LOW 20 MIN: CPT | Performed by: FAMILY MEDICINE

## 2019-11-23 NOTE — PROGRESS NOTES
[facility] Family Medicine Office Note  Chief Complaint:   Patient presents with:  Leg Pain: f/u, pt doing well, swelling decreased       HPI:   This is a [de-identified]year old female coming in for  HPI  Leg swelling - follow up   Had discoloration and swe none silver sulfADIAZINE 1 % External Cream Apply 1 Application topically 2 (two) times daily.  25 g 1   • Warfarin Sodium 2 MG Oral Tab Take as directed. (take 4.5mg Sun,Tue,Th, Sat and 3mg M,W,F) 30 tablet 1   • furosemide 20 MG Oral Tab Take 1 tablet (20 mg t and vomiting. Genitourinary: Negative for dysuria, frequency and urgency. Musculoskeletal: Negative for arthralgias and myalgias. Skin: Negative for pallor and rash. Neurological: Negative for dizziness and headaches.         EXAM:   /70   Pul to call with any side effects or complications from the treatments as a result of today.

## 2019-11-26 ENCOUNTER — APPOINTMENT (OUTPATIENT)
Dept: LAB | Age: 79
End: 2019-11-26
Attending: FAMILY MEDICINE
Payer: MEDICARE

## 2019-11-26 DIAGNOSIS — I48.20 CHRONIC ATRIAL FIBRILLATION (HCC): ICD-10-CM

## 2019-11-26 PROCEDURE — 36415 COLL VENOUS BLD VENIPUNCTURE: CPT

## 2019-11-26 PROCEDURE — 85610 PROTHROMBIN TIME: CPT

## 2019-11-27 ENCOUNTER — TELEPHONE (OUTPATIENT)
Dept: FAMILY MEDICINE CLINIC | Facility: CLINIC | Age: 79
End: 2019-11-27

## 2019-11-27 RX ORDER — WARFARIN SODIUM 2 MG/1
TABLET ORAL
Qty: 60 TABLET | Refills: 1 | Status: SHIPPED | OUTPATIENT
Start: 2019-11-27 | End: 2020-01-29

## 2019-11-27 RX ORDER — WARFARIN SODIUM 1 MG/1
TABLET ORAL
Qty: 12 TABLET | Refills: 1 | Status: SHIPPED | OUTPATIENT
Start: 2019-11-27 | End: 2020-01-29

## 2019-11-27 NOTE — TELEPHONE ENCOUNTER
----- Message from Juwan Hilton MD sent at 11/26/2019  9:49 PM CST -----  INR at upper limit, remind pt to keep diet consistent.  Change to 3 mg on Wednesday and 4mg every other day    Repeat INR in 1 week

## 2019-11-27 NOTE — TELEPHONE ENCOUNTER
Called and spoke to pt with results/instructions and pt verbalized understanding.   Refills sent to pharmacy

## 2019-12-05 ENCOUNTER — OFFICE VISIT (OUTPATIENT)
Dept: CARDIOLOGY | Age: 79
End: 2019-12-05

## 2019-12-05 VITALS
DIASTOLIC BLOOD PRESSURE: 70 MMHG | WEIGHT: 184 LBS | HEART RATE: 68 BPM | HEIGHT: 59 IN | SYSTOLIC BLOOD PRESSURE: 150 MMHG | BODY MASS INDEX: 37.09 KG/M2

## 2019-12-05 DIAGNOSIS — E78.00 PURE HYPERCHOLESTEROLEMIA: ICD-10-CM

## 2019-12-05 DIAGNOSIS — I10 ESSENTIAL HYPERTENSION: ICD-10-CM

## 2019-12-05 DIAGNOSIS — I65.23 ASYMPTOMATIC CAROTID ARTERY STENOSIS, BILATERAL: ICD-10-CM

## 2019-12-05 DIAGNOSIS — I25.10 CORONARY ARTERY DISEASE INVOLVING NATIVE CORONARY ARTERY OF NATIVE HEART WITHOUT ANGINA PECTORIS: Primary | ICD-10-CM

## 2019-12-05 DIAGNOSIS — I48.3 TYPICAL ATRIAL FLUTTER (CMD): ICD-10-CM

## 2019-12-05 DIAGNOSIS — I48.0 PAF (PAROXYSMAL ATRIAL FIBRILLATION) (CMD): ICD-10-CM

## 2019-12-05 PROCEDURE — 99214 OFFICE O/P EST MOD 30 MIN: CPT | Performed by: INTERNAL MEDICINE

## 2019-12-05 PROCEDURE — 3077F SYST BP >= 140 MM HG: CPT | Performed by: INTERNAL MEDICINE

## 2019-12-05 PROCEDURE — 3078F DIAST BP <80 MM HG: CPT | Performed by: INTERNAL MEDICINE

## 2019-12-05 RX ORDER — AMLODIPINE BESYLATE 2.5 MG/1
2.5 TABLET ORAL DAILY
Qty: 90 TABLET | Refills: 3 | Status: SHIPPED | OUTPATIENT
Start: 2019-12-05 | End: 2020-06-04 | Stop reason: SDUPTHER

## 2019-12-05 RX ORDER — AMLODIPINE BESYLATE 2.5 MG/1
2.5 TABLET ORAL DAILY
COMMUNITY
End: 2020-06-04 | Stop reason: SDUPTHER

## 2019-12-05 RX ORDER — AMLODIPINE BESYLATE 2.5 MG/1
2.5 TABLET ORAL DAILY
COMMUNITY
End: 2019-12-05 | Stop reason: SDUPTHER

## 2019-12-05 ASSESSMENT — PATIENT HEALTH QUESTIONNAIRE - PHQ9
SUM OF ALL RESPONSES TO PHQ9 QUESTIONS 1 AND 2: 0
2. FEELING DOWN, DEPRESSED OR HOPELESS: NOT AT ALL
SUM OF ALL RESPONSES TO PHQ9 QUESTIONS 1 AND 2: 0
1. LITTLE INTEREST OR PLEASURE IN DOING THINGS: NOT AT ALL

## 2019-12-06 ENCOUNTER — APPOINTMENT (OUTPATIENT)
Dept: LAB | Age: 79
End: 2019-12-06
Attending: FAMILY MEDICINE
Payer: MEDICARE

## 2019-12-06 DIAGNOSIS — I48.20 CHRONIC ATRIAL FIBRILLATION (HCC): ICD-10-CM

## 2019-12-06 PROCEDURE — 85610 PROTHROMBIN TIME: CPT

## 2019-12-06 PROCEDURE — 36415 COLL VENOUS BLD VENIPUNCTURE: CPT

## 2019-12-10 ENCOUNTER — TELEPHONE (OUTPATIENT)
Dept: FAMILY MEDICINE CLINIC | Facility: CLINIC | Age: 79
End: 2019-12-10

## 2019-12-10 DIAGNOSIS — I48.20 CHRONIC ATRIAL FIBRILLATION (HCC): ICD-10-CM

## 2019-12-10 NOTE — TELEPHONE ENCOUNTER
----- Message from Dennie Simmers, MD sent at 12/9/2019  8:12 AM CST -----  Results reviewed. Please inform patient INR too low - extra 2mg today and 4mg every day - repeat on Friday.

## 2019-12-10 NOTE — TELEPHONE ENCOUNTER
I advised patient of her INR result and gave her ' instructions. She read them back to me and verbalized understanding.

## 2019-12-10 NOTE — TELEPHONE ENCOUNTER
Discussed with . She would like patient to take an additional 2mg today for a total of 4mg then continue 4mg daily. Recheck on Monday.

## 2019-12-12 ENCOUNTER — OFFICE VISIT (OUTPATIENT)
Dept: NEPHROLOGY | Facility: CLINIC | Age: 79
End: 2019-12-12
Payer: MEDICARE

## 2019-12-12 VITALS — BODY MASS INDEX: 37 KG/M2 | DIASTOLIC BLOOD PRESSURE: 62 MMHG | SYSTOLIC BLOOD PRESSURE: 140 MMHG | WEIGHT: 184 LBS

## 2019-12-12 DIAGNOSIS — R80.9 PROTEINURIA, UNSPECIFIED TYPE: ICD-10-CM

## 2019-12-12 DIAGNOSIS — N18.30 CKD (CHRONIC KIDNEY DISEASE) STAGE 3, GFR 30-59 ML/MIN (HCC): Primary | ICD-10-CM

## 2019-12-12 DIAGNOSIS — N17.9 AKI (ACUTE KIDNEY INJURY) (HCC): ICD-10-CM

## 2019-12-12 PROCEDURE — 99204 OFFICE O/P NEW MOD 45 MIN: CPT | Performed by: INTERNAL MEDICINE

## 2019-12-12 RX ORDER — AMLODIPINE BESYLATE 2.5 MG/1
5 TABLET ORAL DAILY
COMMUNITY
End: 2021-02-16 | Stop reason: ALTCHOICE

## 2019-12-12 NOTE — PROGRESS NOTES
Nephrology Consult Note    REASON FOR CONSULT: CKD 3    ASSESSMENT/PLAN:        1) CKD 3- serum creatinine has been elevated since initial labs in 2017 but recently higher approaching 2 mg/dL which may be due to the addition of furosemide 20 mg daily withi history of kidney disease. Denies HIV or hepatitis risk factors. Is otherwise been been in pretty good health. No history of diabetes. No history of cardiac pulmonary or hepatic disease other than atrial fibrillation.     ROS:    Denies fever/chills  [place] Delayed Release Take 1 capsule by mouth daily. • Warfarin Sodium 2 MG Oral Tab Take 2 tablets every day EXCEPT Wednesdays.  60 tablet 1   • Warfarin Sodium 1 MG Oral Tab Take 3 tablets (3mg) on Wednesdays 12 tablet 1   • Tiotropium Bromide Monohydrate kg)    General: Alert and oriented in no apparent distress. HEENT: No scleral icterus, MMM  Neck: Supple, no ALYSSA or thyromegaly  Cardiac: Regular rate and rhythm, S1, S2 normal, no murmur or rub  Lungs: Clear without wheezes, rales, rhonchi.     Abdomen: S

## 2019-12-16 ENCOUNTER — APPOINTMENT (OUTPATIENT)
Dept: LAB | Age: 79
End: 2019-12-16
Attending: FAMILY MEDICINE
Payer: MEDICARE

## 2019-12-16 DIAGNOSIS — I48.20 CHRONIC ATRIAL FIBRILLATION (HCC): ICD-10-CM

## 2019-12-16 PROCEDURE — 36415 COLL VENOUS BLD VENIPUNCTURE: CPT

## 2019-12-16 PROCEDURE — 85610 PROTHROMBIN TIME: CPT

## 2019-12-17 ENCOUNTER — ANTI-COAG VISIT (OUTPATIENT)
Dept: FAMILY MEDICINE CLINIC | Facility: CLINIC | Age: 79
End: 2019-12-17

## 2019-12-17 DIAGNOSIS — I48.20 CHRONIC ATRIAL FIBRILLATION (HCC): ICD-10-CM

## 2019-12-17 PROCEDURE — 93793 ANTICOAG MGMT PT WARFARIN: CPT | Performed by: FAMILY MEDICINE

## 2019-12-17 NOTE — PATIENT INSTRUCTIONS
Pt was instructed to take an extra 2 mg on 12/9 so a total of 6mg and then 4mg daily. Pt took the extra 2mg on 12/9 but then went back to her regular dose of 4mg all days except for 3mg on wed (29mg for the week).   I spoke to Dr Aaliyah Choi who states have pt t

## 2019-12-30 RX ORDER — TIOTROPIUM BROMIDE INHALATION SPRAY 1.56 UG/1
SPRAY, METERED RESPIRATORY (INHALATION)
Qty: 12 G | Refills: 3 | Status: SHIPPED | OUTPATIENT
Start: 2019-12-30 | End: 2021-01-14

## 2019-12-30 NOTE — TELEPHONE ENCOUNTER
Medication(s) to Refill:   Requested Prescriptions     Pending Prescriptions Disp Refills   • SPIRIVA RESPIMAT 1.25 MCG/ACT Inhalation Aero Soln [Pharmacy Med Name: SPIRIVA RESPIMAT 1.25 MCG/ACT Aerosol Solution] 8 g 0     Sig: INHALE 2 PUFFS INTO THE LUNG

## 2019-12-31 ENCOUNTER — LAB ENCOUNTER (OUTPATIENT)
Dept: LAB | Age: 79
End: 2019-12-31
Attending: INTERNAL MEDICINE
Payer: MEDICARE

## 2019-12-31 ENCOUNTER — HOSPITAL ENCOUNTER (OUTPATIENT)
Dept: CV DIAGNOSTICS | Age: 79
Discharge: HOME | End: 2019-12-31
Attending: INTERNAL MEDICINE
Payer: MEDICARE

## 2019-12-31 ENCOUNTER — ANTI-COAG VISIT (OUTPATIENT)
Dept: FAMILY MEDICINE CLINIC | Facility: CLINIC | Age: 79
End: 2019-12-31

## 2019-12-31 ENCOUNTER — HOSPITAL ENCOUNTER (OUTPATIENT)
Dept: ULTRASOUND IMAGING | Age: 79
Discharge: HOME | End: 2019-12-31
Attending: INTERNAL MEDICINE
Payer: MEDICARE

## 2019-12-31 DIAGNOSIS — I48.20 CHRONIC ATRIAL FIBRILLATION (HCC): ICD-10-CM

## 2019-12-31 DIAGNOSIS — R80.9 PROTEINURIA, UNSPECIFIED TYPE: ICD-10-CM

## 2019-12-31 DIAGNOSIS — N18.30 CKD (CHRONIC KIDNEY DISEASE) STAGE 3, GFR 30-59 ML/MIN (HCC): ICD-10-CM

## 2019-12-31 DIAGNOSIS — I48.0 PAROXYSMAL ATRIAL FIBRILLATION (HCC): Primary | ICD-10-CM

## 2019-12-31 DIAGNOSIS — N17.9 AKI (ACUTE KIDNEY INJURY) (HCC): ICD-10-CM

## 2019-12-31 DIAGNOSIS — I65.23 ASYMPTOMATIC CAROTID ARTERY STENOSIS, BILATERAL: ICD-10-CM

## 2019-12-31 LAB
ANION GAP SERPL CALC-SCNC: 5 MMOL/L (ref 0–18)
BILIRUB UR QL STRIP.AUTO: NEGATIVE
BUN BLD-MCNC: 27 MG/DL (ref 7–18)
BUN/CREAT SERPL: 17.9 (ref 10–20)
C3 SERPL-MCNC: 115 MG/DL (ref 90–180)
C4 SERPL-MCNC: 38.8 MG/DL (ref 10–40)
CALCIUM BLD-MCNC: 8.9 MG/DL (ref 8.5–10.1)
CHLORIDE SERPL-SCNC: 108 MMOL/L (ref 98–112)
CLARITY UR REFRACT.AUTO: CLEAR
CO2 SERPL-SCNC: 29 MMOL/L (ref 21–32)
COLOR UR AUTO: YELLOW
CREAT BLD-MCNC: 1.51 MG/DL (ref 0.55–1.02)
CREAT UR-SCNC: 49.1 MG/DL
GLUCOSE BLD-MCNC: 90 MG/DL (ref 70–99)
GLUCOSE UR STRIP.AUTO-MCNC: NEGATIVE MG/DL
KETONES UR STRIP.AUTO-MCNC: NEGATIVE MG/DL
NITRITE UR QL STRIP.AUTO: NEGATIVE
OSMOLALITY SERPL CALC.SUM OF ELEC: 299 MOSM/KG (ref 275–295)
PATIENT FASTING Y/N/NP: NO
PH UR STRIP.AUTO: 6 [PH] (ref 4.5–8)
POTASSIUM SERPL-SCNC: 4.3 MMOL/L (ref 3.5–5.1)
PROT UR STRIP.AUTO-MCNC: NEGATIVE MG/DL
PROT UR-MCNC: 11.8 MG/DL
RBC UR QL AUTO: NEGATIVE
SODIUM SERPL-SCNC: 142 MMOL/L (ref 136–145)
SP GR UR STRIP.AUTO: 1.01 (ref 1–1.03)
TSH SERPL-ACNC: NORMAL M[IU]/L
TSI SER-ACNC: 4.37 MIU/ML (ref 0.36–3.74)
UROBILINOGEN UR STRIP.AUTO-MCNC: <2 MG/DL

## 2019-12-31 PROCEDURE — 93793 ANTICOAG MGMT PT WARFARIN: CPT | Performed by: FAMILY MEDICINE

## 2019-12-31 PROCEDURE — 87086 URINE CULTURE/COLONY COUNT: CPT

## 2019-12-31 PROCEDURE — 87088 URINE BACTERIA CULTURE: CPT

## 2019-12-31 PROCEDURE — 84443 ASSAY THYROID STIM HORMONE: CPT

## 2019-12-31 PROCEDURE — 84156 ASSAY OF PROTEIN URINE: CPT

## 2019-12-31 PROCEDURE — 36415 COLL VENOUS BLD VENIPUNCTURE: CPT | Performed by: FAMILY MEDICINE

## 2019-12-31 PROCEDURE — 87186 SC STD MICRODIL/AGAR DIL: CPT

## 2019-12-31 PROCEDURE — 86334 IMMUNOFIX E-PHORESIS SERUM: CPT

## 2019-12-31 PROCEDURE — 86160 COMPLEMENT ANTIGEN: CPT

## 2019-12-31 PROCEDURE — 83883 ASSAY NEPHELOMETRY NOT SPEC: CPT

## 2019-12-31 PROCEDURE — 93880 EXTRACRANIAL BILAT STUDY: CPT | Performed by: INTERNAL MEDICINE

## 2019-12-31 PROCEDURE — 81001 URINALYSIS AUTO W/SCOPE: CPT

## 2019-12-31 PROCEDURE — 83516 IMMUNOASSAY NONANTIBODY: CPT

## 2019-12-31 PROCEDURE — 82570 ASSAY OF URINE CREATININE: CPT

## 2019-12-31 PROCEDURE — 84165 PROTEIN E-PHORESIS SERUM: CPT

## 2019-12-31 PROCEDURE — 83876 ASSAY MYELOPEROXIDASE: CPT

## 2019-12-31 PROCEDURE — 80048 BASIC METABOLIC PNL TOTAL CA: CPT

## 2019-12-31 PROCEDURE — 86255 FLUORESCENT ANTIBODY SCREEN: CPT

## 2019-12-31 PROCEDURE — 76770 US EXAM ABDO BACK WALL COMP: CPT | Performed by: INTERNAL MEDICINE

## 2019-12-31 PROCEDURE — 85610 PROTHROMBIN TIME: CPT | Performed by: FAMILY MEDICINE

## 2019-12-31 NOTE — PROGRESS NOTES
Left a detailed message with results and recommendations (ok per pts HIPAA consent) on pts # [phone]

## 2019-12-31 NOTE — PROGRESS NOTES
Notes recorded by Ant Iraheta MD on 12/31/2019 at 2:40 PM CST  INR at goal, repeat in 4 weeks    Cont current regimen

## 2020-01-02 LAB
KAPPA FREE LIGHT CHAIN: 3.9 MG/DL (ref 0.33–1.94)
KAPPA/LAMBDA FLC RATIO: 1.8 (ref 0.26–1.65)
LAMBDA FREE LIGHT CHAIN: 2.17 MG/DL (ref 0.57–2.63)

## 2020-01-03 ENCOUNTER — DOCUMENTATION (OUTPATIENT)
Dept: CARDIOLOGY | Age: 80
End: 2020-01-03

## 2020-01-03 LAB
ALBUMIN SERPL ELPH-MCNC: 4.05 G/DL (ref 3.75–5.21)
ALBUMIN/GLOB SERPL: 1.38 {RATIO} (ref 1–2)
ALPHA1 GLOB SERPL ELPH-MCNC: 0.29 G/DL (ref 0.19–0.46)
ALPHA2 GLOB SERPL ELPH-MCNC: 0.89 G/DL (ref 0.48–1.05)
B-GLOBULIN SERPL ELPH-MCNC: 0.97 G/DL (ref 0.68–1.23)
GAMMA GLOB SERPL ELPH-MCNC: 0.79 G/DL (ref 0.62–1.7)
M PROTEIN MFR SERPL ELPH: 7 G/DL (ref 6.4–8.2)
MYELOPEROX ANTIBODIES, IGG: 0 AU/ML
SERINE PROTEASE3, IGG: 6 AU/ML

## 2020-01-05 ENCOUNTER — TELEPHONE (OUTPATIENT)
Dept: NEPHROLOGY | Facility: CLINIC | Age: 80
End: 2020-01-05

## 2020-01-05 NOTE — TELEPHONE ENCOUNTER
Left VM for pt- Cr down to 1.5; all serologies / UA / US unremarkable; due to age / HTN / lasix (OK to continue)- no further w/u- thx fang

## 2020-01-06 ENCOUNTER — TELEPHONE (OUTPATIENT)
Dept: CARDIOLOGY | Age: 80
End: 2020-01-06

## 2020-01-06 DIAGNOSIS — I65.23 ASYMPTOMATIC CAROTID ARTERY STENOSIS, BILATERAL: ICD-10-CM

## 2020-01-07 ENCOUNTER — TELEPHONE (OUTPATIENT)
Dept: CARDIOLOGY | Age: 80
End: 2020-01-07

## 2020-01-09 ENCOUNTER — CLINICAL ABSTRACT (OUTPATIENT)
Dept: CARDIOLOGY | Age: 80
End: 2020-01-09

## 2020-01-09 DIAGNOSIS — I48.0 PAF (PAROXYSMAL ATRIAL FIBRILLATION) (CMD): ICD-10-CM

## 2020-01-24 ENCOUNTER — MED REC SCAN ONLY (OUTPATIENT)
Dept: FAMILY MEDICINE CLINIC | Facility: CLINIC | Age: 80
End: 2020-01-24

## 2020-01-29 ENCOUNTER — APPOINTMENT (OUTPATIENT)
Dept: LAB | Age: 80
End: 2020-01-29
Attending: FAMILY MEDICINE
Payer: MEDICARE

## 2020-01-29 ENCOUNTER — TELEPHONE (OUTPATIENT)
Dept: FAMILY MEDICINE CLINIC | Facility: CLINIC | Age: 80
End: 2020-01-29

## 2020-01-29 ENCOUNTER — ANTI-COAG VISIT (OUTPATIENT)
Dept: FAMILY MEDICINE CLINIC | Facility: CLINIC | Age: 80
End: 2020-01-29

## 2020-01-29 DIAGNOSIS — I48.20 CHRONIC ATRIAL FIBRILLATION (HCC): ICD-10-CM

## 2020-01-29 LAB
INR BLD: 1.94 (ref 0.9–1.1)
PSA SERPL DL<=0.01 NG/ML-MCNC: 23.3 SECONDS (ref 12.5–14.7)

## 2020-01-29 PROCEDURE — 85610 PROTHROMBIN TIME: CPT

## 2020-01-29 PROCEDURE — 93793 ANTICOAG MGMT PT WARFARIN: CPT | Performed by: FAMILY MEDICINE

## 2020-01-29 PROCEDURE — 36415 COLL VENOUS BLD VENIPUNCTURE: CPT

## 2020-01-29 RX ORDER — WARFARIN SODIUM 4 MG/1
4 TABLET ORAL DAILY
Qty: 90 TABLET | Refills: 0 | Status: SHIPPED | OUTPATIENT
Start: 2020-01-29 | End: 2020-04-28

## 2020-01-30 NOTE — PROGRESS NOTES
I called pt at [phone] and verified 2 patient identifiers: name & . I discussed results and recommendations at length with patient. All orders placed. All questions answered.       Nisha Valenzuela MD to [facility]       20 4:34 PM

## 2020-02-06 ENCOUNTER — APPOINTMENT (OUTPATIENT)
Dept: LAB | Age: 80
End: 2020-02-06
Attending: FAMILY MEDICINE
Payer: MEDICARE

## 2020-02-06 ENCOUNTER — TELEPHONE (OUTPATIENT)
Dept: FAMILY MEDICINE CLINIC | Facility: CLINIC | Age: 80
End: 2020-02-06

## 2020-02-06 DIAGNOSIS — I48.20 CHRONIC ATRIAL FIBRILLATION (HCC): ICD-10-CM

## 2020-02-06 DIAGNOSIS — I48.20 CHRONIC ATRIAL FIBRILLATION (HCC): Primary | ICD-10-CM

## 2020-02-06 LAB
INR BLD: 2.72 (ref 0.9–1.1)
PSA SERPL DL<=0.01 NG/ML-MCNC: 30.7 SECONDS (ref 12.5–14.7)

## 2020-02-06 PROCEDURE — 36415 COLL VENOUS BLD VENIPUNCTURE: CPT

## 2020-02-06 PROCEDURE — 85610 PROTHROMBIN TIME: CPT

## 2020-02-06 NOTE — TELEPHONE ENCOUNTER
Pt is currently on warfarin 4mg daily. Spoke to pt regarding results and recommendations below - she verbalizes understanding.

## 2020-02-06 NOTE — TELEPHONE ENCOUNTER
----- Message from Arnold Schwartz MD sent at 2/6/2020  1:43 PM CST -----  INR at goal, repeat in 2 weeks.     Cont current regimen

## 2020-02-20 ENCOUNTER — ANTI-COAG VISIT (OUTPATIENT)
Dept: FAMILY MEDICINE CLINIC | Facility: CLINIC | Age: 80
End: 2020-02-20

## 2020-02-20 ENCOUNTER — APPOINTMENT (OUTPATIENT)
Dept: LAB | Age: 80
End: 2020-02-20
Attending: FAMILY MEDICINE
Payer: MEDICARE

## 2020-02-20 DIAGNOSIS — I48.20 CHRONIC ATRIAL FIBRILLATION (HCC): ICD-10-CM

## 2020-02-20 LAB
INR BLD: 2.71 (ref 0.9–1.1)
PSA SERPL DL<=0.01 NG/ML-MCNC: 30.6 SECONDS (ref 12.5–14.7)

## 2020-02-20 PROCEDURE — 36415 COLL VENOUS BLD VENIPUNCTURE: CPT

## 2020-02-20 PROCEDURE — 85610 PROTHROMBIN TIME: CPT

## 2020-02-20 PROCEDURE — 93793 ANTICOAG MGMT PT WARFARIN: CPT | Performed by: FAMILY MEDICINE

## 2020-02-20 NOTE — PROGRESS NOTES
Left a detailed message with results and recommendations (ok per pts HIPAA consent) on pts # [phone]

## 2020-02-20 NOTE — PROGRESS NOTES
Notes recorded by Jamar Jones MD on 2/20/2020 at 1:49 PM CST  INR at goal, repeat in 4 weeks    Cont current regimen

## 2020-02-24 ENCOUNTER — HOSPITAL ENCOUNTER (OUTPATIENT)
Dept: GENERAL RADIOLOGY | Age: 80
Discharge: HOME | End: 2020-02-24
Attending: FAMILY MEDICINE
Payer: MEDICARE

## 2020-02-24 ENCOUNTER — OFFICE VISIT (OUTPATIENT)
Dept: FAMILY MEDICINE CLINIC | Facility: CLINIC | Age: 80
End: 2020-02-24
Payer: MEDICARE

## 2020-02-24 ENCOUNTER — TELEPHONE (OUTPATIENT)
Dept: FAMILY MEDICINE CLINIC | Facility: CLINIC | Age: 80
End: 2020-02-24

## 2020-02-24 VITALS
HEART RATE: 72 BPM | WEIGHT: 188 LBS | RESPIRATION RATE: 16 BRPM | DIASTOLIC BLOOD PRESSURE: 70 MMHG | OXYGEN SATURATION: 95 % | BODY MASS INDEX: 37.9 KG/M2 | TEMPERATURE: 98 F | SYSTOLIC BLOOD PRESSURE: 120 MMHG | HEIGHT: 59 IN

## 2020-02-24 DIAGNOSIS — J44.1 COPD WITH ACUTE EXACERBATION (HCC): ICD-10-CM

## 2020-02-24 DIAGNOSIS — R05.8 COUGH WITH SPUTUM: ICD-10-CM

## 2020-02-24 DIAGNOSIS — R06.2 WHEEZING: Primary | ICD-10-CM

## 2020-02-24 DIAGNOSIS — R06.2 WHEEZING: ICD-10-CM

## 2020-02-24 DIAGNOSIS — J42 CHRONIC BRONCHITIS, UNSPECIFIED CHRONIC BRONCHITIS TYPE (HCC): ICD-10-CM

## 2020-02-24 LAB
ADENOVIRUS PCR:: NEGATIVE
B PERT DNA SPEC QL NAA+PROBE: NEGATIVE
C PNEUM DNA SPEC QL NAA+PROBE: NEGATIVE
CORONAVIRUS 229E PCR:: NEGATIVE
CORONAVIRUS HKU1 PCR:: NEGATIVE
CORONAVIRUS NL63 PCR:: NEGATIVE
CORONAVIRUS OC43 PCR:: NEGATIVE
FLUAV H1 2009 PAND RNA SPEC QL NAA+PROBE: POSITIVE
FLUBV RNA SPEC QL NAA+PROBE: NEGATIVE
METAPNEUMOVIRUS PCR:: NEGATIVE
MYCOPLASMA PNEUMONIA PCR:: NEGATIVE
PARAINFLUENZA 1 PCR:: NEGATIVE
PARAINFLUENZA 2 PCR:: NEGATIVE
PARAINFLUENZA 3 PCR:: NEGATIVE
PARAINFLUENZA 4 PCR:: NEGATIVE
RHINOVIRUS/ENTERO PCR:: NEGATIVE
RSV RNA SPEC QL NAA+PROBE: NEGATIVE

## 2020-02-24 PROCEDURE — 87486 CHLMYD PNEUM DNA AMP PROBE: CPT | Performed by: FAMILY MEDICINE

## 2020-02-24 PROCEDURE — 71046 X-RAY EXAM CHEST 2 VIEWS: CPT | Performed by: FAMILY MEDICINE

## 2020-02-24 PROCEDURE — 87798 DETECT AGENT NOS DNA AMP: CPT | Performed by: FAMILY MEDICINE

## 2020-02-24 PROCEDURE — 96372 THER/PROPH/DIAG INJ SC/IM: CPT | Performed by: FAMILY MEDICINE

## 2020-02-24 PROCEDURE — 94640 AIRWAY INHALATION TREATMENT: CPT | Performed by: FAMILY MEDICINE

## 2020-02-24 PROCEDURE — 99214 OFFICE O/P EST MOD 30 MIN: CPT | Performed by: FAMILY MEDICINE

## 2020-02-24 PROCEDURE — 87633 RESP VIRUS 12-25 TARGETS: CPT | Performed by: FAMILY MEDICINE

## 2020-02-24 PROCEDURE — 87581 M.PNEUMON DNA AMP PROBE: CPT | Performed by: FAMILY MEDICINE

## 2020-02-24 RX ORDER — METHYLPREDNISOLONE SODIUM SUCCINATE 125 MG/2ML
125 INJECTION, POWDER, LYOPHILIZED, FOR SOLUTION INTRAMUSCULAR; INTRAVENOUS ONCE
Status: COMPLETED | OUTPATIENT
Start: 2020-02-24 | End: 2020-02-24

## 2020-02-24 RX ORDER — ALBUTEROL SULFATE 2.5 MG/3ML
2.5 SOLUTION RESPIRATORY (INHALATION)
Qty: 2 BOX | Refills: 0 | Status: SHIPPED | OUTPATIENT
Start: 2020-02-24

## 2020-02-24 RX ORDER — DOXYCYCLINE HYCLATE 100 MG
100 TABLET ORAL 2 TIMES DAILY
Qty: 14 TABLET | Refills: 0 | Status: SHIPPED | OUTPATIENT
Start: 2020-02-24 | End: 2020-03-02

## 2020-02-24 RX ORDER — PREDNISONE 20 MG/1
60 TABLET ORAL DAILY
Qty: 15 TABLET | Refills: 0 | Status: SHIPPED | OUTPATIENT
Start: 2020-02-24 | End: 2020-02-29

## 2020-02-24 RX ORDER — IPRATROPIUM BROMIDE AND ALBUTEROL SULFATE 2.5; .5 MG/3ML; MG/3ML
3 SOLUTION RESPIRATORY (INHALATION) ONCE
Status: COMPLETED | OUTPATIENT
Start: 2020-02-24 | End: 2020-02-24

## 2020-02-24 RX ADMIN — METHYLPREDNISOLONE SODIUM SUCCINATE 125 MG: 125 INJECTION, POWDER, LYOPHILIZED, FOR SOLUTION INTRAMUSCULAR; INTRAVENOUS at 11:51:00

## 2020-02-24 RX ADMIN — IPRATROPIUM BROMIDE AND ALBUTEROL SULFATE 3 ML: 2.5; .5 SOLUTION RESPIRATORY (INHALATION) at 11:52:00

## 2020-02-24 NOTE — PROGRESS NOTES
[address] [facility] Family Medicine Office Note  Chief Complaint:   Patient presents with:  Cough: x3 days, wheezing and SOB   Vision Problem      HPI:   This is a 79year old female coming in for  HPI  Cough   3 days - worsening   Was productive of clear Medication Sig Dispense Refill   • predniSONE 20 MG Oral Tab Take 3 tablets (60 mg total) by mouth daily for 5 days.  15 tablet 0   • albuterol sulfate (2.5 MG/3ML) 0.083% Inhalation Nebu Soln Take 3 mL (2.5 mg total) by nebulization every 4 (four) hours vomiting. Genitourinary: Negative for dysuria, frequency and urgency. Musculoskeletal: Negative for arthralgias and myalgias. Skin: Negative for pallor and rash. Neurological: Negative for dizziness and headaches.         EXAM:   /70   Pulse 7 Prescriptions     Signed Prescriptions Disp Refills   • predniSONE 20 MG Oral Tab 15 tablet 0     Sig: Take 3 tablets (60 mg total) by mouth daily for 5 days.    • albuterol sulfate (2.5 MG/3ML) 0.083% Inhalation Nebu Soln 2 Box 0     Sig: Take 3 mL (2.5 mg

## 2020-02-24 NOTE — TELEPHONE ENCOUNTER
Called and discussed XR results with patient - she verbalized understanding and treatment plan   Adding doxycycline for COPD exacerbation

## 2020-02-25 ENCOUNTER — TELEPHONE (OUTPATIENT)
Dept: FAMILY MEDICINE CLINIC | Facility: CLINIC | Age: 80
End: 2020-02-25

## 2020-02-25 RX ORDER — OSELTAMIVIR PHOSPHATE 75 MG/1
75 CAPSULE ORAL 2 TIMES DAILY
Qty: 10 CAPSULE | Refills: 0 | Status: SHIPPED | OUTPATIENT
Start: 2020-02-25 | End: 2020-03-01

## 2020-02-25 NOTE — TELEPHONE ENCOUNTER
Patient is positive for influenza A   Will send tamiflu - pt should start 1st dose this morning   Cont doxy and prednisone to cover for secondary infection.      Follow up this week - higher risk for hospital admission

## 2020-02-25 NOTE — TELEPHONE ENCOUNTER
I called pt at [phone] and verified 2 patient identifiers: name & . I discussed results and recommendations at length with patient. Pt did not get the doxy yesterday-pharmacy states its coming in today. Pt states she has a watery eye today.   No

## 2020-02-27 ENCOUNTER — TELEPHONE (OUTPATIENT)
Dept: FAMILY MEDICINE CLINIC | Facility: CLINIC | Age: 80
End: 2020-02-27

## 2020-02-27 ENCOUNTER — APPOINTMENT (OUTPATIENT)
Dept: LAB | Age: 80
End: 2020-02-27
Attending: FAMILY MEDICINE
Payer: MEDICARE

## 2020-02-27 ENCOUNTER — OFFICE VISIT (OUTPATIENT)
Dept: FAMILY MEDICINE CLINIC | Facility: CLINIC | Age: 80
End: 2020-02-27
Payer: MEDICARE

## 2020-02-27 VITALS
BODY MASS INDEX: 38.1 KG/M2 | HEART RATE: 83 BPM | WEIGHT: 189 LBS | SYSTOLIC BLOOD PRESSURE: 138 MMHG | OXYGEN SATURATION: 96 % | RESPIRATION RATE: 16 BRPM | TEMPERATURE: 97 F | HEIGHT: 59 IN | DIASTOLIC BLOOD PRESSURE: 60 MMHG

## 2020-02-27 DIAGNOSIS — I48.20 CHRONIC ATRIAL FIBRILLATION (HCC): ICD-10-CM

## 2020-02-27 DIAGNOSIS — J44.1 COPD WITH ACUTE EXACERBATION (HCC): ICD-10-CM

## 2020-02-27 DIAGNOSIS — J10.1 INFLUENZA A (H1N1): Primary | ICD-10-CM

## 2020-02-27 DIAGNOSIS — I48.20 CHRONIC ATRIAL FIBRILLATION (HCC): Primary | ICD-10-CM

## 2020-02-27 LAB
INR BLD: 3.03 (ref 0.9–1.1)
PSA SERPL DL<=0.01 NG/ML-MCNC: 33.5 SECONDS (ref 12.5–14.7)

## 2020-02-27 PROCEDURE — 36415 COLL VENOUS BLD VENIPUNCTURE: CPT

## 2020-02-27 PROCEDURE — 99214 OFFICE O/P EST MOD 30 MIN: CPT | Performed by: FAMILY MEDICINE

## 2020-02-27 PROCEDURE — 94640 AIRWAY INHALATION TREATMENT: CPT | Performed by: FAMILY MEDICINE

## 2020-02-27 PROCEDURE — 85610 PROTHROMBIN TIME: CPT

## 2020-02-27 RX ORDER — IPRATROPIUM BROMIDE AND ALBUTEROL SULFATE 2.5; .5 MG/3ML; MG/3ML
3 SOLUTION RESPIRATORY (INHALATION) ONCE
Status: COMPLETED | OUTPATIENT
Start: 2020-02-27 | End: 2020-02-27

## 2020-02-27 RX ADMIN — IPRATROPIUM BROMIDE AND ALBUTEROL SULFATE 3 ML: 2.5; .5 SOLUTION RESPIRATORY (INHALATION) at 08:15:00

## 2020-02-27 NOTE — TELEPHONE ENCOUNTER
----- Message from Daniella Morales MD sent at 2/27/2020  2:13 PM CST -----  INR elevated (likely from doxycycline) - please take 2mg tonight (Thurs) and 2mg Monday.    She is scheduled for follow up Friday 3/6 and we will repeat INR then

## 2020-02-27 NOTE — TELEPHONE ENCOUNTER
----- Message from Mehdi Welch MD sent at 2/27/2020  2:13 PM CST -----  INR elevated (likely from doxycycline) - please take 2mg tonight (Thurs) and 2mg Monday.    She is scheduled for follow up Friday 3/6 and we will repeat INR then

## 2020-02-27 NOTE — PROGRESS NOTES
[address] [facility] Family Medicine Office Note  Chief Complaint:   Patient presents with:  Cough: f/u      HPI:   This is a 79year old female coming in for  HPI   Follow up -   Influenza   COPD exacerbation   States she is feeling better. No fevers.  Sti total) by mouth 2 (two) times daily for 5 days. 10 capsule 0   • predniSONE 20 MG Oral Tab Take 3 tablets (60 mg total) by mouth daily for 5 days.  15 tablet 0   • albuterol sulfate (2.5 MG/3ML) 0.083% Inhalation Nebu Soln Take 3 mL (2.5 mg total) by nebuli Negative for abdominal pain, nausea and vomiting. Genitourinary: Negative for dysuria, frequency and urgency. Musculoskeletal: Negative for arthralgias and myalgias. Skin: Negative for pallor and rash.    Neurological: Negative for dizziness and heada 01/30/2004  Annual Physical due on 03/12/2020  Annual Depression Screen due on 07/29/2020  Fall Risk Screening due on 02/24/2021  Influenza Vaccine Completed  DEXA Scan Completed    Patient/Caregiver Education: Patient/Caregiver Education: There are no bar

## 2020-03-06 ENCOUNTER — APPOINTMENT (OUTPATIENT)
Dept: LAB | Age: 80
End: 2020-03-06
Attending: FAMILY MEDICINE
Payer: MEDICARE

## 2020-03-06 ENCOUNTER — ANTI-COAG VISIT (OUTPATIENT)
Dept: FAMILY MEDICINE CLINIC | Facility: CLINIC | Age: 80
End: 2020-03-06

## 2020-03-06 ENCOUNTER — OFFICE VISIT (OUTPATIENT)
Dept: FAMILY MEDICINE CLINIC | Facility: CLINIC | Age: 80
End: 2020-03-06
Payer: MEDICARE

## 2020-03-06 VITALS
BODY MASS INDEX: 38.3 KG/M2 | TEMPERATURE: 98 F | OXYGEN SATURATION: 96 % | WEIGHT: 190 LBS | HEART RATE: 70 BPM | RESPIRATION RATE: 16 BRPM | DIASTOLIC BLOOD PRESSURE: 60 MMHG | SYSTOLIC BLOOD PRESSURE: 142 MMHG | HEIGHT: 59 IN

## 2020-03-06 DIAGNOSIS — N18.30 CKD (CHRONIC KIDNEY DISEASE) STAGE 3, GFR 30-59 ML/MIN (HCC): ICD-10-CM

## 2020-03-06 DIAGNOSIS — J42 CHRONIC BRONCHITIS, UNSPECIFIED CHRONIC BRONCHITIS TYPE (HCC): ICD-10-CM

## 2020-03-06 DIAGNOSIS — I48.20 CHRONIC ATRIAL FIBRILLATION (HCC): ICD-10-CM

## 2020-03-06 DIAGNOSIS — J02.9 SORE THROAT: Primary | ICD-10-CM

## 2020-03-06 DIAGNOSIS — M70.62 TROCHANTERIC BURSITIS OF LEFT HIP: ICD-10-CM

## 2020-03-06 LAB
INR BLD: 3.09 (ref 0.9–1.1)
PSA SERPL DL<=0.01 NG/ML-MCNC: 34 SECONDS (ref 12.5–14.7)

## 2020-03-06 PROCEDURE — 85610 PROTHROMBIN TIME: CPT

## 2020-03-06 PROCEDURE — 99214 OFFICE O/P EST MOD 30 MIN: CPT | Performed by: FAMILY MEDICINE

## 2020-03-06 PROCEDURE — 36415 COLL VENOUS BLD VENIPUNCTURE: CPT

## 2020-03-06 RX ORDER — PREDNISONE 10 MG/1
TABLET ORAL
Qty: 32 TABLET | Refills: 0 | Status: SHIPPED | OUTPATIENT
Start: 2020-03-06 | End: 2020-06-15 | Stop reason: ALTCHOICE

## 2020-03-06 NOTE — PROGRESS NOTES
Notes recorded by Zofia Shahid MD on 3/6/2020 at 3:32 PM CST  INR not yet at goal (she has finished doxy and tamiflu),   patient to start taking warfarin 2 mg Monday Wednesday Friday. She should take 2mg tonight.  repeat in 1 week.

## 2020-03-06 NOTE — PROGRESS NOTES
Discussed INR result with pt. Pt instructed to take 2mg on MWF and 4mg all other days.  (today is Ely Ramirez so she should take 2mg tonight). Patient aware to repeat a INR in 1 week. Flowsheet updated.

## 2020-03-16 ENCOUNTER — LAB ENCOUNTER (OUTPATIENT)
Dept: LAB | Age: 80
End: 2020-03-16
Attending: FAMILY MEDICINE
Payer: MEDICARE

## 2020-03-16 ENCOUNTER — OFFICE VISIT (OUTPATIENT)
Dept: FAMILY MEDICINE CLINIC | Facility: CLINIC | Age: 80
End: 2020-03-16
Payer: MEDICARE

## 2020-03-16 VITALS
TEMPERATURE: 98 F | OXYGEN SATURATION: 98 % | BODY MASS INDEX: 38.51 KG/M2 | HEART RATE: 69 BPM | WEIGHT: 191 LBS | RESPIRATION RATE: 16 BRPM | SYSTOLIC BLOOD PRESSURE: 142 MMHG | DIASTOLIC BLOOD PRESSURE: 60 MMHG | HEIGHT: 59 IN

## 2020-03-16 DIAGNOSIS — E78.2 MIXED HYPERLIPIDEMIA: ICD-10-CM

## 2020-03-16 DIAGNOSIS — M19.011 PRIMARY OSTEOARTHRITIS OF RIGHT SHOULDER: ICD-10-CM

## 2020-03-16 DIAGNOSIS — Z78.0 MENOPAUSE PRESENT: ICD-10-CM

## 2020-03-16 DIAGNOSIS — M47.816 ARTHRITIS, LUMBAR SPINE: ICD-10-CM

## 2020-03-16 DIAGNOSIS — D63.1 ANEMIA DUE TO CHRONIC KIDNEY DISEASE, UNSPECIFIED CKD STAGE: ICD-10-CM

## 2020-03-16 DIAGNOSIS — J41.0 SIMPLE CHRONIC BRONCHITIS (HCC): ICD-10-CM

## 2020-03-16 DIAGNOSIS — M19.90 ARTHRITIS: ICD-10-CM

## 2020-03-16 DIAGNOSIS — E03.9 ACQUIRED HYPOTHYROIDISM: ICD-10-CM

## 2020-03-16 DIAGNOSIS — I77.9 BILATERAL CAROTID ARTERY DISEASE, UNSPECIFIED TYPE (HCC): ICD-10-CM

## 2020-03-16 DIAGNOSIS — I50.32 CHRONIC DIASTOLIC HEART FAILURE (HCC): ICD-10-CM

## 2020-03-16 DIAGNOSIS — I10 ESSENTIAL HYPERTENSION: ICD-10-CM

## 2020-03-16 DIAGNOSIS — N18.4 CKD (CHRONIC KIDNEY DISEASE) STAGE 4, GFR 15-29 ML/MIN (HCC): ICD-10-CM

## 2020-03-16 DIAGNOSIS — Z23 NEED FOR PNEUMOCOCCAL VACCINATION: ICD-10-CM

## 2020-03-16 DIAGNOSIS — I48.20 CHRONIC ATRIAL FIBRILLATION (HCC): ICD-10-CM

## 2020-03-16 DIAGNOSIS — N18.9 ANEMIA DUE TO CHRONIC KIDNEY DISEASE, UNSPECIFIED CKD STAGE: ICD-10-CM

## 2020-03-16 DIAGNOSIS — R73.01 IMPAIRED FASTING GLUCOSE: ICD-10-CM

## 2020-03-16 DIAGNOSIS — M70.62 TROCHANTERIC BURSITIS OF LEFT HIP: ICD-10-CM

## 2020-03-16 DIAGNOSIS — E66.01 SEVERE OBESITY (BMI 35.0-39.9) WITH COMORBIDITY (HCC): ICD-10-CM

## 2020-03-16 DIAGNOSIS — Z00.00 ENCOUNTER FOR ANNUAL HEALTH EXAMINATION: Primary | ICD-10-CM

## 2020-03-16 LAB
ALBUMIN SERPL-MCNC: 3.3 G/DL (ref 3.4–5)
ALBUMIN/GLOB SERPL: 0.9 {RATIO} (ref 1–2)
ALP LIVER SERPL-CCNC: 70 U/L (ref 55–142)
ALT SERPL-CCNC: 30 U/L (ref 13–56)
ANION GAP SERPL CALC-SCNC: 4 MMOL/L (ref 0–18)
AST SERPL-CCNC: 24 U/L (ref 15–37)
BASOPHILS # BLD AUTO: 0.02 X10(3) UL (ref 0–0.2)
BASOPHILS NFR BLD AUTO: 0.1 %
BILIRUB SERPL-MCNC: 0.4 MG/DL (ref 0.1–2)
BUN BLD-MCNC: 37 MG/DL (ref 7–18)
BUN/CREAT SERPL: 20 (ref 10–20)
CALCIUM BLD-MCNC: 9 MG/DL (ref 8.5–10.1)
CHLORIDE SERPL-SCNC: 105 MMOL/L (ref 98–112)
CO2 SERPL-SCNC: 31 MMOL/L (ref 21–32)
CREAT BLD-MCNC: 1.85 MG/DL (ref 0.55–1.02)
DEPRECATED RDW RBC AUTO: 53.1 FL (ref 35.1–46.3)
EOSINOPHIL # BLD AUTO: 0.22 X10(3) UL (ref 0–0.7)
EOSINOPHIL NFR BLD AUTO: 1.5 %
ERYTHROCYTE [DISTWIDTH] IN BLOOD BY AUTOMATED COUNT: 15.3 % (ref 11–15)
EST. AVERAGE GLUCOSE BLD GHB EST-MCNC: 140 MG/DL (ref 68–126)
GLOBULIN PLAS-MCNC: 3.7 G/DL (ref 2.8–4.4)
GLUCOSE BLD-MCNC: 103 MG/DL (ref 70–99)
HBA1C MFR BLD HPLC: 6.5 % (ref ?–5.7)
HCT VFR BLD AUTO: 42.2 % (ref 35–48)
HGB BLD-MCNC: 12.6 G/DL (ref 12–16)
IMM GRANULOCYTES # BLD AUTO: 0.07 X10(3) UL (ref 0–1)
IMM GRANULOCYTES NFR BLD: 0.5 %
INR BLD: 3 (ref 0.9–1.1)
LYMPHOCYTES # BLD AUTO: 1.54 X10(3) UL (ref 1–4)
LYMPHOCYTES NFR BLD AUTO: 10.8 %
M PROTEIN MFR SERPL ELPH: 7 G/DL (ref 6.4–8.2)
MCH RBC QN AUTO: 28 PG (ref 26–34)
MCHC RBC AUTO-ENTMCNC: 29.9 G/DL (ref 31–37)
MCV RBC AUTO: 93.8 FL (ref 80–100)
MONOCYTES # BLD AUTO: 0.88 X10(3) UL (ref 0.1–1)
MONOCYTES NFR BLD AUTO: 6.2 %
NEUTROPHILS # BLD AUTO: 11.57 X10 (3) UL (ref 1.5–7.7)
NEUTROPHILS # BLD AUTO: 11.57 X10(3) UL (ref 1.5–7.7)
NEUTROPHILS NFR BLD AUTO: 80.9 %
OSMOLALITY SERPL CALC.SUM OF ELEC: 299 MOSM/KG (ref 275–295)
PATIENT FASTING Y/N/NP: NO
PLATELET # BLD AUTO: 331 10(3)UL (ref 150–450)
POTASSIUM SERPL-SCNC: 5 MMOL/L (ref 3.5–5.1)
PSA SERPL DL<=0.01 NG/ML-MCNC: 33.2 SECONDS (ref 12.5–14.7)
RBC # BLD AUTO: 4.5 X10(6)UL (ref 3.8–5.3)
SODIUM SERPL-SCNC: 140 MMOL/L (ref 136–145)
TSI SER-ACNC: 2.69 MIU/ML (ref 0.36–3.74)
WBC # BLD AUTO: 14.3 X10(3) UL (ref 4–11)

## 2020-03-16 PROCEDURE — 90670 PCV13 VACCINE IM: CPT | Performed by: FAMILY MEDICINE

## 2020-03-16 PROCEDURE — 84443 ASSAY THYROID STIM HORMONE: CPT

## 2020-03-16 PROCEDURE — 36415 COLL VENOUS BLD VENIPUNCTURE: CPT

## 2020-03-16 PROCEDURE — 80053 COMPREHEN METABOLIC PANEL: CPT

## 2020-03-16 PROCEDURE — 85025 COMPLETE CBC W/AUTO DIFF WBC: CPT

## 2020-03-16 PROCEDURE — 99397 PER PM REEVAL EST PAT 65+ YR: CPT | Performed by: FAMILY MEDICINE

## 2020-03-16 PROCEDURE — G0439 PPPS, SUBSEQ VISIT: HCPCS | Performed by: FAMILY MEDICINE

## 2020-03-16 PROCEDURE — G0009 ADMIN PNEUMOCOCCAL VACCINE: HCPCS | Performed by: FAMILY MEDICINE

## 2020-03-16 PROCEDURE — 85610 PROTHROMBIN TIME: CPT

## 2020-03-16 PROCEDURE — 96160 PT-FOCUSED HLTH RISK ASSMT: CPT | Performed by: FAMILY MEDICINE

## 2020-03-16 PROCEDURE — 83036 HEMOGLOBIN GLYCOSYLATED A1C: CPT

## 2020-03-16 NOTE — PATIENT INSTRUCTIONS
Flo Eisenberg's SCREENING SCHEDULE   Tests on this list are recommended by your physician but may not be covered, or covered at this frequency, by your insurer. Please check with your insurance carrier before scheduling to verify coverage.    PREVENT • Men who are 73-68 years old and have smoked more than 100 cigarettes in their lifetime   • Anyone with a family history    Colorectal Cancer Screening  Covered up to Age 76     Colonoscopy Screen   Covered every 10 years- more often if abnormal There a Immunizations      Influenza  Covered Annually Orders placed or performed in visit on 09/20/19   • FLU VACC HIGH DOSE PRSV FREE    Please get every year    Pneumococcal 13 (Prevnar)  Covered Once after 65 No orders found for this or any previous visit.  P

## 2020-03-16 NOTE — PROGRESS NOTES
HPI:   Sydnee Spurling is a 79year old female who presents for a MA (Medicare Advantage) [address] [facility] (Once per calendar year).     Pneumococcal PPSV23/PCV13 65+ Years / Low and Medium Risk(1 of 2 - PCV13) due on 01/30/2004  Annual Physical due on 03/12 the last two weeks)?: Not at all  PHQ-2 SCORE: 0     Advanced Directive:   She does NOT have a Living Will on file in [address]    Advance care planning including the explanation and discussion of advance directives standard forms performed Face to Face with pa Cholesterol Labs:   Lab Results   Component Value Date    CHOLEST 197 10/17/2019    HDL 58 10/17/2019     (H) 10/17/2019    TRIG 111 10/17/2019          Last Chemistry Labs:   Lab Results   Component Value Date    AST 24 03/16/2020    ALT 30 03/16/2 Carb-Cholecalciferol (CALCIUM 600+D3 OR), Take 1 tablet by mouth daily. Omega-3 Fatty Acids (FISH OIL) 1200 MG Oral Capsule Delayed Release, Take 1 capsule by mouth daily.          MEDICAL INFORMATION:   She  has a past medical history of SHRUTHI-dean [facility]), Ar (Oral)   Resp 16   Ht 59\"   Wt 191 lb (86.6 kg)   SpO2 98%   BMI 38.58 kg/m²  Estimated body mass index is 38.58 kg/m² as calculated from the following:    Height as of this encounter: 59\". Weight as of this encounter: 191 lb (86.6 kg).     Medicare He right shoulder   Cont supportive care         Diet assessment: good     PLAN:  The patient indicates understanding of these issues and agrees to the plan. Reinforced healthy diet, lifestyle, and exercise. Return in 3 months (on 6/16/2020).      Bernie Arboleda No flowsheet data found. Bone Density Screening      Dexascan Every two years Last Dexa Scan:   XR DEXA BONE DENSITOMETRY (CPT=77080) 06/06/2018    No flowsheet data found.     Pap and Pelvic      Pap: Every 3 yrs age 21-65 or Pap+HPV every 5 yrs age 27- (mmol/L)   Date Value   03/16/2020 5.0     POTASSIUM (mmol/L)   Date Value   05/14/2018 4.5    No flowsheet data found.     Creatinine  Annually CREATININE (mg/dL)   Date Value   05/14/2018 1.21 (H)     Creatinine (mg/dL)   Date Value   03/16/2020 1.85 (H)

## 2020-03-17 ENCOUNTER — TELEPHONE (OUTPATIENT)
Dept: FAMILY MEDICINE CLINIC | Facility: CLINIC | Age: 80
End: 2020-03-17

## 2020-03-17 ENCOUNTER — ANTI-COAG VISIT (OUTPATIENT)
Dept: FAMILY MEDICINE CLINIC | Facility: CLINIC | Age: 80
End: 2020-03-17

## 2020-03-17 DIAGNOSIS — D72.829 LEUKOCYTOSIS, UNSPECIFIED TYPE: ICD-10-CM

## 2020-03-17 DIAGNOSIS — R73.01 IMPAIRED FASTING GLUCOSE: ICD-10-CM

## 2020-03-17 DIAGNOSIS — N18.4 CKD (CHRONIC KIDNEY DISEASE) STAGE 4, GFR 15-29 ML/MIN (HCC): Primary | ICD-10-CM

## 2020-03-17 DIAGNOSIS — I48.20 CHRONIC ATRIAL FIBRILLATION (HCC): ICD-10-CM

## 2020-03-17 PROCEDURE — 93793 ANTICOAG MGMT PT WARFARIN: CPT | Performed by: FAMILY MEDICINE

## 2020-03-17 NOTE — TELEPHONE ENCOUNTER
----- Message from Avi Osman MD sent at 3/17/2020  4:38 PM CDT -----  Results reviewed.  Please inform patient mild diabetes - but with recent prednisone use it may be higher than typical. Repeat A1c in 3 mo  Leukocytosis can also be from prednisone use

## 2020-03-17 NOTE — PROGRESS NOTES
Discussed INR result with pt. Verified current dose is 2mg MWF, and 4mg all other days. Pt instructed to continue current dose. Patient aware to repeat a INR in 2 weeks. Flowsheet updated.

## 2020-03-17 NOTE — TELEPHONE ENCOUNTER
I called pt at [phone] and verified 2 patient identifiers: name & . I discussed results and recommendations at length with patient. All orders placed. All questions answered.

## 2020-03-21 PROBLEM — R79.89 AZOTEMIA: Status: RESOLVED | Noted: 2019-07-29 | Resolved: 2020-03-21

## 2020-03-21 PROBLEM — S80.11XD LEG HEMATOMA, RIGHT, SUBSEQUENT ENCOUNTER: Status: RESOLVED | Noted: 2019-07-29 | Resolved: 2020-03-21

## 2020-03-21 PROBLEM — R79.1 SUPRATHERAPEUTIC INR: Status: RESOLVED | Noted: 2019-07-29 | Resolved: 2020-03-21

## 2020-03-21 PROBLEM — N17.9 ACUTE KIDNEY INJURY (HCC): Status: RESOLVED | Noted: 2019-07-29 | Resolved: 2020-03-21

## 2020-03-21 PROBLEM — I48.91 ATRIAL FIBRILLATION WITH RAPID VENTRICULAR RESPONSE (HCC): Status: RESOLVED | Noted: 2019-03-12 | Resolved: 2020-03-21

## 2020-03-21 PROBLEM — N17.9 ACUTE KIDNEY INJURY: Status: RESOLVED | Noted: 2019-07-29 | Resolved: 2020-03-21

## 2020-03-25 ENCOUNTER — TELEPHONE (OUTPATIENT)
Dept: FAMILY MEDICINE CLINIC | Facility: CLINIC | Age: 80
End: 2020-03-25

## 2020-03-25 RX ORDER — AMLODIPINE BESYLATE 2.5 MG/1
TABLET ORAL
Qty: 90 TABLET | Refills: 1 | Status: SHIPPED | OUTPATIENT
Start: 2020-03-25 | End: 2020-06-04 | Stop reason: SDUPTHER

## 2020-03-25 RX ORDER — LEVOFLOXACIN 750 MG/1
750 TABLET ORAL DAILY
Qty: 7 TABLET | Refills: 0 | Status: SHIPPED | OUTPATIENT
Start: 2020-03-25 | End: 2020-04-01

## 2020-03-25 RX ORDER — PREDNISONE 20 MG/1
60 TABLET ORAL DAILY
Qty: 15 TABLET | Refills: 0 | Status: SHIPPED | OUTPATIENT
Start: 2020-03-25 | End: 2020-03-30

## 2020-03-25 NOTE — TELEPHONE ENCOUNTER
Virtual/Telephone Check-In    Scarlet Tee verbally consents to a Virtual/Telephone Check-In service on 03/25/20. Patient understands and accepts financial responsibility for any deductible, co-insurance and/or co-pays associated with this service.

## 2020-03-25 NOTE — TELEPHONE ENCOUNTER
Pt scheduled for phone visit at 3:00 with Dr. Joesph Handley due to sob,tightness in chest and a slight cough

## 2020-03-26 ENCOUNTER — TELEPHONE (OUTPATIENT)
Dept: FAMILY MEDICINE CLINIC | Facility: CLINIC | Age: 80
End: 2020-03-26

## 2020-03-26 NOTE — TELEPHONE ENCOUNTER
Fax received from [facility]. Drug Interaction: Warfarin (increased bleeding risk) and Amiodarone (QT prolongation).     Please advise

## 2020-03-26 NOTE — TELEPHONE ENCOUNTER
YES - ok to fill  Cristi Ocasio going to get INR checked on Monday instead of today.  Lets have her take 1/2 tab every day this week until we see INR result

## 2020-03-26 NOTE — TELEPHONE ENCOUNTER
Pt has been on amiodarone since March 2019 with warfarin for a fib. They are also concerned with the interaction with Levaquin. Pharmacist was informed that Dr is aware of the interaction and her INR is being monitored. Ok to fill.   AGUILA

## 2020-03-26 NOTE — TELEPHONE ENCOUNTER
I spoke to Dr Yvette Galvan to clarify. Pt is taking warfarin 2mg on M, W F, and 4mg all other days. Per Dr Yvette aGlvan, take warfarin 2mg daily until we have her INR results (which she will draw on Mon). Pt voiced understanding.

## 2020-03-27 ENCOUNTER — TELEPHONE (OUTPATIENT)
Dept: FAMILY MEDICINE CLINIC | Facility: CLINIC | Age: 80
End: 2020-03-27

## 2020-03-27 DIAGNOSIS — J44.1 COPD WITH ACUTE EXACERBATION (HCC): Primary | ICD-10-CM

## 2020-03-27 DIAGNOSIS — I48.20 CHRONIC ATRIAL FIBRILLATION (HCC): ICD-10-CM

## 2020-03-27 PROCEDURE — G2012 BRIEF CHECK IN BY MD/QHP: HCPCS | Performed by: FAMILY MEDICINE

## 2020-03-27 NOTE — TELEPHONE ENCOUNTER
Virtual/Telephone Check-In    Mak Hawkins verbally consents to a Virtual/Telephone Check-In service on 03/27/20. Patient understands and accepts financial responsibility for any deductible, co-insurance and/or co-pays associated with this service.

## 2020-03-30 ENCOUNTER — LAB ENCOUNTER (OUTPATIENT)
Dept: LAB | Age: 80
End: 2020-03-30
Attending: FAMILY MEDICINE
Payer: MEDICARE

## 2020-03-30 ENCOUNTER — ANTI-COAG VISIT (OUTPATIENT)
Dept: FAMILY MEDICINE CLINIC | Facility: CLINIC | Age: 80
End: 2020-03-30

## 2020-03-30 DIAGNOSIS — N18.4 CKD (CHRONIC KIDNEY DISEASE) STAGE 4, GFR 15-29 ML/MIN (HCC): ICD-10-CM

## 2020-03-30 DIAGNOSIS — D72.829 LEUKOCYTOSIS, UNSPECIFIED TYPE: ICD-10-CM

## 2020-03-30 DIAGNOSIS — I48.20 CHRONIC ATRIAL FIBRILLATION (HCC): ICD-10-CM

## 2020-03-30 DIAGNOSIS — R73.01 IMPAIRED FASTING GLUCOSE: ICD-10-CM

## 2020-03-30 PROCEDURE — 80053 COMPREHEN METABOLIC PANEL: CPT

## 2020-03-30 PROCEDURE — 36415 COLL VENOUS BLD VENIPUNCTURE: CPT

## 2020-03-30 PROCEDURE — 85025 COMPLETE CBC W/AUTO DIFF WBC: CPT

## 2020-03-30 PROCEDURE — 85007 BL SMEAR W/DIFF WBC COUNT: CPT

## 2020-03-30 PROCEDURE — 85027 COMPLETE CBC AUTOMATED: CPT

## 2020-03-30 PROCEDURE — 93793 ANTICOAG MGMT PT WARFARIN: CPT | Performed by: FAMILY MEDICINE

## 2020-03-30 NOTE — PROGRESS NOTES
Notes recorded by Jmaar Jones MD on 3/30/2020 at 4:20 PM CDT  INR elevated repeat on Thursday 4/2  Due to abx   Hold MWF   Take 2mg Sun, Tues, Thur, Sat

## 2020-03-30 NOTE — PROGRESS NOTES
Discussed INR result with pt. Verified current dose is 2mg daily. Pt instructed to hold MWF and 2mg all other days. Patient aware to repeat a INR on Thurs 4/2/20. Flowsheet updated.

## 2020-04-01 ENCOUNTER — TELEPHONE (OUTPATIENT)
Dept: FAMILY MEDICINE CLINIC | Facility: CLINIC | Age: 80
End: 2020-04-01

## 2020-04-01 NOTE — TELEPHONE ENCOUNTER
PA for [facility] was completed through [employer]. PA was denied. Denial reason below. Patient must use Symbicort. Please advise. Thank you! The drug you asked for is non-formulary (not on [employer] list of preferred drugs).  Before the drug can be covered, we

## 2020-04-02 ENCOUNTER — ANTI-COAG VISIT (OUTPATIENT)
Dept: FAMILY MEDICINE CLINIC | Facility: CLINIC | Age: 80
End: 2020-04-02

## 2020-04-02 ENCOUNTER — APPOINTMENT (OUTPATIENT)
Dept: LAB | Age: 80
End: 2020-04-02
Attending: FAMILY MEDICINE
Payer: MEDICARE

## 2020-04-02 DIAGNOSIS — I48.20 CHRONIC ATRIAL FIBRILLATION (HCC): ICD-10-CM

## 2020-04-02 PROCEDURE — 85610 PROTHROMBIN TIME: CPT

## 2020-04-02 PROCEDURE — 93793 ANTICOAG MGMT PT WARFARIN: CPT | Performed by: FAMILY MEDICINE

## 2020-04-02 PROCEDURE — 36415 COLL VENOUS BLD VENIPUNCTURE: CPT

## 2020-04-02 NOTE — TELEPHONE ENCOUNTER
Dr Bibi Webber gave pt a sample when she came in for her INR check. In the future, we will give her Symbicort.

## 2020-04-02 NOTE — PROGRESS NOTES
Hasmukh Ramsay MD  P [facility]             INR closer to goal, she picked up levofloxacin late - started on 3/26/20.     Current dose 2mg STRS;   Have her take 2mg tomorrow night (Friday)   - we will recheck INR Monday 4/6/20 and cont to adjust

## 2020-04-02 NOTE — PROGRESS NOTES
Discussed INR result with pt. Pt instructed to take 2mg Thur, Fri, Sat, Sun. Patient aware to repeat a INR on Mon. Flowsheet updated.

## 2020-04-06 ENCOUNTER — TELEPHONE (OUTPATIENT)
Dept: FAMILY MEDICINE CLINIC | Facility: CLINIC | Age: 80
End: 2020-04-06

## 2020-04-06 ENCOUNTER — APPOINTMENT (OUTPATIENT)
Dept: LAB | Age: 80
End: 2020-04-06
Attending: FAMILY MEDICINE
Payer: MEDICARE

## 2020-04-06 DIAGNOSIS — I48.20 CHRONIC ATRIAL FIBRILLATION (HCC): ICD-10-CM

## 2020-04-06 DIAGNOSIS — I48.20 CHRONIC ATRIAL FIBRILLATION (HCC): Primary | ICD-10-CM

## 2020-04-06 PROCEDURE — 85610 PROTHROMBIN TIME: CPT

## 2020-04-06 PROCEDURE — 36415 COLL VENOUS BLD VENIPUNCTURE: CPT

## 2020-04-07 ENCOUNTER — ANTI-COAG VISIT (OUTPATIENT)
Dept: FAMILY MEDICINE CLINIC | Facility: CLINIC | Age: 80
End: 2020-04-07

## 2020-04-07 DIAGNOSIS — I48.20 CHRONIC ATRIAL FIBRILLATION (HCC): ICD-10-CM

## 2020-04-07 PROCEDURE — 93793 ANTICOAG MGMT PT WARFARIN: CPT | Performed by: FAMILY MEDICINE

## 2020-04-07 NOTE — PROGRESS NOTES
Lenny Bradley MD       4/6/20 8:48 PM   Note      Patient notified of low INR - advised her to take 4mg tonight     We will be trying to get back to routine dosing   Advised 4mg MWF and 2mg STRS  Patient verbalized understanding   Repeat INR on Thursday 4/

## 2020-04-07 NOTE — TELEPHONE ENCOUNTER
Patient notified of low INR - advised her to take 4mg tonight    We will be trying to get back to routine dosing   Advised 4mg MWF and 2mg STRS  Patient verbalized understanding   Repeat INR on Thursday 4/9/20

## 2020-04-09 ENCOUNTER — ANTI-COAG VISIT (OUTPATIENT)
Dept: FAMILY MEDICINE CLINIC | Facility: CLINIC | Age: 80
End: 2020-04-09

## 2020-04-09 ENCOUNTER — LAB ENCOUNTER (OUTPATIENT)
Dept: LAB | Age: 80
End: 2020-04-09
Attending: FAMILY MEDICINE
Payer: MEDICARE

## 2020-04-09 DIAGNOSIS — I48.20 CHRONIC ATRIAL FIBRILLATION (HCC): ICD-10-CM

## 2020-04-09 DIAGNOSIS — D72.825 BANDEMIA: ICD-10-CM

## 2020-04-09 PROCEDURE — 85025 COMPLETE CBC W/AUTO DIFF WBC: CPT

## 2020-04-09 PROCEDURE — 93793 ANTICOAG MGMT PT WARFARIN: CPT | Performed by: FAMILY MEDICINE

## 2020-04-09 PROCEDURE — 85610 PROTHROMBIN TIME: CPT

## 2020-04-09 PROCEDURE — 36415 COLL VENOUS BLD VENIPUNCTURE: CPT

## 2020-04-09 NOTE — PROGRESS NOTES
INR still subtherapeutic but improving   Will place patient back on previous maintenance dosing   Warfarin 4mg every day, starting tonight. Patient verbalized understanding.      Recheck INR Tuesday 4-14

## 2020-04-13 ENCOUNTER — APPOINTMENT (OUTPATIENT)
Dept: LAB | Age: 80
End: 2020-04-13
Attending: FAMILY MEDICINE
Payer: MEDICARE

## 2020-04-13 ENCOUNTER — ANTI-COAG VISIT (OUTPATIENT)
Dept: FAMILY MEDICINE CLINIC | Facility: CLINIC | Age: 80
End: 2020-04-13

## 2020-04-13 DIAGNOSIS — I48.20 CHRONIC ATRIAL FIBRILLATION (HCC): ICD-10-CM

## 2020-04-13 PROCEDURE — 85610 PROTHROMBIN TIME: CPT

## 2020-04-13 PROCEDURE — 36415 COLL VENOUS BLD VENIPUNCTURE: CPT

## 2020-04-13 PROCEDURE — 93793 ANTICOAG MGMT PT WARFARIN: CPT | Performed by: FAMILY MEDICINE

## 2020-04-13 NOTE — PROGRESS NOTES
Discussed INR result with pt. Pt instructed to continue 4mg daily . Patient aware to repeat a INR in 1 week. Flowsheet updated.

## 2020-04-13 NOTE — PROGRESS NOTES
Notes recorded by Lieutenant Yaya MD on 4/13/2020 at 3:00 PM CDT  INR at goal, repeat in 1 week    Cont current regimen

## 2020-04-20 ENCOUNTER — ANTI-COAG VISIT (OUTPATIENT)
Dept: FAMILY MEDICINE CLINIC | Facility: CLINIC | Age: 80
End: 2020-04-20

## 2020-04-20 ENCOUNTER — APPOINTMENT (OUTPATIENT)
Dept: LAB | Age: 80
End: 2020-04-20
Attending: FAMILY MEDICINE
Payer: MEDICARE

## 2020-04-20 DIAGNOSIS — I48.20 CHRONIC ATRIAL FIBRILLATION (HCC): ICD-10-CM

## 2020-04-20 PROCEDURE — 36415 COLL VENOUS BLD VENIPUNCTURE: CPT

## 2020-04-20 PROCEDURE — 85610 PROTHROMBIN TIME: CPT

## 2020-04-20 PROCEDURE — 93793 ANTICOAG MGMT PT WARFARIN: CPT | Performed by: FAMILY MEDICINE

## 2020-04-20 NOTE — PROGRESS NOTES
Notes recorded by Sal Roberson MD on 4/20/2020 at 3:40 PM CDT  INR at goal, repeat in 1 week    Cont current regimen

## 2020-04-27 ENCOUNTER — APPOINTMENT (OUTPATIENT)
Dept: LAB | Age: 80
End: 2020-04-27
Attending: FAMILY MEDICINE
Payer: MEDICARE

## 2020-04-27 ENCOUNTER — ANTI-COAG VISIT (OUTPATIENT)
Dept: FAMILY MEDICINE CLINIC | Facility: CLINIC | Age: 80
End: 2020-04-27

## 2020-04-27 DIAGNOSIS — I48.20 CHRONIC ATRIAL FIBRILLATION (HCC): ICD-10-CM

## 2020-04-27 PROCEDURE — 36415 COLL VENOUS BLD VENIPUNCTURE: CPT

## 2020-04-27 PROCEDURE — 93793 ANTICOAG MGMT PT WARFARIN: CPT | Performed by: FAMILY MEDICINE

## 2020-04-27 PROCEDURE — 85610 PROTHROMBIN TIME: CPT

## 2020-04-27 RX ORDER — WARFARIN SODIUM 4 MG/1
TABLET ORAL
Qty: 90 TABLET | Refills: 0 | Status: SHIPPED | OUTPATIENT
Start: 2020-04-27 | End: 2020-07-29

## 2020-04-27 NOTE — PROGRESS NOTES
Left a detailed message with instructions on her VM. Ok per HIPAA. I LM asking that pt call back to confirm she got the message or send a MyChart confirming she got it and has no questions.

## 2020-04-27 NOTE — PROGRESS NOTES
Notes recorded by Heraclio Cadena MD on 4/27/2020 at 3:10 PM CDT  INR elevated above goal, decrease tonight to 2mg. Then 4mg all other days.    repeat in 1 week.

## 2020-04-28 NOTE — PROGRESS NOTES
Pt called this am and stated she did not listen to the VM last night. She states sometimes it doesn't \"print out\". She states she last night she took 4mg. Pt instructed to take 2mg tonight and then continue with 4mg. Recheck INR in 1 week.  Pt voiced

## 2020-05-04 ENCOUNTER — ANTI-COAG VISIT (OUTPATIENT)
Dept: FAMILY MEDICINE CLINIC | Facility: CLINIC | Age: 80
End: 2020-05-04

## 2020-05-04 ENCOUNTER — APPOINTMENT (OUTPATIENT)
Dept: LAB | Age: 80
End: 2020-05-04
Attending: FAMILY MEDICINE
Payer: MEDICARE

## 2020-05-04 ENCOUNTER — TELEPHONE (OUTPATIENT)
Dept: FAMILY MEDICINE CLINIC | Facility: CLINIC | Age: 80
End: 2020-05-04

## 2020-05-04 DIAGNOSIS — I48.20 CHRONIC ATRIAL FIBRILLATION (HCC): ICD-10-CM

## 2020-05-04 PROCEDURE — 36415 COLL VENOUS BLD VENIPUNCTURE: CPT

## 2020-05-04 PROCEDURE — 93793 ANTICOAG MGMT PT WARFARIN: CPT | Performed by: FAMILY MEDICINE

## 2020-05-04 PROCEDURE — 85610 PROTHROMBIN TIME: CPT

## 2020-05-04 NOTE — TELEPHONE ENCOUNTER
Pt states she normally has some swelling in her legs. It had increased a lot on Fri. She had swelling in both legs but the right was definitely worse than the left. She states it was weeping. She normally takes 20mg daily.    She took an extra Furosemide

## 2020-05-04 NOTE — PROGRESS NOTES
Discussed INR result with pt. Verified current dose was 2mg on Tues(because she didn't get the msge on Mon) and 4 mg all other days. Pt prefers to take the 2mg tab on Wed so its med week instead of on Mon or Tues.   Pt informed that was fine to take 2 mg

## 2020-05-04 NOTE — TELEPHONE ENCOUNTER
----- Message from Eduarda Byrne MD sent at 5/4/2020 12:30 PM CDT -----  INR at goal, repeat in 1 week    Cont current regimen (2mg Mondays, 4mg all other days)

## 2020-05-04 NOTE — TELEPHONE ENCOUNTER
Yes - she can take an extra 20mg furosemide on days she feels legs are swollen. She should also track her weight though - this would help her know when to stop taking the extra dose. If she thinks she needs the extra tablet on day 5 - call the office.

## 2020-05-04 NOTE — PROGRESS NOTES
Notes recorded by Edmar Harrell MD on 5/4/2020 at 12:30 PM CDT  INR at goal, repeat in 1 week    Cont current regimen (2mg Mondays, 4mg all other days)

## 2020-05-04 NOTE — TELEPHONE ENCOUNTER
Informed patient of results and instructions, patient states understanding and will repeat INR in 1 week. Patient stated her right leg was swollen and draining some fluid, I transferred call to triage to advise patient.

## 2020-05-11 ENCOUNTER — APPOINTMENT (OUTPATIENT)
Dept: LAB | Age: 80
End: 2020-05-11
Attending: FAMILY MEDICINE
Payer: MEDICARE

## 2020-05-11 ENCOUNTER — ANTI-COAG VISIT (OUTPATIENT)
Dept: FAMILY MEDICINE CLINIC | Facility: CLINIC | Age: 80
End: 2020-05-11

## 2020-05-11 DIAGNOSIS — I48.20 CHRONIC ATRIAL FIBRILLATION (HCC): ICD-10-CM

## 2020-05-11 PROCEDURE — 85610 PROTHROMBIN TIME: CPT

## 2020-05-11 PROCEDURE — 36415 COLL VENOUS BLD VENIPUNCTURE: CPT

## 2020-05-11 PROCEDURE — 93793 ANTICOAG MGMT PT WARFARIN: CPT | Performed by: FAMILY MEDICINE

## 2020-05-18 ENCOUNTER — APPOINTMENT (OUTPATIENT)
Dept: LAB | Age: 80
End: 2020-05-18
Attending: FAMILY MEDICINE
Payer: MEDICARE

## 2020-05-18 ENCOUNTER — ANTI-COAG VISIT (OUTPATIENT)
Dept: FAMILY MEDICINE CLINIC | Facility: CLINIC | Age: 80
End: 2020-05-18

## 2020-05-18 DIAGNOSIS — I48.20 CHRONIC ATRIAL FIBRILLATION (HCC): ICD-10-CM

## 2020-05-18 PROCEDURE — 85610 PROTHROMBIN TIME: CPT

## 2020-05-18 PROCEDURE — 93793 ANTICOAG MGMT PT WARFARIN: CPT | Performed by: FAMILY MEDICINE

## 2020-05-18 PROCEDURE — 36415 COLL VENOUS BLD VENIPUNCTURE: CPT

## 2020-05-18 NOTE — PROGRESS NOTES
Pt informed. She voices that she is frustrated that she has to keep going weekly even though they are in range. I informed her that I understand her frustration but she went from one end of the range to the other so that a huge change.   I discussed diet i

## 2020-05-18 NOTE — PROGRESS NOTES
Samanta Gabriel MD  P [facility] Clinical Staff             INR at goal, low end, but we did not change her dose in last week. Huge change in INR.  repeat in 1 week     ask patient to keep consistent diet jimmy in terms of foods like spinach, kale, sharita greens,

## 2020-05-26 ENCOUNTER — APPOINTMENT (OUTPATIENT)
Dept: LAB | Age: 80
End: 2020-05-26
Attending: FAMILY MEDICINE
Payer: MEDICARE

## 2020-05-26 DIAGNOSIS — I48.20 CHRONIC ATRIAL FIBRILLATION (HCC): ICD-10-CM

## 2020-05-26 PROCEDURE — 85610 PROTHROMBIN TIME: CPT

## 2020-05-26 PROCEDURE — 36415 COLL VENOUS BLD VENIPUNCTURE: CPT

## 2020-05-27 ENCOUNTER — ANTI-COAG VISIT (OUTPATIENT)
Dept: FAMILY MEDICINE CLINIC | Facility: CLINIC | Age: 80
End: 2020-05-27

## 2020-05-27 DIAGNOSIS — I48.20 CHRONIC ATRIAL FIBRILLATION (HCC): ICD-10-CM

## 2020-05-27 PROCEDURE — 93793 ANTICOAG MGMT PT WARFARIN: CPT | Performed by: FAMILY MEDICINE

## 2020-05-27 NOTE — PROGRESS NOTES
Notes recorded by Hayder Pressley MD on 5/26/2020 at 9:49 PM CDT  INR at goal, repeat in 2 weeks    Cont current regimen

## 2020-05-27 NOTE — PROGRESS NOTES
Discussed INR result with pt. Pt instructed to continue current dose. Patient aware to repeat a INR in 2 weeks. Flowsheet updated.

## 2020-06-04 ENCOUNTER — OFFICE VISIT (OUTPATIENT)
Dept: CARDIOLOGY | Age: 80
End: 2020-06-04

## 2020-06-04 VITALS
WEIGHT: 195 LBS | DIASTOLIC BLOOD PRESSURE: 80 MMHG | HEART RATE: 75 BPM | SYSTOLIC BLOOD PRESSURE: 166 MMHG | HEIGHT: 59 IN | BODY MASS INDEX: 39.31 KG/M2

## 2020-06-04 DIAGNOSIS — I25.10 CORONARY ARTERY DISEASE INVOLVING NATIVE CORONARY ARTERY OF NATIVE HEART WITHOUT ANGINA PECTORIS: Primary | ICD-10-CM

## 2020-06-04 DIAGNOSIS — Z79.01 CHRONIC ANTICOAGULATION: ICD-10-CM

## 2020-06-04 DIAGNOSIS — Z79.899 LONG TERM CURRENT USE OF AMIODARONE: ICD-10-CM

## 2020-06-04 DIAGNOSIS — I48.0 PAF (PAROXYSMAL ATRIAL FIBRILLATION) (CMD): ICD-10-CM

## 2020-06-04 DIAGNOSIS — I10 ESSENTIAL HYPERTENSION: ICD-10-CM

## 2020-06-04 DIAGNOSIS — I65.23 ASYMPTOMATIC CAROTID ARTERY STENOSIS, BILATERAL: ICD-10-CM

## 2020-06-04 DIAGNOSIS — E78.00 PURE HYPERCHOLESTEROLEMIA: ICD-10-CM

## 2020-06-04 PROCEDURE — 99442 TELEPHONE E&M BY PHYSICIAN EST PT NOT ORIG PREV 7 DAYS 11-20 MIN: CPT | Performed by: INTERNAL MEDICINE

## 2020-06-04 RX ORDER — AMIODARONE HYDROCHLORIDE 100 MG/1
100 TABLET ORAL DAILY
COMMUNITY

## 2020-06-04 RX ORDER — ASPIRIN 81 MG/1
81 TABLET, CHEWABLE ORAL DAILY
COMMUNITY

## 2020-06-04 RX ORDER — EZETIMIBE 10 MG/1
10 TABLET ORAL DAILY
Qty: 90 TABLET | Refills: 3 | Status: SHIPPED | OUTPATIENT
Start: 2020-06-04 | End: 2020-08-21 | Stop reason: ALTCHOICE

## 2020-06-04 RX ORDER — AMLODIPINE BESYLATE 5 MG/1
5 TABLET ORAL DAILY
Qty: 90 TABLET | Refills: 3 | Status: SHIPPED | OUTPATIENT
Start: 2020-06-04

## 2020-06-04 RX ORDER — PRAVASTATIN SODIUM 40 MG
40 TABLET ORAL DAILY
COMMUNITY

## 2020-06-04 ASSESSMENT — PATIENT HEALTH QUESTIONNAIRE - PHQ9
SUM OF ALL RESPONSES TO PHQ9 QUESTIONS 1 AND 2: 0
SUM OF ALL RESPONSES TO PHQ9 QUESTIONS 1 AND 2: 0
CLINICAL INTERPRETATION OF PHQ9 SCORE: NO FURTHER SCREENING NEEDED
1. LITTLE INTEREST OR PLEASURE IN DOING THINGS: NOT AT ALL
2. FEELING DOWN, DEPRESSED OR HOPELESS: NOT AT ALL
CLINICAL INTERPRETATION OF PHQ2 SCORE: NO FURTHER SCREENING NEEDED

## 2020-06-09 ENCOUNTER — APPOINTMENT (OUTPATIENT)
Dept: LAB | Age: 80
End: 2020-06-09
Attending: FAMILY MEDICINE
Payer: MEDICARE

## 2020-06-09 DIAGNOSIS — I48.20 CHRONIC ATRIAL FIBRILLATION (HCC): ICD-10-CM

## 2020-06-09 DIAGNOSIS — R73.01 IMPAIRED FASTING GLUCOSE: ICD-10-CM

## 2020-06-09 DIAGNOSIS — I77.9 BILATERAL CAROTID ARTERY DISEASE, UNSPECIFIED TYPE (HCC): ICD-10-CM

## 2020-06-09 DIAGNOSIS — I10 ESSENTIAL HYPERTENSION: ICD-10-CM

## 2020-06-09 DIAGNOSIS — E78.2 MIXED HYPERLIPIDEMIA: ICD-10-CM

## 2020-06-09 PROCEDURE — 85610 PROTHROMBIN TIME: CPT

## 2020-06-09 PROCEDURE — 80061 LIPID PANEL: CPT

## 2020-06-09 PROCEDURE — 83036 HEMOGLOBIN GLYCOSYLATED A1C: CPT

## 2020-06-09 PROCEDURE — 36415 COLL VENOUS BLD VENIPUNCTURE: CPT

## 2020-06-11 ENCOUNTER — TELEPHONE (OUTPATIENT)
Dept: FAMILY MEDICINE CLINIC | Facility: CLINIC | Age: 80
End: 2020-06-11

## 2020-06-11 DIAGNOSIS — I48.20 CHRONIC ATRIAL FIBRILLATION (HCC): Primary | ICD-10-CM

## 2020-06-11 NOTE — TELEPHONE ENCOUNTER
Called patient and spoke with her. Advised patient of results and POC below. Patient states that with her last INR she was told if this one was normal she could go every 4 weeks. Patient is asking if she really has to recheck again in 2 weeks.   Also, pa

## 2020-06-11 NOTE — TELEPHONE ENCOUNTER
----- Message from Lisa Topete MD sent at 6/9/2020 10:07 PM CDT -----  INR at goal, repeat in 2 weeks. If in range again - will transition to every 4 weeks.   Cont current regimen    A1c now normal and lipid normal.

## 2020-06-12 NOTE — TELEPHONE ENCOUNTER
Level is just barely in range - so I would like 2 weeks again     Regarding lipids - ok but not ideal given her cardiac hx and carotid artery disease   Both medications recommended.

## 2020-06-15 ENCOUNTER — LAB ENCOUNTER (OUTPATIENT)
Dept: LAB | Age: 80
End: 2020-06-15
Attending: FAMILY MEDICINE
Payer: MEDICARE

## 2020-06-15 ENCOUNTER — OFFICE VISIT (OUTPATIENT)
Dept: FAMILY MEDICINE CLINIC | Facility: CLINIC | Age: 80
End: 2020-06-15
Payer: MEDICARE

## 2020-06-15 ENCOUNTER — HOSPITAL ENCOUNTER (OUTPATIENT)
Dept: GENERAL RADIOLOGY | Age: 80
Discharge: HOME | End: 2020-06-15
Attending: FAMILY MEDICINE
Payer: MEDICARE

## 2020-06-15 ENCOUNTER — TELEPHONE (OUTPATIENT)
Dept: FAMILY MEDICINE CLINIC | Facility: CLINIC | Age: 80
End: 2020-06-15

## 2020-06-15 VITALS
WEIGHT: 192 LBS | TEMPERATURE: 98 F | OXYGEN SATURATION: 98 % | SYSTOLIC BLOOD PRESSURE: 132 MMHG | DIASTOLIC BLOOD PRESSURE: 68 MMHG | RESPIRATION RATE: 18 BRPM | HEIGHT: 59 IN | BODY MASS INDEX: 38.71 KG/M2 | HEART RATE: 87 BPM

## 2020-06-15 DIAGNOSIS — R06.00 DOE (DYSPNEA ON EXERTION): ICD-10-CM

## 2020-06-15 DIAGNOSIS — I50.32 CHRONIC DIASTOLIC HEART FAILURE (HCC): ICD-10-CM

## 2020-06-15 DIAGNOSIS — I50.32 CHRONIC DIASTOLIC HEART FAILURE (HCC): Primary | ICD-10-CM

## 2020-06-15 DIAGNOSIS — J41.0 SIMPLE CHRONIC BRONCHITIS (HCC): ICD-10-CM

## 2020-06-15 DIAGNOSIS — I48.20 CHRONIC ATRIAL FIBRILLATION (HCC): ICD-10-CM

## 2020-06-15 PROCEDURE — 85025 COMPLETE CBC W/AUTO DIFF WBC: CPT

## 2020-06-15 PROCEDURE — 99214 OFFICE O/P EST MOD 30 MIN: CPT | Performed by: FAMILY MEDICINE

## 2020-06-15 PROCEDURE — 96372 THER/PROPH/DIAG INJ SC/IM: CPT | Performed by: FAMILY MEDICINE

## 2020-06-15 PROCEDURE — 36415 COLL VENOUS BLD VENIPUNCTURE: CPT

## 2020-06-15 PROCEDURE — 83880 ASSAY OF NATRIURETIC PEPTIDE: CPT

## 2020-06-15 PROCEDURE — 85610 PROTHROMBIN TIME: CPT

## 2020-06-15 PROCEDURE — 80053 COMPREHEN METABOLIC PANEL: CPT

## 2020-06-15 PROCEDURE — 71046 X-RAY EXAM CHEST 2 VIEWS: CPT | Performed by: FAMILY MEDICINE

## 2020-06-15 RX ORDER — METHYLPREDNISOLONE SODIUM SUCCINATE 125 MG/2ML
125 INJECTION, POWDER, LYOPHILIZED, FOR SOLUTION INTRAMUSCULAR; INTRAVENOUS ONCE
Status: COMPLETED | OUTPATIENT
Start: 2020-06-15 | End: 2020-06-15

## 2020-06-15 RX ORDER — PREDNISONE 20 MG/1
60 TABLET ORAL DAILY
Qty: 15 TABLET | Refills: 0 | Status: SHIPPED | OUTPATIENT
Start: 2020-06-15 | End: 2020-06-20

## 2020-06-15 RX ADMIN — METHYLPREDNISOLONE SODIUM SUCCINATE 125 MG: 125 INJECTION, POWDER, LYOPHILIZED, FOR SOLUTION INTRAMUSCULAR; INTRAVENOUS at 12:28:00

## 2020-06-15 NOTE — TELEPHONE ENCOUNTER
Spoke to pt and she said that her breathing is not too good and her leg is swelling. Pt states the SOB with activity, coming on for about 1 week. Denies fever or other symptoms  She took her blood pressure the other day and it was a little high 178/75.

## 2020-06-15 NOTE — PROGRESS NOTES
[facility] Family Medicine Office Note  Chief Complaint:   Patient presents with:  Shortness Of Breath      HPI:   This is a 79year old female coming in for  HPI  Shortness of breath   States it started about 1 week ago   No fevers/chills   Drov quittin.1      Smokeless tobacco: Never Used    Alcohol use: Not Currently    Drug use: No    Family History:  Family History   Problem Relation Age of Onset   • Heart Disorder Father    • Hypertension Mother    • Heart Disorder Sister    • Cancer Pineda Faviola chills and fever. HENT: Negative for rhinorrhea and sinus pressure. Eyes: Negative for pain and visual disturbance. Respiratory: Positive for shortness of breath. Negative for cough. Cardiovascular: Positive for leg swelling.  Negative for chest p Succ (Solu-MEDROL) injection 125 mg    CXR did not show pleural effusion but BNP elevated   Will start prednisone for COPD and inc furosemide to bid x5 days    Return in about 1 week (around 6/22/2020).     Meds & Refills for this Visit:  Requested Keely [id number]

## 2020-06-15 NOTE — PATIENT INSTRUCTIONS
Increase your furosemide (lasix/water pill) to twice a day for 5 days    We will start steroids for a COPD flare up    Continue the spiriva.

## 2020-06-29 ENCOUNTER — APPOINTMENT (OUTPATIENT)
Dept: LAB | Age: 80
End: 2020-06-29
Attending: FAMILY MEDICINE
Payer: MEDICARE

## 2020-06-29 DIAGNOSIS — I48.20 CHRONIC ATRIAL FIBRILLATION (HCC): ICD-10-CM

## 2020-06-29 LAB
INR BLD: 2.84 (ref 0.89–1.11)
PSA SERPL DL<=0.01 NG/ML-MCNC: 30.5 SECONDS (ref 12.4–14.6)

## 2020-06-29 PROCEDURE — 85610 PROTHROMBIN TIME: CPT

## 2020-06-29 PROCEDURE — 36415 COLL VENOUS BLD VENIPUNCTURE: CPT

## 2020-06-30 ENCOUNTER — ANTI-COAG VISIT (OUTPATIENT)
Dept: FAMILY MEDICINE CLINIC | Facility: CLINIC | Age: 80
End: 2020-06-30

## 2020-06-30 DIAGNOSIS — I48.20 CHRONIC ATRIAL FIBRILLATION (HCC): ICD-10-CM

## 2020-06-30 PROCEDURE — 93793 ANTICOAG MGMT PT WARFARIN: CPT | Performed by: FAMILY MEDICINE

## 2020-06-30 NOTE — PROGRESS NOTES
Notes recorded by Ant Iraheta MD on 6/30/2020 at 10:03 AM CDT  INR at goal, repeat in 4 weeks    Cont current regimen

## 2020-06-30 NOTE — PROGRESS NOTES
Discussed INR result with pt. Verified current dose is 2mg on Wed, and 4mg all other days. Pt instructed to comtinue the current dose. Patient aware to repeat a INR in 4 weeks. Flowsheet updated.

## 2020-07-03 RX ORDER — ALBUTEROL SULFATE 90 UG/1
AEROSOL, METERED RESPIRATORY (INHALATION)
Qty: 54 G | Refills: 0 | Status: SHIPPED | OUTPATIENT
Start: 2020-07-03 | End: 2020-09-25

## 2020-07-03 NOTE — TELEPHONE ENCOUNTER
Medication(s) to Refill:   Requested Prescriptions     Pending Prescriptions Disp Refills   • ALBUTEROL SULFATE  (90 Base) MCG/ACT Inhalation Aero Soln [Pharmacy Med Name: ALBUTEROL SULFATE  (90 Base) MCG/ACT Aerosol Solution] 54 g 0     Sig:

## 2020-07-27 ENCOUNTER — APPOINTMENT (OUTPATIENT)
Dept: LAB | Age: 80
End: 2020-07-27
Attending: FAMILY MEDICINE
Payer: MEDICARE

## 2020-07-27 DIAGNOSIS — I48.20 CHRONIC ATRIAL FIBRILLATION (HCC): ICD-10-CM

## 2020-07-27 LAB
INR BLD: 2.97 (ref 0.89–1.11)
PSA SERPL DL<=0.01 NG/ML-MCNC: 31.6 SECONDS (ref 12.4–14.6)

## 2020-07-27 PROCEDURE — 85610 PROTHROMBIN TIME: CPT

## 2020-07-27 PROCEDURE — 36415 COLL VENOUS BLD VENIPUNCTURE: CPT

## 2020-07-28 RX ORDER — PRAVASTATIN SODIUM 40 MG
40 TABLET ORAL NIGHTLY
Qty: 90 TABLET | Refills: 3 | Status: SHIPPED | OUTPATIENT
Start: 2020-07-28 | End: 2021-08-24

## 2020-07-29 ENCOUNTER — TELEPHONE (OUTPATIENT)
Dept: FAMILY MEDICINE CLINIC | Facility: CLINIC | Age: 80
End: 2020-07-29

## 2020-07-29 RX ORDER — WARFARIN SODIUM 4 MG/1
TABLET ORAL
Qty: 90 TABLET | Refills: 0 | Status: SHIPPED | OUTPATIENT
Start: 2020-07-29 | End: 2020-11-10

## 2020-07-29 NOTE — TELEPHONE ENCOUNTER
Pt viewed [employer] msg/instructions from provider. Rx pended. Warfarin 4 mg tab last filled 04/2020 - 90 days.

## 2020-07-29 NOTE — TELEPHONE ENCOUNTER
----- Message from Jake Springer DO sent at 7/28/2020  8:41 PM CDT -----  Results reviewed. Released to Hector Bowers. Will discuss with patient at next visit. Csaper Ortiz,  I have reviewed your test results.   Your INR is stable with slight increase but less chase

## 2020-08-14 ENCOUNTER — TELEPHONE (OUTPATIENT)
Dept: CASE MANAGEMENT | Age: 80
End: 2020-08-14

## 2020-08-14 NOTE — TELEPHONE ENCOUNTER
Reached patient for medication adherence consult. Patient reports lisinopril has been discontinued and she is no longer taking this.  She also reports she stopped taking the ezetimibe and the amiodarone that were prescribed by Dr. Nancy Clayton because they

## 2020-08-14 NOTE — TELEPHONE ENCOUNTER
Patient with chronic atrial fibrillation and amiodarone helps control heart rate.   Uncontrolled blood pressure with stress on the heart and increases risk of heart attack, and stroke  If issues with side effects on medication or cost-patient needs to discu

## 2020-08-17 NOTE — TELEPHONE ENCOUNTER
I spoke to patient at length. Patient is on Zetia, pravastatin, and amiodarone. She did stop taking the Zetia but unclear why. She stopped taking the amiodarone thinking she did not need it because her heart rate has been controlled.  She said she did talk

## 2020-08-20 ENCOUNTER — OFFICE VISIT (OUTPATIENT)
Dept: FAMILY MEDICINE CLINIC | Facility: CLINIC | Age: 80
End: 2020-08-20
Payer: MEDICARE

## 2020-08-20 ENCOUNTER — TELEPHONE (OUTPATIENT)
Dept: CARDIOLOGY | Age: 80
End: 2020-08-20

## 2020-08-20 ENCOUNTER — TELEPHONE (OUTPATIENT)
Dept: FAMILY MEDICINE CLINIC | Facility: CLINIC | Age: 80
End: 2020-08-20

## 2020-08-20 VITALS
OXYGEN SATURATION: 97 % | BODY MASS INDEX: 39.11 KG/M2 | TEMPERATURE: 100 F | RESPIRATION RATE: 20 BRPM | SYSTOLIC BLOOD PRESSURE: 122 MMHG | HEART RATE: 71 BPM | HEIGHT: 59 IN | WEIGHT: 194 LBS | DIASTOLIC BLOOD PRESSURE: 84 MMHG

## 2020-08-20 DIAGNOSIS — I48.20 CHRONIC ATRIAL FIBRILLATION (HCC): ICD-10-CM

## 2020-08-20 DIAGNOSIS — I50.32 CHRONIC DIASTOLIC HEART FAILURE (HCC): ICD-10-CM

## 2020-08-20 DIAGNOSIS — N18.4 CKD (CHRONIC KIDNEY DISEASE) STAGE 4, GFR 15-29 ML/MIN (HCC): Chronic | ICD-10-CM

## 2020-08-20 DIAGNOSIS — E78.2 MIXED HYPERLIPIDEMIA: ICD-10-CM

## 2020-08-20 DIAGNOSIS — I10 ESSENTIAL HYPERTENSION: Primary | ICD-10-CM

## 2020-08-20 DIAGNOSIS — J41.0 SIMPLE CHRONIC BRONCHITIS (HCC): ICD-10-CM

## 2020-08-20 PROBLEM — Z79.899 LONG TERM CURRENT USE OF AMIODARONE: Status: ACTIVE | Noted: 2020-06-04

## 2020-08-20 PROBLEM — Z79.01 CHRONIC ANTICOAGULATION: Status: ACTIVE | Noted: 2020-06-04

## 2020-08-20 PROCEDURE — 3079F DIAST BP 80-89 MM HG: CPT | Performed by: FAMILY MEDICINE

## 2020-08-20 PROCEDURE — 3074F SYST BP LT 130 MM HG: CPT | Performed by: FAMILY MEDICINE

## 2020-08-20 PROCEDURE — 3008F BODY MASS INDEX DOCD: CPT | Performed by: FAMILY MEDICINE

## 2020-08-20 PROCEDURE — 99214 OFFICE O/P EST MOD 30 MIN: CPT | Performed by: FAMILY MEDICINE

## 2020-08-20 RX ORDER — EZETIMIBE 10 MG/1
10 TABLET ORAL NIGHTLY
COMMUNITY
End: 2020-10-09

## 2020-08-20 NOTE — PROGRESS NOTES
[address] [facility] Family Medicine Office Note  Chief Complaint:   Patient presents with:  Medication Follow-Up: BP meds      HPI:   This is a 79year old female coming in for follow up. 1. HTN - takes Amlodpine 5mg daily.  No HA, vision changes, ches Dispense Refill   • ezetimibe 10 MG Oral Tab Take 10 mg by mouth nightly.      • Warfarin Sodium 4 MG Oral Tab Take 1 tablet daily or as directed by your physician 90 tablet 0   • PRAVASTATIN SODIUM 40 MG Oral Tab TAKE 1 TABLET (40 MG TOTAL) BY MOUTH NIGHTL distention. Endocrine: Negative. Genitourinary: Negative. Musculoskeletal: Negative. Skin: Negative for rash. Neurological: Negative.          EXAM:   /84   Pulse 71   Temp 99.9 °F (37.7 °C) (Oral)   Resp 20   Ht 59\"   Wt 194 lb (88 kg) <130/80  -Medications: Continue amlodipine.  -Diet & Exercise. Counseled on DASH & Mediterranean diets. Limit sodium to <1.5g daily. Counseled on daily activity as tolerated.   -Labs: None needed at this time.   -Renal: consider at future appt.    -EKG: las comorbidity (HCC)     Arthritis, lumbar spine     Chronic obstructive pulmonary disease (HCC)     Anemia     Acetabular labrum tear, right, initial encounter     CKD (chronic kidney disease) stage 4, GFR 15-29 ml/min (HCC)     Chronic diastolic heart failu

## 2020-08-25 ENCOUNTER — TELEPHONE (OUTPATIENT)
Dept: FAMILY MEDICINE CLINIC | Facility: CLINIC | Age: 80
End: 2020-08-25

## 2020-08-25 ENCOUNTER — LAB ENCOUNTER (OUTPATIENT)
Dept: LAB | Age: 80
End: 2020-08-25
Attending: FAMILY MEDICINE
Payer: MEDICARE

## 2020-08-25 DIAGNOSIS — I48.20 CHRONIC ATRIAL FIBRILLATION (HCC): ICD-10-CM

## 2020-08-25 DIAGNOSIS — I48.20 CHRONIC ATRIAL FIBRILLATION (HCC): Primary | ICD-10-CM

## 2020-08-25 DIAGNOSIS — Z79.01 CHRONIC ANTICOAGULATION: ICD-10-CM

## 2020-08-25 LAB
INR BLD: 2.19 (ref 0.89–1.11)
PSA SERPL DL<=0.01 NG/ML-MCNC: 24.9 SECONDS (ref 12.4–14.6)

## 2020-08-25 PROCEDURE — 85610 PROTHROMBIN TIME: CPT

## 2020-08-25 PROCEDURE — 36415 COLL VENOUS BLD VENIPUNCTURE: CPT

## 2020-08-26 NOTE — PROGRESS NOTES
Pt notified via [employer]:  INR is 2.19 (target is 2.0-3.0). Ok to continue same dose of Warfarin. Rpt in 1 month.

## 2020-09-15 ENCOUNTER — HOSPITAL ENCOUNTER (OUTPATIENT)
Age: 80
Discharge: HOME | End: 2020-09-15
Payer: MEDICARE

## 2020-09-15 ENCOUNTER — APPOINTMENT (OUTPATIENT)
Dept: GENERAL RADIOLOGY | Age: 80
End: 2020-09-15
Attending: PHYSICIAN ASSISTANT
Payer: MEDICARE

## 2020-09-15 VITALS
OXYGEN SATURATION: 98 % | DIASTOLIC BLOOD PRESSURE: 90 MMHG | WEIGHT: 193 LBS | SYSTOLIC BLOOD PRESSURE: 149 MMHG | TEMPERATURE: 98 F | HEIGHT: 59 IN | HEART RATE: 77 BPM | BODY MASS INDEX: 38.91 KG/M2 | RESPIRATION RATE: 16 BRPM

## 2020-09-15 DIAGNOSIS — M79.642 LEFT HAND PAIN: Primary | ICD-10-CM

## 2020-09-15 PROCEDURE — 73130 X-RAY EXAM OF HAND: CPT | Performed by: PHYSICIAN ASSISTANT

## 2020-09-15 PROCEDURE — 99213 OFFICE O/P EST LOW 20 MIN: CPT

## 2020-09-15 NOTE — ED PROVIDER NOTES
Patient Seen in: [facility] In [facility]      History   Patient presents with:  Upper Extremity Injury    Stated Complaint: 4 days wrist pain    HPI    48-year-old female presents to the clinic for evaluation of left hand pain for 4 days.   P Temp 98.4 °F (36.9 °C)   Temp src Oral   SpO2 98 %   O2 Device None (Room air)       Current:/90   Pulse 77   Temp 98.4 °F (36.9 °C) (Oral)   Resp 16   Ht 149.9 cm (4' 11\")   Wt 87.5 kg   SpO2 98%   BMI 38.98 kg/m²         Physical Exam  Vitals si

## 2020-09-15 NOTE — ED INITIAL ASSESSMENT (HPI)
Pt presents with L lower palm and wrist pain, pt was pulling shoe up over heel and felt an \"electric shock\" per pt on Friday, now there is pain with movement

## 2020-09-16 ENCOUNTER — TELEPHONE (OUTPATIENT)
Dept: FAMILY MEDICINE CLINIC | Facility: CLINIC | Age: 80
End: 2020-09-16

## 2020-09-16 NOTE — TELEPHONE ENCOUNTER
Late entry: incoming page at [facility] on 9/15/20. Pt calling states that she is having left hand and wrist pain. She is unable to use her hand /wrist.  Reviewed pt's chart- noted she has A fib and is currently on Warfarin.  Advised to go to ED or IC for eval, a

## 2020-09-25 ENCOUNTER — LAB ENCOUNTER (OUTPATIENT)
Dept: LAB | Age: 80
End: 2020-09-25
Attending: FAMILY MEDICINE
Payer: MEDICARE

## 2020-09-25 ENCOUNTER — TELEPHONE (OUTPATIENT)
Dept: FAMILY MEDICINE CLINIC | Facility: CLINIC | Age: 80
End: 2020-09-25

## 2020-09-25 ENCOUNTER — OFFICE VISIT (OUTPATIENT)
Dept: FAMILY MEDICINE CLINIC | Facility: CLINIC | Age: 80
End: 2020-09-25
Payer: MEDICARE

## 2020-09-25 VITALS
OXYGEN SATURATION: 94 % | HEART RATE: 74 BPM | WEIGHT: 194 LBS | HEIGHT: 59 IN | RESPIRATION RATE: 20 BRPM | TEMPERATURE: 98 F | SYSTOLIC BLOOD PRESSURE: 124 MMHG | DIASTOLIC BLOOD PRESSURE: 62 MMHG | BODY MASS INDEX: 39.11 KG/M2

## 2020-09-25 DIAGNOSIS — I48.20 CHRONIC ATRIAL FIBRILLATION (HCC): ICD-10-CM

## 2020-09-25 DIAGNOSIS — J44.9 CHRONIC OBSTRUCTIVE PULMONARY DISEASE, UNSPECIFIED COPD TYPE (HCC): ICD-10-CM

## 2020-09-25 DIAGNOSIS — M79.642 LEFT HAND PAIN: Primary | ICD-10-CM

## 2020-09-25 LAB
INR BLD: 1.64 (ref 0.89–1.11)
PSA SERPL DL<=0.01 NG/ML-MCNC: 19.9 SECONDS (ref 12.4–14.6)

## 2020-09-25 PROCEDURE — 99213 OFFICE O/P EST LOW 20 MIN: CPT | Performed by: FAMILY MEDICINE

## 2020-09-25 PROCEDURE — 3078F DIAST BP <80 MM HG: CPT | Performed by: FAMILY MEDICINE

## 2020-09-25 PROCEDURE — 85610 PROTHROMBIN TIME: CPT

## 2020-09-25 PROCEDURE — 3074F SYST BP LT 130 MM HG: CPT | Performed by: FAMILY MEDICINE

## 2020-09-25 PROCEDURE — 36415 COLL VENOUS BLD VENIPUNCTURE: CPT

## 2020-09-25 PROCEDURE — 3008F BODY MASS INDEX DOCD: CPT | Performed by: FAMILY MEDICINE

## 2020-09-25 RX ORDER — ALBUTEROL SULFATE 90 UG/1
2 AEROSOL, METERED RESPIRATORY (INHALATION) EVERY 6 HOURS PRN
Qty: 54 G | Refills: 2 | Status: SHIPPED | OUTPATIENT
Start: 2020-09-25 | End: 2021-02-16

## 2020-09-25 NOTE — TELEPHONE ENCOUNTER
----- Message from Sagar John MD sent at 9/25/2020  1:12 PM CDT -----  INR is too low (1.64). Therapeutic level is 2.0-3.0.     What dose of Warfarin is she currently taking?  4 mg daily, per her medication list?      Let me know and I will le

## 2020-09-25 NOTE — PROGRESS NOTES
[facility] Family Medicine Office Note  Chief Complaint:   Patient presents with:   Follow - Up: left wrist pain, xray done at [facility] on 9/15/2020      HPI:   This is a 79year old female coming in for UC follow up.   -Presents for follow up for L loredo 10 MG Oral Tab Take 10 mg by mouth nightly. • Warfarin Sodium 4 MG Oral Tab Take 1 tablet daily or as directed by your physician 90 tablet 0   • PRAVASTATIN SODIUM 40 MG Oral Tab TAKE 1 TABLET (40 MG TOTAL) BY MOUTH NIGHTLY.  90 tablet 3   • ALBUTEROL S EXAM:   /62 (BP Location: Left arm, Patient Position: Sitting, Cuff Size: large)   Pulse 74   Temp 98.4 °F (36.9 °C) (Temporal)   Resp 20   Ht 59\"   Wt 194 lb (88 kg)   SpO2 94%   BMI 39.18 kg/m²  Estimated body mass index is 39.18 kg/m² as c or Shortness of Breath. Health Maintenance:  DEXA Scan due on 06/06/2020  Pneumococcal Vaccine: 65+ Years(2 of 2 - PPSV23) due on 03/16/2021    Patient/Caregiver Education: Patient/Caregiver Education: There are no barriers to learning.  Medical educa

## 2020-09-25 NOTE — TELEPHONE ENCOUNTER
Spoke to patient. She takes 4mg daily except Wednesday she takes 2mg daily. She has not missed any doses. No new meds. Please advise, thanks.

## 2020-09-25 NOTE — PROGRESS NOTES
INR is too low (1.64). Therapeutic level is 2.0-3.0. What dose of Warfarin is she currently taking?  4 mg daily, per her medication list?      Let me know and I will let you know what dose to change to.

## 2020-09-25 NOTE — PATIENT INSTRUCTIONS
Hand and Wrist Exercises: Finger  and Release      This exercise stretches and strengthens your hands and wrists. Before starting, read through all the instructions. While exercising, breathe normally. If you feel any pain, stop the exercise.  If pain standing on one foot. Make sure to support yourself for balance. If you feel pain while performing these stretching exercises, please stop and consult your healthcare provider.    Billy last reviewed this educational content on 5/1/2018  © 1174-8598 T until your palm is up. Hold for _____ seconds. Then return to starting position. · Repeat _____ times. Do _____ sets a day. Billy last reviewed this educational content on 10/1/2017  © 4102-9285 [employer] [address].  [address] [facility]

## 2020-09-25 NOTE — TELEPHONE ENCOUNTER
Order received from [address] [place] [address]. Patient should take warfarin 4mg daily and recheck INR in 1 week. I called patient and gave her these instructions. Verbalized understanding. Says she will schedule her lab appt.

## 2020-10-02 ENCOUNTER — LABORATORY ENCOUNTER (OUTPATIENT)
Dept: LAB | Age: 80
End: 2020-10-02
Attending: FAMILY MEDICINE
Payer: MEDICARE

## 2020-10-02 DIAGNOSIS — I48.20 CHRONIC ATRIAL FIBRILLATION (HCC): ICD-10-CM

## 2020-10-02 PROCEDURE — 85610 PROTHROMBIN TIME: CPT

## 2020-10-02 PROCEDURE — 36415 COLL VENOUS BLD VENIPUNCTURE: CPT

## 2020-10-04 NOTE — PROGRESS NOTES
Patient was stable on 2 mg on Wednesdays and 4 mg all other days until 9/25/2020  Verify with patient if she is certain she has not missed any Coumadin doses prior to instructions below:     If patient is not missing doses in the past 2 weeks, then   vicky

## 2020-10-06 ENCOUNTER — TELEPHONE (OUTPATIENT)
Dept: FAMILY MEDICINE CLINIC | Facility: CLINIC | Age: 80
End: 2020-10-06

## 2020-10-06 NOTE — TELEPHONE ENCOUNTER
The patient or proxy has not reviewed this information.    Medications   The patient or proxy has not reviewed this information, and there are updates pending:   Requested Medication Removals Start Date Reported By  Comments   ezetimibe 10 MG Tabs

## 2020-10-08 NOTE — TELEPHONE ENCOUNTER
Pt is needing a refill of furosemide 20 MG Oral Tab sent to [facility] as she is completely out.  Thank you

## 2020-10-09 RX ORDER — FUROSEMIDE 20 MG/1
20 TABLET ORAL 2 TIMES DAILY
Qty: 90 TABLET | Refills: 0 | Status: SHIPPED | OUTPATIENT
Start: 2020-10-09 | End: 2021-02-16

## 2020-10-14 ENCOUNTER — TELEPHONE (OUTPATIENT)
Dept: FAMILY MEDICINE CLINIC | Facility: CLINIC | Age: 80
End: 2020-10-14

## 2020-10-14 NOTE — TELEPHONE ENCOUNTER
----- Message from Malou Hall MD sent at 10/13/2020 11:02 PM CDT -----  Please see Dr. Hsu Mad result notes discussing treatment (she noted this on 10/3/20).  Thanks.  ----- Message -----  From: Eagle Dorsey RN  Sent: 10/5/2020   2:10 PM CDT

## 2020-10-14 NOTE — TELEPHONE ENCOUNTER
Spoke with patient via phone. Notified patient of below orders. Patient stated that she did miss a dose the day before her lab draw. Notified patient that she needs a repeat lab draw in one week and there is no change to the Coumadin dosage.  Patient verbal

## 2020-10-21 ENCOUNTER — LAB ENCOUNTER (OUTPATIENT)
Dept: LAB | Age: 80
End: 2020-10-21
Attending: FAMILY MEDICINE
Payer: MEDICARE

## 2020-10-21 DIAGNOSIS — I48.20 CHRONIC ATRIAL FIBRILLATION (HCC): ICD-10-CM

## 2020-10-21 PROCEDURE — 85610 PROTHROMBIN TIME: CPT

## 2020-10-21 PROCEDURE — 36415 COLL VENOUS BLD VENIPUNCTURE: CPT

## 2020-10-23 ENCOUNTER — TELEPHONE (OUTPATIENT)
Dept: FAMILY MEDICINE CLINIC | Facility: CLINIC | Age: 80
End: 2020-10-23

## 2020-10-23 NOTE — TELEPHONE ENCOUNTER
----- Message from Brooklynn Villanueva MD sent at 10/22/2020 11:59 PM CDT -----  INR at goal but low end, repeat in 2 weeks    Cont current regimen      Spoke to pt with results/instructions and pt verbalized understanding [address]        3/6/2019      Dear Brad Cunha,    This letter is to inform you that your recent lab work results are normal. Please follow up in clinic as previously advised. Please call the GI clinic at [phone] if you have any questions or concerns. Sincerely,    Emmanuel ENGLAND   Gastroenterology  [facility]  [address] [facility] [address], [address] [facility],[address]

## 2020-11-04 ENCOUNTER — OFFICE VISIT (OUTPATIENT)
Dept: FAMILY MEDICINE CLINIC | Facility: CLINIC | Age: 80
End: 2020-11-04
Payer: MEDICARE

## 2020-11-04 VITALS
HEIGHT: 59 IN | SYSTOLIC BLOOD PRESSURE: 126 MMHG | OXYGEN SATURATION: 96 % | RESPIRATION RATE: 18 BRPM | HEART RATE: 81 BPM | BODY MASS INDEX: 39.51 KG/M2 | DIASTOLIC BLOOD PRESSURE: 60 MMHG | TEMPERATURE: 98 F | WEIGHT: 196 LBS

## 2020-11-04 DIAGNOSIS — M25.551 ACUTE HIP PAIN, RIGHT: Primary | ICD-10-CM

## 2020-11-04 DIAGNOSIS — J44.9 CHRONIC OBSTRUCTIVE PULMONARY DISEASE, UNSPECIFIED COPD TYPE (HCC): ICD-10-CM

## 2020-11-04 PROCEDURE — 3008F BODY MASS INDEX DOCD: CPT | Performed by: FAMILY MEDICINE

## 2020-11-04 PROCEDURE — 3074F SYST BP LT 130 MM HG: CPT | Performed by: FAMILY MEDICINE

## 2020-11-04 PROCEDURE — 3078F DIAST BP <80 MM HG: CPT | Performed by: FAMILY MEDICINE

## 2020-11-04 PROCEDURE — 99214 OFFICE O/P EST MOD 30 MIN: CPT | Performed by: FAMILY MEDICINE

## 2020-11-04 NOTE — PROGRESS NOTES
[facility] Family Medicine Office Note  Chief Complaint:   Patient presents with:  Leg Pain: c/o rt side leg/hip and lower back pain x2 weeks unable to lift leg       HPI:   This is a 79year old female coming in for  HPI  Right leg and hip pain Outpatient Medications   Medication Sig Dispense Refill   • Mometasone Furo-Formoterol Fum (DULERA) 100-5 MCG/ACT Inhalation Aerosol Inhale into the lungs.      • Mometasone Furo-Formoterol Fum (DULERA) 100-5 MCG/ACT Inhalation Aerosol Inhale 1 puff into th and sinus pressure. Eyes: Negative for pain and visual disturbance. Respiratory: Negative for cough and shortness of breath. Cardiovascular: Negative for chest pain and palpitations.    Gastrointestinal: Negative for abdominal pain, nausea and vomit assistance to help with rx cose       Return in about 4 weeks (around 12/2/2020).     Meds & Refills for this Visit:  Requested Prescriptions     Signed Prescriptions Disp Refills   • Mometasone Furo-Formoterol Fum (DULERA) 100-5 MCG/ACT Inhalation Aerosol

## 2020-11-05 ENCOUNTER — ANTI-COAG VISIT (OUTPATIENT)
Dept: FAMILY MEDICINE CLINIC | Facility: CLINIC | Age: 80
End: 2020-11-05

## 2020-11-05 ENCOUNTER — TELEPHONE (OUTPATIENT)
Dept: FAMILY MEDICINE CLINIC | Facility: CLINIC | Age: 80
End: 2020-11-05

## 2020-11-05 ENCOUNTER — LAB ENCOUNTER (OUTPATIENT)
Dept: LAB | Age: 80
End: 2020-11-05
Attending: FAMILY MEDICINE
Payer: MEDICARE

## 2020-11-05 ENCOUNTER — HOSPITAL ENCOUNTER (OUTPATIENT)
Dept: GENERAL RADIOLOGY | Age: 80
Discharge: HOME | End: 2020-11-05
Attending: FAMILY MEDICINE
Payer: MEDICARE

## 2020-11-05 DIAGNOSIS — M25.551 ACUTE HIP PAIN, RIGHT: ICD-10-CM

## 2020-11-05 DIAGNOSIS — M25.551 RIGHT HIP PAIN: Primary | ICD-10-CM

## 2020-11-05 DIAGNOSIS — I48.20 CHRONIC ATRIAL FIBRILLATION (HCC): ICD-10-CM

## 2020-11-05 PROCEDURE — 85610 PROTHROMBIN TIME: CPT

## 2020-11-05 PROCEDURE — 73502 X-RAY EXAM HIP UNI 2-3 VIEWS: CPT | Performed by: FAMILY MEDICINE

## 2020-11-05 PROCEDURE — 36415 COLL VENOUS BLD VENIPUNCTURE: CPT

## 2020-11-05 NOTE — TELEPHONE ENCOUNTER
----- Message from Elsie Caldwell MD sent at 11/5/2020  3:32 PM CST -----  Results reviewed. Tests show no significant abnormalities.  Please refer to ortho - [facility]

## 2020-11-05 NOTE — TELEPHONE ENCOUNTER
Patient's Joan Graham requires PA. PA was denied. Patient must have tried and failed Symbicort. Please advise. Thank you!

## 2020-11-05 NOTE — PROGRESS NOTES
Low INR   Verify compliance and if taking - increase to 6mg Thursdays, 4mg all other days, repeat in 2 weeks

## 2020-11-05 NOTE — TELEPHONE ENCOUNTER
Called patient and spoke with her. Advised her of results and POC below. Patient states understanding. Request Dr. Yves Naik information be sent to her My Chart. Information sent as requested. Referral placed in Epic for patient as requested.

## 2020-11-05 NOTE — PROGRESS NOTES
Called patient and spoke with her. Went over INR results with patient. Patient reports missing last night's dose. Per Dr. Sasha Lucas if missed dose no dose adjust and recheck in 2 weeks. Patient notified of this information. Flowsheet updated.

## 2020-11-10 RX ORDER — WARFARIN SODIUM 4 MG/1
TABLET ORAL
Qty: 90 TABLET | Refills: 0 | Status: SHIPPED | OUTPATIENT
Start: 2020-11-10 | End: 2021-02-16

## 2020-11-10 NOTE — TELEPHONE ENCOUNTER
Spoke with the patient via phone. Notified the patient of the below response by the PCP. Patient verbalized understanding. Patient reiterated back to this nurse the directions for the warfarin. Answered all questions at this time.

## 2020-11-10 NOTE — TELEPHONE ENCOUNTER
Continue 4mg daily   Repeat test on 11/18  May need to increase   Sig should say take as directed and then follow the verbal instructions we give

## 2020-11-19 ENCOUNTER — LAB ENCOUNTER (OUTPATIENT)
Dept: LAB | Age: 80
End: 2020-11-19
Attending: FAMILY MEDICINE
Payer: MEDICARE

## 2020-11-19 ENCOUNTER — TELEPHONE (OUTPATIENT)
Dept: FAMILY MEDICINE CLINIC | Facility: CLINIC | Age: 80
End: 2020-11-19

## 2020-11-19 ENCOUNTER — ANTI-COAG VISIT (OUTPATIENT)
Dept: FAMILY MEDICINE CLINIC | Facility: CLINIC | Age: 80
End: 2020-11-19

## 2020-11-19 DIAGNOSIS — I48.20 CHRONIC ATRIAL FIBRILLATION (HCC): ICD-10-CM

## 2020-11-19 PROCEDURE — 36415 COLL VENOUS BLD VENIPUNCTURE: CPT

## 2020-11-19 PROCEDURE — 85610 PROTHROMBIN TIME: CPT

## 2020-11-19 NOTE — TELEPHONE ENCOUNTER
----- Message from Deb Antunez MD sent at 11/19/2020  1:58 PM CST -----  INR subtherapeutic. Please take extra half tablet today (6mg total) but then resume 4mg daily tomorrow.  Her levels are difficult to control - I would like her to consider switching

## 2020-11-19 NOTE — PROGRESS NOTES
Spoke with the patient via phone. Notified the patient of below medication instructions. Patient verbalized understanding and stated back to this nurse the medication directions.  Patient stated that she does not want to switch to a different anticoagulant

## 2020-11-19 NOTE — PROGRESS NOTES
NR subtherapeutic. Please take extra half tablet today (6mg total) but then resume 4mg daily tomorrow.

## 2020-11-20 NOTE — PROGRESS NOTES
Spoke with the patient via phone. Notified the patient of lab draw in 2 weeks. Patient verbalized understanding. Answered all questions at this time.

## 2020-11-24 RX ORDER — LEVOTHYROXINE SODIUM 88 UG/1
88 TABLET ORAL
Qty: 90 TABLET | Refills: 1 | Status: SHIPPED | OUTPATIENT
Start: 2020-11-24 | End: 2021-02-16

## 2020-12-03 ENCOUNTER — LAB ENCOUNTER (OUTPATIENT)
Dept: LAB | Age: 80
End: 2020-12-03
Attending: FAMILY MEDICINE
Payer: MEDICARE

## 2020-12-03 ENCOUNTER — TELEPHONE (OUTPATIENT)
Dept: FAMILY MEDICINE CLINIC | Facility: CLINIC | Age: 80
End: 2020-12-03

## 2020-12-03 DIAGNOSIS — I48.20 CHRONIC ATRIAL FIBRILLATION (HCC): ICD-10-CM

## 2020-12-03 PROCEDURE — 36415 COLL VENOUS BLD VENIPUNCTURE: CPT

## 2020-12-03 PROCEDURE — 85610 PROTHROMBIN TIME: CPT

## 2020-12-03 NOTE — TELEPHONE ENCOUNTER
Spoke with the patient via phone. Notified the patient of the below lab results and orders. Patient verbalized understanding. Answered all questions at this time.

## 2020-12-03 NOTE — TELEPHONE ENCOUNTER
----- Message from Kena Edwards MD sent at 12/3/2020  1:17 PM CST -----  INR at goal, repeat in 4 weeks. Cont current regimen. Emphasize need for consistent diet.

## 2020-12-17 ENCOUNTER — TELEPHONE (OUTPATIENT)
Dept: FAMILY MEDICINE CLINIC | Facility: CLINIC | Age: 80
End: 2020-12-17

## 2020-12-17 ENCOUNTER — VIRTUAL PHONE E/M (OUTPATIENT)
Dept: FAMILY MEDICINE CLINIC | Facility: CLINIC | Age: 80
End: 2020-12-17
Payer: MEDICARE

## 2020-12-17 DIAGNOSIS — Z79.01 CURRENT USE OF LONG TERM ANTICOAGULATION: ICD-10-CM

## 2020-12-17 DIAGNOSIS — J44.1 COPD WITH ACUTE EXACERBATION (HCC): Primary | ICD-10-CM

## 2020-12-17 PROCEDURE — 99443 PHONE E/M BY PHYS 21-30 MIN: CPT | Performed by: FAMILY MEDICINE

## 2020-12-17 RX ORDER — BUDESONIDE AND FORMOTEROL FUMARATE DIHYDRATE 160; 4.5 UG/1; UG/1
2 AEROSOL RESPIRATORY (INHALATION) 2 TIMES DAILY
Qty: 1 INHALER | Refills: 5 | Status: SHIPPED | OUTPATIENT
Start: 2020-12-17 | End: 2021-02-16

## 2020-12-17 RX ORDER — PREDNISONE 20 MG/1
60 TABLET ORAL DAILY
Qty: 15 TABLET | Refills: 0 | Status: SHIPPED | OUTPATIENT
Start: 2020-12-17 | End: 2020-12-22

## 2020-12-17 RX ORDER — LEVOFLOXACIN 750 MG/1
750 TABLET ORAL DAILY
Qty: 7 TABLET | Refills: 0 | Status: SHIPPED | OUTPATIENT
Start: 2020-12-17 | End: 2020-12-21

## 2020-12-17 NOTE — PROGRESS NOTES
Virtual Telephone Check-In    Gloria Cohen verbally consents to a Virtual/Telephone Check-In visit on 12/17/20. Patient has been referred to the [facility] website at [email or web address] to review the yearly Consent to Treat document.     Patient unde

## 2020-12-17 NOTE — TELEPHONE ENCOUNTER
Pharmacy left a message on our voice mail asking if OK to dispense levofloxacin 750 MG Oral Tab as Pt is on Warfarin. Please advise.

## 2020-12-17 NOTE — TELEPHONE ENCOUNTER
Yes - patient will have INR checked tomorrow and Monday to monitor while on antibiotic. She will also alternate warfarin 4mg and 2mg while taking antibiotic. Please ask pharmacist to reiterate this to patient.

## 2020-12-17 NOTE — TELEPHONE ENCOUNTER
Spoke with the Farideh Bansal, pharmacy technician, at [facility]. Notified Kostas Brar of below response by PCP. Farideh Bansal verbalized understanding and voiced back to this nurse the below directions. Answered all questions at this time.      Also spoke to Michelle, the

## 2020-12-18 ENCOUNTER — HOSPITAL ENCOUNTER (EMERGENCY)
Dept: GENERAL RADIOLOGY | Facility: HOSPITAL | Age: 80
Discharge: HOME | End: 2020-12-18
Attending: FAMILY MEDICINE
Payer: MEDICARE

## 2020-12-18 ENCOUNTER — HOSPITAL ENCOUNTER (EMERGENCY)
Facility: HOSPITAL | Age: 80
Discharge: STILL ADMITTED OR ADMINISTRATIVE | End: 2020-12-19
Attending: FAMILY MEDICINE
Payer: MEDICARE

## 2020-12-18 DIAGNOSIS — J44.1 COPD WITH ACUTE EXACERBATION (HCC): ICD-10-CM

## 2020-12-18 PROCEDURE — 71046 X-RAY EXAM CHEST 2 VIEWS: CPT | Performed by: FAMILY MEDICINE

## 2020-12-19 ENCOUNTER — PATIENT MESSAGE (OUTPATIENT)
Dept: FAMILY MEDICINE CLINIC | Facility: CLINIC | Age: 80
End: 2020-12-19

## 2020-12-21 ENCOUNTER — TELEPHONE (OUTPATIENT)
Dept: FAMILY MEDICINE CLINIC | Facility: CLINIC | Age: 80
End: 2020-12-21

## 2020-12-21 RX ORDER — MOXIFLOXACIN HYDROCHLORIDE 400 MG/1
400 TABLET ORAL DAILY
Qty: 7 TABLET | Refills: 0 | Status: SHIPPED | OUTPATIENT
Start: 2020-12-21 | End: 2020-12-26

## 2020-12-21 NOTE — TELEPHONE ENCOUNTER
From: Harinder Holley  To: Michael Denise MD  Sent: 12/19/2020 10:50 AM CST  Subject: Prescription Question    Hi Dr Sasha Lucas,     The side affects of Levofloxacin really bother me , please prescribe an alternative.  Looking on line Cipro is a  An alternative

## 2020-12-21 NOTE — TELEPHONE ENCOUNTER
Spoke with the patient via phone. Notified the patient of the below x-ray results. Patient verbalized understanding. Patient stated that she is feeling better on steroids. Notified the patient of the need to have INR drawn.  Patient stated that she would co

## 2020-12-21 NOTE — TELEPHONE ENCOUNTER
Spoke with the patient via phone. Patient stated that she was prescribed levofloxacin at the hospital. Stated that she looked up the side effects of the medication and she had some of the more extreme side effects when she took the medication years ago.  Pa

## 2020-12-21 NOTE — TELEPHONE ENCOUNTER
Patient called, she wants alternative to cipro, see mychart message sent on 12-19, prefers phone call response does not know how to use mychart effectively.   States that med makes her muscles and tendons rip please call pt to advise

## 2020-12-21 NOTE — TELEPHONE ENCOUNTER
----- Message from Leni Constatnino MD sent at 12/20/2020  3:58 PM CST -----  Results reviewed. Please inform patient no obvious pneumonia - if sx worsening then viral illness should be considered. Cont tx for COPD exacerbation with steroids and abx.  she also

## 2020-12-22 ENCOUNTER — TELEPHONE (OUTPATIENT)
Dept: FAMILY MEDICINE CLINIC | Facility: CLINIC | Age: 80
End: 2020-12-22

## 2020-12-22 ENCOUNTER — LAB ENCOUNTER (OUTPATIENT)
Dept: LAB | Age: 80
End: 2020-12-22
Attending: FAMILY MEDICINE
Payer: MEDICARE

## 2020-12-22 DIAGNOSIS — I48.20 CHRONIC ATRIAL FIBRILLATION (HCC): ICD-10-CM

## 2020-12-22 PROCEDURE — 36415 COLL VENOUS BLD VENIPUNCTURE: CPT

## 2020-12-22 PROCEDURE — 85610 PROTHROMBIN TIME: CPT

## 2020-12-22 NOTE — TELEPHONE ENCOUNTER
Spoke with the patient via phone. Patient stated that she has not taken any doses of antibiotic; neither the moxifloxacin (did not pick it up from the pharmacy yet) or levofloxacin.  Patient stated that she was only taking the prednisone and she had the las

## 2020-12-22 NOTE — TELEPHONE ENCOUNTER
Call patient and ask how many doses of antibiotic has she taken? What dose of warfarin did she take last night? Is she having any bleeding - nose, urine, stool? Any chest pain or SOB? Hold warfarin tonight and tomorrow.  Repeat INR on Thursday AM (before

## 2020-12-22 NOTE — TELEPHONE ENCOUNTER
Spoke with the patient via phone. Notified the patient of the below response by PCP. Patient verbalized understanding. Answered all questions at this time.

## 2020-12-22 NOTE — TELEPHONE ENCOUNTER
Discussed with RN - plan will be to switch to moxifloxacin (same category as levofloxacin but perhaps she tolerates better). cipro not apporpriate for resp illness.  Treating as COPD exacerbation and she has allergy to cephalosporin so resp fluoroquinolone

## 2020-12-22 NOTE — TELEPHONE ENCOUNTER
See TE dated 12/21/2020. Notified the patient of new medication (moxifloxacin) when patient was notified of x-ray results.

## 2020-12-22 NOTE — TELEPHONE ENCOUNTER
Received call from [facility] with [facility] lab reporting critical results - PT/ INR.      Component      Latest Ref Rng & Units 12/22/2020   PT      12.4 - 14.6 seconds 48.7 (H)   INR      0.89 - 1.11 5.17 (HH)

## 2020-12-24 ENCOUNTER — LAB ENCOUNTER (OUTPATIENT)
Dept: LAB | Age: 80
End: 2020-12-24
Attending: FAMILY MEDICINE
Payer: MEDICARE

## 2020-12-24 DIAGNOSIS — I48.20 CHRONIC ATRIAL FIBRILLATION (HCC): ICD-10-CM

## 2020-12-24 PROCEDURE — 36415 COLL VENOUS BLD VENIPUNCTURE: CPT

## 2020-12-24 PROCEDURE — 85610 PROTHROMBIN TIME: CPT

## 2020-12-26 ENCOUNTER — TELEPHONE (OUTPATIENT)
Dept: FAMILY MEDICINE CLINIC | Facility: CLINIC | Age: 80
End: 2020-12-26

## 2020-12-26 NOTE — TELEPHONE ENCOUNTER
Called to discuss most recent INR results with patient. Denies any changes to her diet, does not know what made her level increase. abx were prescribed for COPD exacerbation (but she never started). Denies any abnormal bleeding.    Has not taken any warf

## 2020-12-28 ENCOUNTER — LAB ENCOUNTER (OUTPATIENT)
Dept: LAB | Age: 80
End: 2020-12-28
Attending: FAMILY MEDICINE
Payer: MEDICARE

## 2020-12-28 DIAGNOSIS — I48.20 CHRONIC ATRIAL FIBRILLATION (HCC): ICD-10-CM

## 2020-12-28 PROCEDURE — 36415 COLL VENOUS BLD VENIPUNCTURE: CPT

## 2020-12-28 PROCEDURE — 85610 PROTHROMBIN TIME: CPT

## 2020-12-31 ENCOUNTER — ANTI-COAG VISIT (OUTPATIENT)
Dept: FAMILY MEDICINE CLINIC | Facility: CLINIC | Age: 80
End: 2020-12-31

## 2020-12-31 ENCOUNTER — TELEPHONE (OUTPATIENT)
Dept: FAMILY MEDICINE CLINIC | Facility: CLINIC | Age: 80
End: 2020-12-31

## 2020-12-31 DIAGNOSIS — I48.20 CHRONIC ATRIAL FIBRILLATION (HCC): ICD-10-CM

## 2020-12-31 PROCEDURE — 93793 ANTICOAG MGMT PT WARFARIN: CPT | Performed by: FAMILY MEDICINE

## 2020-12-31 NOTE — TELEPHONE ENCOUNTER
----- Message from Audrey Solo MD sent at 12/31/2020  9:38 AM CST -----  INR subtherapeutic - discussed with patient on 12/28 to inc warfarin to 4mg MWF, 2mg TRSaSu,   Repeat INR on 1/4/20

## 2021-01-04 ENCOUNTER — ANTI-COAG VISIT (OUTPATIENT)
Dept: FAMILY MEDICINE CLINIC | Facility: CLINIC | Age: 81
End: 2021-01-04

## 2021-01-04 ENCOUNTER — LAB ENCOUNTER (OUTPATIENT)
Dept: LAB | Age: 81
End: 2021-01-04
Attending: FAMILY MEDICINE
Payer: MEDICARE

## 2021-01-04 DIAGNOSIS — I48.20 CHRONIC ATRIAL FIBRILLATION (HCC): ICD-10-CM

## 2021-01-04 LAB
INR BLD: 2.14 (ref 0.89–1.11)
PSA SERPL DL<=0.01 NG/ML-MCNC: 24.5 SECONDS (ref 12.4–14.6)

## 2021-01-04 PROCEDURE — 36415 COLL VENOUS BLD VENIPUNCTURE: CPT

## 2021-01-04 PROCEDURE — 93793 ANTICOAG MGMT PT WARFARIN: CPT | Performed by: FAMILY MEDICINE

## 2021-01-04 PROCEDURE — 85610 PROTHROMBIN TIME: CPT

## 2021-01-14 RX ORDER — TIOTROPIUM BROMIDE INHALATION SPRAY 1.56 UG/1
2 SPRAY, METERED RESPIRATORY (INHALATION) DAILY
Qty: 12 G | Refills: 3 | Status: SHIPPED | OUTPATIENT
Start: 2021-01-14

## 2021-01-14 NOTE — TELEPHONE ENCOUNTER
Medication(s) to Refill:   Requested Prescriptions     Pending Prescriptions Disp Refills   • Tiotropium Bromide Monohydrate (SPIRIVA RESPIMAT) 1.25 MCG/ACT Inhalation Aero Soln 12 g 3     Sig: Inhale 2 puffs into the lungs daily.          Reason for Medica

## 2021-01-21 ENCOUNTER — APPOINTMENT (OUTPATIENT)
Dept: CARDIOLOGY | Age: 81
End: 2021-01-21

## 2021-01-26 DIAGNOSIS — Z23 NEED FOR VACCINATION: ICD-10-CM

## 2021-02-16 ENCOUNTER — OFFICE VISIT (OUTPATIENT)
Dept: FAMILY MEDICINE CLINIC | Facility: CLINIC | Age: 81
End: 2021-02-16
Payer: MEDICARE

## 2021-02-16 ENCOUNTER — LAB ENCOUNTER (OUTPATIENT)
Dept: LAB | Age: 81
End: 2021-02-16
Attending: FAMILY MEDICINE
Payer: MEDICARE

## 2021-02-16 VITALS
TEMPERATURE: 98 F | WEIGHT: 190 LBS | RESPIRATION RATE: 16 BRPM | HEIGHT: 59 IN | HEART RATE: 83 BPM | SYSTOLIC BLOOD PRESSURE: 140 MMHG | BODY MASS INDEX: 38.3 KG/M2 | DIASTOLIC BLOOD PRESSURE: 70 MMHG | OXYGEN SATURATION: 97 %

## 2021-02-16 DIAGNOSIS — R73.01 IMPAIRED FASTING GLUCOSE: ICD-10-CM

## 2021-02-16 DIAGNOSIS — Z79.01 CURRENT USE OF LONG TERM ANTICOAGULATION: ICD-10-CM

## 2021-02-16 DIAGNOSIS — E78.2 MIXED HYPERLIPIDEMIA: ICD-10-CM

## 2021-02-16 DIAGNOSIS — I77.9 BILATERAL CAROTID ARTERY DISEASE, UNSPECIFIED TYPE (HCC): ICD-10-CM

## 2021-02-16 DIAGNOSIS — M19.90 ARTHRITIS: ICD-10-CM

## 2021-02-16 DIAGNOSIS — I10 ESSENTIAL HYPERTENSION: ICD-10-CM

## 2021-02-16 DIAGNOSIS — M19.011 PRIMARY OSTEOARTHRITIS OF RIGHT SHOULDER: ICD-10-CM

## 2021-02-16 DIAGNOSIS — N18.9 ANEMIA DUE TO CHRONIC KIDNEY DISEASE, UNSPECIFIED CKD STAGE: ICD-10-CM

## 2021-02-16 DIAGNOSIS — L03.115 CELLULITIS OF RIGHT LOWER EXTREMITY: ICD-10-CM

## 2021-02-16 DIAGNOSIS — I48.20 CHRONIC ATRIAL FIBRILLATION (HCC): ICD-10-CM

## 2021-02-16 DIAGNOSIS — D63.1 ANEMIA DUE TO CHRONIC KIDNEY DISEASE, UNSPECIFIED CKD STAGE: ICD-10-CM

## 2021-02-16 DIAGNOSIS — E66.01 SEVERE OBESITY (BMI 35.0-39.9) WITH COMORBIDITY (HCC): ICD-10-CM

## 2021-02-16 DIAGNOSIS — J44.9 CHRONIC OBSTRUCTIVE PULMONARY DISEASE, UNSPECIFIED COPD TYPE (HCC): ICD-10-CM

## 2021-02-16 DIAGNOSIS — E03.9 ACQUIRED HYPOTHYROIDISM: ICD-10-CM

## 2021-02-16 DIAGNOSIS — I50.32 CHRONIC DIASTOLIC HEART FAILURE (HCC): ICD-10-CM

## 2021-02-16 DIAGNOSIS — Z00.00 ENCOUNTER FOR ANNUAL HEALTH EXAMINATION: Primary | ICD-10-CM

## 2021-02-16 DIAGNOSIS — M47.816 ARTHRITIS, LUMBAR SPINE: ICD-10-CM

## 2021-02-16 DIAGNOSIS — N18.4 CKD (CHRONIC KIDNEY DISEASE) STAGE 4, GFR 15-29 ML/MIN (HCC): ICD-10-CM

## 2021-02-16 LAB
BASOPHILS # BLD AUTO: 0.04 X10(3) UL (ref 0–0.2)
BASOPHILS NFR BLD AUTO: 0.4 %
DEPRECATED RDW RBC AUTO: 49.6 FL (ref 35.1–46.3)
EOSINOPHIL # BLD AUTO: 0.32 X10(3) UL (ref 0–0.7)
EOSINOPHIL NFR BLD AUTO: 3.2 %
ERYTHROCYTE [DISTWIDTH] IN BLOOD BY AUTOMATED COUNT: 14.1 % (ref 11–15)
HCT VFR BLD AUTO: 41.9 %
HGB BLD-MCNC: 12.9 G/DL
IMM GRANULOCYTES # BLD AUTO: 0.04 X10(3) UL (ref 0–1)
IMM GRANULOCYTES NFR BLD: 0.4 %
INR BLD: 1.54 (ref 0.89–1.11)
LYMPHOCYTES # BLD AUTO: 2.34 X10(3) UL (ref 1–4)
LYMPHOCYTES NFR BLD AUTO: 23.3 %
MCH RBC QN AUTO: 29.4 PG (ref 26–34)
MCHC RBC AUTO-ENTMCNC: 30.8 G/DL (ref 31–37)
MCV RBC AUTO: 95.4 FL
MONOCYTES # BLD AUTO: 0.91 X10(3) UL (ref 0.1–1)
MONOCYTES NFR BLD AUTO: 9 %
NEUTROPHILS # BLD AUTO: 6.41 X10 (3) UL (ref 1.5–7.7)
NEUTROPHILS # BLD AUTO: 6.41 X10(3) UL (ref 1.5–7.7)
NEUTROPHILS NFR BLD AUTO: 63.7 %
NT-PROBNP SERPL-MCNC: 274 PG/ML (ref ?–450)
PLATELET # BLD AUTO: 302 10(3)UL (ref 150–450)
PSA SERPL DL<=0.01 NG/ML-MCNC: 18.9 SECONDS (ref 12.4–14.6)
RBC # BLD AUTO: 4.39 X10(6)UL
WBC # BLD AUTO: 10.1 X10(3) UL (ref 4–11)

## 2021-02-16 PROCEDURE — 85025 COMPLETE CBC W/AUTO DIFF WBC: CPT

## 2021-02-16 PROCEDURE — 3008F BODY MASS INDEX DOCD: CPT | Performed by: FAMILY MEDICINE

## 2021-02-16 PROCEDURE — 83880 ASSAY OF NATRIURETIC PEPTIDE: CPT

## 2021-02-16 PROCEDURE — G0439 PPPS, SUBSEQ VISIT: HCPCS | Performed by: FAMILY MEDICINE

## 2021-02-16 PROCEDURE — 3077F SYST BP >= 140 MM HG: CPT | Performed by: FAMILY MEDICINE

## 2021-02-16 PROCEDURE — 99397 PER PM REEVAL EST PAT 65+ YR: CPT | Performed by: FAMILY MEDICINE

## 2021-02-16 PROCEDURE — 36415 COLL VENOUS BLD VENIPUNCTURE: CPT

## 2021-02-16 PROCEDURE — 85610 PROTHROMBIN TIME: CPT

## 2021-02-16 PROCEDURE — 3078F DIAST BP <80 MM HG: CPT | Performed by: FAMILY MEDICINE

## 2021-02-16 PROCEDURE — 96160 PT-FOCUSED HLTH RISK ASSMT: CPT | Performed by: FAMILY MEDICINE

## 2021-02-16 RX ORDER — AMLODIPINE BESYLATE 5 MG/1
5 TABLET ORAL DAILY
Qty: 90 TABLET | Refills: 3 | Status: SHIPPED | OUTPATIENT
Start: 2021-02-16 | End: 2021-08-24

## 2021-02-16 RX ORDER — FUROSEMIDE 20 MG/1
20 TABLET ORAL DAILY
Qty: 90 TABLET | Refills: 1 | Status: SHIPPED | OUTPATIENT
Start: 2021-02-16 | End: 2021-05-25

## 2021-02-16 RX ORDER — AMOXICILLIN AND CLAVULANATE POTASSIUM 875; 125 MG/1; MG/1
1 TABLET, FILM COATED ORAL 2 TIMES DAILY
Qty: 20 TABLET | Refills: 0 | Status: SHIPPED | OUTPATIENT
Start: 2021-02-16 | End: 2021-02-26

## 2021-02-16 RX ORDER — WARFARIN SODIUM 4 MG/1
TABLET ORAL
Qty: 90 TABLET | Refills: 0 | Status: SHIPPED | OUTPATIENT
Start: 2021-02-16

## 2021-02-16 RX ORDER — ALBUTEROL SULFATE 90 UG/1
2 AEROSOL, METERED RESPIRATORY (INHALATION) EVERY 6 HOURS PRN
Qty: 54 G | Refills: 2 | Status: SHIPPED | OUTPATIENT
Start: 2021-02-16 | End: 2021-08-24

## 2021-02-16 RX ORDER — LEVOTHYROXINE SODIUM 88 UG/1
88 TABLET ORAL
Qty: 90 TABLET | Refills: 1 | Status: SHIPPED | OUTPATIENT
Start: 2021-02-16 | End: 2021-08-24

## 2021-02-16 NOTE — PROGRESS NOTES
HPI:   Silvia Castaneda is a 79year old female who presents for a MA (Medicare Advantage) [facility] (Once per calendar year).     Zoster Vaccines(1 of 2) due on 01/30/1989  DEXA Scan due on 06/06/2020  Pneumococcal Vaccine: 65+ Years(2 of 2 - PPSV23) than 12 months ago. Social History    Tobacco Use      Smoking status: Former Smoker        Quit date: 1992        Years since quittin.8      Smokeless tobacco: Never Used       Ms. Portillo Grace already takes aspirin and has it on her medication list. and cephalexin.     CURRENT MEDICATIONS:     •  Levothyroxine Sodium 88 MCG Oral Tab, Take 1 tablet (88 mcg total) by mouth before breakfast.    •  Warfarin Sodium 4 MG Oral Tab, Take 1 tablet daily or as directed by your physician    •  furosemide 20 MG Or hypertension, Hearing impairment, High blood pressure, High cholesterol, Hyperlipidemia, Hypothyroidism, Leg hematoma, right, subsequent encounter (7/29/2019), Osteoarthritis, and Visual impairment.     She  has a past surgical history that includes hernia Chart Acuity: 20/40   Able To Tolerate Visual Acuity: Yes      General Appearance:  Alert, cooperative, no distress, appears stated age   Head:  Normocephalic, without obvious abnormality, atraumatic   Eyes:  PERRL, conjunctiva/corneas clear, EOM's intact hyperlipidemia  Controlled on pravastatin     Essential hypertension  -     CBC WITH DIFFERENTIAL WITH PLATELET; Future  -     amLODIPine Besylate 5 MG Oral Tab; Take 1 tablet (5 mg total) by mouth daily.     Acquired hypothyroidism  Stable   -     Levothyr have you lost more than 10 pounds without trying?: 2 - No  Has your appetite been poor?: No  How does the patient maintain a good energy level?: Stretching;Daily Walks  How would you describe your current health state?: Good  How do you maintain positive m results found for: CHLAMYDIA No flowsheet data found. Screening Mammogram      Mammogram Annually to 75, then as discussed There are no preventive care reminders to display for this patient.  Update Health Maintenance if applicable     Immunizations (Cas Solis flowsheet data found.             Template: ERIN WHITMAN MEDICARE ANNUAL ASSESSMENT FEMALE [83801]

## 2021-02-16 NOTE — PATIENT INSTRUCTIONS
[facility] SCREENING SCHEDULE   Tests on this list are recommended by your physician but may not be covered, or covered at this frequency, by your insurer. Please check with your insurance carrier before scheduling to verify coverage.    PREVENT following criteria:   • Men who are 73-68 years old and have smoked more than 100 cigarettes in their lifetime   • Anyone with a family history    Colorectal Cancer Screening  Covered up to Age 76     Colonoscopy Screen   Covered every 10 years- more often Mammogram regularly   Immunizations      Influenza  Covered Annually Orders placed or performed in visit on 09/20/19   • FLU VACC HIGH DOSE PRSV FREE    Please get every year    Pneumococcal 13 (Prevnar)  Covered Once after 65 Orders placed or performed in regarding Advance Directives.

## 2021-02-21 PROBLEM — Z79.01 CURRENT USE OF LONG TERM ANTICOAGULATION: Status: ACTIVE | Noted: 2020-06-04

## 2021-02-21 PROBLEM — R73.01 IMPAIRED FASTING GLUCOSE: Status: ACTIVE | Noted: 2021-02-21

## 2021-02-25 ENCOUNTER — LAB ENCOUNTER (OUTPATIENT)
Dept: LAB | Age: 81
End: 2021-02-25
Attending: FAMILY MEDICINE
Payer: MEDICARE

## 2021-02-25 DIAGNOSIS — I48.20 CHRONIC ATRIAL FIBRILLATION (HCC): ICD-10-CM

## 2021-02-25 DIAGNOSIS — R73.01 IMPAIRED FASTING GLUCOSE: ICD-10-CM

## 2021-02-25 LAB
EST. AVERAGE GLUCOSE BLD GHB EST-MCNC: 123 MG/DL (ref 68–126)
HBA1C MFR BLD HPLC: 5.9 % (ref ?–5.7)
INR BLD: 1.87 (ref 0.89–1.11)
PSA SERPL DL<=0.01 NG/ML-MCNC: 22 SECONDS (ref 12.4–14.6)

## 2021-02-25 PROCEDURE — 36415 COLL VENOUS BLD VENIPUNCTURE: CPT

## 2021-02-25 PROCEDURE — 85610 PROTHROMBIN TIME: CPT

## 2021-02-25 PROCEDURE — 83036 HEMOGLOBIN GLYCOSYLATED A1C: CPT

## 2021-02-26 ENCOUNTER — TELEPHONE (OUTPATIENT)
Dept: FAMILY MEDICINE CLINIC | Facility: CLINIC | Age: 81
End: 2021-02-26

## 2021-02-26 ENCOUNTER — ANTI-COAG VISIT (OUTPATIENT)
Dept: FAMILY MEDICINE CLINIC | Facility: CLINIC | Age: 81
End: 2021-02-26

## 2021-02-26 DIAGNOSIS — I48.20 CHRONIC ATRIAL FIBRILLATION (HCC): ICD-10-CM

## 2021-02-26 PROCEDURE — 93793 ANTICOAG MGMT PT WARFARIN: CPT | Performed by: FAMILY MEDICINE

## 2021-02-26 RX ORDER — WARFARIN SODIUM 3 MG/1
3 TABLET ORAL AS DIRECTED
Qty: 90 TABLET | Refills: 0 | Status: SHIPPED | OUTPATIENT
Start: 2021-02-26

## 2021-02-27 NOTE — TELEPHONE ENCOUNTER
Discussed labs with patient and adjusted warfarin dosing - see anti-coag note. Repeat INR in 1 week. Prediabetes noted.

## 2021-03-04 ENCOUNTER — LAB ENCOUNTER (OUTPATIENT)
Dept: LAB | Age: 81
End: 2021-03-04
Attending: FAMILY MEDICINE
Payer: MEDICARE

## 2021-03-04 DIAGNOSIS — I48.20 CHRONIC ATRIAL FIBRILLATION (HCC): ICD-10-CM

## 2021-03-04 LAB
INR BLD: 2.19 (ref 0.89–1.11)
PSA SERPL DL<=0.01 NG/ML-MCNC: 24.9 SECONDS (ref 12.4–14.6)

## 2021-03-04 PROCEDURE — 36415 COLL VENOUS BLD VENIPUNCTURE: CPT

## 2021-03-04 PROCEDURE — 85610 PROTHROMBIN TIME: CPT

## 2021-03-05 ENCOUNTER — TELEPHONE (OUTPATIENT)
Dept: FAMILY MEDICINE CLINIC | Facility: CLINIC | Age: 81
End: 2021-03-05

## 2021-03-05 NOTE — TELEPHONE ENCOUNTER
----- Message from Sanford Calderon MD sent at 3/5/2021 12:49 PM CST -----  INR at goal, repeat in 2 weeks     Cont current regimen  Use anticoag flowsheet and update please

## 2021-03-08 ENCOUNTER — TELEPHONE (OUTPATIENT)
Dept: CARDIOLOGY | Age: 81
End: 2021-03-08

## 2021-03-10 RX ORDER — BUDESONIDE AND FORMOTEROL FUMARATE DIHYDRATE 160; 4.5 UG/1; UG/1
2 AEROSOL RESPIRATORY (INHALATION) 2 TIMES DAILY
COMMUNITY
Start: 2020-12-17

## 2021-03-10 NOTE — TELEPHONE ENCOUNTER
Called and spoke with pt. Pt informed of lab results below. Pt states understanding.  Pt will keep her appointment on 5/21/18 Detail Level: Detailed Quality 130: Documentation Of Current Medications In The Medical Record: Current Medications Documented

## 2021-03-11 ENCOUNTER — OFFICE VISIT (OUTPATIENT)
Dept: CARDIOLOGY | Age: 81
End: 2021-03-11

## 2021-03-11 ENCOUNTER — ANTI-COAG (OUTPATIENT)
Dept: CARDIOLOGY | Age: 81
End: 2021-03-11

## 2021-03-11 ENCOUNTER — APPOINTMENT (OUTPATIENT)
Dept: CARDIOLOGY | Age: 81
End: 2021-03-11

## 2021-03-11 ENCOUNTER — TELEPHONE (OUTPATIENT)
Dept: CARDIOLOGY | Age: 81
End: 2021-03-11

## 2021-03-11 VITALS
SYSTOLIC BLOOD PRESSURE: 132 MMHG | HEIGHT: 59 IN | DIASTOLIC BLOOD PRESSURE: 60 MMHG | WEIGHT: 187 LBS | HEART RATE: 78 BPM | BODY MASS INDEX: 37.7 KG/M2

## 2021-03-11 DIAGNOSIS — Z79.01 CHRONIC ANTICOAGULATION: Primary | ICD-10-CM

## 2021-03-11 DIAGNOSIS — E78.00 PURE HYPERCHOLESTEROLEMIA: ICD-10-CM

## 2021-03-11 DIAGNOSIS — I48.0 PAF (PAROXYSMAL ATRIAL FIBRILLATION) (CMD): ICD-10-CM

## 2021-03-11 DIAGNOSIS — R06.02 SHORTNESS OF BREATH: ICD-10-CM

## 2021-03-11 DIAGNOSIS — I48.0 PAF (PAROXYSMAL ATRIAL FIBRILLATION) (CMD): Primary | ICD-10-CM

## 2021-03-11 DIAGNOSIS — I25.10 CORONARY ARTERY DISEASE INVOLVING NATIVE CORONARY ARTERY OF NATIVE HEART WITHOUT ANGINA PECTORIS: ICD-10-CM

## 2021-03-11 DIAGNOSIS — I48.3 TYPICAL ATRIAL FLUTTER (CMD): ICD-10-CM

## 2021-03-11 DIAGNOSIS — I27.20 PULMONARY HYPERTENSION (CMD): ICD-10-CM

## 2021-03-11 DIAGNOSIS — I10 ESSENTIAL HYPERTENSION: ICD-10-CM

## 2021-03-11 DIAGNOSIS — Z79.899 LONG TERM CURRENT USE OF AMIODARONE: ICD-10-CM

## 2021-03-11 DIAGNOSIS — I65.23 ASYMPTOMATIC CAROTID ARTERY STENOSIS, BILATERAL: ICD-10-CM

## 2021-03-11 PROCEDURE — 3075F SYST BP GE 130 - 139MM HG: CPT | Performed by: INTERNAL MEDICINE

## 2021-03-11 PROCEDURE — 99214 OFFICE O/P EST MOD 30 MIN: CPT | Performed by: INTERNAL MEDICINE

## 2021-03-11 PROCEDURE — 3078F DIAST BP <80 MM HG: CPT | Performed by: INTERNAL MEDICINE

## 2021-03-11 PROCEDURE — 93000 ELECTROCARDIOGRAM COMPLETE: CPT | Performed by: INTERNAL MEDICINE

## 2021-03-11 SDOH — HEALTH STABILITY: MENTAL HEALTH: HOW OFTEN DO YOU HAVE A DRINK CONTAINING ALCOHOL?: NEVER

## 2021-03-11 SDOH — HEALTH STABILITY: PHYSICAL HEALTH: ON AVERAGE, HOW MANY MINUTES DO YOU ENGAGE IN EXERCISE AT THIS LEVEL?: 30 MIN

## 2021-03-11 SDOH — HEALTH STABILITY: PHYSICAL HEALTH: ON AVERAGE, HOW MANY DAYS PER WEEK DO YOU ENGAGE IN MODERATE TO STRENUOUS EXERCISE (LIKE A BRISK WALK)?: 2 DAYS

## 2021-03-11 ASSESSMENT — PATIENT HEALTH QUESTIONNAIRE - PHQ9
CLINICAL INTERPRETATION OF PHQ9 SCORE: NO FURTHER SCREENING NEEDED
SUM OF ALL RESPONSES TO PHQ9 QUESTIONS 1 AND 2: 0
SUM OF ALL RESPONSES TO PHQ9 QUESTIONS 1 AND 2: 0
2. FEELING DOWN, DEPRESSED OR HOPELESS: NOT AT ALL
1. LITTLE INTEREST OR PLEASURE IN DOING THINGS: NOT AT ALL
CLINICAL INTERPRETATION OF PHQ2 SCORE: NO FURTHER SCREENING NEEDED

## 2021-03-15 ENCOUNTER — LAB ENCOUNTER (OUTPATIENT)
Dept: LAB | Age: 81
End: 2021-03-15
Attending: INTERNAL MEDICINE
Payer: MEDICARE

## 2021-03-15 ENCOUNTER — LAB ENCOUNTER (OUTPATIENT)
Dept: LAB | Age: 81
End: 2021-03-15
Attending: FAMILY MEDICINE
Payer: MEDICARE

## 2021-03-15 ENCOUNTER — ANTI-COAG (OUTPATIENT)
Dept: CARDIOLOGY | Age: 81
End: 2021-03-15

## 2021-03-15 DIAGNOSIS — I48.0 PAROXYSMAL ATRIAL FIBRILLATION (HCC): ICD-10-CM

## 2021-03-15 DIAGNOSIS — I48.20 CHRONIC ATRIAL FIBRILLATION (HCC): ICD-10-CM

## 2021-03-15 DIAGNOSIS — I27.20 PROGRESSIVE PULMONARY HYPERTENSION (HCC): ICD-10-CM

## 2021-03-15 DIAGNOSIS — Z79.899 NEED FOR PROPHYLACTIC CHEMOTHERAPY: ICD-10-CM

## 2021-03-15 DIAGNOSIS — I10 ESSENTIAL HYPERTENSION, MALIGNANT: ICD-10-CM

## 2021-03-15 DIAGNOSIS — I65.23 BILATERAL CAROTID ARTERY STENOSIS: ICD-10-CM

## 2021-03-15 DIAGNOSIS — R06.02 SHORTNESS OF BREATH: ICD-10-CM

## 2021-03-15 DIAGNOSIS — E78.00 PURE HYPERCHOLESTEROLEMIA: ICD-10-CM

## 2021-03-15 DIAGNOSIS — I48.3 TYPICAL ATRIAL FLUTTER (HCC): ICD-10-CM

## 2021-03-15 DIAGNOSIS — Z79.01 LONG TERM (CURRENT) USE OF ANTICOAGULANTS: Primary | ICD-10-CM

## 2021-03-15 DIAGNOSIS — I25.10 CORONARY ATHEROSCLEROSIS OF NATIVE CORONARY ARTERY: ICD-10-CM

## 2021-03-15 DIAGNOSIS — I48.0 PAF (PAROXYSMAL ATRIAL FIBRILLATION) (CMD): ICD-10-CM

## 2021-03-15 LAB
BASOPHILS # BLD AUTO: 0.02 X10(3) UL (ref 0–0.2)
BASOPHILS NFR BLD AUTO: 0.3 %
CHOLEST SMN-MCNC: 178 MG/DL (ref ?–200)
DEPRECATED RDW RBC AUTO: 50.1 FL (ref 35.1–46.3)
EOSINOPHIL # BLD AUTO: 0.41 X10(3) UL (ref 0–0.7)
EOSINOPHIL NFR BLD AUTO: 5.6 %
ERYTHROCYTE [DISTWIDTH] IN BLOOD BY AUTOMATED COUNT: 13.9 % (ref 11–15)
HCT VFR BLD AUTO: 41.7 %
HDLC SERPL-MCNC: 62 MG/DL (ref 40–59)
HGB BLD-MCNC: 12.8 G/DL
IMM GRANULOCYTES # BLD AUTO: 0.02 X10(3) UL (ref 0–1)
IMM GRANULOCYTES NFR BLD: 0.3 %
INR BLD: 2.16 (ref 0.89–1.11)
INR PPP: 2.16
LDLC SERPL CALC-MCNC: 80 MG/DL (ref ?–100)
LYMPHOCYTES # BLD AUTO: 2.47 X10(3) UL (ref 1–4)
LYMPHOCYTES NFR BLD AUTO: 33.5 %
MCH RBC QN AUTO: 29.7 PG (ref 26–34)
MCHC RBC AUTO-ENTMCNC: 30.7 G/DL (ref 31–37)
MCV RBC AUTO: 96.8 FL
MONOCYTES # BLD AUTO: 0.79 X10(3) UL (ref 0.1–1)
MONOCYTES NFR BLD AUTO: 10.7 %
NEUTROPHILS # BLD AUTO: 3.67 X10 (3) UL (ref 1.5–7.7)
NEUTROPHILS # BLD AUTO: 3.67 X10(3) UL (ref 1.5–7.7)
NEUTROPHILS NFR BLD AUTO: 49.6 %
NONHDLC SERPL-MCNC: 116 MG/DL (ref ?–130)
NT-PROBNP SERPL-MCNC: 185 PG/ML (ref ?–450)
PATIENT FASTING Y/N/NP: YES
PLATELET # BLD AUTO: 276 10(3)UL (ref 150–450)
PSA SERPL DL<=0.01 NG/ML-MCNC: 24.6 SECONDS (ref 12.4–14.6)
RBC # BLD AUTO: 4.31 X10(6)UL
TRIGL SERPL-MCNC: 182 MG/DL (ref 30–149)
TSI SER-ACNC: 5.34 MIU/ML (ref 0.36–3.74)
VLDLC SERPL CALC-MCNC: 36 MG/DL (ref 0–30)
WBC # BLD AUTO: 7.4 X10(3) UL (ref 4–11)

## 2021-03-15 PROCEDURE — 36415 COLL VENOUS BLD VENIPUNCTURE: CPT

## 2021-03-15 PROCEDURE — 80061 LIPID PANEL: CPT

## 2021-03-15 PROCEDURE — 85610 PROTHROMBIN TIME: CPT

## 2021-03-15 PROCEDURE — 85025 COMPLETE CBC W/AUTO DIFF WBC: CPT

## 2021-03-15 PROCEDURE — 84443 ASSAY THYROID STIM HORMONE: CPT

## 2021-03-15 PROCEDURE — 83880 ASSAY OF NATRIURETIC PEPTIDE: CPT

## 2021-03-16 ENCOUNTER — TELEPHONE (OUTPATIENT)
Dept: CARDIOLOGY | Age: 81
End: 2021-03-16

## 2021-03-16 ENCOUNTER — HOSPITAL ENCOUNTER (OUTPATIENT)
Dept: CV DIAGNOSTICS | Age: 81
Discharge: HOME | End: 2021-03-16
Attending: INTERNAL MEDICINE
Payer: MEDICARE

## 2021-03-16 DIAGNOSIS — I25.10 CORONARY ARTERY DISEASE INVOLVING NATIVE CORONARY ARTERY OF NATIVE HEART WITHOUT ANGINA PECTORIS: ICD-10-CM

## 2021-03-16 DIAGNOSIS — R06.02 SHORTNESS OF BREATH: ICD-10-CM

## 2021-03-16 DIAGNOSIS — I65.23 ASYMPTOMATIC CAROTID ARTERY STENOSIS, BILATERAL: ICD-10-CM

## 2021-03-16 DIAGNOSIS — I10 HYPERTENSION, ESSENTIAL: ICD-10-CM

## 2021-03-16 DIAGNOSIS — E78.00 PURE HYPERCHOLESTEROLEMIA: ICD-10-CM

## 2021-03-16 DIAGNOSIS — I48.3 TYPICAL ATRIAL FLUTTER (HCC): ICD-10-CM

## 2021-03-16 DIAGNOSIS — E78.00 PURE HYPERCHOLESTEROLEMIA: Primary | ICD-10-CM

## 2021-03-16 DIAGNOSIS — I27.20 PULMONARY HYPERTENSION (HCC): ICD-10-CM

## 2021-03-16 DIAGNOSIS — Z79.899 LONG TERM CURRENT USE OF AMIODARONE: ICD-10-CM

## 2021-03-16 DIAGNOSIS — I48.0 PAF (PAROXYSMAL ATRIAL FIBRILLATION) (HCC): ICD-10-CM

## 2021-03-16 DIAGNOSIS — Z79.01 CHRONIC ANTICOAGULATION: ICD-10-CM

## 2021-03-16 PROCEDURE — 93306 TTE W/DOPPLER COMPLETE: CPT | Performed by: INTERNAL MEDICINE

## 2021-03-17 ENCOUNTER — TELEPHONE (OUTPATIENT)
Dept: CARDIOLOGY | Age: 81
End: 2021-03-17

## 2021-03-18 ENCOUNTER — ORDER TRANSCRIPTION (OUTPATIENT)
Dept: ADMINISTRATIVE | Facility: HOSPITAL | Age: 81
End: 2021-03-18

## 2021-03-25 ENCOUNTER — APPOINTMENT (OUTPATIENT)
Dept: CARDIOLOGY | Age: 81
End: 2021-03-25

## 2021-03-29 ENCOUNTER — LAB ENCOUNTER (OUTPATIENT)
Dept: LAB | Age: 81
End: 2021-03-29
Attending: INTERNAL MEDICINE
Payer: MEDICARE

## 2021-03-29 DIAGNOSIS — I48.20 CHRONIC ATRIAL FIBRILLATION (HCC): ICD-10-CM

## 2021-03-29 DIAGNOSIS — I48.3 TYPICAL ATRIAL FLUTTER (CMD): Primary | ICD-10-CM

## 2021-03-29 LAB
INR BLD: 2.14 (ref 0.89–1.11)
INR PPP: 2.14
PSA SERPL DL<=0.01 NG/ML-MCNC: 24.5 SECONDS (ref 12.4–14.6)

## 2021-03-29 PROCEDURE — 36415 COLL VENOUS BLD VENIPUNCTURE: CPT

## 2021-03-29 PROCEDURE — 85610 PROTHROMBIN TIME: CPT

## 2021-03-30 ENCOUNTER — ANTI-COAG (OUTPATIENT)
Dept: CARDIOLOGY | Age: 81
End: 2021-03-30

## 2021-03-30 DIAGNOSIS — I48.0 PAF (PAROXYSMAL ATRIAL FIBRILLATION) (CMD): ICD-10-CM

## 2021-04-09 ENCOUNTER — TELEPHONE (OUTPATIENT)
Dept: FAMILY MEDICINE CLINIC | Facility: CLINIC | Age: 81
End: 2021-04-09

## 2021-04-09 DIAGNOSIS — Z20.822 CLOSE EXPOSURE TO COVID-19 VIRUS: Primary | ICD-10-CM

## 2021-04-09 NOTE — TELEPHONE ENCOUNTER
It has been 5 days since exposure, so she should get tested in the next 1-2 days. She should quarantine herself at home until she has a negative result and minimum of 7 days from exposure. If test is positive, quarantine period may be longer.

## 2021-04-09 NOTE — TELEPHONE ENCOUNTER
Patient calling to report she was with her family on [facility] and was just told that 2 people there have been confirmed positive for COVID     Patient had 1st pfiizer vaccine 2/18 and 2nd one on 3/11 with shadi dyson, they came to [facility]

## 2021-04-09 NOTE — TELEPHONE ENCOUNTER
Immunization record updated. Patient states that she saw her family on [facility]. Patient was notified by her daughter that two of her grandchildren tested positive for Covid.  Patient was in close contact with the grandchildren and they were not wearing m

## 2021-04-09 NOTE — TELEPHONE ENCOUNTER
Notified the patient of the below response by the PCP. Patient verbalized understanding. Provided central scheduling phone number. Answered all questions at this time.

## 2021-04-12 ENCOUNTER — LAB ENCOUNTER (OUTPATIENT)
Dept: LAB | Age: 81
End: 2021-04-12
Attending: FAMILY MEDICINE
Payer: MEDICARE

## 2021-04-12 DIAGNOSIS — Z20.822 CLOSE EXPOSURE TO COVID-19 VIRUS: ICD-10-CM

## 2021-04-14 ENCOUNTER — APPOINTMENT (OUTPATIENT)
Dept: CARDIOLOGY | Age: 81
End: 2021-04-14

## 2021-04-19 RX ORDER — WARFARIN SODIUM 4 MG/1
TABLET ORAL
Qty: 90 TABLET | Refills: 0 | OUTPATIENT
Start: 2021-04-19

## 2021-04-19 NOTE — TELEPHONE ENCOUNTER
Medication(s) to Refill:   Requested Prescriptions     Pending Prescriptions Disp Refills   • WARFARIN SODIUM 4 MG Oral Tab [Pharmacy Med Name: WARFARIN SODIUM 4 MG Tablet] 90 tablet 0     Sig: TAKE 1 TABLET DAILY OR AS DIRECTED BY YOUR PHYSICIAN         R

## 2021-04-21 RX ORDER — WARFARIN SODIUM 4 MG/1
TABLET ORAL
Qty: 90 TABLET | Refills: 0 | OUTPATIENT
Start: 2021-04-21

## 2021-05-03 ENCOUNTER — LAB ENCOUNTER (OUTPATIENT)
Dept: LAB | Age: 81
End: 2021-05-03
Attending: NURSE PRACTITIONER
Payer: MEDICARE

## 2021-05-03 DIAGNOSIS — I48.20 CHRONIC ATRIAL FIBRILLATION (HCC): ICD-10-CM

## 2021-05-03 PROCEDURE — 36415 COLL VENOUS BLD VENIPUNCTURE: CPT

## 2021-05-03 PROCEDURE — 85610 PROTHROMBIN TIME: CPT

## 2021-05-04 ENCOUNTER — ANTI-COAG (OUTPATIENT)
Dept: CARDIOLOGY | Age: 81
End: 2021-05-04

## 2021-05-04 DIAGNOSIS — I48.0 PAF (PAROXYSMAL ATRIAL FIBRILLATION) (CMD): ICD-10-CM

## 2021-05-04 LAB — INR PPP: 1.8

## 2021-05-18 ENCOUNTER — LAB ENCOUNTER (OUTPATIENT)
Dept: LAB | Age: 81
End: 2021-05-18
Attending: NURSE PRACTITIONER
Payer: MEDICARE

## 2021-05-18 ENCOUNTER — OFFICE VISIT (OUTPATIENT)
Dept: FAMILY MEDICINE CLINIC | Facility: CLINIC | Age: 81
End: 2021-05-18
Payer: MEDICARE

## 2021-05-18 VITALS
OXYGEN SATURATION: 95 % | BODY MASS INDEX: 38.91 KG/M2 | SYSTOLIC BLOOD PRESSURE: 140 MMHG | HEIGHT: 59 IN | TEMPERATURE: 99 F | DIASTOLIC BLOOD PRESSURE: 60 MMHG | WEIGHT: 193 LBS | HEART RATE: 85 BPM | RESPIRATION RATE: 16 BRPM

## 2021-05-18 DIAGNOSIS — R73.03 PREDIABETES: ICD-10-CM

## 2021-05-18 DIAGNOSIS — I10 ESSENTIAL HYPERTENSION: ICD-10-CM

## 2021-05-18 DIAGNOSIS — J44.9 CHRONIC OBSTRUCTIVE PULMONARY DISEASE, UNSPECIFIED COPD TYPE (HCC): Primary | ICD-10-CM

## 2021-05-18 DIAGNOSIS — I48.20 CHRONIC ATRIAL FIBRILLATION (HCC): ICD-10-CM

## 2021-05-18 DIAGNOSIS — I50.32 CHRONIC DIASTOLIC HEART FAILURE (HCC): ICD-10-CM

## 2021-05-18 LAB — INR PPP: 1.83

## 2021-05-18 PROCEDURE — 99214 OFFICE O/P EST MOD 30 MIN: CPT | Performed by: FAMILY MEDICINE

## 2021-05-18 PROCEDURE — 3078F DIAST BP <80 MM HG: CPT | Performed by: FAMILY MEDICINE

## 2021-05-18 PROCEDURE — 85610 PROTHROMBIN TIME: CPT

## 2021-05-18 PROCEDURE — 36415 COLL VENOUS BLD VENIPUNCTURE: CPT

## 2021-05-18 PROCEDURE — 3008F BODY MASS INDEX DOCD: CPT | Performed by: FAMILY MEDICINE

## 2021-05-18 PROCEDURE — 3077F SYST BP >= 140 MM HG: CPT | Performed by: FAMILY MEDICINE

## 2021-05-18 RX ORDER — TRAMADOL HYDROCHLORIDE 50 MG/1
50 TABLET ORAL EVERY 8 HOURS PRN
Qty: 21 TABLET | Refills: 0 | Status: SHIPPED | OUTPATIENT
Start: 2021-05-18

## 2021-05-18 RX ORDER — FLUTICASONE FUROATE, UMECLIDINIUM BROMIDE AND VILANTEROL TRIFENATATE 200; 62.5; 25 UG/1; UG/1; UG/1
1 POWDER RESPIRATORY (INHALATION) DAILY
Qty: 1 EACH | Refills: 0 | COMMUNITY
Start: 2021-05-18 | End: 2021-05-26

## 2021-05-18 NOTE — PATIENT INSTRUCTIONS
· Increase furosemide (water pill) to twice a day until your swelling improves and weight stays close to 190 lbs or less.      · Instead of Spiriva, use the same inhaler Trelegy - again this replaces Spiriva only; still use rescue inhaler (albuterol) as Jakob Apa

## 2021-05-19 ENCOUNTER — TELEPHONE (OUTPATIENT)
Dept: CARDIOLOGY | Age: 81
End: 2021-05-19

## 2021-05-19 ENCOUNTER — ANTI-COAG (OUTPATIENT)
Dept: CARDIOLOGY | Age: 81
End: 2021-05-19

## 2021-05-19 DIAGNOSIS — I48.0 PAF (PAROXYSMAL ATRIAL FIBRILLATION) (CMD): ICD-10-CM

## 2021-05-24 NOTE — PROGRESS NOTES
[facility] Family Medicine Office Note  Chief Complaint:   Patient presents with:   Follow - Up: 3 month      HPI:   This is a 79year old female coming in for  HPI  Follow up COPD   States she is using spiriva inhaler daily but also using albuter fluticasone-Umeclidin-Vilant (TRELEGY ELLIPTA) 913-92.0-92 MCG/INH Inhalation Aerosol Powder, Breath Activated Inhale 1 puff into the lungs daily. 1 each 0   • Warfarin Sodium 3 MG Oral Tab Take 1 tablet (3 mg total) by mouth As Directed.  90 tablet 0   • L Negative for pain and visual disturbance. Respiratory: Positive for shortness of breath. Negative for cough. Cardiovascular: Positive for leg swelling. Negative for chest pain and palpitations.    Gastrointestinal: Negative for abdominal pain, nausea a mg total) by mouth every 8 (eight) hours as needed for Pain. • fluticasone-Umeclidin-Vilant (TRELEGY ELLIPTA) 596-33.6-63 MCG/INH Inhalation Aerosol Powder, Breath Activated 1 each 0     Sig: Inhale 1 puff into the lungs daily.            Return in about

## 2021-05-25 ENCOUNTER — LAB ENCOUNTER (OUTPATIENT)
Dept: LAB | Age: 81
End: 2021-05-25
Attending: FAMILY MEDICINE
Payer: MEDICARE

## 2021-05-25 ENCOUNTER — OFFICE VISIT (OUTPATIENT)
Dept: FAMILY MEDICINE CLINIC | Facility: CLINIC | Age: 81
End: 2021-05-25
Payer: MEDICARE

## 2021-05-25 ENCOUNTER — TELEPHONE (OUTPATIENT)
Dept: FAMILY MEDICINE CLINIC | Facility: CLINIC | Age: 81
End: 2021-05-25

## 2021-05-25 VITALS
DIASTOLIC BLOOD PRESSURE: 60 MMHG | HEART RATE: 75 BPM | SYSTOLIC BLOOD PRESSURE: 138 MMHG | HEIGHT: 59 IN | RESPIRATION RATE: 20 BRPM | TEMPERATURE: 99 F | OXYGEN SATURATION: 96 % | WEIGHT: 193 LBS | BODY MASS INDEX: 38.91 KG/M2

## 2021-05-25 VITALS
HEIGHT: 59 IN | BODY MASS INDEX: 37.09 KG/M2 | HEART RATE: 84 BPM | DIASTOLIC BLOOD PRESSURE: 62 MMHG | WEIGHT: 184 LBS | SYSTOLIC BLOOD PRESSURE: 146 MMHG

## 2021-05-25 DIAGNOSIS — J44.9 CHRONIC OBSTRUCTIVE PULMONARY DISEASE, UNSPECIFIED COPD TYPE (HCC): ICD-10-CM

## 2021-05-25 DIAGNOSIS — I48.20 CHRONIC ATRIAL FIBRILLATION (HCC): ICD-10-CM

## 2021-05-25 DIAGNOSIS — R73.03 PREDIABETES: ICD-10-CM

## 2021-05-25 DIAGNOSIS — I10 ESSENTIAL HYPERTENSION: ICD-10-CM

## 2021-05-25 DIAGNOSIS — I50.32 CHRONIC DIASTOLIC HEART FAILURE (HCC): Primary | ICD-10-CM

## 2021-05-25 LAB — INR PPP: 1.9

## 2021-05-25 PROCEDURE — 83036 HEMOGLOBIN GLYCOSYLATED A1C: CPT

## 2021-05-25 PROCEDURE — 3075F SYST BP GE 130 - 139MM HG: CPT | Performed by: FAMILY MEDICINE

## 2021-05-25 PROCEDURE — 3078F DIAST BP <80 MM HG: CPT | Performed by: FAMILY MEDICINE

## 2021-05-25 PROCEDURE — 99213 OFFICE O/P EST LOW 20 MIN: CPT | Performed by: FAMILY MEDICINE

## 2021-05-25 PROCEDURE — 84443 ASSAY THYROID STIM HORMONE: CPT

## 2021-05-25 PROCEDURE — 85025 COMPLETE CBC W/AUTO DIFF WBC: CPT

## 2021-05-25 PROCEDURE — 36415 COLL VENOUS BLD VENIPUNCTURE: CPT

## 2021-05-25 PROCEDURE — 85610 PROTHROMBIN TIME: CPT

## 2021-05-25 PROCEDURE — 80053 COMPREHEN METABOLIC PANEL: CPT

## 2021-05-25 PROCEDURE — 3008F BODY MASS INDEX DOCD: CPT | Performed by: FAMILY MEDICINE

## 2021-05-25 RX ORDER — FUROSEMIDE 20 MG/1
TABLET ORAL
Qty: 120 TABLET | Refills: 1 | Status: SHIPPED | OUTPATIENT
Start: 2021-05-25 | End: 2021-08-24

## 2021-05-25 RX ORDER — POTASSIUM CHLORIDE 1500 MG/1
20 TABLET, FILM COATED, EXTENDED RELEASE ORAL DAILY
Qty: 30 TABLET | Refills: 0 | Status: SHIPPED | OUTPATIENT
Start: 2021-05-25 | End: 2021-06-24

## 2021-05-25 NOTE — PATIENT INSTRUCTIONS
Labs today to look at potassium and kidney function     Start taking furosemide twice a day on Monday - Wednesday - Friday   Other days continue furosemide once a day for now    We will start potassium supplement - we may need to adjust the dose - I will h

## 2021-05-25 NOTE — TELEPHONE ENCOUNTER
Patient requesting to have [address] [place] [address] Patient Assistance Program completed for Fransisco Morales. Application completed; however, patient did not sign application. Notified the patient of the need to sign application. Patient verbalized understanding.  Patient st

## 2021-05-25 NOTE — PROGRESS NOTES
[facility] Family Medicine Office Note  Chief Complaint:   Patient presents with:  Blood Pressure: f/u      HPI:   This is a 79year old female coming in for  HPI  Blood Pressure 5/25/2021 5/18/2021 2/16/2021 2/16/2021 11/4/2020   /60 140/6 Dispense Refill   • furosemide 20 MG Oral Tab 20 mg daily and extra 20mg Mon-Wed-Fri 120 tablet 1   • Potassium Chloride ER 20 MEQ Oral Tab CR Take 20 mEq by mouth daily.  30 tablet 0   • traMADol HCl 50 MG Oral Tab Take 1 tablet (50 mg total) by mouth ever Take 1 capsule by mouth daily. Counseling given: Not Answered       REVIEW OF SYSTEMS:   Review of Systems   Constitutional: Negative for chills and fever. HENT: Negative for rhinorrhea and sinus pressure.     Eyes: Negative for pain and visual d unspecified COPD type ([facility] [address]    Inc furosemide to 40mg MWF, 20mg other days   Cont to track weights   Labs today - start k supplement   Repeat bmp in 1 week     Meds & Refills for this Visit:  Requested Prescriptions     Signed Prescriptions Disp Refills

## 2021-05-26 ENCOUNTER — TELEPHONE (OUTPATIENT)
Dept: CARDIOLOGY | Age: 81
End: 2021-05-26

## 2021-05-26 ENCOUNTER — ANTI-COAG (OUTPATIENT)
Dept: CARDIOLOGY | Age: 81
End: 2021-05-26

## 2021-05-26 ENCOUNTER — TELEPHONE (OUTPATIENT)
Dept: FAMILY MEDICINE CLINIC | Facility: CLINIC | Age: 81
End: 2021-05-26

## 2021-05-26 DIAGNOSIS — I48.0 PAF (PAROXYSMAL ATRIAL FIBRILLATION) (CMD): ICD-10-CM

## 2021-05-26 RX ORDER — FLUTICASONE FUROATE, UMECLIDINIUM BROMIDE AND VILANTEROL TRIFENATATE 200; 62.5; 25 UG/1; UG/1; UG/1
1 POWDER RESPIRATORY (INHALATION) DAILY
Qty: 60 EACH | Refills: 5 | Status: SHIPPED
Start: 2021-05-26 | End: 2021-09-20

## 2021-05-26 RX ORDER — FLUTICASONE FUROATE, UMECLIDINIUM BROMIDE AND VILANTEROL TRIFENATATE 200; 62.5; 25 UG/1; UG/1; UG/1
1 POWDER RESPIRATORY (INHALATION) DAILY
Qty: 1 EACH | Refills: 0 | Status: SHIPPED
Start: 2021-05-26 | End: 2021-05-26

## 2021-05-26 RX ORDER — FLUTICASONE FUROATE, UMECLIDINIUM BROMIDE AND VILANTEROL TRIFENATATE 200; 62.5; 25 UG/1; UG/1; UG/1
1 POWDER RESPIRATORY (INHALATION) DAILY
Qty: 1 EACH | Refills: 0 | Status: SHIPPED | OUTPATIENT
Start: 2021-05-26 | End: 2021-05-26

## 2021-05-26 NOTE — TELEPHONE ENCOUNTER
----- Message from Aydin Teresa MD sent at 5/25/2021 10:05 PM CDT -----  Results reviewed. Tests show no significant abnormalities. +prediabetes, stable. Have her only take kcl supplement MWF. Repea bmp ordered for 1 week. Please inform patient.

## 2021-05-26 NOTE — TELEPHONE ENCOUNTER
Notified the patient of the below lab results and orders. Patient verbalized understanding. Patient states that she will take the potassium supplement on MWF at the same time as furosemide (also MWF).  Patient states that she will check BMP in 1 week at lab

## 2021-05-26 NOTE — TELEPHONE ENCOUNTER
[address] [place] [address] patient assistance program completed. Patient signed application. Prescription for Trelegy Ellipta printed and signed by PCP. Faxed to [address] [place] [address] Patient Assistance Program at [phone]. Received confirmation fax. Application sent to scan.

## 2021-05-26 NOTE — TELEPHONE ENCOUNTER
Notified the patient of the below response by the PCP. Patient verbalized understanding. Patient states that she will call Dr. Elida Gaviria' office tomorrow.

## 2021-05-27 ENCOUNTER — TELEPHONE (OUTPATIENT)
Dept: CARDIOLOGY | Age: 81
End: 2021-05-27

## 2021-05-27 NOTE — TELEPHONE ENCOUNTER
Patient states that see spoke with Dr. Sita Yun' office. Was told that the provider will call her today to discuss medications.

## 2021-06-01 ENCOUNTER — TELEPHONE (OUTPATIENT)
Dept: FAMILY MEDICINE CLINIC | Facility: CLINIC | Age: 81
End: 2021-06-01

## 2021-06-01 NOTE — TELEPHONE ENCOUNTER
Notified the patient that the requested documentation was faxed to [address] [place] [address] patient assistance [facility]. Patient verbalized understanding.

## 2021-06-01 NOTE — TELEPHONE ENCOUNTER
Pt insisting on speaking with nurse. Pt has a \"form\" she doesn't know if  will complete or not. I tried to get further information regarding form. Pt states this form is to get her medication price reduced.  I suggested to pt that dropping it off

## 2021-06-01 NOTE — TELEPHONE ENCOUNTER
Notified the patient of the needed documentation. Patient verbalized understanding.  Patient states that she will bring a copy of the [facility] [employer] paperwork to the office so that the office can fax the required paperwork to [address] [place] [address] Patient Assistance Program.    Will

## 2021-06-01 NOTE — TELEPHONE ENCOUNTER
Spoke with Julian Ware at [employer] patient assistance program. Assistance program needs a copy of the patient's Medicare part D card and copy of [employer] paperwork that shows that the patient has spent over $600 on prescriptions. Fax number: at [phone].  Patient I

## 2021-06-01 NOTE — TELEPHONE ENCOUNTER
Patient brought in documentation from [facility] that shows the patient has spent more than $600 on prescriptions. F[facility]ed [facility] and prescription drug card to [address] [place] [address] patient assistance program at number listed below. Received confirmation fax.

## 2021-06-01 NOTE — TELEPHONE ENCOUNTER
Patient states that she received paperwork from [facility] [employer] that shows how much she has paid out of pocket for her medications this year so far.  Patient wondering if she needs to provide the information to [address] [place] [address] patient assistance program. Notified the patient th

## 2021-06-02 ENCOUNTER — LAB ENCOUNTER (OUTPATIENT)
Dept: LAB | Age: 81
End: 2021-06-02
Attending: FAMILY MEDICINE
Payer: MEDICARE

## 2021-06-02 DIAGNOSIS — I48.20 CHRONIC ATRIAL FIBRILLATION (HCC): ICD-10-CM

## 2021-06-02 DIAGNOSIS — I10 ESSENTIAL HYPERTENSION: ICD-10-CM

## 2021-06-02 DIAGNOSIS — I50.32 CHRONIC DIASTOLIC HEART FAILURE (HCC): ICD-10-CM

## 2021-06-02 LAB — INR PPP: 1.5

## 2021-06-02 PROCEDURE — 36415 COLL VENOUS BLD VENIPUNCTURE: CPT

## 2021-06-02 PROCEDURE — 80048 BASIC METABOLIC PNL TOTAL CA: CPT

## 2021-06-02 PROCEDURE — 85610 PROTHROMBIN TIME: CPT

## 2021-06-03 ENCOUNTER — TELEPHONE (OUTPATIENT)
Dept: FAMILY MEDICINE CLINIC | Facility: CLINIC | Age: 81
End: 2021-06-03

## 2021-06-03 ENCOUNTER — TELEPHONE (OUTPATIENT)
Dept: CARDIOLOGY | Age: 81
End: 2021-06-03

## 2021-06-03 ENCOUNTER — ANTI-COAG (OUTPATIENT)
Dept: CARDIOLOGY | Age: 81
End: 2021-06-03

## 2021-06-03 DIAGNOSIS — I48.0 PAF (PAROXYSMAL ATRIAL FIBRILLATION) (CMD): ICD-10-CM

## 2021-06-03 NOTE — TELEPHONE ENCOUNTER
----- Message from Sameer Arreola MD sent at 6/3/2021  2:46 PM CDT -----  Results reviewed. Please notify coumadin clinic - I do not see any encounters to adjust her warfarin dosing.

## 2021-06-03 NOTE — TELEPHONE ENCOUNTER
Called Dr. Jefry Mcneill' office and spoke with [facility] [place] [facility] [place]. She states that they did receive patient's INR results and will be contacting patient. Nothing further needed from our office.

## 2021-06-09 ENCOUNTER — LAB ENCOUNTER (OUTPATIENT)
Dept: LAB | Age: 81
End: 2021-06-09
Attending: INTERNAL MEDICINE
Payer: MEDICARE

## 2021-06-09 DIAGNOSIS — I48.20 CHRONIC ATRIAL FIBRILLATION (HCC): ICD-10-CM

## 2021-06-09 LAB — INR PPP: 1.9

## 2021-06-09 PROCEDURE — 85610 PROTHROMBIN TIME: CPT

## 2021-06-09 PROCEDURE — 36415 COLL VENOUS BLD VENIPUNCTURE: CPT

## 2021-06-10 ENCOUNTER — ANTI-COAG (OUTPATIENT)
Dept: CARDIOLOGY | Age: 81
End: 2021-06-10

## 2021-06-10 DIAGNOSIS — I48.0 PAF (PAROXYSMAL ATRIAL FIBRILLATION) (CMD): ICD-10-CM

## 2021-06-10 RX ORDER — WARFARIN SODIUM 4 MG/1
4-6 TABLET ORAL DAILY
Qty: 135 TABLET | Refills: 0 | Status: SHIPPED | OUTPATIENT
Start: 2021-06-10 | End: 2021-09-08

## 2021-06-21 ENCOUNTER — TELEPHONE (OUTPATIENT)
Dept: FAMILY MEDICINE CLINIC | Facility: CLINIC | Age: 81
End: 2021-06-21

## 2021-06-21 ENCOUNTER — OFFICE VISIT (OUTPATIENT)
Dept: FAMILY MEDICINE CLINIC | Facility: CLINIC | Age: 81
End: 2021-06-21
Payer: MEDICARE

## 2021-06-21 VITALS
TEMPERATURE: 98 F | BODY MASS INDEX: 38.71 KG/M2 | SYSTOLIC BLOOD PRESSURE: 132 MMHG | RESPIRATION RATE: 19 BRPM | WEIGHT: 192 LBS | HEART RATE: 82 BPM | OXYGEN SATURATION: 97 % | HEIGHT: 59 IN | DIASTOLIC BLOOD PRESSURE: 60 MMHG

## 2021-06-21 DIAGNOSIS — J44.9 CHRONIC OBSTRUCTIVE PULMONARY DISEASE, UNSPECIFIED COPD TYPE (HCC): Primary | ICD-10-CM

## 2021-06-21 PROCEDURE — 3075F SYST BP GE 130 - 139MM HG: CPT | Performed by: FAMILY MEDICINE

## 2021-06-21 PROCEDURE — 3008F BODY MASS INDEX DOCD: CPT | Performed by: FAMILY MEDICINE

## 2021-06-21 PROCEDURE — 3078F DIAST BP <80 MM HG: CPT | Performed by: FAMILY MEDICINE

## 2021-06-21 PROCEDURE — 99213 OFFICE O/P EST LOW 20 MIN: CPT | Performed by: FAMILY MEDICINE

## 2021-06-21 RX ORDER — BUDESONIDE, GLYCOPYRROLATE, AND FORMOTEROL FUMARATE 160; 9; 4.8 UG/1; UG/1; UG/1
AEROSOL, METERED RESPIRATORY (INHALATION)
Qty: 2 EACH | Refills: 0 | COMMUNITY
Start: 2021-06-21 | End: 2021-08-24 | Stop reason: ALTCHOICE

## 2021-06-21 NOTE — TELEPHONE ENCOUNTER
Patient applied for patient assistance through [facility] [employer] for [facility]     She has not heard anything from pharmacy, our office of [address] [place] [address]. She is having SOB for last 2-3 weeks bc she is only on spiriva.

## 2021-06-21 NOTE — TELEPHONE ENCOUNTER
Spoke with Oni Rodrigez at [employer] [facility]. Patient's file was still under \"missing information\"; however, [employer] received missing information on 6/1/2021. Oni Rodrigez states that she just moved patient's file to [facility]. \" Oni Rodrigez states to allow 24-

## 2021-06-22 NOTE — PROGRESS NOTES
[facility] Family [facility] Office Note  Chief Complaint:   Patient presents with:  Shortness Of Breath: x2-3 weeks      HPI:   This is a 79year old female coming in for  HPI  SOB   2-3 weeks   Hx of COPD, afib and CHF   States weight has been no RASH  Current Meds:  Current Outpatient Medications   Medication Sig Dispense Refill   • budeson-glycopyrrol-formoterol ([employer] AEROSPHERE) 160-9-4.8 MCG/ACT Inhalation Aerosol 2 puffs morning and 2 puffs nightly  2 samples  Lot: [id number]  Exp: 8/20/202 Multiple Vitamins-Minerals (COMPLETE DAILY/LUTEIN) Oral Tab Take 1 tablet by mouth daily. • Calcium Carb-Cholecalciferol (CALCIUM 600+D3 OR) Take 1 tablet by mouth daily.        • Omega-3 Fatty Acids (FISH OIL) 1200 MG Oral Capsule Delayed Release [place] General: Skin is warm and dry. Neurological:      General: No focal deficit present. Mental Status: She is alert and oriented to person, place, and time.    Psychiatric:         Mood and Affect: Mood normal.         Behavior: Behavior normal.

## 2021-06-23 ENCOUNTER — LAB ENCOUNTER (OUTPATIENT)
Dept: LAB | Age: 81
End: 2021-06-23
Attending: NURSE PRACTITIONER
Payer: MEDICARE

## 2021-06-23 DIAGNOSIS — I48.20 CHRONIC ATRIAL FIBRILLATION (HCC): ICD-10-CM

## 2021-06-23 LAB — INR PPP: 2.93

## 2021-06-23 PROCEDURE — 85610 PROTHROMBIN TIME: CPT

## 2021-06-23 PROCEDURE — 36415 COLL VENOUS BLD VENIPUNCTURE: CPT

## 2021-06-23 NOTE — TELEPHONE ENCOUNTER
Notified the patient of the assistance program approval and medication will arrive in 3-5 business days. Patient verbalized understanding. Answered all questions at this time.

## 2021-06-23 NOTE — TELEPHONE ENCOUNTER
Spoke with Dany at [facility],  [address] for [address] [facility] approved. Aprroval from 6/23/2021 to 12/23/2021. Medication will arrive in 3-5 business days.

## 2021-06-24 ENCOUNTER — TELEPHONE (OUTPATIENT)
Dept: CARDIOLOGY | Age: 81
End: 2021-06-24

## 2021-06-24 ENCOUNTER — ANTI-COAG (OUTPATIENT)
Dept: CARDIOLOGY | Age: 81
End: 2021-06-24

## 2021-06-24 DIAGNOSIS — I48.0 PAF (PAROXYSMAL ATRIAL FIBRILLATION) (CMD): Primary | ICD-10-CM

## 2021-06-30 ENCOUNTER — TELEPHONE (OUTPATIENT)
Dept: FAMILY MEDICINE CLINIC | Facility: CLINIC | Age: 81
End: 2021-06-30

## 2021-07-01 ENCOUNTER — TELEPHONE (OUTPATIENT)
Dept: FAMILY MEDICINE CLINIC | Facility: CLINIC | Age: 81
End: 2021-07-01

## 2021-07-01 NOTE — TELEPHONE ENCOUNTER
Patient is calling, she has new medication trelegy  and she has questions on it and if she should still take spiriva with it

## 2021-07-01 NOTE — TELEPHONE ENCOUNTER
Per OV note dated 6/21/2021, patient to stop Spiriva and stop Breztri once received Trelegy. Notified the patient to stop Spiriva and Breztri. And start Trelegy (patient received Trelegy per TE dated 6/30/2021). Patient verbalized understanding.      Pa

## 2021-07-21 ENCOUNTER — ANTI-COAG (OUTPATIENT)
Dept: CARDIOLOGY | Age: 81
End: 2021-07-21

## 2021-07-21 ENCOUNTER — TELEPHONE (OUTPATIENT)
Dept: CARDIOLOGY | Age: 81
End: 2021-07-21

## 2021-07-21 ENCOUNTER — LAB ENCOUNTER (OUTPATIENT)
Dept: LAB | Age: 81
End: 2021-07-21
Attending: INTERNAL MEDICINE
Payer: MEDICARE

## 2021-07-21 DIAGNOSIS — I48.0 PAF (PAROXYSMAL ATRIAL FIBRILLATION) (CMD): Primary | ICD-10-CM

## 2021-07-21 DIAGNOSIS — I48.20 CHRONIC ATRIAL FIBRILLATION (HCC): ICD-10-CM

## 2021-07-21 LAB
INR BLD: 2.19 (ref 0.89–1.11)
PSA SERPL DL<=0.01 NG/ML-MCNC: 24.9 SECONDS (ref 12.4–14.6)

## 2021-07-21 PROCEDURE — 85610 PROTHROMBIN TIME: CPT

## 2021-07-21 PROCEDURE — 36415 COLL VENOUS BLD VENIPUNCTURE: CPT

## 2021-07-26 ENCOUNTER — TELEPHONE (OUTPATIENT)
Dept: FAMILY MEDICINE CLINIC | Facility: CLINIC | Age: 81
End: 2021-07-26

## 2021-07-26 NOTE — TELEPHONE ENCOUNTER
----- Message from Eduarda Byrne MD sent at 7/25/2021  3:57 PM CDT -----  Results reviewed.  INR within goal. Dosing per coumadin clinic provider

## 2021-07-26 NOTE — TELEPHONE ENCOUNTER
Notified the patient of the below lab results and recommendation. Patient verbalized understanding. Patient states that she already spoke with the coumadin clinic regarding dosage. No questions voiced at this time.

## 2021-08-19 ENCOUNTER — LAB ENCOUNTER (OUTPATIENT)
Dept: LAB | Age: 81
End: 2021-08-19
Attending: INTERNAL MEDICINE
Payer: MEDICARE

## 2021-08-19 DIAGNOSIS — I48.20 CHRONIC ATRIAL FIBRILLATION (HCC): ICD-10-CM

## 2021-08-19 LAB
INR BLD: 1.76 (ref 0.89–1.11)
PSA SERPL DL<=0.01 NG/ML-MCNC: 20.9 SECONDS (ref 12.2–14.5)

## 2021-08-19 PROCEDURE — 36415 COLL VENOUS BLD VENIPUNCTURE: CPT

## 2021-08-19 PROCEDURE — 85610 PROTHROMBIN TIME: CPT

## 2021-08-20 ENCOUNTER — VIRTUAL PHONE E/M (OUTPATIENT)
Dept: FAMILY MEDICINE CLINIC | Facility: CLINIC | Age: 81
End: 2021-08-20
Payer: MEDICARE

## 2021-08-20 DIAGNOSIS — J44.1 COPD WITH ACUTE EXACERBATION (HCC): Primary | ICD-10-CM

## 2021-08-20 PROCEDURE — 99442 PHONE E/M BY PHYS 11-20 MIN: CPT | Performed by: NURSE PRACTITIONER

## 2021-08-20 RX ORDER — AMOXICILLIN AND CLAVULANATE POTASSIUM 875; 125 MG/1; MG/1
1 TABLET, FILM COATED ORAL 2 TIMES DAILY
Qty: 20 TABLET | Refills: 0 | Status: SHIPPED | OUTPATIENT
Start: 2021-08-20 | End: 2021-08-30

## 2021-08-20 RX ORDER — PREDNISONE 20 MG/1
40 TABLET ORAL DAILY
Qty: 10 TABLET | Refills: 0 | Status: SHIPPED | OUTPATIENT
Start: 2021-08-20 | End: 2021-08-25

## 2021-08-20 NOTE — PROGRESS NOTES
Telehealth outside of [employer] Verbal Consent   I conducted a telehealth visit with Cristina Bear today, 08/20/21, which was completed using two-way, real-time interactive audio communication.  This has been done in good blaise to provide con sore throat. No SOB or chest pain. No abdominal s/s. She feels this is a flare up of her COPD. Requesting abx and steroids.  States she has been given flouroquinolone (Levaquin) in the past and reportedly had issue with tendon, so she does not want this abx ELLIPTA) 200-62.5-25 MCG/INH Inhalation Aerosol Powder, Breath Activated Inhale 1 puff into the lungs daily.  60 each 5   • furosemide 20 MG Oral Tab 20 mg daily and extra 20mg Mon-Wed-Fri 120 tablet 1   • traMADol HCl 50 MG Oral Tab Take 1 tablet (50 mg to for chills, fatigue and fever. HENT: Negative for congestion, ear discharge, ear pain, postnasal drip, rhinorrhea, sinus pressure, sinus pain, sore throat and trouble swallowing. Respiratory: Positive for cough, chest tightness and wheezing.  Negative physical exam could be performed. Every conscious effort was taken to allow for sufficient and adequate time. This billing was spent on reviewing labs, medications, radiology tests and decision making.   Appropriate medical decision-making and tests are o

## 2021-08-24 ENCOUNTER — OFFICE VISIT (OUTPATIENT)
Dept: FAMILY MEDICINE CLINIC | Facility: CLINIC | Age: 81
End: 2021-08-24
Payer: MEDICARE

## 2021-08-24 VITALS
TEMPERATURE: 98 F | RESPIRATION RATE: 19 BRPM | DIASTOLIC BLOOD PRESSURE: 70 MMHG | SYSTOLIC BLOOD PRESSURE: 130 MMHG | BODY MASS INDEX: 38.1 KG/M2 | HEART RATE: 84 BPM | HEIGHT: 59 IN | WEIGHT: 189 LBS | OXYGEN SATURATION: 98 %

## 2021-08-24 DIAGNOSIS — J44.9 CHRONIC OBSTRUCTIVE PULMONARY DISEASE, UNSPECIFIED COPD TYPE (HCC): ICD-10-CM

## 2021-08-24 DIAGNOSIS — J44.1 COPD WITH ACUTE EXACERBATION (HCC): Primary | ICD-10-CM

## 2021-08-24 DIAGNOSIS — Z20.822 ENCOUNTER FOR PREPROCEDURE SCREENING LABORATORY TESTING FOR COVID-19: ICD-10-CM

## 2021-08-24 DIAGNOSIS — Z01.812 ENCOUNTER FOR PREPROCEDURE SCREENING LABORATORY TESTING FOR COVID-19: ICD-10-CM

## 2021-08-24 PROCEDURE — 96372 THER/PROPH/DIAG INJ SC/IM: CPT | Performed by: FAMILY MEDICINE

## 2021-08-24 PROCEDURE — 99214 OFFICE O/P EST MOD 30 MIN: CPT | Performed by: FAMILY MEDICINE

## 2021-08-24 PROCEDURE — 3075F SYST BP GE 130 - 139MM HG: CPT | Performed by: FAMILY MEDICINE

## 2021-08-24 PROCEDURE — 3078F DIAST BP <80 MM HG: CPT | Performed by: FAMILY MEDICINE

## 2021-08-24 PROCEDURE — 3008F BODY MASS INDEX DOCD: CPT | Performed by: FAMILY MEDICINE

## 2021-08-24 RX ORDER — PRAVASTATIN SODIUM 40 MG
40 TABLET ORAL NIGHTLY
Qty: 90 TABLET | Refills: 3 | Status: SHIPPED | OUTPATIENT
Start: 2021-08-24

## 2021-08-24 RX ORDER — LEVOTHYROXINE SODIUM 88 UG/1
88 TABLET ORAL
Qty: 90 TABLET | Refills: 1 | Status: SHIPPED | OUTPATIENT
Start: 2021-08-24

## 2021-08-24 RX ORDER — METHYLPREDNISOLONE SODIUM SUCCINATE 125 MG/2ML
125 INJECTION, POWDER, LYOPHILIZED, FOR SOLUTION INTRAMUSCULAR; INTRAVENOUS ONCE
Status: COMPLETED | OUTPATIENT
Start: 2021-08-24 | End: 2021-08-24

## 2021-08-24 RX ORDER — ALBUTEROL SULFATE 90 UG/1
2 AEROSOL, METERED RESPIRATORY (INHALATION) EVERY 6 HOURS PRN
Qty: 54 G | Refills: 2 | Status: SHIPPED | OUTPATIENT
Start: 2021-08-24

## 2021-08-24 RX ORDER — FUROSEMIDE 20 MG/1
TABLET ORAL
Qty: 120 TABLET | Refills: 1 | Status: SHIPPED | OUTPATIENT
Start: 2021-08-24

## 2021-08-24 RX ORDER — AMLODIPINE BESYLATE 5 MG/1
5 TABLET ORAL DAILY
Qty: 90 TABLET | Refills: 3 | Status: SHIPPED | OUTPATIENT
Start: 2021-08-24

## 2021-08-24 RX ADMIN — METHYLPREDNISOLONE SODIUM SUCCINATE 125 MG: 125 INJECTION, POWDER, LYOPHILIZED, FOR SOLUTION INTRAMUSCULAR; INTRAVENOUS at 09:30:00

## 2021-08-24 NOTE — PROGRESS NOTES
[facility] Family Medicine Office Note  Chief Complaint:   Patient presents with:  Medication Follow-Up      HPI:   This is a 79year old female coming in for  HPI  COPD FOLLOW UP   Acute exacerbation   Had telehealth visit 4 days ago - started o RASH  Current Meds:  Current Outpatient Medications   Medication Sig Dispense Refill   • amLODIPine 5 MG Oral Tab Take 1 tablet (5 mg total) by mouth daily. 90 tablet 3   • pravastatin 40 MG Oral Tab Take 1 tablet (40 mg total) by mouth nightly.  90 tablet by mouth daily. • Calcium Carb-Cholecalciferol (CALCIUM 600+D3 OR) Take 1 tablet by mouth daily. • Omega-3 Fatty Acids (FISH OIL) 1200 MG Oral Capsule Delayed Release Take 1 capsule by mouth daily.           Counseling given: Not Answered exacerbation (HCC)  - methylPREDNISolone Sodium Succ (Solu-MEDROL) injection 125 mg     Finish abx   Pulse ox good today   IM steroids for better control of acute exacerbation     Meds & Refills for this Visit:  Requested Prescriptions     Signed Prescript

## 2021-09-01 ENCOUNTER — TELEPHONE (OUTPATIENT)
Dept: FAMILY MEDICINE CLINIC | Facility: CLINIC | Age: 81
End: 2021-09-01

## 2021-09-01 ENCOUNTER — LAB ENCOUNTER (OUTPATIENT)
Dept: LAB | Age: 81
End: 2021-09-01
Attending: NURSE PRACTITIONER
Payer: MEDICARE

## 2021-09-01 DIAGNOSIS — I48.20 CHRONIC ATRIAL FIBRILLATION (HCC): ICD-10-CM

## 2021-09-01 LAB
INR BLD: 2.43 (ref 0.89–1.11)
PSA SERPL DL<=0.01 NG/ML-MCNC: 27 SECONDS (ref 12.2–14.5)

## 2021-09-01 PROCEDURE — 36415 COLL VENOUS BLD VENIPUNCTURE: CPT

## 2021-09-01 PROCEDURE — 85610 PROTHROMBIN TIME: CPT

## 2021-09-01 NOTE — TELEPHONE ENCOUNTER
Spoke with Chichi Quevedo at [employer] patient assistance program. Notified Chichi Quevedo of the need for refill. Chichi Quevedo states that the Trelegy will be refilled. Medication will arrive in 3-4 business days.

## 2021-09-01 NOTE — TELEPHONE ENCOUNTER
Notified the patient of the medication refill. Patient verbalized understanding. Answered all questions at this time.

## 2021-09-01 NOTE — TELEPHONE ENCOUNTER
Patient states that she is trying to get a refill on Trelegy Ellipta; however, she has not had an success with contacting [address] [place] [address] patient assistance program regarding refilling the medication. Requesting assistance from office with refilling medication.  Notifi

## 2021-09-04 ENCOUNTER — LAB ENCOUNTER (OUTPATIENT)
Dept: LAB | Age: 81
End: 2021-09-04
Attending: FAMILY MEDICINE
Payer: MEDICARE

## 2021-09-04 DIAGNOSIS — Z20.822 ENCOUNTER FOR PREPROCEDURE SCREENING LABORATORY TESTING FOR COVID-19: ICD-10-CM

## 2021-09-04 DIAGNOSIS — Z01.812 ENCOUNTER FOR PREPROCEDURE SCREENING LABORATORY TESTING FOR COVID-19: ICD-10-CM

## 2021-09-05 LAB — SARS-COV-2 RNA RESP QL NAA+PROBE: NOT DETECTED

## 2021-09-07 ENCOUNTER — RT VISIT (OUTPATIENT)
Dept: RESPIRATORY THERAPY | Facility: HOSPITAL | Age: 81
End: 2021-09-07
Attending: FAMILY MEDICINE
Payer: MEDICARE

## 2021-09-07 DIAGNOSIS — J44.9 CHRONIC OBSTRUCTIVE PULMONARY DISEASE, UNSPECIFIED COPD TYPE (HCC): ICD-10-CM

## 2021-09-07 PROCEDURE — 94726 PLETHYSMOGRAPHY LUNG VOLUMES: CPT

## 2021-09-07 PROCEDURE — 94729 DIFFUSING CAPACITY: CPT

## 2021-09-07 PROCEDURE — 94060 EVALUATION OF WHEEZING: CPT

## 2021-09-09 NOTE — PROCEDURES
Findings:  Postbronchodilator FEV1 is 0.81L, 52% predicted. Postbronchodilator FVC is 1.44L, 70% predicted. FEV1/ FVC ratio is 0.56. There is no significant bronchodilator response after   administration of albuterol.    The flow-volume loop demonstrates

## 2021-09-10 ENCOUNTER — TELEPHONE (OUTPATIENT)
Dept: FAMILY MEDICINE CLINIC | Facility: CLINIC | Age: 81
End: 2021-09-10

## 2021-09-10 DIAGNOSIS — J44.9 COPD, MODERATE (HCC): Primary | ICD-10-CM

## 2021-09-10 NOTE — TELEPHONE ENCOUNTER
----- Message from Dennie Simmers, MD sent at 9/10/2021 10:04 AM CDT -----  Results reviewed. Please refer patient to pulm for moderate COPD. She does have some evidence of pulmonary htn as seen on last ECHO (cont follow up with cardiology).

## 2021-09-15 ENCOUNTER — TELEPHONE (OUTPATIENT)
Dept: FAMILY MEDICINE CLINIC | Facility: CLINIC | Age: 81
End: 2021-09-15

## 2021-09-15 NOTE — TELEPHONE ENCOUNTER
Patient called asking if we received Trelegy for her from the  assistance program, please advise and let Pt know.  Thank you

## 2021-09-16 ENCOUNTER — APPOINTMENT (OUTPATIENT)
Dept: CARDIOLOGY | Age: 81
End: 2021-09-16

## 2021-09-17 NOTE — TELEPHONE ENCOUNTER
Notified the patient of the below information and this nurse will look into if further on Monday. Patient verbalized understanding. States that she has approximately another week left of inhaler. Will radha for follow up.

## 2021-09-17 NOTE — TELEPHONE ENCOUNTER
Left message for Torey. (Rep. With [address]) to see it there are any samples that he can give the office.

## 2021-09-17 NOTE — TELEPHONE ENCOUNTER
Spoke with Kaleb Reza at [employer] Patient Assistance Program. Notified Kaleb Reza that this office has not received Trelegy. Kaleb Reza states that the patient assistance program can provide a one time replacement prescription, but it would take a few days.  Will need to resend p

## 2021-09-17 NOTE — TELEPHONE ENCOUNTER
Spoke with Minal Alegre at [employer] Patient Assistance Program. Minal Alegre states that the medication was delivered to our office on 9/8/2021. Unable to locate inhalers.

## 2021-09-20 RX ORDER — FLUTICASONE FUROATE, UMECLIDINIUM BROMIDE AND VILANTEROL TRIFENATATE 200; 62.5; 25 UG/1; UG/1; UG/1
1 POWDER RESPIRATORY (INHALATION) DAILY
Qty: 60 EACH | Refills: 5 | Status: SHIPPED
Start: 2021-09-20

## 2021-09-20 NOTE — TELEPHONE ENCOUNTER
Rx for Trelegy printed and faxed to number below. Received confirmation fax. Will radha for F/U to make sure that [facility] received order and are processing order.

## 2021-09-20 NOTE — TELEPHONE ENCOUNTER
Spoke with [facility] at [employer] [facility]. [facility] confirmed [employer] [facility] received order for Trelegy.  [facility] states that order will be sent to pharmacy tomorrow and the \"exception\" team will have to review the replacement medicati

## 2021-09-21 ENCOUNTER — TELEPHONE (OUTPATIENT)
Dept: FAMILY MEDICINE CLINIC | Facility: CLINIC | Age: 81
End: 2021-09-21

## 2021-09-21 ENCOUNTER — OFFICE VISIT (OUTPATIENT)
Dept: FAMILY MEDICINE CLINIC | Facility: CLINIC | Age: 81
End: 2021-09-21
Payer: MEDICARE

## 2021-09-21 ENCOUNTER — LAB ENCOUNTER (OUTPATIENT)
Dept: LAB | Age: 81
End: 2021-09-21
Attending: INTERNAL MEDICINE
Payer: MEDICARE

## 2021-09-21 VITALS
BODY MASS INDEX: 38.71 KG/M2 | SYSTOLIC BLOOD PRESSURE: 158 MMHG | DIASTOLIC BLOOD PRESSURE: 74 MMHG | RESPIRATION RATE: 21 BRPM | HEART RATE: 80 BPM | WEIGHT: 192 LBS | OXYGEN SATURATION: 95 % | TEMPERATURE: 98 F | HEIGHT: 59 IN

## 2021-09-21 DIAGNOSIS — Z23 NEED FOR VACCINATION: ICD-10-CM

## 2021-09-21 DIAGNOSIS — R73.03 PREDIABETES: ICD-10-CM

## 2021-09-21 DIAGNOSIS — I10 ESSENTIAL HYPERTENSION, MALIGNANT: ICD-10-CM

## 2021-09-21 DIAGNOSIS — R06.02 SHORTNESS OF BREATH: ICD-10-CM

## 2021-09-21 DIAGNOSIS — I25.10 CORONARY ATHEROSCLEROSIS OF NATIVE CORONARY ARTERY: Primary | ICD-10-CM

## 2021-09-21 DIAGNOSIS — E78.00 PURE HYPERCHOLESTEROLEMIA: ICD-10-CM

## 2021-09-21 DIAGNOSIS — J44.9 CHRONIC OBSTRUCTIVE PULMONARY DISEASE, UNSPECIFIED COPD TYPE (HCC): Primary | ICD-10-CM

## 2021-09-21 LAB
ALBUMIN SERPL-MCNC: 3.1 G/DL (ref 3.4–5)
ALBUMIN/GLOB SERPL: 0.9 {RATIO} (ref 1–2)
ALP LIVER SERPL-CCNC: 82 U/L
ALT SERPL-CCNC: 19 U/L
ANION GAP SERPL CALC-SCNC: 6 MMOL/L (ref 0–18)
AST SERPL-CCNC: 21 U/L (ref 15–37)
BASOPHILS # BLD AUTO: 0.04 X10(3) UL (ref 0–0.2)
BASOPHILS NFR BLD AUTO: 0.6 %
BILIRUB SERPL-MCNC: 0.4 MG/DL (ref 0.1–2)
BUN BLD-MCNC: 27 MG/DL (ref 7–18)
CALCIUM BLD-MCNC: 8.8 MG/DL (ref 8.5–10.1)
CHLORIDE SERPL-SCNC: 111 MMOL/L (ref 98–112)
CHOLEST SERPL-MCNC: 210 MG/DL (ref ?–200)
CO2 SERPL-SCNC: 27 MMOL/L (ref 21–32)
CREAT BLD-MCNC: 1.22 MG/DL
EOSINOPHIL # BLD AUTO: 0.32 X10(3) UL (ref 0–0.7)
EOSINOPHIL NFR BLD AUTO: 4.5 %
ERYTHROCYTE [DISTWIDTH] IN BLOOD BY AUTOMATED COUNT: 13.2 %
GLOBULIN PLAS-MCNC: 3.6 G/DL (ref 2.8–4.4)
GLUCOSE BLD-MCNC: 96 MG/DL (ref 70–99)
HCT VFR BLD AUTO: 42.1 %
HDLC SERPL-MCNC: 53 MG/DL (ref 40–59)
HGB BLD-MCNC: 12.8 G/DL
IMM GRANULOCYTES # BLD AUTO: 0.01 X10(3) UL (ref 0–1)
IMM GRANULOCYTES NFR BLD: 0.1 %
LDLC SERPL CALC-MCNC: 136 MG/DL (ref ?–100)
LYMPHOCYTES # BLD AUTO: 2.29 X10(3) UL (ref 1–4)
LYMPHOCYTES NFR BLD AUTO: 32.1 %
MCH RBC QN AUTO: 28.9 PG (ref 26–34)
MCHC RBC AUTO-ENTMCNC: 30.4 G/DL (ref 31–37)
MCV RBC AUTO: 95 FL
MONOCYTES # BLD AUTO: 0.8 X10(3) UL (ref 0.1–1)
MONOCYTES NFR BLD AUTO: 11.2 %
NEUTROPHILS # BLD AUTO: 3.67 X10 (3) UL (ref 1.5–7.7)
NEUTROPHILS # BLD AUTO: 3.67 X10(3) UL (ref 1.5–7.7)
NEUTROPHILS NFR BLD AUTO: 51.5 %
NONHDLC SERPL-MCNC: 157 MG/DL (ref ?–130)
NT-PROBNP SERPL-MCNC: 339 PG/ML (ref ?–450)
OSMOLALITY SERPL CALC.SUM OF ELEC: 303 MOSM/KG (ref 275–295)
PATIENT FASTING Y/N/NP: YES
PATIENT FASTING Y/N/NP: YES
PLATELET # BLD AUTO: 294 10(3)UL (ref 150–450)
POTASSIUM SERPL-SCNC: 4.1 MMOL/L (ref 3.5–5.1)
PROT SERPL-MCNC: 6.7 G/DL (ref 6.4–8.2)
RBC # BLD AUTO: 4.43 X10(6)UL
SODIUM SERPL-SCNC: 144 MMOL/L (ref 136–145)
TRIGL SERPL-MCNC: 118 MG/DL (ref 30–149)
TSI SER-ACNC: 3.93 MIU/ML (ref 0.36–3.74)
VLDLC SERPL CALC-MCNC: 22 MG/DL (ref 0–30)
WBC # BLD AUTO: 7.1 X10(3) UL (ref 4–11)

## 2021-09-21 PROCEDURE — 99213 OFFICE O/P EST LOW 20 MIN: CPT | Performed by: FAMILY MEDICINE

## 2021-09-21 PROCEDURE — 3077F SYST BP >= 140 MM HG: CPT | Performed by: FAMILY MEDICINE

## 2021-09-21 PROCEDURE — 83880 ASSAY OF NATRIURETIC PEPTIDE: CPT

## 2021-09-21 PROCEDURE — 3078F DIAST BP <80 MM HG: CPT | Performed by: FAMILY MEDICINE

## 2021-09-21 PROCEDURE — 36415 COLL VENOUS BLD VENIPUNCTURE: CPT

## 2021-09-21 PROCEDURE — 3008F BODY MASS INDEX DOCD: CPT | Performed by: FAMILY MEDICINE

## 2021-09-21 PROCEDURE — 80061 LIPID PANEL: CPT

## 2021-09-21 PROCEDURE — 90662 IIV NO PRSV INCREASED AG IM: CPT | Performed by: FAMILY MEDICINE

## 2021-09-21 PROCEDURE — 84443 ASSAY THYROID STIM HORMONE: CPT

## 2021-09-21 PROCEDURE — 80053 COMPREHEN METABOLIC PANEL: CPT

## 2021-09-21 PROCEDURE — 85025 COMPLETE CBC W/AUTO DIFF WBC: CPT

## 2021-09-21 PROCEDURE — G0008 ADMIN INFLUENZA VIRUS VAC: HCPCS | Performed by: FAMILY MEDICINE

## 2021-09-21 NOTE — TELEPHONE ENCOUNTER
Called and spoke to patient informed her we received medications. Medications have been placed at the , she stated she will come in tomorrow morning.

## 2021-09-21 NOTE — PROGRESS NOTES
Subjective:   Zaida Knutson is a 79year old female who presents for COPD (f/u)     COPD follow up   States it was improving - not having as much productive cough since starting trelegy   Did not get trelegy rx from [facility] - has had to use rescue inh 0   • Tiotropium Bromide Monohydrate (SPIRIVA RESPIMAT) 1.25 MCG/ACT Inhalation Aero Soln Inhale 2 puffs into the lungs daily.  12 g 3   • albuterol sulfate (2.5 MG/3ML) 0.083% Inhalation Nebu Soln Take 3 mL (2.5 mg total) by nebulization every 4 (four) judith Appearance: She is not diaphoretic. Cardiovascular:      Rate and Rhythm: Normal rate and regular rhythm. Pulmonary:      Effort: Pulmonary effort is normal. No respiratory distress. Breath sounds: No stridor. No wheezing.    Neurological:      Men

## 2021-09-28 ENCOUNTER — TELEPHONE (OUTPATIENT)
Dept: FAMILY MEDICINE CLINIC | Facility: CLINIC | Age: 81
End: 2021-09-28

## 2021-09-28 NOTE — TELEPHONE ENCOUNTER
Spoke to pt and her Trelegy Ellipta inhalers are in. She states she will     3 month supply.   Lot#[id number]  EXP 2023

## 2021-10-25 ENCOUNTER — HOSPITAL ENCOUNTER (OUTPATIENT)
Dept: LAB | Facility: HOSPITAL | Age: 81
Discharge: HOME | End: 2021-10-25
Attending: INTERNAL MEDICINE
Payer: MEDICARE

## 2021-10-25 PROCEDURE — 85610 PROTHROMBIN TIME: CPT | Performed by: INTERNAL MEDICINE

## 2021-11-26 ENCOUNTER — LAB ENCOUNTER (OUTPATIENT)
Dept: LAB | Age: 81
End: 2021-11-26
Attending: INTERNAL MEDICINE
Payer: MEDICARE

## 2021-11-26 DIAGNOSIS — I48.20 CHRONIC ATRIAL FIBRILLATION (HCC): ICD-10-CM

## 2021-11-26 PROCEDURE — 36415 COLL VENOUS BLD VENIPUNCTURE: CPT

## 2021-11-26 PROCEDURE — 85610 PROTHROMBIN TIME: CPT

## 2021-12-10 ENCOUNTER — LAB ENCOUNTER (OUTPATIENT)
Dept: LAB | Age: 81
End: 2021-12-10
Attending: INTERNAL MEDICINE
Payer: MEDICARE

## 2021-12-10 DIAGNOSIS — I48.20 CHRONIC ATRIAL FIBRILLATION (HCC): ICD-10-CM

## 2021-12-10 DIAGNOSIS — R73.03 PREDIABETES: ICD-10-CM

## 2021-12-10 PROCEDURE — 36415 COLL VENOUS BLD VENIPUNCTURE: CPT

## 2021-12-10 PROCEDURE — 85610 PROTHROMBIN TIME: CPT

## 2021-12-10 PROCEDURE — 83036 HEMOGLOBIN GLYCOSYLATED A1C: CPT

## 2021-12-21 ENCOUNTER — OFFICE VISIT (OUTPATIENT)
Dept: FAMILY MEDICINE CLINIC | Facility: CLINIC | Age: 81
End: 2021-12-21
Payer: MEDICARE

## 2021-12-21 VITALS
HEART RATE: 60 BPM | RESPIRATION RATE: 16 BRPM | OXYGEN SATURATION: 96 % | SYSTOLIC BLOOD PRESSURE: 130 MMHG | BODY MASS INDEX: 38.71 KG/M2 | DIASTOLIC BLOOD PRESSURE: 60 MMHG | WEIGHT: 192 LBS | TEMPERATURE: 98 F | HEIGHT: 59 IN

## 2021-12-21 DIAGNOSIS — J44.9 CHRONIC OBSTRUCTIVE PULMONARY DISEASE, UNSPECIFIED COPD TYPE (HCC): ICD-10-CM

## 2021-12-21 DIAGNOSIS — M51.36 DDD (DEGENERATIVE DISC DISEASE), LUMBAR: ICD-10-CM

## 2021-12-21 DIAGNOSIS — M47.816 ARTHRITIS, LUMBAR SPINE: Primary | ICD-10-CM

## 2021-12-21 PROCEDURE — 99213 OFFICE O/P EST LOW 20 MIN: CPT | Performed by: FAMILY MEDICINE

## 2021-12-21 PROCEDURE — 3078F DIAST BP <80 MM HG: CPT | Performed by: FAMILY MEDICINE

## 2021-12-21 PROCEDURE — 3075F SYST BP GE 130 - 139MM HG: CPT | Performed by: FAMILY MEDICINE

## 2021-12-21 PROCEDURE — 3008F BODY MASS INDEX DOCD: CPT | Performed by: FAMILY MEDICINE

## 2021-12-21 RX ORDER — TIOTROPIUM BROMIDE INHALATION SPRAY 1.56 UG/1
2 SPRAY, METERED RESPIRATORY (INHALATION) DAILY
Qty: 12 G | Refills: 3 | Status: CANCELLED | OUTPATIENT
Start: 2021-12-21

## 2021-12-21 RX ORDER — FLUTICASONE FUROATE, UMECLIDINIUM BROMIDE AND VILANTEROL TRIFENATATE 200; 62.5; 25 UG/1; UG/1; UG/1
1 POWDER RESPIRATORY (INHALATION) DAILY
Qty: 60 EACH | Refills: 5 | Status: CANCELLED | OUTPATIENT
Start: 2021-12-21

## 2021-12-21 NOTE — PROGRESS NOTES
Subjective:   Paz Centeno is a 79year old female who presents for Low Back Pain (f/u)     Back pain   Worse on right side   Worse when walking and with back extension   States she feels like she has to lean forward to relieve pain   No posterior leg pain Inhale 2 puffs into the lungs daily. 12 g 3   • albuterol sulfate (2.5 MG/3ML) 0.083% Inhalation Nebu Soln Take 3 mL (2.5 mg total) by nebulization every 4 (four) hours while awake.  2 Box 0   • amiodarone HCl 200 MG Oral Tab Take 1 tablet (200 mg total) by visit:    Arthritis, lumbar spine  -     XR LUMBAR SPINE (MIN 4 VIEWS) (CPT=72110); Future   Cont tramadol prn     DDD (degenerative disc disease), lumbar  -     XR LUMBAR SPINE (MIN 4 VIEWS) (CPT=72110);  Future    Chronic obstructive pulmonary disease, un

## 2021-12-21 NOTE — PATIENT INSTRUCTIONS
Complete X-RAY of your back   Try heating pad   We will request rolling walker from Medicare     Continue follow up with pulmonology   Get Covid booster shot   Complete sleep study     Continue follow up with cardiology

## 2021-12-27 ENCOUNTER — LAB ENCOUNTER (OUTPATIENT)
Dept: LAB | Age: 81
End: 2021-12-27
Attending: INTERNAL MEDICINE
Payer: MEDICARE

## 2021-12-27 DIAGNOSIS — I48.20 CHRONIC ATRIAL FIBRILLATION (HCC): ICD-10-CM

## 2021-12-27 PROCEDURE — 36415 COLL VENOUS BLD VENIPUNCTURE: CPT

## 2021-12-27 PROCEDURE — 85610 PROTHROMBIN TIME: CPT

## 2022-01-03 ENCOUNTER — HOSPITAL ENCOUNTER (OUTPATIENT)
Dept: GENERAL RADIOLOGY | Age: 82
Discharge: HOME | End: 2022-01-03
Attending: FAMILY MEDICINE
Payer: MEDICARE

## 2022-01-03 DIAGNOSIS — M51.36 DDD (DEGENERATIVE DISC DISEASE), LUMBAR: ICD-10-CM

## 2022-01-03 DIAGNOSIS — M47.816 ARTHRITIS, LUMBAR SPINE: ICD-10-CM

## 2022-01-03 PROCEDURE — 72110 X-RAY EXAM L-2 SPINE 4/>VWS: CPT | Performed by: FAMILY MEDICINE

## 2022-01-05 ENCOUNTER — TELEPHONE (OUTPATIENT)
Dept: FAMILY MEDICINE CLINIC | Facility: CLINIC | Age: 82
End: 2022-01-05

## 2022-01-05 NOTE — TELEPHONE ENCOUNTER
Based on recent conversation with medicare specialists - it might be best for patient to look at getting on her own, as medicare will only cover 1 mobility device every 5 years. If she were to need a wheelchair in the next 5 years, it might not get covered.      If patient insists - please place order for rollator walker

## 2022-01-05 NOTE — TELEPHONE ENCOUNTER
Notified the patient of the below response by the provider. Patient verbalized understanding. States that she will pay out of pocket for the walker. Also notified the patient that assist program for Trelegy has . Will have to pay out of pocket 600 for medications before she is eligible again. Patient states that she does not pay for the rest of her medications because they are all tier 1 so that will be an issue. Currently has 3 Trelegy inhalers. Requesting to have an alternative when she completes the last inhaler. Please advise. Thank you.

## 2022-01-05 NOTE — TELEPHONE ENCOUNTER
Spoke with Shelly Ku at [address] [place] [address] patient assistance program. Eligibility ended on 12/31/2021. Patient needs to spend $600 out of pocket on medications then can reapply for assistance through [address] [place] [address]. Please advise on DME referral. Thank you.

## 2022-01-05 NOTE — TELEPHONE ENCOUNTER
Patient has some questions regarding therapy that was recommened and other questions from her last visit

## 2022-01-05 NOTE — TELEPHONE ENCOUNTER
Answered patient's questions regarding xray results from 1/3/2021. Patient inquiring about PT. States that she does not want to complete PT at this time due to weather (too cold and snow). Will look into doing PT in the summer. Inquiring about status of walker. Notified the patient that there is no order in the system at this time. Please advise on order for walker. Ok to place DME referral?     Also, patient states that she tried to contact [address] [place] [address] patient assistance program for refill on Trelegy. Unable to get a hold of representative to refill medication. Requesting this nurse to help with refill. Notified the patient that this nurse will contact [address] [place] [address] patient assistance program for refill.

## 2022-01-27 ENCOUNTER — LAB ENCOUNTER (OUTPATIENT)
Dept: LAB | Age: 82
End: 2022-01-27
Attending: NURSE PRACTITIONER
Payer: MEDICARE

## 2022-01-27 DIAGNOSIS — I48.20 CHRONIC ATRIAL FIBRILLATION (HCC): ICD-10-CM

## 2022-01-27 LAB
INR BLD: 2.06 (ref 0.8–1.2)
PROTHROMBIN TIME: 23.3 SECONDS (ref 11.6–14.8)

## 2022-01-27 PROCEDURE — 85610 PROTHROMBIN TIME: CPT

## 2022-01-27 PROCEDURE — 36415 COLL VENOUS BLD VENIPUNCTURE: CPT

## 2022-02-14 ENCOUNTER — TELEPHONE (OUTPATIENT)
Dept: FAMILY MEDICINE CLINIC | Facility: CLINIC | Age: 82
End: 2022-02-14

## 2022-02-21 RX ORDER — LEVOTHYROXINE SODIUM 88 UG/1
TABLET ORAL
Qty: 90 TABLET | Refills: 1 | Status: SHIPPED | OUTPATIENT
Start: 2022-02-21

## 2022-02-24 ENCOUNTER — LAB ENCOUNTER (OUTPATIENT)
Dept: LAB | Age: 82
End: 2022-02-24
Attending: INTERNAL MEDICINE
Payer: MEDICARE

## 2022-02-24 DIAGNOSIS — I48.20 CHRONIC ATRIAL FIBRILLATION (HCC): ICD-10-CM

## 2022-02-24 LAB
INR BLD: 1.85 (ref 0.8–1.2)
PROTHROMBIN TIME: 21.4 SECONDS (ref 11.6–14.8)

## 2022-02-24 PROCEDURE — 85610 PROTHROMBIN TIME: CPT

## 2022-02-24 PROCEDURE — 36415 COLL VENOUS BLD VENIPUNCTURE: CPT

## 2022-02-25 ENCOUNTER — LAB ENCOUNTER (OUTPATIENT)
Dept: LAB | Age: 82
End: 2022-02-25
Attending: INTERNAL MEDICINE
Payer: MEDICARE

## 2022-02-25 DIAGNOSIS — R06.02 SHORTNESS OF BREATH: ICD-10-CM

## 2022-02-25 DIAGNOSIS — I10 ESSENTIAL HYPERTENSION, MALIGNANT: ICD-10-CM

## 2022-02-25 DIAGNOSIS — I25.10 CORONARY ATHEROSCLEROSIS OF NATIVE CORONARY ARTERY: Primary | ICD-10-CM

## 2022-02-25 DIAGNOSIS — E78.00 PURE HYPERCHOLESTEROLEMIA: ICD-10-CM

## 2022-02-25 LAB
ALBUMIN SERPL-MCNC: 3.8 G/DL (ref 3.4–5)
ALBUMIN/GLOB SERPL: 1.3 {RATIO} (ref 1–2)
ALP LIVER SERPL-CCNC: 83 U/L
ALT SERPL-CCNC: 20 U/L
ANION GAP SERPL CALC-SCNC: 6 MMOL/L (ref 0–18)
AST SERPL-CCNC: 19 U/L (ref 15–37)
BASOPHILS NFR BLD AUTO: 0.5 %
BILIRUB SERPL-MCNC: 0.4 MG/DL (ref 0.1–2)
BUN BLD-MCNC: 28 MG/DL (ref 7–18)
CALCIUM BLD-MCNC: 9.7 MG/DL (ref 8.5–10.1)
CHLORIDE SERPL-SCNC: 106 MMOL/L (ref 98–112)
CHOLEST SERPL-MCNC: 208 MG/DL (ref ?–200)
CO2 SERPL-SCNC: 30 MMOL/L (ref 21–32)
CREAT BLD-MCNC: 1.29 MG/DL
EOSINOPHIL # BLD AUTO: 0.42 X10(3) UL (ref 0–0.7)
EOSINOPHIL NFR BLD AUTO: 3.3 %
ERYTHROCYTE [DISTWIDTH] IN BLOOD BY AUTOMATED COUNT: 12.8 %
FASTING PATIENT LIPID ANSWER: YES
FASTING STATUS PATIENT QL REPORTED: YES
GLOBULIN PLAS-MCNC: 3 G/DL (ref 2.8–4.4)
GLUCOSE BLD-MCNC: 99 MG/DL (ref 70–99)
HCT VFR BLD AUTO: 44.4 %
HDLC SERPL-MCNC: 57 MG/DL (ref 40–59)
HGB BLD-MCNC: 13.6 G/DL
IMM GRANULOCYTES # BLD AUTO: 0.05 X10(3) UL (ref 0–1)
IMM GRANULOCYTES NFR BLD: 0.4 %
LDLC SERPL CALC-MCNC: 124 MG/DL (ref ?–100)
LYMPHOCYTES # BLD AUTO: 4.59 X10(3) UL (ref 1–4)
LYMPHOCYTES NFR BLD AUTO: 35.8 %
MCH RBC QN AUTO: 28.8 PG (ref 26–34)
MCHC RBC AUTO-ENTMCNC: 30.6 G/DL (ref 31–37)
MCV RBC AUTO: 94.1 FL
MONOCYTES # BLD AUTO: 1.13 X10(3) UL (ref 0.1–1)
MONOCYTES NFR BLD AUTO: 8.8 %
NEUTROPHILS # BLD AUTO: 6.56 X10 (3) UL (ref 1.5–7.7)
NEUTROPHILS # BLD AUTO: 6.56 X10(3) UL (ref 1.5–7.7)
NEUTROPHILS NFR BLD AUTO: 51.2 %
NONHDLC SERPL-MCNC: 151 MG/DL (ref ?–130)
NT-PROBNP SERPL-MCNC: 428 PG/ML (ref ?–450)
OSMOLALITY SERPL CALC.SUM OF ELEC: 300 MOSM/KG (ref 275–295)
PLATELET # BLD AUTO: 317 10(3)UL (ref 150–450)
POTASSIUM SERPL-SCNC: 4.8 MMOL/L (ref 3.5–5.1)
PROT SERPL-MCNC: 6.8 G/DL (ref 6.4–8.2)
RBC # BLD AUTO: 4.72 X10(6)UL
SODIUM SERPL-SCNC: 142 MMOL/L (ref 136–145)
TRIGL SERPL-MCNC: 156 MG/DL (ref 30–149)
TSI SER-ACNC: 4.05 MIU/ML (ref 0.36–3.74)
VLDLC SERPL CALC-MCNC: 28 MG/DL (ref 0–30)
WBC # BLD AUTO: 12.8 X10(3) UL (ref 4–11)

## 2022-02-25 PROCEDURE — 85025 COMPLETE CBC W/AUTO DIFF WBC: CPT

## 2022-02-25 PROCEDURE — 80061 LIPID PANEL: CPT

## 2022-02-25 PROCEDURE — 80053 COMPREHEN METABOLIC PANEL: CPT

## 2022-02-25 PROCEDURE — 36415 COLL VENOUS BLD VENIPUNCTURE: CPT

## 2022-02-25 PROCEDURE — 84443 ASSAY THYROID STIM HORMONE: CPT

## 2022-02-25 PROCEDURE — 83880 ASSAY OF NATRIURETIC PEPTIDE: CPT

## 2022-03-03 ENCOUNTER — LAB ENCOUNTER (OUTPATIENT)
Dept: LAB | Age: 82
End: 2022-03-03
Attending: INTERNAL MEDICINE
Payer: MEDICARE

## 2022-03-03 DIAGNOSIS — I48.20 CHRONIC ATRIAL FIBRILLATION (HCC): ICD-10-CM

## 2022-03-03 LAB
INR BLD: 2.41 (ref 0.8–1.2)
PROTHROMBIN TIME: 26.4 SECONDS (ref 11.6–14.8)

## 2022-03-03 PROCEDURE — 85610 PROTHROMBIN TIME: CPT

## 2022-03-03 PROCEDURE — 36415 COLL VENOUS BLD VENIPUNCTURE: CPT

## 2022-03-16 ENCOUNTER — OFFICE VISIT (OUTPATIENT)
Dept: FAMILY MEDICINE CLINIC | Facility: CLINIC | Age: 82
End: 2022-03-16
Payer: MEDICARE

## 2022-03-16 VITALS
TEMPERATURE: 98 F | WEIGHT: 193 LBS | SYSTOLIC BLOOD PRESSURE: 170 MMHG | HEART RATE: 80 BPM | BODY MASS INDEX: 38.91 KG/M2 | OXYGEN SATURATION: 97 % | RESPIRATION RATE: 17 BRPM | HEIGHT: 59 IN | DIASTOLIC BLOOD PRESSURE: 68 MMHG

## 2022-03-16 DIAGNOSIS — I50.32 CHRONIC DIASTOLIC HEART FAILURE (HCC): ICD-10-CM

## 2022-03-16 DIAGNOSIS — I48.20 CHRONIC ATRIAL FIBRILLATION (HCC): ICD-10-CM

## 2022-03-16 DIAGNOSIS — Z79.01 CURRENT USE OF LONG TERM ANTICOAGULATION: ICD-10-CM

## 2022-03-16 DIAGNOSIS — E03.9 ACQUIRED HYPOTHYROIDISM: ICD-10-CM

## 2022-03-16 DIAGNOSIS — Z23 NEED FOR PNEUMOCOCCAL VACCINATION: ICD-10-CM

## 2022-03-16 DIAGNOSIS — R73.03 PREDIABETES: ICD-10-CM

## 2022-03-16 DIAGNOSIS — N18.9 ANEMIA DUE TO CHRONIC KIDNEY DISEASE, UNSPECIFIED CKD STAGE: ICD-10-CM

## 2022-03-16 DIAGNOSIS — N18.4 CKD (CHRONIC KIDNEY DISEASE) STAGE 4, GFR 15-29 ML/MIN (HCC): ICD-10-CM

## 2022-03-16 DIAGNOSIS — J44.9 CHRONIC OBSTRUCTIVE PULMONARY DISEASE, UNSPECIFIED COPD TYPE (HCC): ICD-10-CM

## 2022-03-16 DIAGNOSIS — I77.9 BILATERAL CAROTID ARTERY DISEASE, UNSPECIFIED TYPE (HCC): ICD-10-CM

## 2022-03-16 DIAGNOSIS — E66.01 SEVERE OBESITY (BMI 35.0-39.9) WITH COMORBIDITY (HCC): ICD-10-CM

## 2022-03-16 DIAGNOSIS — Z00.00 ENCOUNTER FOR ANNUAL HEALTH EXAMINATION: Primary | ICD-10-CM

## 2022-03-16 DIAGNOSIS — D63.1 ANEMIA DUE TO CHRONIC KIDNEY DISEASE, UNSPECIFIED CKD STAGE: ICD-10-CM

## 2022-03-16 DIAGNOSIS — I10 ESSENTIAL HYPERTENSION: ICD-10-CM

## 2022-03-16 DIAGNOSIS — E78.2 MIXED HYPERLIPIDEMIA: ICD-10-CM

## 2022-03-16 DIAGNOSIS — Z78.0 MENOPAUSE: ICD-10-CM

## 2022-03-16 PROCEDURE — 3008F BODY MASS INDEX DOCD: CPT | Performed by: FAMILY MEDICINE

## 2022-03-16 PROCEDURE — 99397 PER PM REEVAL EST PAT 65+ YR: CPT | Performed by: FAMILY MEDICINE

## 2022-03-16 PROCEDURE — 90732 PPSV23 VACC 2 YRS+ SUBQ/IM: CPT | Performed by: FAMILY MEDICINE

## 2022-03-16 PROCEDURE — 96160 PT-FOCUSED HLTH RISK ASSMT: CPT | Performed by: FAMILY MEDICINE

## 2022-03-16 PROCEDURE — 3077F SYST BP >= 140 MM HG: CPT | Performed by: FAMILY MEDICINE

## 2022-03-16 PROCEDURE — G0009 ADMIN PNEUMOCOCCAL VACCINE: HCPCS | Performed by: FAMILY MEDICINE

## 2022-03-16 PROCEDURE — G0439 PPPS, SUBSEQ VISIT: HCPCS | Performed by: FAMILY MEDICINE

## 2022-03-16 PROCEDURE — 3078F DIAST BP <80 MM HG: CPT | Performed by: FAMILY MEDICINE

## 2022-03-16 RX ORDER — FLUTICASONE FUROATE, UMECLIDINIUM BROMIDE AND VILANTEROL TRIFENATATE 200; 62.5; 25 UG/1; UG/1; UG/1
1 POWDER RESPIRATORY (INHALATION) DAILY
Qty: 2 EACH | Refills: 0 | COMMUNITY
Start: 2022-03-16

## 2022-03-16 RX ORDER — LEVOTHYROXINE SODIUM 0.1 MG/1
100 TABLET ORAL DAILY
Qty: 90 TABLET | Refills: 3 | Status: SHIPPED | OUTPATIENT
Start: 2022-03-16 | End: 2023-03-11

## 2022-03-17 ENCOUNTER — LAB ENCOUNTER (OUTPATIENT)
Dept: LAB | Age: 82
End: 2022-03-17
Attending: INTERNAL MEDICINE
Payer: MEDICARE

## 2022-03-17 DIAGNOSIS — I48.20 CHRONIC ATRIAL FIBRILLATION (HCC): ICD-10-CM

## 2022-03-17 LAB
INR BLD: 2.53 (ref 0.8–1.2)
PROTHROMBIN TIME: 27.4 SECONDS (ref 11.6–14.8)

## 2022-03-17 PROCEDURE — 85610 PROTHROMBIN TIME: CPT

## 2022-03-17 PROCEDURE — 36415 COLL VENOUS BLD VENIPUNCTURE: CPT

## 2022-04-04 ENCOUNTER — HOSPITAL ENCOUNTER (OUTPATIENT)
Dept: BONE DENSITY | Age: 82
Discharge: HOME | End: 2022-04-04
Attending: FAMILY MEDICINE
Payer: MEDICARE

## 2022-04-04 DIAGNOSIS — Z78.0 MENOPAUSE: ICD-10-CM

## 2022-04-04 PROCEDURE — 77080 DXA BONE DENSITY AXIAL: CPT | Performed by: FAMILY MEDICINE

## 2022-04-07 ENCOUNTER — TELEPHONE (OUTPATIENT)
Dept: FAMILY MEDICINE CLINIC | Facility: CLINIC | Age: 82
End: 2022-04-07

## 2022-04-07 RX ORDER — ALENDRONATE SODIUM 35 MG/1
35 TABLET ORAL
Qty: 4 TABLET | Refills: 2 | Status: SHIPPED | OUTPATIENT
Start: 2022-04-07

## 2022-04-07 NOTE — TELEPHONE ENCOUNTER
Pt states the nurse at her living facility took her bp today and the first reading was 147/83 the second one was 140/83.

## 2022-04-07 NOTE — TELEPHONE ENCOUNTER
Pt notified of below orders. Pt is concerned about increasing Amlodipine as she already has bilat foot swelling, right > left. She is asking if she can increase water pill instead. She states she did this in the past & it helped bring down her BP also. Please advise.

## 2022-04-07 NOTE — TELEPHONE ENCOUNTER
----- Message from Mike Herman MD sent at 4/7/2022  4:10 PM CDT -----  Osteopenia - recommend fosamax 35mg weekly to prevent osteoporosis.

## 2022-04-07 NOTE — TELEPHONE ENCOUNTER
patient called, she says she is willing to try medication as long as it is not to expensive. Told her if she does not end up taking it she can also do light exercise and weight training, to which she said they are starting an exercise class where you can sit or stand to do exercise. I encouraged that for her.

## 2022-04-07 NOTE — TELEPHONE ENCOUNTER
took her bp today and the first reading was 147/83 the second one was 140/83. Spoke to patient and states that she has no other symptoms, no dizziness, floaters, chest pain, tingling. She usually runs in the 130's over 70's. Told her I would notify Dr Anabelle Bashir and we would contact her if there were any changes to her care. Otherwise, if she becomes symptomatic to contact us.

## 2022-04-07 NOTE — TELEPHONE ENCOUNTER
She can increase amlodipine to 10mg daily - but if swelling of lower legs worsens, then cut back to 5mg

## 2022-04-08 RX ORDER — AMLODIPINE BESYLATE 10 MG/1
10 TABLET ORAL DAILY
Qty: 90 TABLET | Refills: 0 | Status: SHIPPED | OUTPATIENT
Start: 2022-04-08

## 2022-04-08 NOTE — TELEPHONE ENCOUNTER
Notified the patient of the below response by the provider. Patient verbalized understanding. Agrees to take amlodipine 10mg daily. Will take two amlodipine 5mg tablets until gone then start 10mg tablet. Will also purchase new blood pressure cuff (states that current blood pressure cuff is old). Answered all questions at this time. Rx pending for amlodipine 10mg tablets. Please advise on number of refills. Thank you.

## 2022-04-13 ENCOUNTER — TELEPHONE (OUTPATIENT)
Dept: FAMILY MEDICINE CLINIC | Facility: CLINIC | Age: 82
End: 2022-04-13

## 2022-04-13 RX ORDER — HYDRALAZINE HYDROCHLORIDE 25 MG/1
25 TABLET, FILM COATED ORAL 3 TIMES DAILY
Qty: 90 TABLET | Refills: 0 | Status: SHIPPED | OUTPATIENT
Start: 2022-04-13

## 2022-04-13 NOTE — TELEPHONE ENCOUNTER
Her BP has been up before we increased levothyroxine (on 3/16/22)    Her last OV BP was 170/68. Is she having palpitations? Sweating? Diarrhea? Decreased appetite?     Start hydralazine 25mg TID    Hold if BP less than 120/60  Follow up in office  She should also call cardiologist for follow up

## 2022-04-13 NOTE — TELEPHONE ENCOUNTER
FYI: Called pt and was informed of below response from provider. Pt denies palpations, sweating, diarrhea and decreased appetite. Explained to pt to keep a BP log and to bring to f/u appt on 04/26/22. Pharmacy confirmed and Rx sent. Reinforced to hold BP if less than 120/60. Pt states she will also call cardiologist. All questions answered and pt verbalized understanding.

## 2022-04-13 NOTE — TELEPHONE ENCOUNTER
Pt called and states her BP is elevated the past couple days: 160/76, 158/76. Per pt, she usually takes Amlodipine 5 mg but for the past 2 days she has been taking Amlodipine 10 mg. Denies dizziness, chest pain, headache, and blurred. Pt asking if levothyroxine can increase her BP. Asked how pt is taking levothyroxine who states she does take medication on an empty stomach with a full glass of water and without other medications. Pt adamant that her BP is high d/t the increase dosage of levothyroxine 100 mg. Please advise. Thank you.    LOV: 03/16/22

## 2022-04-13 NOTE — TELEPHONE ENCOUNTER
Patient feels ok, but is very worried about her BP in the last hour     158/76, 160/76, 156/75     She wanted to speak to dr Frankie Mitchell, I declined that, then she asked to hold, and I told her I can get a nurse for her and forward a message

## 2022-04-18 ENCOUNTER — LAB ENCOUNTER (OUTPATIENT)
Dept: LAB | Age: 82
End: 2022-04-18
Attending: INTERNAL MEDICINE
Payer: MEDICARE

## 2022-04-18 DIAGNOSIS — Z79.01 CHRONIC ANTICOAGULATION: Primary | ICD-10-CM

## 2022-04-18 DIAGNOSIS — I48.92 ATRIAL FLUTTER (HCC): ICD-10-CM

## 2022-04-18 DIAGNOSIS — I48.0 PAF (PAROXYSMAL ATRIAL FIBRILLATION) (HCC): ICD-10-CM

## 2022-04-18 LAB
INR BLD: 2.77 (ref 0.8–1.2)
PROTHROMBIN TIME: 29.5 SECONDS (ref 11.6–14.8)

## 2022-04-18 PROCEDURE — 36415 COLL VENOUS BLD VENIPUNCTURE: CPT

## 2022-04-18 PROCEDURE — 85610 PROTHROMBIN TIME: CPT

## 2022-04-25 ENCOUNTER — LAB ENCOUNTER (OUTPATIENT)
Dept: LAB | Age: 82
End: 2022-04-25
Attending: INTERNAL MEDICINE
Payer: MEDICARE

## 2022-04-25 DIAGNOSIS — I48.92 ATRIAL FLUTTER (HCC): ICD-10-CM

## 2022-04-25 DIAGNOSIS — I48.0 PAF (PAROXYSMAL ATRIAL FIBRILLATION) (HCC): ICD-10-CM

## 2022-04-25 DIAGNOSIS — Z79.01 CHRONIC ANTICOAGULATION: ICD-10-CM

## 2022-04-25 LAB
INR BLD: 2.59 (ref 0.8–1.2)
PROTHROMBIN TIME: 27.9 SECONDS (ref 11.6–14.8)

## 2022-04-25 PROCEDURE — 85610 PROTHROMBIN TIME: CPT

## 2022-04-25 PROCEDURE — 36415 COLL VENOUS BLD VENIPUNCTURE: CPT

## 2022-04-26 ENCOUNTER — OFFICE VISIT (OUTPATIENT)
Dept: FAMILY MEDICINE CLINIC | Facility: CLINIC | Age: 82
End: 2022-04-26
Payer: MEDICARE

## 2022-04-26 ENCOUNTER — HOSPITAL ENCOUNTER (OUTPATIENT)
Dept: GENERAL RADIOLOGY | Age: 82
Discharge: HOME | End: 2022-04-26
Attending: FAMILY MEDICINE
Payer: MEDICARE

## 2022-04-26 ENCOUNTER — TELEPHONE (OUTPATIENT)
Dept: FAMILY MEDICINE CLINIC | Facility: CLINIC | Age: 82
End: 2022-04-26

## 2022-04-26 VITALS
HEIGHT: 59 IN | HEART RATE: 90 BPM | TEMPERATURE: 98 F | RESPIRATION RATE: 21 BRPM | BODY MASS INDEX: 38.91 KG/M2 | OXYGEN SATURATION: 97 % | WEIGHT: 193 LBS | SYSTOLIC BLOOD PRESSURE: 144 MMHG | DIASTOLIC BLOOD PRESSURE: 60 MMHG

## 2022-04-26 DIAGNOSIS — R09.89 RESPIRATORY CRACKLES AT LEFT LUNG BASE: ICD-10-CM

## 2022-04-26 DIAGNOSIS — I10 ESSENTIAL HYPERTENSION: Primary | ICD-10-CM

## 2022-04-26 PROCEDURE — 3008F BODY MASS INDEX DOCD: CPT | Performed by: FAMILY MEDICINE

## 2022-04-26 PROCEDURE — 3078F DIAST BP <80 MM HG: CPT | Performed by: FAMILY MEDICINE

## 2022-04-26 PROCEDURE — 3077F SYST BP >= 140 MM HG: CPT | Performed by: FAMILY MEDICINE

## 2022-04-26 PROCEDURE — 99214 OFFICE O/P EST MOD 30 MIN: CPT | Performed by: FAMILY MEDICINE

## 2022-04-26 PROCEDURE — 71046 X-RAY EXAM CHEST 2 VIEWS: CPT | Performed by: FAMILY MEDICINE

## 2022-04-26 RX ORDER — FLUTICASONE FUROATE, UMECLIDINIUM BROMIDE AND VILANTEROL TRIFENATATE 200; 62.5; 25 UG/1; UG/1; UG/1
1 POWDER RESPIRATORY (INHALATION) DAILY
Qty: 60 EACH | Refills: 0 | COMMUNITY
Start: 2022-04-26

## 2022-04-26 NOTE — TELEPHONE ENCOUNTER
----- Message from Mauri Ferrer MD sent at 4/26/2022  2:40 PM CDT -----  Results reviewed. Stable CXR - no pneumonia. Please inform patient.

## 2022-05-13 ENCOUNTER — TELEPHONE (OUTPATIENT)
Dept: FAMILY MEDICINE CLINIC | Facility: CLINIC | Age: 82
End: 2022-05-13

## 2022-05-13 NOTE — TELEPHONE ENCOUNTER
Pt calling and states that she used to take fluticasone-umeclidin-vilant (Naomia Baas) 200-62.5-25 MCG/INH Inhalation Aerosol Powder, Breath Activated and that she was given the 100 and pt said that she does not think that it is working.  Pt wants to know what she should do or if she should be put on something else     Please advise    Thank you

## 2022-05-13 NOTE — TELEPHONE ENCOUNTER
Called the patient back for more information. Patient states that she used to get Trelegy Ellipta 200-62.5-25 mcg/inh; however, sample provided was 100-62.5-25 mcg/inh. Patient states that the 100-62.5-25 mcg/inh does not work as good. States that she is easily out of breath. Requesting to go back on 200-62.5-25 mcg/inh or to have the rx changed. Please advise. Thank you.

## 2022-05-16 ENCOUNTER — TELEPHONE (OUTPATIENT)
Dept: FAMILY MEDICINE CLINIC | Facility: CLINIC | Age: 82
End: 2022-05-16

## 2022-05-16 RX ORDER — FLUTICASONE FUROATE, UMECLIDINIUM BROMIDE AND VILANTEROL TRIFENATATE 200; 62.5; 25 UG/1; UG/1; UG/1
1 POWDER RESPIRATORY (INHALATION) DAILY
Qty: 60 EACH | Refills: 0 | Status: SHIPPED | OUTPATIENT
Start: 2022-05-16

## 2022-05-16 NOTE — TELEPHONE ENCOUNTER
Patient called, she was under the impression that her Trelegy prescription was to be sent to [facility] order pharmacy. Pt called [employer] and was told Rx is not there. Please send to [facility]. Patient would like to talk to a nurse as well.

## 2022-05-16 NOTE — TELEPHONE ENCOUNTER
[address]. Spoke with Jerry De La Torre. Jerry De La Torre states that normal shipping is within 3-5 days. Expedited shipping is 2-3 days, but will be $6.99 charge. While on the phone, also confirmed that the rx for Marcia Peterson will cost $131.00 for 3 month supply of $47.00 for 1 month supply (due to patient's deductible). Notified Jerry De La Torre to keep shipping as standard. Will call patient and see if she wants expedited shipping. If so, will call pharmacy back to change shipping from standard to expedited. Jerry De La Torre verbalized understanding.

## 2022-05-16 NOTE — TELEPHONE ENCOUNTER
Called the patient again. Notified the patient of the below conversation with [facility] pharmacy representative. Patient verbalized understanding. States that she would like to keep the shipping as standard since she has enough of the inhaler to last 6 days. Nothing further needed at this time.

## 2022-05-16 NOTE — TELEPHONE ENCOUNTER
Called the patient back. Notified the patient that there are no samples at this time. Patient verbalized understanding. Patient states that she has 6 days left of inhaler. Patient wondering if rx can be exped[facility]. Notified patient that this nurse will call [address] and see if order can be expedited. Patient verbalized understanding.

## 2022-05-16 NOTE — TELEPHONE ENCOUNTER
Spoke with the patient. Patient requesting to have rx for Irene Lopez sent to [address] (prescription originally sent to [address] [facility]). Patient also wondering if there are samples of Trelegy Ellipta. Rx sent to requested pharmacy.

## 2022-05-25 ENCOUNTER — LAB ENCOUNTER (OUTPATIENT)
Dept: LAB | Age: 82
End: 2022-05-25
Attending: INTERNAL MEDICINE
Payer: MEDICARE

## 2022-05-25 ENCOUNTER — LAB ENCOUNTER (OUTPATIENT)
Dept: LAB | Age: 82
End: 2022-05-25
Attending: FAMILY MEDICINE
Payer: MEDICARE

## 2022-05-25 DIAGNOSIS — R73.03 PREDIABETES: ICD-10-CM

## 2022-05-25 DIAGNOSIS — I48.0 PAF (PAROXYSMAL ATRIAL FIBRILLATION) (HCC): ICD-10-CM

## 2022-05-25 DIAGNOSIS — I48.92 ATRIAL FLUTTER (HCC): ICD-10-CM

## 2022-05-25 DIAGNOSIS — E03.9 ACQUIRED HYPOTHYROIDISM: ICD-10-CM

## 2022-05-25 DIAGNOSIS — Z79.01 CHRONIC ANTICOAGULATION: ICD-10-CM

## 2022-05-25 LAB
EST. AVERAGE GLUCOSE BLD GHB EST-MCNC: 126 MG/DL (ref 68–126)
HBA1C MFR BLD: 6 % (ref ?–5.7)
INR BLD: 2.73 (ref 0.8–1.2)
PROTHROMBIN TIME: 29.1 SECONDS (ref 11.6–14.8)
TSI SER-ACNC: 0.92 MIU/ML (ref 0.36–3.74)

## 2022-05-25 PROCEDURE — 85610 PROTHROMBIN TIME: CPT

## 2022-05-25 PROCEDURE — 36415 COLL VENOUS BLD VENIPUNCTURE: CPT

## 2022-05-25 PROCEDURE — 83036 HEMOGLOBIN GLYCOSYLATED A1C: CPT

## 2022-05-25 PROCEDURE — 84443 ASSAY THYROID STIM HORMONE: CPT

## 2022-05-31 ENCOUNTER — TELEPHONE (OUTPATIENT)
Dept: FAMILY MEDICINE CLINIC | Facility: CLINIC | Age: 82
End: 2022-05-31

## 2022-05-31 RX ORDER — FLUTICASONE FUROATE, UMECLIDINIUM BROMIDE AND VILANTEROL TRIFENATATE 200; 62.5; 25 UG/1; UG/1; UG/1
1 POWDER RESPIRATORY (INHALATION) DAILY
Qty: 180 EACH | Refills: 0 | Status: SHIPPED | OUTPATIENT
Start: 2022-05-31

## 2022-05-31 NOTE — TELEPHONE ENCOUNTER
Pt requesting a 3 month supply of Trelegy instead of just one month so she is not calling every month for refill. Pls advise.

## 2022-06-10 ENCOUNTER — TELEPHONE (OUTPATIENT)
Dept: FAMILY MEDICINE CLINIC | Facility: CLINIC | Age: 82
End: 2022-06-10

## 2022-06-10 NOTE — TELEPHONE ENCOUNTER
Pt states she rec'd a text message from [facility] [employer] stating her Warfarin Sodium 4 MG Oral Tab   Was denied. Pt would like to know why. Pls call pt.

## 2022-06-24 ENCOUNTER — LAB ENCOUNTER (OUTPATIENT)
Dept: LAB | Age: 82
End: 2022-06-24
Attending: INTERNAL MEDICINE
Payer: MEDICARE

## 2022-06-24 DIAGNOSIS — I48.0 PAF (PAROXYSMAL ATRIAL FIBRILLATION) (HCC): ICD-10-CM

## 2022-06-24 DIAGNOSIS — I48.92 ATRIAL FLUTTER (HCC): ICD-10-CM

## 2022-06-24 DIAGNOSIS — Z79.01 CHRONIC ANTICOAGULATION: ICD-10-CM

## 2022-06-24 LAB
INR BLD: 3.2 (ref 0.8–1.2)
PROTHROMBIN TIME: 33 SECONDS (ref 11.6–14.8)

## 2022-06-24 PROCEDURE — 85610 PROTHROMBIN TIME: CPT

## 2022-06-24 PROCEDURE — 36415 COLL VENOUS BLD VENIPUNCTURE: CPT

## 2022-07-27 ENCOUNTER — LAB ENCOUNTER (OUTPATIENT)
Dept: LAB | Age: 82
End: 2022-07-27
Attending: INTERNAL MEDICINE
Payer: MEDICARE

## 2022-07-27 DIAGNOSIS — I48.0 PAF (PAROXYSMAL ATRIAL FIBRILLATION) (HCC): ICD-10-CM

## 2022-07-27 DIAGNOSIS — I48.92 ATRIAL FLUTTER (HCC): ICD-10-CM

## 2022-07-27 DIAGNOSIS — Z79.01 CHRONIC ANTICOAGULATION: ICD-10-CM

## 2022-07-27 LAB
INR BLD: 2.54 (ref 0.8–1.2)
PROTHROMBIN TIME: 27.5 SECONDS (ref 11.6–14.8)

## 2022-07-27 PROCEDURE — 36415 COLL VENOUS BLD VENIPUNCTURE: CPT

## 2022-07-27 PROCEDURE — 85610 PROTHROMBIN TIME: CPT

## 2022-07-31 ENCOUNTER — APPOINTMENT (OUTPATIENT)
Dept: GENERAL RADIOLOGY | Facility: HOSPITAL | Age: 82
End: 2022-07-31
Attending: EMERGENCY MEDICINE
Payer: MEDICARE

## 2022-07-31 ENCOUNTER — HOSPITAL ENCOUNTER (EMERGENCY)
Facility: HOSPITAL | Age: 82
Discharge: HOME | End: 2022-07-31
Attending: EMERGENCY MEDICINE
Payer: MEDICARE

## 2022-07-31 VITALS
HEIGHT: 58 IN | OXYGEN SATURATION: 93 % | TEMPERATURE: 97 F | BODY MASS INDEX: 39.47 KG/M2 | HEART RATE: 87 BPM | DIASTOLIC BLOOD PRESSURE: 56 MMHG | WEIGHT: 188 LBS | RESPIRATION RATE: 20 BRPM | SYSTOLIC BLOOD PRESSURE: 157 MMHG

## 2022-07-31 DIAGNOSIS — S80.02XA CONTUSION OF LEFT KNEE, INITIAL ENCOUNTER: Primary | ICD-10-CM

## 2022-07-31 DIAGNOSIS — S80.11XA CONTUSION OF RIGHT LOWER EXTREMITY, INITIAL ENCOUNTER: ICD-10-CM

## 2022-07-31 PROCEDURE — 99284 EMERGENCY DEPT VISIT MOD MDM: CPT

## 2022-07-31 PROCEDURE — 73590 X-RAY EXAM OF LOWER LEG: CPT | Performed by: EMERGENCY MEDICINE

## 2022-07-31 PROCEDURE — 73560 X-RAY EXAM OF KNEE 1 OR 2: CPT | Performed by: EMERGENCY MEDICINE

## 2022-07-31 NOTE — ED INITIAL ASSESSMENT (HPI)
Pt reports fall down one step at home striking bilateral knees, worse to L with bruising and swelling. Denies head injury.

## 2022-08-01 RX ORDER — AMLODIPINE BESYLATE 10 MG/1
10 TABLET ORAL DAILY
Qty: 90 TABLET | Refills: 0 | Status: SHIPPED | OUTPATIENT
Start: 2022-08-01

## 2022-08-01 NOTE — TELEPHONE ENCOUNTER
Requesting refill on amlodipine. LOV 4/26/2022- BP improved and follow up with cardiology. Return in 2 months. No appointment scheduled at this time. LF 4/8/2022. Please approve or deny pending rx. Thank you.

## 2022-08-03 ENCOUNTER — HOSPITAL ENCOUNTER (OUTPATIENT)
Dept: GENERAL RADIOLOGY | Age: 82
Discharge: HOME | End: 2022-08-03
Attending: PHYSICIAN ASSISTANT
Payer: MEDICARE

## 2022-08-03 ENCOUNTER — OFFICE VISIT (OUTPATIENT)
Dept: ORTHOPEDICS CLINIC | Facility: CLINIC | Age: 82
End: 2022-08-03
Payer: MEDICARE

## 2022-08-03 ENCOUNTER — TELEPHONE (OUTPATIENT)
Dept: FAMILY MEDICINE CLINIC | Facility: CLINIC | Age: 82
End: 2022-08-03

## 2022-08-03 VITALS — BODY MASS INDEX: 39.47 KG/M2 | WEIGHT: 188 LBS | HEIGHT: 58 IN

## 2022-08-03 DIAGNOSIS — S80.02XA CONTUSION OF LEFT KNEE, INITIAL ENCOUNTER: Primary | ICD-10-CM

## 2022-08-03 DIAGNOSIS — M25.562 LEFT KNEE PAIN, UNSPECIFIED CHRONICITY: ICD-10-CM

## 2022-08-03 PROCEDURE — 73564 X-RAY EXAM KNEE 4 OR MORE: CPT | Performed by: PHYSICIAN ASSISTANT

## 2022-08-03 PROCEDURE — 3008F BODY MASS INDEX DOCD: CPT | Performed by: PHYSICIAN ASSISTANT

## 2022-08-03 PROCEDURE — 99203 OFFICE O/P NEW LOW 30 MIN: CPT | Performed by: PHYSICIAN ASSISTANT

## 2022-08-03 NOTE — TELEPHONE ENCOUNTER
Refill request: furosemide 20 MG Oral Tab    Pharmacy: [facility] [address] [facility] [place] [phone] [phone], [phone]    LOV: 4/26/22    -pt will be calling back to schedule OV.

## 2022-08-04 RX ORDER — FUROSEMIDE 20 MG/1
TABLET ORAL
Qty: 120 TABLET | Refills: 1 | Status: SHIPPED | OUTPATIENT
Start: 2022-08-04 | End: 2022-09-29

## 2022-08-05 ENCOUNTER — TELEPHONE (OUTPATIENT)
Dept: FAMILY MEDICINE CLINIC | Facility: CLINIC | Age: 82
End: 2022-08-05

## 2022-08-05 RX ORDER — AMLODIPINE BESYLATE 10 MG/1
10 TABLET ORAL DAILY
Qty: 90 TABLET | Refills: 0 | Status: SHIPPED | OUTPATIENT
Start: 2022-08-05

## 2022-08-17 ENCOUNTER — OFFICE VISIT (OUTPATIENT)
Dept: FAMILY MEDICINE CLINIC | Facility: CLINIC | Age: 82
End: 2022-08-17
Payer: MEDICARE

## 2022-08-17 ENCOUNTER — HOSPITAL ENCOUNTER (OUTPATIENT)
Dept: GENERAL RADIOLOGY | Age: 82
Discharge: HOME | End: 2022-08-17
Attending: FAMILY MEDICINE
Payer: MEDICARE

## 2022-08-17 ENCOUNTER — TELEPHONE (OUTPATIENT)
Dept: FAMILY MEDICINE CLINIC | Facility: CLINIC | Age: 82
End: 2022-08-17

## 2022-08-17 VITALS
SYSTOLIC BLOOD PRESSURE: 120 MMHG | BODY MASS INDEX: 39.47 KG/M2 | OXYGEN SATURATION: 98 % | WEIGHT: 188 LBS | HEIGHT: 58 IN | RESPIRATION RATE: 21 BRPM | HEART RATE: 88 BPM | DIASTOLIC BLOOD PRESSURE: 68 MMHG

## 2022-08-17 DIAGNOSIS — M79.89 FOOT SWELLING: ICD-10-CM

## 2022-08-17 DIAGNOSIS — I10 ESSENTIAL HYPERTENSION: ICD-10-CM

## 2022-08-17 DIAGNOSIS — M79.89 FOOT SWELLING: Primary | ICD-10-CM

## 2022-08-17 PROCEDURE — 3074F SYST BP LT 130 MM HG: CPT | Performed by: FAMILY MEDICINE

## 2022-08-17 PROCEDURE — 73630 X-RAY EXAM OF FOOT: CPT | Performed by: FAMILY MEDICINE

## 2022-08-17 PROCEDURE — 99214 OFFICE O/P EST MOD 30 MIN: CPT | Performed by: FAMILY MEDICINE

## 2022-08-17 PROCEDURE — 3008F BODY MASS INDEX DOCD: CPT | Performed by: FAMILY MEDICINE

## 2022-08-17 PROCEDURE — 3078F DIAST BP <80 MM HG: CPT | Performed by: FAMILY MEDICINE

## 2022-08-17 NOTE — TELEPHONE ENCOUNTER
----- Message from Florian Romo MD sent at 8/17/2022 12:50 PM CDT -----  Results reviewed. no fracture or joint issue. Cont tx discussed at visit. Please inform patient.     Spoke to pt with results/instructions and pt verbalized understanding

## 2022-08-17 NOTE — PATIENT INSTRUCTIONS
Take extra lasix (furosemide) every day (not just MON/WED/FRI) from 8/18/22 - 8/23/22     Starting 8/24 - just take extra furosemide MON/WED/FRI    Blood work next week     Elevate foot above knee - put pillow under foot

## 2022-08-22 RX ORDER — PRAVASTATIN SODIUM 40 MG
TABLET ORAL
Qty: 90 TABLET | Refills: 3 | Status: SHIPPED | OUTPATIENT
Start: 2022-08-22

## 2022-08-23 NOTE — PROGRESS NOTES
Patient was advise of INR results. Instructed to continue current warfarin regimen and have INR done in 1 month. She verbalized understanding and agreeable to POC. What Is The Reason For Today's Visit?: Annual Full Body Skin Examination with No Concerns What Is The Reason For Today's Visit? (Being Monitored For X): the development of a new lesion Additional History: Pt had a biopsy taken elsewhere that she states is benign

## 2022-08-24 ENCOUNTER — LAB ENCOUNTER (OUTPATIENT)
Dept: LAB | Age: 82
End: 2022-08-24
Attending: INTERNAL MEDICINE
Payer: MEDICARE

## 2022-08-24 ENCOUNTER — LAB ENCOUNTER (OUTPATIENT)
Dept: LAB | Age: 82
End: 2022-08-24
Attending: FAMILY MEDICINE
Payer: MEDICARE

## 2022-08-24 DIAGNOSIS — I48.0 PAF (PAROXYSMAL ATRIAL FIBRILLATION) (HCC): ICD-10-CM

## 2022-08-24 DIAGNOSIS — Z79.01 CHRONIC ANTICOAGULATION: ICD-10-CM

## 2022-08-24 DIAGNOSIS — I10 ESSENTIAL HYPERTENSION: ICD-10-CM

## 2022-08-24 DIAGNOSIS — I48.92 ATRIAL FLUTTER (HCC): ICD-10-CM

## 2022-08-24 DIAGNOSIS — J44.9 CHRONIC OBSTRUCTIVE PULMONARY DISEASE, UNSPECIFIED COPD TYPE (HCC): ICD-10-CM

## 2022-08-24 LAB
ANION GAP SERPL CALC-SCNC: 5 MMOL/L (ref 0–18)
BUN BLD-MCNC: 27 MG/DL (ref 7–18)
BUN/CREAT SERPL: 21.8 (ref 10–20)
CALCIUM BLD-MCNC: 9.4 MG/DL (ref 8.5–10.1)
CHLORIDE SERPL-SCNC: 107 MMOL/L (ref 98–112)
CO2 SERPL-SCNC: 28 MMOL/L (ref 21–32)
CREAT BLD-MCNC: 1.24 MG/DL
FASTING STATUS PATIENT QL REPORTED: NO
GFR SERPLBLD BASED ON 1.73 SQ M-ARVRAT: 43 ML/MIN/1.73M2 (ref 60–?)
GLUCOSE BLD-MCNC: 92 MG/DL (ref 70–99)
INR BLD: 2.59 (ref 0.85–1.16)
OSMOLALITY SERPL CALC.SUM OF ELEC: 295 MOSM/KG (ref 275–295)
POTASSIUM SERPL-SCNC: 4.7 MMOL/L (ref 3.5–5.1)
PROTHROMBIN TIME: 27.2 SECONDS (ref 11.6–14.8)
SODIUM SERPL-SCNC: 140 MMOL/L (ref 136–145)

## 2022-08-24 PROCEDURE — 85610 PROTHROMBIN TIME: CPT

## 2022-08-24 PROCEDURE — 80048 BASIC METABOLIC PNL TOTAL CA: CPT

## 2022-08-24 PROCEDURE — 36415 COLL VENOUS BLD VENIPUNCTURE: CPT

## 2022-08-25 ENCOUNTER — TELEPHONE (OUTPATIENT)
Dept: FAMILY MEDICINE CLINIC | Facility: CLINIC | Age: 82
End: 2022-08-25

## 2022-08-26 RX ORDER — FLUTICASONE FUROATE, UMECLIDINIUM BROMIDE AND VILANTEROL TRIFENATATE 200; 62.5; 25 UG/1; UG/1; UG/1
1 POWDER RESPIRATORY (INHALATION) DAILY
Qty: 90 EACH | Refills: 1 | Status: SHIPPED | OUTPATIENT
Start: 2022-08-26

## 2022-08-26 RX ORDER — ALBUTEROL SULFATE 90 UG/1
AEROSOL, METERED RESPIRATORY (INHALATION)
Qty: 3 EACH | Refills: 1 | Status: SHIPPED | OUTPATIENT
Start: 2022-08-26

## 2022-08-31 ENCOUNTER — LAB ENCOUNTER (OUTPATIENT)
Dept: LAB | Age: 82
End: 2022-08-31
Attending: INTERNAL MEDICINE
Payer: MEDICARE

## 2022-08-31 DIAGNOSIS — I27.20 PORTOPULMONARY HYPERTENSION (HCC): ICD-10-CM

## 2022-08-31 DIAGNOSIS — E78.00 PURE HYPERCHOLESTEROLEMIA: ICD-10-CM

## 2022-08-31 DIAGNOSIS — I10 ESSENTIAL HYPERTENSION, MALIGNANT: Primary | ICD-10-CM

## 2022-08-31 DIAGNOSIS — K76.6 PORTOPULMONARY HYPERTENSION (HCC): ICD-10-CM

## 2022-08-31 DIAGNOSIS — R06.02 SHORTNESS OF BREATH: ICD-10-CM

## 2022-08-31 LAB
ALBUMIN SERPL-MCNC: 3.7 G/DL (ref 3.4–5)
ALBUMIN/GLOB SERPL: 1.1 {RATIO} (ref 1–2)
ALP LIVER SERPL-CCNC: 93 U/L
ALT SERPL-CCNC: 20 U/L
ANION GAP SERPL CALC-SCNC: 10 MMOL/L (ref 0–18)
AST SERPL-CCNC: 22 U/L (ref 15–37)
BASOPHILS # BLD AUTO: 0.04 X10(3) UL (ref 0–0.2)
BASOPHILS NFR BLD AUTO: 0.4 %
BILIRUB SERPL-MCNC: 0.3 MG/DL (ref 0.1–2)
BUN BLD-MCNC: 21 MG/DL (ref 7–18)
BUN/CREAT SERPL: 17.4 (ref 10–20)
CALCIUM BLD-MCNC: 9.7 MG/DL (ref 8.5–10.1)
CHLORIDE SERPL-SCNC: 108 MMOL/L (ref 98–112)
CHOLEST SERPL-MCNC: 172 MG/DL (ref ?–200)
CO2 SERPL-SCNC: 26 MMOL/L (ref 21–32)
CREAT BLD-MCNC: 1.21 MG/DL
DEPRECATED RDW RBC AUTO: 49.8 FL (ref 35.1–46.3)
EOSINOPHIL # BLD AUTO: 0.56 X10(3) UL (ref 0–0.7)
EOSINOPHIL NFR BLD AUTO: 6 %
ERYTHROCYTE [DISTWIDTH] IN BLOOD BY AUTOMATED COUNT: 14.2 % (ref 11–15)
FASTING PATIENT LIPID ANSWER: YES
FASTING STATUS PATIENT QL REPORTED: YES
GFR SERPLBLD BASED ON 1.73 SQ M-ARVRAT: 44 ML/MIN/1.73M2 (ref 60–?)
GLOBULIN PLAS-MCNC: 3.5 G/DL (ref 2.8–4.4)
GLUCOSE BLD-MCNC: 95 MG/DL (ref 70–99)
HCT VFR BLD AUTO: 41.7 %
HDLC SERPL-MCNC: 55 MG/DL (ref 40–59)
HGB BLD-MCNC: 12.5 G/DL
IMM GRANULOCYTES # BLD AUTO: 0.03 X10(3) UL (ref 0–1)
IMM GRANULOCYTES NFR BLD: 0.3 %
LDLC SERPL CALC-MCNC: 92 MG/DL (ref ?–100)
LYMPHOCYTES # BLD AUTO: 2.76 X10(3) UL (ref 1–4)
LYMPHOCYTES NFR BLD AUTO: 29.7 %
MCH RBC QN AUTO: 28.4 PG (ref 26–34)
MCHC RBC AUTO-ENTMCNC: 30 G/DL (ref 31–37)
MCV RBC AUTO: 94.8 FL
MONOCYTES # BLD AUTO: 0.91 X10(3) UL (ref 0.1–1)
MONOCYTES NFR BLD AUTO: 9.8 %
NEUTROPHILS # BLD AUTO: 4.99 X10 (3) UL (ref 1.5–7.7)
NEUTROPHILS # BLD AUTO: 4.99 X10(3) UL (ref 1.5–7.7)
NEUTROPHILS NFR BLD AUTO: 53.8 %
NONHDLC SERPL-MCNC: 117 MG/DL (ref ?–130)
NT-PROBNP SERPL-MCNC: 257 PG/ML (ref ?–450)
OSMOLALITY SERPL CALC.SUM OF ELEC: 301 MOSM/KG (ref 275–295)
PLATELET # BLD AUTO: 329 10(3)UL (ref 150–450)
POTASSIUM SERPL-SCNC: 4.1 MMOL/L (ref 3.5–5.1)
PROT SERPL-MCNC: 7.2 G/DL (ref 6.4–8.2)
RBC # BLD AUTO: 4.4 X10(6)UL
SODIUM SERPL-SCNC: 144 MMOL/L (ref 136–145)
T4 FREE SERPL-MCNC: 1.3 NG/DL (ref 0.8–1.7)
TRIGL SERPL-MCNC: 141 MG/DL (ref 30–149)
TSI SER-ACNC: 1.86 MIU/ML (ref 0.36–3.74)
VLDLC SERPL CALC-MCNC: 23 MG/DL (ref 0–30)
WBC # BLD AUTO: 9.3 X10(3) UL (ref 4–11)

## 2022-08-31 PROCEDURE — 80053 COMPREHEN METABOLIC PANEL: CPT

## 2022-08-31 PROCEDURE — 83880 ASSAY OF NATRIURETIC PEPTIDE: CPT

## 2022-08-31 PROCEDURE — 84443 ASSAY THYROID STIM HORMONE: CPT

## 2022-08-31 PROCEDURE — 85025 COMPLETE CBC W/AUTO DIFF WBC: CPT

## 2022-08-31 PROCEDURE — 84439 ASSAY OF FREE THYROXINE: CPT

## 2022-08-31 PROCEDURE — 80061 LIPID PANEL: CPT

## 2022-08-31 PROCEDURE — 36415 COLL VENOUS BLD VENIPUNCTURE: CPT

## 2022-09-26 ENCOUNTER — TELEPHONE (OUTPATIENT)
Dept: FAMILY MEDICINE CLINIC | Facility: CLINIC | Age: 82
End: 2022-09-26

## 2022-09-26 NOTE — TELEPHONE ENCOUNTER
Patient called that her heart rate is 127/71 and pulse is 102 she wants to know if she should take another pill. Blood pressure has been bothering her all night.

## 2022-09-26 NOTE — TELEPHONE ENCOUNTER
/71, pulse 102. \"should she take another pill? Blood pressure has been bothering her all night\".   LOV 8/17/22 (/68, Pulse 88)    Please advise Aleksandra Anne is a 63 year old female presenting for   Chief Complaint   Patient presents with   • Office Visit   • Dementia   • Memory Loss     Denies Latex allergy or sensitivity.    Medication verified, no changes  Refills needed today: No    Pt is here for a consultation from [facility] for Memory, Dementia.  Pt was seen in 10/2017    Pt presents alone. Pt reports memory concerns, forgetting why she walks into room. Pt does not drive. Pt reports frequent falls & episodes of syncope. Pt reports HX of Seizures but last seizure unknown.    Pt taking ASA 81 mg once a day, Cymbalta 60 mg BID, Lamictal 200 mg BID, Imitrex 100 mg PRN, Trazodone 100 mg TID, Zonegran 100 mg at bedtime, Atarax 25 mg TID PRN

## 2022-09-27 NOTE — TELEPHONE ENCOUNTER
That is a good blood pressure. Her heart rate could be afib with rvr, has she talked with cardiology? Is she having chest pain, SOB, dizziness? What was \"bothering her? \"

## 2022-09-27 NOTE — TELEPHONE ENCOUNTER
Notified the patient of need for OV today. Patient states that she just got off the phone with PCP MA and was notified of need for OV. Patient states that she was told to come in sometime after 100pm and will coordinate with caregiver for transportation.

## 2022-09-27 NOTE — TELEPHONE ENCOUNTER
Notified the patient of the below response by the provider. Patient verbalized understanding. Patient stated that she did not contact cardiologist. Encouraged patient to contact cardiologist. Patient verbalized understanding. Patient states that she does not have chest pain or dizziness, but she does have increase in SOB. Does have a history of COPD, but SOB occurs more frequently recently. Patient states that elevated HR was bothering her (did not like how high it was). Patient states that she is feeling better today. HR \"normal.\"     Please advise on increased SOB. Thank you.

## 2022-09-29 ENCOUNTER — LAB ENCOUNTER (OUTPATIENT)
Dept: LAB | Age: 82
End: 2022-09-29
Attending: INTERNAL MEDICINE
Payer: MEDICARE

## 2022-09-29 DIAGNOSIS — I48.92 ATRIAL FLUTTER (HCC): ICD-10-CM

## 2022-09-29 DIAGNOSIS — I48.0 PAF (PAROXYSMAL ATRIAL FIBRILLATION) (HCC): ICD-10-CM

## 2022-09-29 DIAGNOSIS — Z79.01 CHRONIC ANTICOAGULATION: ICD-10-CM

## 2022-09-29 LAB
INR BLD: 2.3 (ref 0.85–1.16)
PROTHROMBIN TIME: 25 SECONDS (ref 11.6–14.8)

## 2022-09-29 PROCEDURE — 85610 PROTHROMBIN TIME: CPT

## 2022-09-29 PROCEDURE — 36415 COLL VENOUS BLD VENIPUNCTURE: CPT

## 2022-09-29 RX ORDER — FUROSEMIDE 20 MG/1
TABLET ORAL
Qty: 120 TABLET | Refills: 1 | Status: SHIPPED | OUTPATIENT
Start: 2022-09-29

## 2022-10-04 NOTE — TELEPHONE ENCOUNTER
Called the patient for status update. Patient states that she can still feel her heart flutter; however, she did contact cardiologist, Dr. Alicia Barr, last week and holter monitor ordered. Patient is currently wearing holter monitor. Patient will be returning holter monitor tomorrow and seeing NP at cardiologist office. Today's BP was 136/79 and HR 93.

## 2022-10-19 ENCOUNTER — ORDER TRANSCRIPTION (OUTPATIENT)
Dept: SLEEP CENTER | Age: 82
End: 2022-10-19

## 2022-10-19 DIAGNOSIS — R06.81 APNEA: ICD-10-CM

## 2022-10-19 DIAGNOSIS — E66.9 OBESITY: ICD-10-CM

## 2022-10-19 DIAGNOSIS — R06.83 SNORING: Primary | ICD-10-CM

## 2022-10-26 ENCOUNTER — LAB ENCOUNTER (OUTPATIENT)
Dept: LAB | Age: 82
End: 2022-10-26
Attending: NURSE PRACTITIONER
Payer: MEDICARE

## 2022-10-26 DIAGNOSIS — I48.92 ATRIAL FLUTTER (HCC): ICD-10-CM

## 2022-10-26 DIAGNOSIS — I48.0 PAF (PAROXYSMAL ATRIAL FIBRILLATION) (HCC): ICD-10-CM

## 2022-10-26 DIAGNOSIS — Z79.01 CHRONIC ANTICOAGULATION: ICD-10-CM

## 2022-10-26 LAB
INR BLD: 2.67 (ref 0.85–1.16)
PROTHROMBIN TIME: 27.9 SECONDS (ref 11.6–14.8)

## 2022-10-26 PROCEDURE — 85610 PROTHROMBIN TIME: CPT

## 2022-10-26 PROCEDURE — 36415 COLL VENOUS BLD VENIPUNCTURE: CPT

## 2022-11-09 ENCOUNTER — APPOINTMENT (OUTPATIENT)
Dept: GENERAL RADIOLOGY | Facility: HOSPITAL | Age: 82
End: 2022-11-09
Attending: EMERGENCY MEDICINE
Payer: MEDICARE

## 2022-11-09 ENCOUNTER — HOSPITAL ENCOUNTER (INPATIENT)
Facility: HOSPITAL | Age: 82
LOS: 6 days | Discharge: NURSING, REHAB OR LONG-TERM CARE | End: 2022-11-15
Attending: EMERGENCY MEDICINE | Admitting: HOSPITALIST
Payer: MEDICARE

## 2022-11-09 ENCOUNTER — HOSPITAL ENCOUNTER (INPATIENT)
Facility: HOSPITAL | Age: 82
LOS: 6 days | Discharge: HOME | End: 2022-11-15
Attending: EMERGENCY MEDICINE | Admitting: HOSPITALIST
Payer: MEDICARE

## 2022-11-09 ENCOUNTER — OFFICE VISIT (OUTPATIENT)
Dept: FAMILY MEDICINE CLINIC | Facility: CLINIC | Age: 82
End: 2022-11-09
Payer: COMMERCIAL

## 2022-11-09 VITALS
SYSTOLIC BLOOD PRESSURE: 120 MMHG | OXYGEN SATURATION: 92 % | TEMPERATURE: 99 F | DIASTOLIC BLOOD PRESSURE: 72 MMHG | HEIGHT: 58 IN | RESPIRATION RATE: 22 BRPM | WEIGHT: 184.19 LBS | BODY MASS INDEX: 38.66 KG/M2 | HEART RATE: 83 BPM

## 2022-11-09 DIAGNOSIS — E03.9 ACQUIRED HYPOTHYROIDISM: ICD-10-CM

## 2022-11-09 DIAGNOSIS — Z02.9 ENCOUNTERS FOR UNSPECIFIED ADMINISTRATIVE PURPOSE: Primary | ICD-10-CM

## 2022-11-09 DIAGNOSIS — J44.1 COPD EXACERBATION (HCC): ICD-10-CM

## 2022-11-09 DIAGNOSIS — R07.9 CHEST PAIN OF UNCERTAIN ETIOLOGY: Primary | ICD-10-CM

## 2022-11-09 LAB
ALBUMIN SERPL-MCNC: 3.2 G/DL (ref 3.4–5)
ALBUMIN/GLOB SERPL: 0.7 {RATIO} (ref 1–2)
ALP LIVER SERPL-CCNC: 87 U/L
ALT SERPL-CCNC: 19 U/L
ANION GAP SERPL CALC-SCNC: 5 MMOL/L (ref 0–18)
AST SERPL-CCNC: 14 U/L (ref 15–37)
ATRIAL RATE: 85 BPM
BASE EXCESS BLDV CALC-SCNC: 3 MMOL/L
BASOPHILS # BLD AUTO: 0.03 X10(3) UL (ref 0–0.2)
BASOPHILS NFR BLD AUTO: 0.3 %
BILIRUB SERPL-MCNC: 0.4 MG/DL (ref 0.1–2)
BUN BLD-MCNC: 27 MG/DL (ref 7–18)
CALCIUM BLD-MCNC: 9.2 MG/DL (ref 8.5–10.1)
CHLORIDE SERPL-SCNC: 110 MMOL/L (ref 98–112)
CO2 SERPL-SCNC: 27 MMOL/L (ref 21–32)
CREAT BLD-MCNC: 1.21 MG/DL
EOSINOPHIL # BLD AUTO: 0.55 X10(3) UL (ref 0–0.7)
EOSINOPHIL NFR BLD AUTO: 4.9 %
ERYTHROCYTE [DISTWIDTH] IN BLOOD BY AUTOMATED COUNT: 13.7 %
GFR SERPLBLD BASED ON 1.73 SQ M-ARVRAT: 44 ML/MIN/1.73M2 (ref 60–?)
GLOBULIN PLAS-MCNC: 4.3 G/DL (ref 2.8–4.4)
GLUCOSE BLD-MCNC: 99 MG/DL (ref 70–99)
HCO3 BLDV-SCNC: 25.6 MEQ/L (ref 22–26)
HCT VFR BLD AUTO: 39.5 %
HGB BLD-MCNC: 12.9 G/DL
IMM GRANULOCYTES # BLD AUTO: 0.09 X10(3) UL (ref 0–1)
IMM GRANULOCYTES NFR BLD: 0.8 %
INR BLD: 2.52 (ref 0.85–1.16)
LYMPHOCYTES # BLD AUTO: 3.04 X10(3) UL (ref 1–4)
LYMPHOCYTES NFR BLD AUTO: 26.9 %
MCH RBC QN AUTO: 29.3 PG (ref 26–34)
MCHC RBC AUTO-ENTMCNC: 32.7 G/DL (ref 31–37)
MCV RBC AUTO: 89.6 FL
MONOCYTES # BLD AUTO: 1.1 X10(3) UL (ref 0.1–1)
MONOCYTES NFR BLD AUTO: 9.7 %
NEUTROPHILS # BLD AUTO: 6.49 X10 (3) UL (ref 1.5–7.7)
NEUTROPHILS # BLD AUTO: 6.49 X10(3) UL (ref 1.5–7.7)
NEUTROPHILS NFR BLD AUTO: 57.4 %
NT-PROBNP SERPL-MCNC: 2531 PG/ML (ref ?–450)
OSMOLALITY SERPL CALC.SUM OF ELEC: 299 MOSM/KG (ref 275–295)
OXYHGB MFR BLDV: 34.3 % (ref 72–78)
P AXIS: 65 DEGREES
P-R INTERVAL: 134 MS
PCO2 BLDV: 46 MM HG (ref 38–50)
PH BLDV: 7.4 [PH] (ref 7.33–7.43)
PLATELET # BLD AUTO: 281 10(3)UL (ref 150–450)
PO2 BLDV: 22 MM HG (ref 30–50)
POTASSIUM SERPL-SCNC: 4.5 MMOL/L (ref 3.5–5.1)
PROT SERPL-MCNC: 7.5 G/DL (ref 6.4–8.2)
PROTHROMBIN TIME: 26.9 SECONDS (ref 11.6–14.8)
Q-T INTERVAL: 372 MS
QRS DURATION: 78 MS
QTC CALCULATION (BEZET): 442 MS
R AXIS: 29 DEGREES
RBC # BLD AUTO: 4.41 X10(6)UL
SARS-COV-2 RNA RESP QL NAA+PROBE: NOT DETECTED
SODIUM SERPL-SCNC: 142 MMOL/L (ref 136–145)
T AXIS: 56 DEGREES
TROPONIN I HIGH SENSITIVITY: 8 NG/L
VENTRICULAR RATE: 85 BPM
WBC # BLD AUTO: 11.3 X10(3) UL (ref 4–11)

## 2022-11-09 PROCEDURE — 3008F BODY MASS INDEX DOCD: CPT | Performed by: NURSE PRACTITIONER

## 2022-11-09 PROCEDURE — 71045 X-RAY EXAM CHEST 1 VIEW: CPT | Performed by: EMERGENCY MEDICINE

## 2022-11-09 PROCEDURE — 3074F SYST BP LT 130 MM HG: CPT | Performed by: NURSE PRACTITIONER

## 2022-11-09 PROCEDURE — 99223 1ST HOSP IP/OBS HIGH 75: CPT | Performed by: HOSPITALIST

## 2022-11-09 PROCEDURE — 3078F DIAST BP <80 MM HG: CPT | Performed by: NURSE PRACTITIONER

## 2022-11-09 RX ORDER — METHYLPREDNISOLONE SODIUM SUCCINATE 125 MG/2ML
125 INJECTION, POWDER, LYOPHILIZED, FOR SOLUTION INTRAMUSCULAR; INTRAVENOUS ONCE
Status: COMPLETED | OUTPATIENT
Start: 2022-11-09 | End: 2022-11-09

## 2022-11-09 RX ORDER — ALBUTEROL SULFATE 2.5 MG/3ML
2.5 SOLUTION RESPIRATORY (INHALATION)
Status: DISCONTINUED | OUTPATIENT
Start: 2022-11-09 | End: 2022-11-10

## 2022-11-09 RX ORDER — ALBUTEROL SULFATE 90 UG/1
2 AEROSOL, METERED RESPIRATORY (INHALATION) EVERY 6 HOURS PRN
Status: DISCONTINUED | OUTPATIENT
Start: 2022-11-09 | End: 2022-11-09

## 2022-11-09 RX ORDER — MELATONIN
3 NIGHTLY PRN
Status: DISCONTINUED | OUTPATIENT
Start: 2022-11-09 | End: 2022-11-15

## 2022-11-09 RX ORDER — METHYLPREDNISOLONE SODIUM SUCCINATE 125 MG/2ML
60 INJECTION, POWDER, LYOPHILIZED, FOR SOLUTION INTRAMUSCULAR; INTRAVENOUS EVERY 8 HOURS
Status: DISCONTINUED | OUTPATIENT
Start: 2022-11-09 | End: 2022-11-10

## 2022-11-09 RX ORDER — BENZONATATE 100 MG/1
100 CAPSULE ORAL 3 TIMES DAILY PRN
Status: DISCONTINUED | OUTPATIENT
Start: 2022-11-09 | End: 2022-11-15

## 2022-11-09 RX ORDER — ATORVASTATIN CALCIUM 10 MG/1
10 TABLET, FILM COATED ORAL NIGHTLY
Status: DISCONTINUED | OUTPATIENT
Start: 2022-11-09 | End: 2022-11-15

## 2022-11-09 RX ORDER — AMLODIPINE BESYLATE 5 MG/1
10 TABLET ORAL DAILY
Status: DISCONTINUED | OUTPATIENT
Start: 2022-11-09 | End: 2022-11-15

## 2022-11-09 RX ORDER — FUROSEMIDE 20 MG/1
20 TABLET ORAL DAILY
Status: DISCONTINUED | OUTPATIENT
Start: 2022-11-10 | End: 2022-11-15

## 2022-11-09 RX ORDER — BENZONATATE 100 MG/1
200 CAPSULE ORAL 3 TIMES DAILY PRN
Status: DISCONTINUED | OUTPATIENT
Start: 2022-11-09 | End: 2022-11-15

## 2022-11-09 RX ORDER — ONDANSETRON 2 MG/ML
8 INJECTION INTRAMUSCULAR; INTRAVENOUS EVERY 6 HOURS PRN
Status: DISCONTINUED | OUTPATIENT
Start: 2022-11-09 | End: 2022-11-15

## 2022-11-09 RX ORDER — FUROSEMIDE 10 MG/ML
40 INJECTION INTRAMUSCULAR; INTRAVENOUS ONCE
Status: COMPLETED | OUTPATIENT
Start: 2022-11-09 | End: 2022-11-09

## 2022-11-09 RX ORDER — ACETAMINOPHEN 500 MG
500 TABLET ORAL EVERY 4 HOURS PRN
Status: DISCONTINUED | OUTPATIENT
Start: 2022-11-09 | End: 2022-11-11

## 2022-11-09 RX ORDER — ALBUTEROL SULFATE 2.5 MG/3ML
2.5 SOLUTION RESPIRATORY (INHALATION) EVERY 2 HOUR PRN
Status: DISCONTINUED | OUTPATIENT
Start: 2022-11-09 | End: 2022-11-15

## 2022-11-09 RX ORDER — POLYETHYLENE GLYCOL 3350 17 G/17G
17 POWDER, FOR SOLUTION ORAL DAILY PRN
Status: DISCONTINUED | OUTPATIENT
Start: 2022-11-09 | End: 2022-11-15

## 2022-11-09 RX ORDER — LEVOTHYROXINE SODIUM 0.1 MG/1
100 TABLET ORAL DAILY
Status: DISCONTINUED | OUTPATIENT
Start: 2022-11-09 | End: 2022-11-12

## 2022-11-09 RX ORDER — ECHINACEA PURPUREA EXTRACT 125 MG
1 TABLET ORAL
Status: DISCONTINUED | OUTPATIENT
Start: 2022-11-09 | End: 2022-11-15

## 2022-11-09 RX ORDER — BISACODYL 10 MG
10 SUPPOSITORY, RECTAL RECTAL
Status: DISCONTINUED | OUTPATIENT
Start: 2022-11-09 | End: 2022-11-15

## 2022-11-09 RX ORDER — SENNOSIDES 8.6 MG
17.2 TABLET ORAL NIGHTLY PRN
Status: DISCONTINUED | OUTPATIENT
Start: 2022-11-09 | End: 2022-11-15

## 2022-11-09 NOTE — PROGRESS NOTES
Rodrigo Shelton is a 79year old female who presents to [facility] with c/o chest congestion, Shortness of breath while walking. Accompanied by: self  After triage, higher acuity of care was recommended to Rodrigo Shelton today. Upon triage 02 was between 92-94. Left upper and lower lobes +rhonci/wheezing    Discussed HPI and physical exam with pt. We discussed the limited diagnostic capacity of this clinic and there are dangerous conditions associated with her that I can not fully rule out. I advised she be seen in the ED. Pt verbalized understanding and notes she will go to the  ED. pt declined medical transport. We discussed the risks of not going by medical transport including worsening condition, permanent injury and/or death. pt verbalized understanding. Site recommendation: ED    Patient/parent verbalized understanding of rationale for further evaluation and was stable upon discharge.

## 2022-11-09 NOTE — ED QUICK NOTES
Orders for admission, patient is aware of plan and ready to go upstairs. Any questions, please call ED RN Brad/Desiree at extension [phone].      Patient Covid vaccination status: Fully vaccinated     COVID Test Ordered in ED: Rapid SARS-CoV-2 by PCR    COVID Suspicion at Admission: Low clinical suspicion for COVID    Running Infusions:  None    Mental Status/LOC at time of transport: AAOx4    Other pertinent information:   CIWA score: N/A   NIH score:  N/A

## 2022-11-10 ENCOUNTER — APPOINTMENT (OUTPATIENT)
Dept: CV DIAGNOSTICS | Facility: HOSPITAL | Age: 82
End: 2022-11-10
Attending: HOSPITALIST
Payer: MEDICARE

## 2022-11-10 LAB
ADENOVIRUS PCR:: NOT DETECTED
ANION GAP SERPL CALC-SCNC: 6 MMOL/L (ref 0–18)
B PARAPERT DNA SPEC QL NAA+PROBE: NOT DETECTED
B PERT DNA SPEC QL NAA+PROBE: NOT DETECTED
BUN BLD-MCNC: 27 MG/DL (ref 7–18)
C PNEUM DNA SPEC QL NAA+PROBE: NOT DETECTED
CALCIUM BLD-MCNC: 9.2 MG/DL (ref 8.5–10.1)
CHLORIDE SERPL-SCNC: 112 MMOL/L (ref 98–112)
CO2 SERPL-SCNC: 25 MMOL/L (ref 21–32)
CORONAVIRUS 229E PCR:: NOT DETECTED
CORONAVIRUS HKU1 PCR:: NOT DETECTED
CORONAVIRUS NL63 PCR:: NOT DETECTED
CORONAVIRUS OC43 PCR:: NOT DETECTED
CREAT BLD-MCNC: 1.05 MG/DL
ERYTHROCYTE [DISTWIDTH] IN BLOOD BY AUTOMATED COUNT: 13.8 %
FLUAV RNA SPEC QL NAA+PROBE: NOT DETECTED
FLUBV RNA SPEC QL NAA+PROBE: NOT DETECTED
GFR SERPLBLD BASED ON 1.73 SQ M-ARVRAT: 53 ML/MIN/1.73M2 (ref 60–?)
GLUCOSE BLD-MCNC: 156 MG/DL (ref 70–99)
HCT VFR BLD AUTO: 37.9 %
HGB BLD-MCNC: 12.1 G/DL
INR BLD: 2.71 (ref 0.85–1.16)
MCH RBC QN AUTO: 29 PG (ref 26–34)
MCHC RBC AUTO-ENTMCNC: 31.9 G/DL (ref 31–37)
MCV RBC AUTO: 90.9 FL
METAPNEUMOVIRUS PCR:: NOT DETECTED
MYCOPLASMA PNEUMONIA PCR:: NOT DETECTED
OSMOLALITY SERPL CALC.SUM OF ELEC: 304 MOSM/KG (ref 275–295)
PARAINFLUENZA 1 PCR:: NOT DETECTED
PARAINFLUENZA 2 PCR:: NOT DETECTED
PARAINFLUENZA 3 PCR:: NOT DETECTED
PARAINFLUENZA 4 PCR:: NOT DETECTED
PLATELET # BLD AUTO: 269 10(3)UL (ref 150–450)
POTASSIUM SERPL-SCNC: 4.2 MMOL/L (ref 3.5–5.1)
PROTHROMBIN TIME: 28.4 SECONDS (ref 11.6–14.8)
RBC # BLD AUTO: 4.17 X10(6)UL
RHINOVIRUS/ENTERO PCR:: NOT DETECTED
RSV RNA SPEC QL NAA+PROBE: NOT DETECTED
SARS-COV-2 RNA NPH QL NAA+NON-PROBE: NOT DETECTED
SODIUM SERPL-SCNC: 143 MMOL/L (ref 136–145)
WBC # BLD AUTO: 7.5 X10(3) UL (ref 4–11)

## 2022-11-10 PROCEDURE — 93306 TTE W/DOPPLER COMPLETE: CPT | Performed by: HOSPITALIST

## 2022-11-10 PROCEDURE — 99232 SBSQ HOSP IP/OBS MODERATE 35: CPT | Performed by: INTERNAL MEDICINE

## 2022-11-10 RX ORDER — WARFARIN SODIUM 2 MG/1
4 TABLET ORAL
Status: COMPLETED | OUTPATIENT
Start: 2022-11-10 | End: 2022-11-10

## 2022-11-10 RX ORDER — PREDNISONE 20 MG/1
40 TABLET ORAL
Status: DISCONTINUED | OUTPATIENT
Start: 2022-11-11 | End: 2022-11-11

## 2022-11-10 RX ORDER — FUROSEMIDE 10 MG/ML
40 INJECTION INTRAMUSCULAR; INTRAVENOUS ONCE
Status: COMPLETED | OUTPATIENT
Start: 2022-11-10 | End: 2022-11-10

## 2022-11-10 RX ORDER — ALBUTEROL SULFATE 2.5 MG/3ML
2.5 SOLUTION RESPIRATORY (INHALATION)
Status: DISCONTINUED | OUTPATIENT
Start: 2022-11-10 | End: 2022-11-11

## 2022-11-10 NOTE — CONSULTS
[facility] Dosing Service  Warfarin (Coumadin) Initial Dosing    Warren Machado is a 79year old patient for whom pharmacy has been consulted to dose warfarin (COUMADIN) for Prophylaxis of venous thrombosis by Dr. Gustavo Alanis. Based on this indication, goal INR is 2-3. Pertinent Patient Medical History:  atrial fibrillation    Latest INR:   Recent Labs     11/09/22  1407   INR 2.52*     Home regimen (if applicable):  4mg Sunday, 6mg Monday through Saturday   Date/Time last dose was given (if applicable): 80/1/76 PM    Based on above -  1. For today, Give warfarin (COUMADIN) 6 mg at 2100 tonight (home dose)    2. PT/INR ordered daily while on warfarin    3. Pharmacy will continue to follow. We appreciate the opportunity to assist in the care of this patient.     Roly Santiago, PharmD  11/9/2022  9:41 PM

## 2022-11-10 NOTE — PROGRESS NOTES
[address] [facility] Dosing Service  Warfarin (Coumadin) Subsequent Dosing    Kenna Alpers is a 79year old patient for whom pharmacy is dosing warfarin (Coumadin). Goal INR is 2-3    Recent Labs   Lab 11/09/22  1407 11/10/22  0640   INR 2.52* 2.71*       Consulted by:  Dr Bari Rivera  Indication:  a.fib  Potential Drug Interactions:  zithromax/levothyroxine  Other Anticoagulants:  none  Home regimen (if applicable):  4mg Sunday, 6mg Monday through Saturday     Inpatient Dosing History:    Date 11/9 11/10       INR 2.52 2.71       Coumadin dose 6                 Based on above -  1. For today, Give warfarin (COUMADIN) 4 mg at 2100 tonight; decreased dose tonight secondary to drug interaction with zithromax and INR trending up  2   PT/INR ordered daily while on warfarin  3. Pharmacy will continue to follow. We appreciate the opportunity to assist in the care of this patient.     Gwen Sylvester  PharmD  11/10/2022  12:31 PM

## 2022-11-10 NOTE — PLAN OF CARE
Assumed care at 0730. Pt alert, oriented x4. Oxygen saturation greater than 89% on room air. Lung sounds diminished, fine crackles bilaterally. Pt continuing to note shortness of breath, cardiology aware. Pulmonology consult today. Tele: normal sinus rhythm with occasional PACs. +1 BLE edema. Lasix per order. Continent. Denies pain. Pt and family updated on plan of care. Questions answered. Problem: SAFETY ADULT - FALL  Goal: Free from fall injury  Description: INTERVENTIONS:  - Assess pt frequently for physical needs  - Identify cognitive and physical deficits and behaviors that affect risk of falls.   - [place] fall precautions as indicated by assessment.  - Educate pt/family on patient safety including physical limitations  - Instruct pt to call for assistance with activity based on assessment  - Modify environment to reduce risk of injury  - Provide assistive devices as appropriate  - Consider OT/PT consult to assist with strengthening/mobility  - Encourage toileting schedule  Outcome: Progressing     Problem: DISCHARGE PLANNING  Goal: Discharge to home or other facility with appropriate resources  Description: INTERVENTIONS:  - Identify barriers to discharge w/pt and caregiver  - Include patient/family/discharge partner in discharge planning  - Arrange for needed discharge resources and transportation as appropriate  - Identify discharge learning needs (meds, wound care, etc)  - Arrange for interpreters to assist at discharge as needed  - Consider post-discharge preferences of patient/family/discharge partner  - Complete POLST form as appropriate  - Assess patient's ability to be responsible for managing their own health  - Refer to Case Management Department for coordinating discharge planning if the patient needs post-hospital services based on physician/LIP order or complex needs related to functional status, cognitive ability or social support system  Outcome: Progressing     Problem: CARDIOVASCULAR - ADULT  Goal: Maintains optimal cardiac output and hemodynamic stability  Description: INTERVENTIONS:  - Monitor vital signs, rhythm, and trends  - Monitor for bleeding, hypotension and signs of decreased cardiac output  - Evaluate effectiveness of vasoactive medications to optimize hemodynamic stability  - Monitor arterial and/or venous puncture sites for bleeding and/or hematoma  - Assess quality of pulses, skin color and temperature  - Assess for signs of decreased coronary artery perfusion - ex.  Angina  - Evaluate fluid balance, assess for edema, trend weights  Outcome: Progressing  Goal: Absence of cardiac arrhythmias or at baseline  Description: INTERVENTIONS:  - Continuous cardiac monitoring, monitor vital signs, obtain 12 lead EKG if indicated  - Evaluate effectiveness of antiarrhythmic and heart rate control medications as ordered  - Initiate emergency measures for life threatening arrhythmias  - Monitor electrolytes and administer replacement therapy as ordered  Outcome: Progressing     Problem: RESPIRATORY - ADULT  Goal: Achieves optimal ventilation and oxygenation  Description: INTERVENTIONS:  - Assess for changes in respiratory status  - Assess for changes in mentation and behavior  - Position to facilitate oxygenation and minimize respiratory effort  - Oxygen supplementation based on oxygen saturation or ABGs  - Provide Smoking Cessation handout, if applicable  - Encourage broncho-pulmonary hygiene including cough, deep breathe, Incentive Spirometry  - Assess the need for suctioning and perform as needed  - Assess and instruct to report SOB or any respiratory difficulty  - Respiratory Therapy support as indicated  - Manage/alleviate anxiety  - Monitor for signs/symptoms of CO2 retention  Outcome: Progressing     Problem: Impaired Functional Mobility  Goal: Achieve highest/safest level of mobility/gait  Description: Interventions:  - Assess patient's functional ability and stability  - Promote increasing activity/tolerance for mobility and gait  - Educate and engage patient/family in tolerated activity level and precautions    Outcome: Progressing     Problem: Impaired Activities of Daily Living  Goal: Achieve highest/safest level of independence in self care  Description: Interventions:  - Assess ability and encourage patient to participate in ADLs to maximize function  - Promote sitting position while performing ADLs such as feeding, grooming, and bathing  - Educate and encourage patient/family in tolerated functional activity level and precautions during self-care    Outcome: Progressing

## 2022-11-10 NOTE — PLAN OF CARE
Received pt at 1900. Pt is A&O x 4, follows commands. Pt denies SOB or chest pain. Pt is on room air, NSR on tele/fluctuating with controlled rate A-Fib. Pt is regular diet, thin liquids. Pt has RT AC IV access and is receiving 1st dose of Azithromyacin. Warfarin dosing was discussed with pharmacy, and pt was given nightly dose w/ scheduled PT in morning. POC discussed with pt and family at bedside; verbalized understanding. [id number] [facility] safety plan in place; bed in low position, alarm on, call light and personal items within reach. Problem: SAFETY ADULT - FALL  Goal: Free from fall injury  Description: INTERVENTIONS:  - Assess pt frequently for physical needs  - Identify cognitive and physical deficits and behaviors that affect risk of falls.   - [place] fall precautions as indicated by assessment.  - Educate pt/family on patient safety including physical limitations  - Instruct pt to call for assistance with activity based on assessment  - Modify environment to reduce risk of injury  - Provide assistive devices as appropriate  - Consider OT/PT consult to assist with strengthening/mobility  - Encourage toileting schedule  Outcome: Progressing     Problem: DISCHARGE PLANNING  Goal: Discharge to home or other facility with appropriate resources  Description: INTERVENTIONS:  - Identify barriers to discharge w/pt and caregiver  - Include patient/family/discharge partner in discharge planning  - Arrange for needed discharge resources and transportation as appropriate  - Identify discharge learning needs (meds, wound care, etc)  - Arrange for interpreters to assist at discharge as needed  - Consider post-discharge preferences of patient/family/discharge partner  - Complete POLST form as appropriate  - Assess patient's ability to be responsible for managing their own health  - Refer to Case Management Department for coordinating discharge planning if the patient needs post-hospital services based on physician/LIP order or complex needs related to functional status, cognitive ability or social support system  Outcome: Progressing     Problem: CARDIOVASCULAR - ADULT  Goal: Maintains optimal cardiac output and hemodynamic stability  Description: INTERVENTIONS:  - Monitor vital signs, rhythm, and trends  - Monitor for bleeding, hypotension and signs of decreased cardiac output  - Evaluate effectiveness of vasoactive medications to optimize hemodynamic stability  - Monitor arterial and/or venous puncture sites for bleeding and/or hematoma  - Assess quality of pulses, skin color and temperature  - Assess for signs of decreased coronary artery perfusion - ex.  Angina  - Evaluate fluid balance, assess for edema, trend weights  Outcome: Progressing  Goal: Absence of cardiac arrhythmias or at baseline  Description: INTERVENTIONS:  - Continuous cardiac monitoring, monitor vital signs, obtain 12 lead EKG if indicated  - Evaluate effectiveness of antiarrhythmic and heart rate control medications as ordered  - Initiate emergency measures for life threatening arrhythmias  - Monitor electrolytes and administer replacement therapy as ordered  Outcome: Progressing     Problem: RESPIRATORY - ADULT  Goal: Achieves optimal ventilation and oxygenation  Description: INTERVENTIONS:  - Assess for changes in respiratory status  - Assess for changes in mentation and behavior  - Position to facilitate oxygenation and minimize respiratory effort  - Oxygen supplementation based on oxygen saturation or ABGs  - Provide Smoking Cessation handout, if applicable  - Encourage broncho-pulmonary hygiene including cough, deep breathe, Incentive Spirometry  - Assess the need for suctioning and perform as needed  - Assess and instruct to report SOB or any respiratory difficulty  - Respiratory Therapy support as indicated  - Manage/alleviate anxiety  - Monitor for signs/symptoms of CO2 retention  Outcome: Progressing     Problem: Impaired Functional Mobility  Goal: Achieve highest/safest level of mobility/gait  Description: Interventions:  - Assess patient's functional ability and stability  - Promote increasing activity/tolerance for mobility and gait  - Educate and engage patient/family in tolerated activity level and precautions  Outcome: Progressing     Problem: Impaired Activities of Daily Living  Goal: Achieve highest/safest level of independence in self care  Description: Interventions:  - Assess ability and encourage patient to participate in ADLs to maximize function  - Promote sitting position while performing ADLs such as feeding, grooming, and bathing  - Educate and encourage patient/family in tolerated functional activity level and precautions during self-care  Outcome: Progressing

## 2022-11-10 NOTE — PLAN OF CARE
Received patient at 1800 from the ED. Alert and oriented x4. Tele rhythm NSR. History of afib. O2 saturation is 94% on room air. Breath sounds with crackles and wheezes. No c/o shortness of breath at this time. Pt oriented to room and unit. Reviewed plan of care pt verbalizes understanding.

## 2022-11-11 LAB
ANION GAP SERPL CALC-SCNC: 7 MMOL/L (ref 0–18)
ATRIAL RATE: 107 BPM
ATRIAL RATE: 117 BPM
ATRIAL RATE: 135 BPM
ATRIAL RATE: 277 BPM
BUN BLD-MCNC: 43 MG/DL (ref 7–18)
CALCIUM BLD-MCNC: 9.3 MG/DL (ref 8.5–10.1)
CHLORIDE SERPL-SCNC: 108 MMOL/L (ref 98–112)
CO2 SERPL-SCNC: 25 MMOL/L (ref 21–32)
CREAT BLD-MCNC: 1.52 MG/DL
GFR SERPLBLD BASED ON 1.73 SQ M-ARVRAT: 34 ML/MIN/1.73M2 (ref 60–?)
GLUCOSE BLD-MCNC: 125 MG/DL (ref 70–99)
INR BLD: 3.82 (ref 0.85–1.16)
MAGNESIUM SERPL-MCNC: 2.5 MG/DL (ref 1.6–2.6)
OSMOLALITY SERPL CALC.SUM OF ELEC: 302 MOSM/KG (ref 275–295)
POTASSIUM SERPL-SCNC: 4 MMOL/L (ref 3.5–5.1)
POTASSIUM SERPL-SCNC: 4 MMOL/L (ref 3.5–5.1)
PROTHROMBIN TIME: 37 SECONDS (ref 11.6–14.8)
Q-T INTERVAL: 284 MS
Q-T INTERVAL: 288 MS
Q-T INTERVAL: 324 MS
Q-T INTERVAL: 358 MS
QRS DURATION: 84 MS
QRS DURATION: 86 MS
QTC CALCULATION (BEZET): 372 MS
QTC CALCULATION (BEZET): 432 MS
QTC CALCULATION (BEZET): 451 MS
QTC CALCULATION (BEZET): 462 MS
R AXIS: 18 DEGREES
R AXIS: 23 DEGREES
R AXIS: 23 DEGREES
R AXIS: 25 DEGREES
SODIUM SERPL-SCNC: 140 MMOL/L (ref 136–145)
T AXIS: 155 DEGREES
T AXIS: 32 DEGREES
T AXIS: 33 DEGREES
T AXIS: 47 DEGREES
TSI SER-ACNC: 0.37 MIU/ML (ref 0.36–3.74)
VENTRICULAR RATE: 100 BPM
VENTRICULAR RATE: 103 BPM
VENTRICULAR RATE: 117 BPM
VENTRICULAR RATE: 135 BPM

## 2022-11-11 PROCEDURE — 99232 SBSQ HOSP IP/OBS MODERATE 35: CPT | Performed by: INTERNAL MEDICINE

## 2022-11-11 RX ORDER — DOFETILIDE 0.12 MG/1
125 CAPSULE ORAL EVERY 12 HOURS
Status: DISCONTINUED | OUTPATIENT
Start: 2022-11-11 | End: 2022-11-15

## 2022-11-11 RX ORDER — ACETAMINOPHEN 500 MG
500 TABLET ORAL EVERY 4 HOURS PRN
Status: DISCONTINUED | OUTPATIENT
Start: 2022-11-11 | End: 2022-11-15

## 2022-11-11 RX ORDER — ALBUTEROL SULFATE 2.5 MG/3ML
2.5 SOLUTION RESPIRATORY (INHALATION) EVERY 4 HOURS PRN
Status: DISCONTINUED | OUTPATIENT
Start: 2022-11-11 | End: 2022-11-15

## 2022-11-11 RX ORDER — PREDNISONE 20 MG/1
20 TABLET ORAL
Status: COMPLETED | OUTPATIENT
Start: 2022-11-12 | End: 2022-11-15

## 2022-11-11 NOTE — PLAN OF CARE
Patient is alert & oriented x4. Pt ambulates with walker, SBA. Pt still experience dyspnea with ambulation. Crackles heard bilateral. On room air. O2 sats overnight >89%. Skin dry and intact. No BM since 11/8, miralax to be given in the morning. Pt report right upper back pain (5/10). Bed in low position and call light within reach. Reviewed plan of care and patient verbalizes understanding. Pt converted to Afib at 2133. HR:130-140s. Cardiologist on call notified. Cardizem gtt started. Problem: SAFETY ADULT - FALL  Goal: Free from fall injury  Description: INTERVENTIONS:  - Assess pt frequently for physical needs  - Identify cognitive and physical deficits and behaviors that affect risk of falls.   - [place] fall precautions as indicated by assessment.  - Educate pt/family on patient safety including physical limitations  - Instruct pt to call for assistance with activity based on assessment  - Modify environment to reduce risk of injury  - Provide assistive devices as appropriate  - Consider OT/PT consult to assist with strengthening/mobility  - Encourage toileting schedule  Outcome: Progressing     Problem: DISCHARGE PLANNING  Goal: Discharge to home or other facility with appropriate resources  Description: INTERVENTIONS:  - Identify barriers to discharge w/pt and caregiver  - Include patient/family/discharge partner in discharge planning  - Arrange for needed discharge resources and transportation as appropriate  - Identify discharge learning needs (meds, wound care, etc)  - Arrange for interpreters to assist at discharge as needed  - Consider post-discharge preferences of patient/family/discharge partner  - Complete POLST form as appropriate  - Assess patient's ability to be responsible for managing their own health  - Refer to Case Management Department for coordinating discharge planning if the patient needs post-hospital services based on physician/LIP order or complex needs related to functional status, cognitive ability or social support system  Outcome: Progressing     Problem: CARDIOVASCULAR - ADULT  Goal: Maintains optimal cardiac output and hemodynamic stability  Description: INTERVENTIONS:  - Monitor vital signs, rhythm, and trends  - Monitor for bleeding, hypotension and signs of decreased cardiac output  - Evaluate effectiveness of vasoactive medications to optimize hemodynamic stability  - Monitor arterial and/or venous puncture sites for bleeding and/or hematoma  - Assess quality of pulses, skin color and temperature  - Assess for signs of decreased coronary artery perfusion - ex.  Angina  - Evaluate fluid balance, assess for edema, trend weights  Outcome: Progressing  Goal: Absence of cardiac arrhythmias or at baseline  Description: INTERVENTIONS:  - Continuous cardiac monitoring, monitor vital signs, obtain 12 lead EKG if indicated  - Evaluate effectiveness of antiarrhythmic and heart rate control medications as ordered  - Initiate emergency measures for life threatening arrhythmias  - Monitor electrolytes and administer replacement therapy as ordered  Outcome: Progressing     Problem: RESPIRATORY - ADULT  Goal: Achieves optimal ventilation and oxygenation  Description: INTERVENTIONS:  - Assess for changes in respiratory status  - Assess for changes in mentation and behavior  - Position to facilitate oxygenation and minimize respiratory effort  - Oxygen supplementation based on oxygen saturation or ABGs  - Provide Smoking Cessation handout, if applicable  - Encourage broncho-pulmonary hygiene including cough, deep breathe, Incentive Spirometry  - Assess the need for suctioning and perform as needed  - Assess and instruct to report SOB or any respiratory difficulty  - Respiratory Therapy support as indicated  - Manage/alleviate anxiety  - Monitor for signs/symptoms of CO2 retention  Outcome: Progressing     Problem: Impaired Functional Mobility  Goal: Achieve highest/safest level of mobility/gait  Description: Interventions:  - Assess patient's functional ability and stability  - Promote increasing activity/tolerance for mobility and gait  - Educate and engage patient/family in tolerated activity level and precautions  - Recommend use of  RW for transfers and ambulation  Outcome: Progressing     Problem: Impaired Activities of Daily Living  Goal: Achieve highest/safest level of independence in self care  Description: Interventions:  - Assess ability and encourage patient to participate in ADLs to maximize function  - Promote sitting position while performing ADLs such as feeding, grooming, and bathing  - Educate and encourage patient/family in tolerated functional activity level and precautions during self-care  - Provide support under elbow of weak side to prevent shoulder subluxation  Outcome: Progressing

## 2022-11-11 NOTE — CM/SW NOTE
11/11/22 1100   CM/SW Referral Data   Referral Source Social Work (self-referral)   Reason for Referral Discharge planning   Informant Patient   Patient Info   Patient's Current Mental Status at Time of Assessment Alert;Oriented   Patient's Home Environment Condo/Apt with elevator   Patient lives with Alone   Patient Status Prior to Admission   Independent with ADLs and Mobility No   Pt. requires assistance with Driving;Housework   Services in place prior to admission [address] Provider Department on [address] [place] [address] hours per day 4   long term Care days per week 1     Sw met with pt to assess for dc needs. Pt is 78 yo female, admitted due to chest pain/ afib . Pt lives alone in [facility]. She uses Rolator for ambulation. Has caregiver once per week to take her to appointments and to help with cleaning. Discussed HHC but she does not feel she needs it. SW to follow.     Linda Brand, SHUBHAM  /Discharge Planner

## 2022-11-11 NOTE — CONSULTS
120 [facility] Dosing Service  Warfarin (Coumadin) Subsequent Dosing    Hector Velazquez is a 79year old patient for whom pharmacy is dosing warfarin (Coumadin). Goal INR is 2-3    Recent Labs   Lab 11/09/22  1407 11/10/22  0640 11/11/22  0642   INR 2.52* 2.71* 3.82*     Consulted by:  Dr Gabriel Sun  Indication:  Afib  Potential Drug Interactions:  zithromax/levothyroxine  Other Anticoagulants:  none  Home regimen (if applicable):  4mg Sunday, 6mg Monday through Saturday      Inpatient Dosing History:     Date 11/9 11/10 11/11          INR 2.52 2.71 3.82          Coumadin dose 6 4 mg                       Based on above -  1. For today, Hold warfarin (COUMADIN) dose  2   PT/INR ordered daily while on warfarin  3. Pharmacy will continue to follow. We appreciate the opportunity to assist in the care of this patient.     TRISTEN Malcolm [facility]  11/11/2022  10:51 AM

## 2022-11-11 NOTE — PLAN OF CARE
Assumed care at 0730. Pt alert, oriented x4. Oxygen saturation greater than 89% on room air. Lung sounds with crackles in bases, diminished bilaterally. Tele: atrial fibrillation. Rates in 100s-110s. Cardizem gtt maintained per order. Received orders for tikosyn, EKG completed per protocol, given per order. Continent of bowel and bladder. Denies pain. Ambulates standby assist. Plan of care: cardizem gtt, tikosyn with EKGs per protocol. Pt updated on plan of care. Questions answered. 1552 - QTc 462 on repeat EKG. Cardiology APRN notified, no new orders. 1652 - PRN tylenol for right shoulder pain. Problem: SAFETY ADULT - FALL  Goal: Free from fall injury  Description: INTERVENTIONS:  - Assess pt frequently for physical needs  - Identify cognitive and physical deficits and behaviors that affect risk of falls.   - [place] fall precautions as indicated by assessment.  - Educate pt/family on patient safety including physical limitations  - Instruct pt to call for assistance with activity based on assessment  - Modify environment to reduce risk of injury  - Provide assistive devices as appropriate  - Consider OT/PT consult to assist with strengthening/mobility  - Encourage toileting schedule  Outcome: Progressing     Problem: DISCHARGE PLANNING  Goal: Discharge to home or other facility with appropriate resources  Description: INTERVENTIONS:  - Identify barriers to discharge w/pt and caregiver  - Include patient/family/discharge partner in discharge planning  - Arrange for needed discharge resources and transportation as appropriate  - Identify discharge learning needs (meds, wound care, etc)  - Arrange for interpreters to assist at discharge as needed  - Consider post-discharge preferences of patient/family/discharge partner  - Complete POLST form as appropriate  - Assess patient's ability to be responsible for managing their own health  - Refer to Case Management Department for coordinating discharge planning if the patient needs post-hospital services based on physician/LIP order or complex needs related to functional status, cognitive ability or social support system  Outcome: Progressing     Problem: CARDIOVASCULAR - ADULT  Goal: Maintains optimal cardiac output and hemodynamic stability  Description: INTERVENTIONS:  - Monitor vital signs, rhythm, and trends  - Monitor for bleeding, hypotension and signs of decreased cardiac output  - Evaluate effectiveness of vasoactive medications to optimize hemodynamic stability  - Monitor arterial and/or venous puncture sites for bleeding and/or hematoma  - Assess quality of pulses, skin color and temperature  - Assess for signs of decreased coronary artery perfusion - ex.  Angina  - Evaluate fluid balance, assess for edema, trend weights  Outcome: Progressing  Goal: Absence of cardiac arrhythmias or at baseline  Description: INTERVENTIONS:  - Continuous cardiac monitoring, monitor vital signs, obtain 12 lead EKG if indicated  - Evaluate effectiveness of antiarrhythmic and heart rate control medications as ordered  - Initiate emergency measures for life threatening arrhythmias  - Monitor electrolytes and administer replacement therapy as ordered  Outcome: Progressing     Problem: RESPIRATORY - ADULT  Goal: Achieves optimal ventilation and oxygenation  Description: INTERVENTIONS:  - Assess for changes in respiratory status  - Assess for changes in mentation and behavior  - Position to facilitate oxygenation and minimize respiratory effort  - Oxygen supplementation based on oxygen saturation or ABGs  - Provide Smoking Cessation handout, if applicable  - Encourage broncho-pulmonary hygiene including cough, deep breathe, Incentive Spirometry  - Assess the need for suctioning and perform as needed  - Assess and instruct to report SOB or any respiratory difficulty  - Respiratory Therapy support as indicated  - Manage/alleviate anxiety  - Monitor for signs/symptoms of CO2 retention  Outcome: Progressing     Problem: Impaired Functional Mobility  Goal: Achieve highest/safest level of mobility/gait  Description: Interventions:  - Assess patient's functional ability and stability  - Promote increasing activity/tolerance for mobility and gait  - Educate and engage patient/family in tolerated activity level and precautions    Outcome: Progressing     Problem: Impaired Activities of Daily Living  Goal: Achieve highest/safest level of independence in self care  Description: Interventions:  - Assess ability and encourage patient to participate in ADLs to maximize function  - Promote sitting position while performing ADLs such as feeding, grooming, and bathing  - Educate and encourage patient/family in tolerated functional activity level and precautions during self-care    Outcome: Progressing

## 2022-11-12 LAB
ANION GAP SERPL CALC-SCNC: 6 MMOL/L (ref 0–18)
ATRIAL RATE: 69 BPM
ATRIAL RATE: 71 BPM
ATRIAL RATE: 76 BPM
BUN BLD-MCNC: 47 MG/DL (ref 7–18)
CALCIUM BLD-MCNC: 9.5 MG/DL (ref 8.5–10.1)
CHLORIDE SERPL-SCNC: 110 MMOL/L (ref 98–112)
CO2 SERPL-SCNC: 27 MMOL/L (ref 21–32)
CREAT BLD-MCNC: 1.29 MG/DL
GFR SERPLBLD BASED ON 1.73 SQ M-ARVRAT: 41 ML/MIN/1.73M2 (ref 60–?)
GLUCOSE BLD-MCNC: 110 MG/DL (ref 70–99)
INR BLD: 3.6 (ref 0.85–1.16)
MAGNESIUM SERPL-MCNC: 2.5 MG/DL (ref 1.6–2.6)
OSMOLALITY SERPL CALC.SUM OF ELEC: 309 MOSM/KG (ref 275–295)
P AXIS: 65 DEGREES
P AXIS: 67 DEGREES
P AXIS: 72 DEGREES
P-R INTERVAL: 130 MS
P-R INTERVAL: 132 MS
P-R INTERVAL: 134 MS
POTASSIUM SERPL-SCNC: 4.2 MMOL/L (ref 3.5–5.1)
PROTHROMBIN TIME: 35.4 SECONDS (ref 11.6–14.8)
Q-T INTERVAL: 408 MS
Q-T INTERVAL: 416 MS
Q-T INTERVAL: 418 MS
QRS DURATION: 74 MS
QRS DURATION: 80 MS
QRS DURATION: 82 MS
QTC CALCULATION (BEZET): 437 MS
QTC CALCULATION (BEZET): 452 MS
QTC CALCULATION (BEZET): 470 MS
R AXIS: 18 DEGREES
R AXIS: 19 DEGREES
R AXIS: 20 DEGREES
SODIUM SERPL-SCNC: 143 MMOL/L (ref 136–145)
T AXIS: 35 DEGREES
T AXIS: 42 DEGREES
T AXIS: 46 DEGREES
VENTRICULAR RATE: 69 BPM
VENTRICULAR RATE: 71 BPM
VENTRICULAR RATE: 76 BPM

## 2022-11-12 PROCEDURE — 99232 SBSQ HOSP IP/OBS MODERATE 35: CPT | Performed by: INTERNAL MEDICINE

## 2022-11-12 RX ORDER — LEVOTHYROXINE SODIUM 88 UG/1
88 TABLET ORAL DAILY
Status: DISCONTINUED | OUTPATIENT
Start: 2022-11-13 | End: 2022-11-15

## 2022-11-12 RX ORDER — FUROSEMIDE 10 MG/ML
40 INJECTION INTRAMUSCULAR; INTRAVENOUS ONCE
Status: COMPLETED | OUTPATIENT
Start: 2022-11-12 | End: 2022-11-12

## 2022-11-12 NOTE — PLAN OF CARE
Patient is alert & oriented x4. Pt ambulates w/ walker, SBA. Crackles heard bilateral. On room air, overnight O2 maintained >91%. No pain reported, did not request pain medication. Pt had a medium bowel movement on 11/11. Skin dry and intact. Bed in low position and call light within reach. Reviewed plan of care and patient verbalizes understanding. At 2046, pt converted to sinus rhythm. Confirmed with EKG. On call cardiologist notified. Cardizem gtt weaned down to 2.5 mg/hr. QTc:437. Tikosyn given. EKG done 2 hr after dose with QTc:452. Cardizem gtt stopped @0645. Problem: SAFETY ADULT - FALL  Goal: Free from fall injury  Description: INTERVENTIONS:  - Assess pt frequently for physical needs  - Identify cognitive and physical deficits and behaviors that affect risk of falls.   - [place] fall precautions as indicated by assessment.  - Educate pt/family on patient safety including physical limitations  - Instruct pt to call for assistance with activity based on assessment  - Modify environment to reduce risk of injury  - Provide assistive devices as appropriate  - Consider OT/PT consult to assist with strengthening/mobility  - Encourage toileting schedule  Outcome: Progressing     Problem: DISCHARGE PLANNING  Goal: Discharge to home or other facility with appropriate resources  Description: INTERVENTIONS:  - Identify barriers to discharge w/pt and caregiver  - Include patient/family/discharge partner in discharge planning  - Arrange for needed discharge resources and transportation as appropriate  - Identify discharge learning needs (meds, wound care, etc)  - Arrange for interpreters to assist at discharge as needed  - Consider post-discharge preferences of patient/family/discharge partner  - Complete POLST form as appropriate  - Assess patient's ability to be responsible for managing their own health  - Refer to Case Management Department for coordinating discharge planning if the patient needs post-hospital services based on physician/LIP order or complex needs related to functional status, cognitive ability or social support system  Outcome: Progressing     Problem: CARDIOVASCULAR - ADULT  Goal: Maintains optimal cardiac output and hemodynamic stability  Description: INTERVENTIONS:  - Monitor vital signs, rhythm, and trends  - Monitor for bleeding, hypotension and signs of decreased cardiac output  - Evaluate effectiveness of vasoactive medications to optimize hemodynamic stability  - Monitor arterial and/or venous puncture sites for bleeding and/or hematoma  - Assess quality of pulses, skin color and temperature  - Assess for signs of decreased coronary artery perfusion - ex.  Angina  - Evaluate fluid balance, assess for edema, trend weights  Outcome: Progressing  Goal: Absence of cardiac arrhythmias or at baseline  Description: INTERVENTIONS:  - Continuous cardiac monitoring, monitor vital signs, obtain 12 lead EKG if indicated  - Evaluate effectiveness of antiarrhythmic and heart rate control medications as ordered  - Initiate emergency measures for life threatening arrhythmias  - Monitor electrolytes and administer replacement therapy as ordered  Outcome: Progressing     Problem: RESPIRATORY - ADULT  Goal: Achieves optimal ventilation and oxygenation  Description: INTERVENTIONS:  - Assess for changes in respiratory status  - Assess for changes in mentation and behavior  - Position to facilitate oxygenation and minimize respiratory effort  - Oxygen supplementation based on oxygen saturation or ABGs  - Provide Smoking Cessation handout, if applicable  - Encourage broncho-pulmonary hygiene including cough, deep breathe, Incentive Spirometry  - Assess the need for suctioning and perform as needed  - Assess and instruct to report SOB or any respiratory difficulty  - Respiratory Therapy support as indicated  - Manage/alleviate anxiety  - Monitor for signs/symptoms of CO2 retention  Outcome: Progressing Problem: Impaired Functional Mobility  Goal: Achieve highest/safest level of mobility/gait  Description: Interventions:  - Assess patient's functional ability and stability  - Promote increasing activity/tolerance for mobility and gait  - Educate and engage patient/family in tolerated activity level and precautions  - Recommend use of  RW for transfers and ambulation  Outcome: Progressing     Problem: Impaired Activities of Daily Living  Goal: Achieve highest/safest level of independence in self care  Description: Interventions:  - Assess ability and encourage patient to participate in ADLs to maximize function  - Promote sitting position while performing ADLs such as feeding, grooming, and bathing  - Educate and encourage patient/family in tolerated functional activity level and precautions during self-care  - Encourage patient to incorporate impaired side during daily activities to promote function  Outcome: Progressing

## 2022-11-12 NOTE — CONSULTS
[facility] Dosing Service  Warfarin (Coumadin) Subsequent Dosing    Arely Silver is a 79year old patient for whom pharmacy is dosing warfarin (Coumadin). Goal INR is 2-3    Recent Labs   Lab 11/09/22  1407 11/10/22  0640 11/11/22  0642 11/12/22  0805   INR 2.52* 2.71* 3.82* 3.60*       Consulted by: Dr. Emory Butcher  Indication: a-fib  Potential Drug Interactions: synthroid  Other Anticoagulants: none  Home regimen (if applicable): 4 mg Iniguez, 6 mg ROW    Inpatient Dosing History:     Date 11/9 11/10 11/11   11/12       INR 2.52 2.71 3.82   3.6       Coumadin dose 6 4 mg  hold   hold                Based on above -  1. For today, Hold warfarin (COUMADIN) dose  2   PT/INR ordered daily while on warfarin  3. Pharmacy will continue to follow. We appreciate the opportunity to assist in the care of this patient.     Manny Barrera PharmD  11/12/2022  11:03 AM

## 2022-11-12 NOTE — PLAN OF CARE
Pt denies c/o pain, malaise, or cardiac symptoms. A&Ox4. Lungs diminished with crackles bilaterally, equal expansion, on room air. Pt NSR on monitor with frequent PACs. Abdomen soft and non-tender with active bowel sounds in all four quadrants. Continent of B&B. Pt updated with plan of care. Problem: SAFETY ADULT - FALL  Goal: Free from fall injury  Description: INTERVENTIONS:  - Assess pt frequently for physical needs  - Identify cognitive and physical deficits and behaviors that affect risk of falls.   - [place] fall precautions as indicated by assessment.  - Educate pt/family on patient safety including physical limitations  - Instruct pt to call for assistance with activity based on assessment  - Modify environment to reduce risk of injury  - Provide assistive devices as appropriate  - Consider OT/PT consult to assist with strengthening/mobility  - Encourage toileting schedule  Outcome: Progressing     Problem: DISCHARGE PLANNING  Goal: Discharge to home or other facility with appropriate resources  Description: INTERVENTIONS:  - Identify barriers to discharge w/pt and caregiver  - Include patient/family/discharge partner in discharge planning  - Arrange for needed discharge resources and transportation as appropriate  - Identify discharge learning needs (meds, wound care, etc)  - Arrange for interpreters to assist at discharge as needed  - Consider post-discharge preferences of patient/family/discharge partner  - Complete POLST form as appropriate  - Assess patient's ability to be responsible for managing their own health  - Refer to Case Management Department for coordinating discharge planning if the patient needs post-hospital services based on physician/LIP order or complex needs related to functional status, cognitive ability or social support system  Outcome: Progressing     Problem: CARDIOVASCULAR - ADULT  Goal: Maintains optimal cardiac output and hemodynamic stability  Description: INTERVENTIONS:  - Monitor vital signs, rhythm, and trends  - Monitor for bleeding, hypotension and signs of decreased cardiac output  - Evaluate effectiveness of vasoactive medications to optimize hemodynamic stability  - Monitor arterial and/or venous puncture sites for bleeding and/or hematoma  - Assess quality of pulses, skin color and temperature  - Assess for signs of decreased coronary artery perfusion - ex.  Angina  - Evaluate fluid balance, assess for edema, trend weights  Outcome: Progressing  Goal: Absence of cardiac arrhythmias or at baseline  Description: INTERVENTIONS:  - Continuous cardiac monitoring, monitor vital signs, obtain 12 lead EKG if indicated  - Evaluate effectiveness of antiarrhythmic and heart rate control medications as ordered  - Initiate emergency measures for life threatening arrhythmias  - Monitor electrolytes and administer replacement therapy as ordered  Outcome: Progressing     Problem: RESPIRATORY - ADULT  Goal: Achieves optimal ventilation and oxygenation  Description: INTERVENTIONS:  - Assess for changes in respiratory status  - Assess for changes in mentation and behavior  - Position to facilitate oxygenation and minimize respiratory effort  - Oxygen supplementation based on oxygen saturation or ABGs  - Provide Smoking Cessation handout, if applicable  - Encourage broncho-pulmonary hygiene including cough, deep breathe, Incentive Spirometry  - Assess the need for suctioning and perform as needed  - Assess and instruct to report SOB or any respiratory difficulty  - Respiratory Therapy support as indicated  - Manage/alleviate anxiety  - Monitor for signs/symptoms of CO2 retention  Outcome: Progressing     Problem: Impaired Functional Mobility  Goal: Achieve highest/safest level of mobility/gait  Description: Interventions:  - Assess patient's functional ability and stability  - Promote increasing activity/tolerance for mobility and gait  - Educate and engage patient/family in tolerated activity level and precautions  Outcome: Progressing     Problem: Impaired Activities of Daily Living  Goal: Achieve highest/safest level of independence in self care  Description: Interventions:  - Assess ability and encourage patient to participate in ADLs to maximize function  - Promote sitting position while performing ADLs such as feeding, grooming, and bathing  - Educate and encourage patient/family in tolerated functional activity level and precautions during self-care  Outcome: Progressing

## 2022-11-13 LAB
ANION GAP SERPL CALC-SCNC: 4 MMOL/L (ref 0–18)
ATRIAL RATE: 68 BPM
ATRIAL RATE: 70 BPM
ATRIAL RATE: 72 BPM
BUN BLD-MCNC: 39 MG/DL (ref 7–18)
CALCIUM BLD-MCNC: 9 MG/DL (ref 8.5–10.1)
CHLORIDE SERPL-SCNC: 107 MMOL/L (ref 98–112)
CO2 SERPL-SCNC: 31 MMOL/L (ref 21–32)
CREAT BLD-MCNC: 1.09 MG/DL
GFR SERPLBLD BASED ON 1.73 SQ M-ARVRAT: 50 ML/MIN/1.73M2 (ref 60–?)
GLUCOSE BLD-MCNC: 89 MG/DL (ref 70–99)
INR BLD: 2.7 (ref 0.85–1.16)
MAGNESIUM SERPL-MCNC: 2.5 MG/DL (ref 1.6–2.6)
OSMOLALITY SERPL CALC.SUM OF ELEC: 303 MOSM/KG (ref 275–295)
P AXIS: 61 DEGREES
P AXIS: 69 DEGREES
P AXIS: 69 DEGREES
P-R INTERVAL: 126 MS
P-R INTERVAL: 130 MS
P-R INTERVAL: 138 MS
POTASSIUM SERPL-SCNC: 3.8 MMOL/L (ref 3.5–5.1)
PROTHROMBIN TIME: 28.3 SECONDS (ref 11.6–14.8)
Q-T INTERVAL: 404 MS
Q-T INTERVAL: 426 MS
Q-T INTERVAL: 430 MS
QRS DURATION: 74 MS
QRS DURATION: 78 MS
QRS DURATION: 84 MS
QTC CALCULATION (BEZET): 442 MS
QTC CALCULATION (BEZET): 452 MS
QTC CALCULATION (BEZET): 464 MS
R AXIS: 18 DEGREES
R AXIS: 19 DEGREES
R AXIS: 23 DEGREES
SODIUM SERPL-SCNC: 142 MMOL/L (ref 136–145)
T AXIS: 47 DEGREES
T AXIS: 55 DEGREES
T AXIS: 57 DEGREES
VENTRICULAR RATE: 68 BPM
VENTRICULAR RATE: 70 BPM
VENTRICULAR RATE: 72 BPM

## 2022-11-13 PROCEDURE — 99232 SBSQ HOSP IP/OBS MODERATE 35: CPT | Performed by: INTERNAL MEDICINE

## 2022-11-13 RX ORDER — DILTIAZEM HYDROCHLORIDE 5 MG/ML
10 INJECTION INTRAVENOUS ONCE
Status: COMPLETED | OUTPATIENT
Start: 2022-11-13 | End: 2022-11-13

## 2022-11-13 RX ORDER — WARFARIN SODIUM 2 MG/1
4 TABLET ORAL
Status: COMPLETED | OUTPATIENT
Start: 2022-11-13 | End: 2022-11-13

## 2022-11-13 RX ORDER — POTASSIUM CHLORIDE 20 MEQ/1
40 TABLET, EXTENDED RELEASE ORAL ONCE
Status: COMPLETED | OUTPATIENT
Start: 2022-11-13 | End: 2022-11-13

## 2022-11-13 RX ORDER — FUROSEMIDE 10 MG/ML
40 INJECTION INTRAMUSCULAR; INTRAVENOUS ONCE
Status: COMPLETED | OUTPATIENT
Start: 2022-11-13 | End: 2022-11-13

## 2022-11-13 NOTE — PLAN OF CARE
Patient is alert & oriented x4. Pt ambulates. Crackles heard bilateral. On room air. Normal sinus rhythm w/ PAC. No pain reported. Skin dry and intact. No acute events overnight. Bed in low position and call light within reach. Reviewed plan of care and patient verbalizes understanding. Notified on call cardiology of QTc:464. Orders to hold tikosyn last night. Repeat EKG done the morning. Problem: SAFETY ADULT - FALL  Goal: Free from fall injury  Description: INTERVENTIONS:  - Assess pt frequently for physical needs  - Identify cognitive and physical deficits and behaviors that affect risk of falls.   - [place] fall precautions as indicated by assessment.  - Educate pt/family on patient safety including physical limitations  - Instruct pt to call for assistance with activity based on assessment  - Modify environment to reduce risk of injury  - Provide assistive devices as appropriate  - Consider OT/PT consult to assist with strengthening/mobility  - Encourage toileting schedule  11/13/2022 0715 by Eddy Caldwell RN  Outcome: Progressing  11/13/2022 0135 by Eddy Caldwell RN  Outcome: Progressing     Problem: DISCHARGE PLANNING  Goal: Discharge to home or other facility with appropriate resources  Description: INTERVENTIONS:  - Identify barriers to discharge w/pt and caregiver  - Include patient/family/discharge partner in discharge planning  - Arrange for needed discharge resources and transportation as appropriate  - Identify discharge learning needs (meds, wound care, etc)  - Arrange for interpreters to assist at discharge as needed  - Consider post-discharge preferences of patient/family/discharge partner  - Complete POLST form as appropriate  - Assess patient's ability to be responsible for managing their own health  - Refer to Case Management Department for coordinating discharge planning if the patient needs post-hospital services based on physician/LIP order or complex needs related to functional status, cognitive ability or social support system  11/13/2022 0715 by Wilbur Barba RN  Outcome: Progressing  11/13/2022 0135 by Wilbur Barba RN  Outcome: Progressing     Problem: CARDIOVASCULAR - ADULT  Goal: Maintains optimal cardiac output and hemodynamic stability  Description: INTERVENTIONS:  - Monitor vital signs, rhythm, and trends  - Monitor for bleeding, hypotension and signs of decreased cardiac output  - Evaluate effectiveness of vasoactive medications to optimize hemodynamic stability  - Monitor arterial and/or venous puncture sites for bleeding and/or hematoma  - Assess quality of pulses, skin color and temperature  - Assess for signs of decreased coronary artery perfusion - ex.  Angina  - Evaluate fluid balance, assess for edema, trend weights  11/13/2022 0715 by Wilbur Barba RN  Outcome: Progressing  11/13/2022 0135 by Wilbur Barba RN  Outcome: Progressing  Goal: Absence of cardiac arrhythmias or at baseline  Description: INTERVENTIONS:  - Continuous cardiac monitoring, monitor vital signs, obtain 12 lead EKG if indicated  - Evaluate effectiveness of antiarrhythmic and heart rate control medications as ordered  - Initiate emergency measures for life threatening arrhythmias  - Monitor electrolytes and administer replacement therapy as ordered  11/13/2022 0715 by Wilbur Barba RN  Outcome: Progressing  11/13/2022 0135 by Wilbur Barba RN  Outcome: Progressing     Problem: RESPIRATORY - ADULT  Goal: Achieves optimal ventilation and oxygenation  Description: INTERVENTIONS:  - Assess for changes in respiratory status  - Assess for changes in mentation and behavior  - Position to facilitate oxygenation and minimize respiratory effort  - Oxygen supplementation based on oxygen saturation or ABGs  - Provide Smoking Cessation handout, if applicable  - Encourage broncho-pulmonary hygiene including cough, deep breathe, Incentive Spirometry  - Assess the need for suctioning and perform as needed  - Assess and instruct to report SOB or any respiratory difficulty  - Respiratory Therapy support as indicated  - Manage/alleviate anxiety  - Monitor for signs/symptoms of CO2 retention  11/13/2022 0715 by Maicol Mckay RN  Outcome: Progressing  11/13/2022 0135 by Maicol Mckay RN  Outcome: Progressing     Problem: Impaired Functional Mobility  Goal: Achieve highest/safest level of mobility/gait  Description: Interventions:  - Assess patient's functional ability and stability  - Promote increasing activity/tolerance for mobility and gait  - Educate and engage patient/family in tolerated activity level and precautions  - Recommend use of  RW for transfers and ambulation  11/13/2022 0715 by Maicol Mckay RN  Outcome: Progressing  11/13/2022 0135 by Maicol Mckay RN  Outcome: Progressing     Problem: Impaired Activities of Daily Living  Goal: Achieve highest/safest level of independence in self care  Description: Interventions:  - Assess ability and encourage patient to participate in ADLs to maximize function  - Promote sitting position while performing ADLs such as feeding, grooming, and bathing  - Educate and encourage patient/family in tolerated functional activity level and precautions during self-care  - Encourage patient to incorporate impaired side during daily activities to promote function  11/13/2022 0715 by Maicol Mckay RN  Outcome: Progressing  11/13/2022 0135 by Maicol Mckay RN  Outcome: Progressing

## 2022-11-13 NOTE — PLAN OF CARE
Pt denies c/o pain, malaise, or cardiac symptoms. A&Ox4. Lungs diminished with crackles bilaterally, equal expansion, on room air. Pt SR on monitor with frequent PACs. Abdomen soft and non-tender with active bowel sounds in all four quadrants. Continent of B&B. Pt updated with plan of care. 1856: pt in afib, this RN called night [facility] NP. Cardizem bolus x1 ordered. Problem: SAFETY ADULT - FALL  Goal: Free from fall injury  Description: INTERVENTIONS:  - Assess pt frequently for physical needs  - Identify cognitive and physical deficits and behaviors that affect risk of falls.   - [place] fall precautions as indicated by assessment.  - Educate pt/family on patient safety including physical limitations  - Instruct pt to call for assistance with activity based on assessment  - Modify environment to reduce risk of injury  - Provide assistive devices as appropriate  - Consider OT/PT consult to assist with strengthening/mobility  - Encourage toileting schedule  Outcome: Progressing     Problem: DISCHARGE PLANNING  Goal: Discharge to home or other facility with appropriate resources  Description: INTERVENTIONS:  - Identify barriers to discharge w/pt and caregiver  - Include patient/family/discharge partner in discharge planning  - Arrange for needed discharge resources and transportation as appropriate  - Identify discharge learning needs (meds, wound care, etc)  - Arrange for interpreters to assist at discharge as needed  - Consider post-discharge preferences of patient/family/discharge partner  - Complete POLST form as appropriate  - Assess patient's ability to be responsible for managing their own health  - Refer to Case Management Department for coordinating discharge planning if the patient needs post-hospital services based on physician/LIP order or complex needs related to functional status, cognitive ability or social support system  Outcome: Progressing     Problem: CARDIOVASCULAR - ADULT  Goal: Maintains optimal cardiac output and hemodynamic stability  Description: INTERVENTIONS:  - Monitor vital signs, rhythm, and trends  - Monitor for bleeding, hypotension and signs of decreased cardiac output  - Evaluate effectiveness of vasoactive medications to optimize hemodynamic stability  - Monitor arterial and/or venous puncture sites for bleeding and/or hematoma  - Assess quality of pulses, skin color and temperature  - Assess for signs of decreased coronary artery perfusion - ex.  Angina  - Evaluate fluid balance, assess for edema, trend weights  Outcome: Progressing  Goal: Absence of cardiac arrhythmias or at baseline  Description: INTERVENTIONS:  - Continuous cardiac monitoring, monitor vital signs, obtain 12 lead EKG if indicated  - Evaluate effectiveness of antiarrhythmic and heart rate control medications as ordered  - Initiate emergency measures for life threatening arrhythmias  - Monitor electrolytes and administer replacement therapy as ordered  Outcome: Progressing     Problem: RESPIRATORY - ADULT  Goal: Achieves optimal ventilation and oxygenation  Description: INTERVENTIONS:  - Assess for changes in respiratory status  - Assess for changes in mentation and behavior  - Position to facilitate oxygenation and minimize respiratory effort  - Oxygen supplementation based on oxygen saturation or ABGs  - Provide Smoking Cessation handout, if applicable  - Encourage broncho-pulmonary hygiene including cough, deep breathe, Incentive Spirometry  - Assess the need for suctioning and perform as needed  - Assess and instruct to report SOB or any respiratory difficulty  - Respiratory Therapy support as indicated  - Manage/alleviate anxiety  - Monitor for signs/symptoms of CO2 retention  Outcome: Progressing     Problem: Impaired Functional Mobility  Goal: Achieve highest/safest level of mobility/gait  Description: Interventions:  - Assess patient's functional ability and stability  - Promote increasing activity/tolerance for mobility and gait  - Educate and engage patient/family in tolerated activity level and precautions  Outcome: Progressing     Problem: Impaired Activities of Daily Living  Goal: Achieve highest/safest level of independence in self care  Description: Interventions:  - Assess ability and encourage patient to participate in ADLs to maximize function  - Promote sitting position while performing ADLs such as feeding, grooming, and bathing  - Educate and encourage patient/family in tolerated functional activity level and precautions during self-care  Outcome: Progressing     Problem: Diabetes/Glucose Control  Goal: Glucose maintained within prescribed range  Description: INTERVENTIONS:  - Monitor Blood Glucose as ordered  - Assess for signs and symptoms of hyperglycemia and hypoglycemia  - Administer ordered medications to maintain glucose within target range  - Assess barriers to adequate nutritional intake and initiate nutrition consult as needed  - Instruct patient on self management of diabetes  Outcome: Progressing

## 2022-11-13 NOTE — CONSULTS
[facility] Dosing Service  Warfarin (Coumadin) Subsequent Dosing    Rajendra Vera is a 79year old patient for whom pharmacy is dosing warfarin (Coumadin). Goal INR is 2-3    Recent Labs   Lab 11/09/22  1407 11/10/22  0640 11/11/22  0642 11/12/22  0805 11/13/22  0747   INR 2.52* 2.71* 3.82* 3.60* 2.70*       Consulted by: Dr. Alessandra Mitchell  Indication: a-fib  Potential Drug Interactions: synthroid  Other Anticoagulants: none  Home regimen (if applicable): 4 mg Iniguez, 6 mg ROW    Inpatient Dosing History:     Date 11/9 11/10 11/11   11/12  11/13     INR 2.52 2.71 3.82   3.6  2.7     Coumadin dose 6 4 mg  hold   hold 4 mg               Based on above -  1. For today, Give warfarin (COUMADIN) 4 mg at 2100 tonight  2   PT/INR ordered daily while on warfarin  3. Pharmacy will continue to follow. We appreciate the opportunity to assist in the care of this patient.     Vipul Jamil, Jennifer  11/13/2022  1:21 PM

## 2022-11-14 ENCOUNTER — APPOINTMENT (OUTPATIENT)
Dept: GENERAL RADIOLOGY | Facility: HOSPITAL | Age: 82
End: 2022-11-14
Attending: NURSE PRACTITIONER
Payer: MEDICARE

## 2022-11-14 LAB
ATRIAL RATE: 93 BPM
ERYTHROCYTE [DISTWIDTH] IN BLOOD BY AUTOMATED COUNT: 13.6 %
HCT VFR BLD AUTO: 44.5 %
HGB BLD-MCNC: 14.1 G/DL
INR BLD: 1.88 (ref 0.85–1.16)
MCH RBC QN AUTO: 28.1 PG (ref 26–34)
MCHC RBC AUTO-ENTMCNC: 31.7 G/DL (ref 31–37)
MCV RBC AUTO: 88.6 FL
NT-PROBNP SERPL-MCNC: 1556 PG/ML (ref ?–450)
PLATELET # BLD AUTO: 376 10(3)UL (ref 150–450)
POTASSIUM SERPL-SCNC: 3.9 MMOL/L (ref 3.5–5.1)
PROTHROMBIN TIME: 21.4 SECONDS (ref 11.6–14.8)
Q-T INTERVAL: 368 MS
QRS DURATION: 84 MS
QTC CALCULATION (BEZET): 408 MS
R AXIS: 45 DEGREES
RBC # BLD AUTO: 5.02 X10(6)UL
SARS-COV-2 RNA RESP QL NAA+PROBE: NOT DETECTED
T AXIS: 52 DEGREES
VENTRICULAR RATE: 74 BPM
WBC # BLD AUTO: 14.5 X10(3) UL (ref 4–11)

## 2022-11-14 PROCEDURE — 71046 X-RAY EXAM CHEST 2 VIEWS: CPT | Performed by: NURSE PRACTITIONER

## 2022-11-14 PROCEDURE — 99232 SBSQ HOSP IP/OBS MODERATE 35: CPT | Performed by: INTERNAL MEDICINE

## 2022-11-14 RX ORDER — METOPROLOL TARTRATE 50 MG/1
50 TABLET, FILM COATED ORAL
Status: DISCONTINUED | OUTPATIENT
Start: 2022-11-14 | End: 2022-11-15

## 2022-11-14 RX ORDER — SODIUM CHLORIDE 9 MG/ML
INJECTION, SOLUTION INTRAVENOUS CONTINUOUS
Status: DISCONTINUED | OUTPATIENT
Start: 2022-11-14 | End: 2022-11-15

## 2022-11-14 NOTE — PROGRESS NOTES
[facility] Dosing Service  Warfarin (Coumadin) Subsequent Dosing    Krish Chao is a 79year old patient for whom pharmacy is dosing warfarin (Coumadin). Goal INR is 2-3    Recent Labs   Lab 11/10/22  0640 11/11/22  0642 11/12/22  0805 11/13/22  0747 11/14/22  0714   INR 2.71* 3.82* 3.60* 2.70* 1.88*       Consulted by:  Dr Morro Lovett  Indication:  afib  Potential Drug Interactions:  levothyroxine - can increase INR  Other Anticoagulants:  none  Home regimen (if applicable):  Coumdin 4mg on Sundays, 6mg rest of week    Inpatient Dosing History:    Date 11/9 11/10 11/11 11/12 11/13 11/14   INR 2.52 2.71 3.82 3.6 2.7 1.88   Coumadin dose 6mg 4mg held held 4mg             Based on above -  1. For today, Give warfarin (COUMADIN) 7 mg at 2100 tonight  2   PT/INR ordered daily while on warfarin  3. Pharmacy will continue to follow. We appreciate the opportunity to assist in the care of this patient.     Romulo Luna, PharmD  11/14/2022  1:20 PM

## 2022-11-14 NOTE — PLAN OF CARE
Received w/ cardizem drip ;extra dose of lopressor given as ordered and cardizem drip dc as ordered  A fib rate 80's-90's  taking tikosyn ; verified 12 lead-to continue 12 lead ekg 2 hours after each dose  of tikosyn  Inr today=1.88 taking coumadin  For YORDAN  and cardioversion in am-11/15  Per patient dyspnea is the same -pa/lat chest x ray done  Problem: CARDIOVASCULAR - ADULT  Goal: Absence of cardiac arrhythmias or at baseline  Description: INTERVENTIONS:  - Continuous cardiac monitoring, monitor vital signs, obtain 12 lead EKG if indicated  - Evaluate effectiveness of antiarrhythmic and heart rate control medications as ordered  - Initiate emergency measures for life threatening arrhythmias  - Monitor electrolytes and administer replacement therapy as ordered  Outcome: Not Progressing     Problem: CARDIOVASCULAR - ADULT  Goal: Maintains optimal cardiac output and hemodynamic stability  Description: INTERVENTIONS:  - Monitor vital signs, rhythm, and trends  - Monitor for bleeding, hypotension and signs of decreased cardiac output  - Evaluate effectiveness of vasoactive medications to optimize hemodynamic stability  - Monitor arterial and/or venous puncture sites for bleeding and/or hematoma  - Assess quality of pulses, skin color and temperature  - Assess for signs of decreased coronary artery perfusion - ex.  Angina  - Evaluate fluid balance, assess for edema, trend weights  Outcome: Progressing     Problem: RESPIRATORY - ADULT  Goal: Achieves optimal ventilation and oxygenation  Description: INTERVENTIONS:  - Assess for changes in respiratory status  - Assess for changes in mentation and behavior  - Position to facilitate oxygenation and minimize respiratory effort  - Oxygen supplementation based on oxygen saturation or ABGs  - Provide Smoking Cessation handout, if applicable  - Encourage broncho-pulmonary hygiene including cough, deep breathe, Incentive Spirometry  - Assess the need for suctioning and perform as needed  - Assess and instruct to report SOB or any respiratory difficulty  - Respiratory Therapy support as indicated  - Manage/alleviate anxiety  - Monitor for signs/symptoms of CO2 retention  Outcome: Not Progressing     Problem: Impaired Activities of Daily Living  Goal: Achieve highest/safest level of independence in self care  Description: Interventions:  - Assess ability and encourage patient to participate in ADLs to maximize function  - Promote sitting position while performing ADLs such as feeding, grooming, and bathing  - Educate and encourage patient/family in tolerated functional activity level and precautions during self-care    Outcome: Progressing     Problem: SAFETY ADULT - FALL  Goal: Free from fall injury  Description: INTERVENTIONS:  - Assess pt frequently for physical needs  - Identify cognitive and physical deficits and behaviors that affect risk of falls.   - [place] fall precautions as indicated by assessment.  - Educate pt/family on patient safety including physical limitations  - Instruct pt to call for assistance with activity based on assessment  - Modify environment to reduce risk of injury  - Provide assistive devices as appropriate  - Consider OT/PT consult to assist with strengthening/mobility  - Encourage toileting schedule  Outcome: Progressing

## 2022-11-14 NOTE — PLAN OF CARE
Received patient at 1 . Alert and oriented x4 . Tele rhythm is atrial fibrillation . O2 saturation is within normal range on room air. Breath sounds are regular, diminished and crackles present bilaterally. Patient is ambulating with stand by assist. Patient is voiding. Last BM was 11/12. Patient has no complaints of pain. Call light is within patient reach. All patient needs currently met. Problem: SAFETY ADULT - FALL  Goal: Free from fall injury  Description: INTERVENTIONS:  - Assess pt frequently for physical needs  - Identify cognitive and physical deficits and behaviors that affect risk of falls.   - [place] fall precautions as indicated by assessment.  - Educate pt/family on patient safety including physical limitations  - Instruct pt to call for assistance with activity based on assessment  - Modify environment to reduce risk of injury  - Provide assistive devices as appropriate  - Consider OT/PT consult to assist with strengthening/mobility  - Encourage toileting schedule  Outcome: Progressing     Problem: DISCHARGE PLANNING  Goal: Discharge to home or other facility with appropriate resources  Description: INTERVENTIONS:  - Identify barriers to discharge w/pt and caregiver  - Include patient/family/discharge partner in discharge planning  - Arrange for needed discharge resources and transportation as appropriate  - Identify discharge learning needs (meds, wound care, etc)  - Arrange for interpreters to assist at discharge as needed  - Consider post-discharge preferences of patient/family/discharge partner  - Complete POLST form as appropriate  - Assess patient's ability to be responsible for managing their own health  - Refer to Case Management Department for coordinating discharge planning if the patient needs post-hospital services based on physician/LIP order or complex needs related to functional status, cognitive ability or social support system  Outcome: Progressing     Problem: CARDIOVASCULAR - ADULT  Goal: Maintains optimal cardiac output and hemodynamic stability  Description: INTERVENTIONS:  - Monitor vital signs, rhythm, and trends  - Monitor for bleeding, hypotension and signs of decreased cardiac output  - Evaluate effectiveness of vasoactive medications to optimize hemodynamic stability  - Monitor arterial and/or venous puncture sites for bleeding and/or hematoma  - Assess quality of pulses, skin color and temperature  - Assess for signs of decreased coronary artery perfusion - ex.  Angina  - Evaluate fluid balance, assess for edema, trend weights  Outcome: Progressing  Goal: Absence of cardiac arrhythmias or at baseline  Description: INTERVENTIONS:  - Continuous cardiac monitoring, monitor vital signs, obtain 12 lead EKG if indicated  - Evaluate effectiveness of antiarrhythmic and heart rate control medications as ordered  - Initiate emergency measures for life threatening arrhythmias  - Monitor electrolytes and administer replacement therapy as ordered  Outcome: Progressing     Problem: RESPIRATORY - ADULT  Goal: Achieves optimal ventilation and oxygenation  Description: INTERVENTIONS:  - Assess for changes in respiratory status  - Assess for changes in mentation and behavior  - Position to facilitate oxygenation and minimize respiratory effort  - Oxygen supplementation based on oxygen saturation or ABGs  - Provide Smoking Cessation handout, if applicable  - Encourage broncho-pulmonary hygiene including cough, deep breathe, Incentive Spirometry  - Assess the need for suctioning and perform as needed  - Assess and instruct to report SOB or any respiratory difficulty  - Respiratory Therapy support as indicated  - Manage/alleviate anxiety  - Monitor for signs/symptoms of CO2 retention  Outcome: Progressing     Problem: Impaired Functional Mobility  Goal: Achieve highest/safest level of mobility/gait  Description: Interventions:  - Assess patient's functional ability and stability  - Promote increasing activity/tolerance for mobility and gait  - Educate and engage patient/family in tolerated activity level and precautions  - Recommend use of  RW for transfers and ambulation  Outcome: Progressing     Problem: Impaired Activities of Daily Living  Goal: Achieve highest/safest level of independence in self care  Description: Interventions:  - Assess ability and encourage patient to participate in ADLs to maximize function  - Promote sitting position while performing ADLs such as feeding, grooming, and bathing  - Educate and encourage patient/family in tolerated functional activity level and precautions during self-care  - Provide support under elbow of weak side to prevent shoulder subluxation  Outcome: Progressing     Problem: Diabetes/Glucose Control  Goal: Glucose maintained within prescribed range  Description: INTERVENTIONS:  - Monitor Blood Glucose as ordered  - Assess for signs and symptoms of hyperglycemia and hypoglycemia  - Administer ordered medications to maintain glucose within target range  - Assess barriers to adequate nutritional intake and initiate nutrition consult as needed  - Instruct patient on self management of diabetes  Outcome: Progressing

## 2022-11-14 NOTE — PROGRESS NOTES
Notified earlier patient went in to afib with RVR. Cardizem 10 mg bolus ordered. Update from RN that pt's HR dropped into the 90s after cardizem bolus but RN states HR back in the 100s. RN states cardizem gtt restarted. Advised RN ok to give Tikosyn while pt on cardizem gtt.

## 2022-11-15 ENCOUNTER — APPOINTMENT (OUTPATIENT)
Dept: CV DIAGNOSTICS | Facility: HOSPITAL | Age: 82
End: 2022-11-15
Attending: NURSE PRACTITIONER
Payer: MEDICARE

## 2022-11-15 VITALS
DIASTOLIC BLOOD PRESSURE: 49 MMHG | HEIGHT: 57 IN | WEIGHT: 179.5 LBS | HEART RATE: 62 BPM | RESPIRATION RATE: 24 BRPM | BODY MASS INDEX: 38.73 KG/M2 | OXYGEN SATURATION: 93 % | SYSTOLIC BLOOD PRESSURE: 141 MMHG | TEMPERATURE: 97 F

## 2022-11-15 LAB
ANION GAP SERPL CALC-SCNC: 3 MMOL/L (ref 0–18)
ATRIAL RATE: 62 BPM
ATRIAL RATE: 66 BPM
ATRIAL RATE: 68 BPM
ATRIAL RATE: 92 BPM
BUN BLD-MCNC: 36 MG/DL (ref 7–18)
CALCIUM BLD-MCNC: 8.6 MG/DL (ref 8.5–10.1)
CHLORIDE SERPL-SCNC: 111 MMOL/L (ref 98–112)
CO2 SERPL-SCNC: 29 MMOL/L (ref 21–32)
CREAT BLD-MCNC: 1.04 MG/DL
GFR SERPLBLD BASED ON 1.73 SQ M-ARVRAT: 53 ML/MIN/1.73M2 (ref 60–?)
GLUCOSE BLD-MCNC: 88 MG/DL (ref 70–99)
INR BLD: 1.95 (ref 0.85–1.16)
OSMOLALITY SERPL CALC.SUM OF ELEC: 304 MOSM/KG (ref 275–295)
P AXIS: 65 DEGREES
P AXIS: 68 DEGREES
P AXIS: 70 DEGREES
P-R INTERVAL: 126 MS
P-R INTERVAL: 130 MS
P-R INTERVAL: 138 MS
POTASSIUM SERPL-SCNC: 3.9 MMOL/L (ref 3.5–5.1)
PROTHROMBIN TIME: 22 SECONDS (ref 11.6–14.8)
Q-T INTERVAL: 358 MS
Q-T INTERVAL: 426 MS
Q-T INTERVAL: 448 MS
Q-T INTERVAL: 452 MS
QRS DURATION: 80 MS
QRS DURATION: 82 MS
QRS DURATION: 84 MS
QRS DURATION: 84 MS
QTC CALCULATION (BEZET): 447 MS
QTC CALCULATION (BEZET): 452 MS
QTC CALCULATION (BEZET): 458 MS
QTC CALCULATION (BEZET): 469 MS
R AXIS: 11 DEGREES
R AXIS: 18 DEGREES
R AXIS: 20 DEGREES
R AXIS: 21 DEGREES
SODIUM SERPL-SCNC: 143 MMOL/L (ref 136–145)
T AXIS: 38 DEGREES
T AXIS: 56 DEGREES
T AXIS: 56 DEGREES
T AXIS: 62 DEGREES
VENTRICULAR RATE: 62 BPM
VENTRICULAR RATE: 66 BPM
VENTRICULAR RATE: 68 BPM
VENTRICULAR RATE: 94 BPM

## 2022-11-15 PROCEDURE — 99239 HOSP IP/OBS DSCHRG MGMT >30: CPT | Performed by: INTERNAL MEDICINE

## 2022-11-15 RX ORDER — DOFETILIDE 0.12 MG/1
125 CAPSULE ORAL EVERY 12 HOURS
Qty: 60 CAPSULE | Refills: 3 | Status: SHIPPED | OUTPATIENT
Start: 2022-11-15

## 2022-11-15 RX ORDER — METOPROLOL TARTRATE 50 MG/1
50 TABLET, FILM COATED ORAL
Qty: 60 TABLET | Refills: 3 | Status: SHIPPED | OUTPATIENT
Start: 2022-11-15

## 2022-11-15 RX ORDER — LEVOTHYROXINE SODIUM 88 UG/1
88 TABLET ORAL DAILY
Qty: 30 TABLET | Refills: 0 | Status: SHIPPED | OUTPATIENT
Start: 2022-11-16

## 2022-11-15 RX ORDER — FUROSEMIDE 40 MG/1
40 TABLET ORAL DAILY
Status: DISCONTINUED | OUTPATIENT
Start: 2022-11-15 | End: 2022-11-15

## 2022-11-15 RX ORDER — FUROSEMIDE 40 MG/1
40 TABLET ORAL DAILY
Qty: 30 TABLET | Refills: 3 | Status: SHIPPED | OUTPATIENT
Start: 2022-11-15

## 2022-11-15 NOTE — DISCHARGE INSTRUCTIONS
Take your prior Coumadin dosing of 4mg on Sunday, and 6mg all other days. Get INR checked one week. Your levothyroxine was decreased. Please take new dose of 88mcg daily and follow up with your PCP for repeat thyroid testing in about 4-6 weeks. Going Home    In this section you will find the tools which will guide you through the first few days after you leave the hospital. Continued use of these tools will help you develop the skills necessary to keep your heart failure under control. Heart Failure Guidelines - place this worksheet on your refrigerator or somewhere you can refer to it daily to help you decide if your symptoms are under control, and what to do if they are not. Home Care Instructions Following Heart Failure - the most important things to do every day include:     Weigh yourself  Take your medicines as prescribed  Limit your sodium (salt) and fluid intake  Know when to call your cardiologist, primary doctor, or nurse  Know when to seek emergency care    There is also a handy weight chart on which to record your weight every day, information on measuring fluids and limiting fluid intake, and a section for recording your thoughts or questions. Things for You to Remember:   1. An appointment has been made for you to see your doctor or healthcare provider within 7 days of hospital discharge. It is important that you attend this appointment to make sure your symptoms are under control. 2. Your recommended sodium intake is [id number] mg daily    3. Limit your fluid intake to no more than 2 liters or 64 ounces per day    4. Some exercise and activity is important to help keep your heart functioning and strong. Unless instructed not to exercise, you may walk at a slow to moderate pace for 10-15 minutes 2-3 days per week to start. Pace your activity to prevent shortness of breath or fatigue. Stop exercise if you develop chest pain, lightheadedness, or significant shortness of breath. Call Your Cardiologist If:   You gain 2 pounds overnight or 3-4 pounds in 3-5 days  You have more difficulty breathing  You are getting more tired with normal activity  You are more short of breath lying down, or awaken at night short of breath  You have swelling of your feet or legs  You urinate less often during the day and more often at night  You have cramps in your legs  You have blurred vision or see yellowish-green halos around objects of lights    Go to the Emergency Room If:   You have pain or tightness in your chest  You are extremely short of breath  You are coughing up pink-frothy mucus  You are traveling and develop symptoms of worsening heart failure    Additional Resources:    If you have questions or concerns about heart failure management, call the [facility] Failure [facility] at [phone]

## 2022-11-15 NOTE — CM/SW NOTE
Spoke with THE [facility] outpatient pharmacy regarding medication costs. Eliquis cost is $141.59 a month on insurance, can use free month coupon if never used before. Tikosyn cost is $72.56 a month on insurance. Updated RN and APRN. SW will continue to follow.      KIKI Patrick  Discharge Planner  [phone]

## 2022-11-15 NOTE — PLAN OF CARE
Received patient at 1 . Alert and oriented x4 . Tele rhythm is normal sinus, patient converted from atrial fibrillation at ~1830 . O2 saturation is within normal range on room air. Breath sounds are regular and crackles are present in the lower lobes bilaterally . Patient is ambulating with no assist . Patient is voiding. Last BM was 11/13. Patient has no complaints of pain. Patient is NPO for possible YORDAN and cardioversion in the morning. Call light is within patient reach. All patient needs are currently met. [address] [facility] with Radha nellie Miller to give morning dose of tikosyn, no plan for cardioversion due to converting back to normal sinus      Problem: SAFETY ADULT - FALL  Goal: Free from fall injury  Description: INTERVENTIONS:  - Assess pt frequently for physical needs  - Identify cognitive and physical deficits and behaviors that affect risk of falls.   - [place] fall precautions as indicated by assessment.  - Educate pt/family on patient safety including physical limitations  - Instruct pt to call for assistance with activity based on assessment  - Modify environment to reduce risk of injury  - Provide assistive devices as appropriate  - Consider OT/PT consult to assist with strengthening/mobility  - Encourage toileting schedule  Outcome: Progressing     Problem: DISCHARGE PLANNING  Goal: Discharge to home or other facility with appropriate resources  Description: INTERVENTIONS:  - Identify barriers to discharge w/pt and caregiver  - Include patient/family/discharge partner in discharge planning  - Arrange for needed discharge resources and transportation as appropriate  - Identify discharge learning needs (meds, wound care, etc)  - Arrange for interpreters to assist at discharge as needed  - Consider post-discharge preferences of patient/family/discharge partner  - Complete POLST form as appropriate  - Assess patient's ability to be responsible for managing their own health  - Refer to Case Management Department for coordinating discharge planning if the patient needs post-hospital services based on physician/LIP order or complex needs related to functional status, cognitive ability or social support system  Outcome: Progressing     Problem: CARDIOVASCULAR - ADULT  Goal: Maintains optimal cardiac output and hemodynamic stability  Description: INTERVENTIONS:  - Monitor vital signs, rhythm, and trends  - Monitor for bleeding, hypotension and signs of decreased cardiac output  - Evaluate effectiveness of vasoactive medications to optimize hemodynamic stability  - Monitor arterial and/or venous puncture sites for bleeding and/or hematoma  - Assess quality of pulses, skin color and temperature  - Assess for signs of decreased coronary artery perfusion - ex.  Angina  - Evaluate fluid balance, assess for edema, trend weights  Outcome: Progressing  Goal: Absence of cardiac arrhythmias or at baseline  Description: INTERVENTIONS:  - Continuous cardiac monitoring, monitor vital signs, obtain 12 lead EKG if indicated  - Evaluate effectiveness of antiarrhythmic and heart rate control medications as ordered  - Initiate emergency measures for life threatening arrhythmias  - Monitor electrolytes and administer replacement therapy as ordered  Outcome: Progressing     Problem: RESPIRATORY - ADULT  Goal: Achieves optimal ventilation and oxygenation  Description: INTERVENTIONS:  - Assess for changes in respiratory status  - Assess for changes in mentation and behavior  - Position to facilitate oxygenation and minimize respiratory effort  - Oxygen supplementation based on oxygen saturation or ABGs  - Provide Smoking Cessation handout, if applicable  - Encourage broncho-pulmonary hygiene including cough, deep breathe, Incentive Spirometry  - Assess the need for suctioning and perform as needed  - Assess and instruct to report SOB or any respiratory difficulty  - Respiratory Therapy support as indicated  - Manage/alleviate anxiety  - Monitor for signs/symptoms of CO2 retention  Outcome: Progressing     Problem: Impaired Functional Mobility  Goal: Achieve highest/safest level of mobility/gait  Description: Interventions:  - Assess patient's functional ability and stability  - Promote increasing activity/tolerance for mobility and gait  - Educate and engage patient/family in tolerated activity level and precautions  - Recommend use of  RW for transfers and ambulation  Outcome: Progressing     Problem: Impaired Activities of Daily Living  Goal: Achieve highest/safest level of independence in self care  Description: Interventions:  - Assess ability and encourage patient to participate in ADLs to maximize function  - Promote sitting position while performing ADLs such as feeding, grooming, and bathing  - Educate and encourage patient/family in tolerated functional activity level and precautions during self-care  - Provide support under elbow of weak side to prevent shoulder subluxation  Outcome: Progressing     Problem: Diabetes/Glucose Control  Goal: Glucose maintained within prescribed range  Description: INTERVENTIONS:  - Monitor Blood Glucose as ordered  - Assess for signs and symptoms of hyperglycemia and hypoglycemia  - Administer ordered medications to maintain glucose within target range  - Assess barriers to adequate nutritional intake and initiate nutrition consult as needed  - Instruct patient on self management of diabetes  Outcome: Progressing

## 2022-11-15 NOTE — PLAN OF CARE
Assumed pt care at 0730. A&Ox4, [place] with BL HA, glasses, dentures, and walker at bedside. RA/, denies pain/SOB, lung sounds diminished, congested productive cough. VSS, NSR on tele. Voids. Up with SB/ad radha. Edema to BLE. POC dc today on tikosyn, POC updated with pt, questions answered, verbalized understanding. Will continue to monitor. Problem: SAFETY ADULT - FALL  Goal: Free from fall injury  Description: INTERVENTIONS:  - Assess pt frequently for physical needs  - Identify cognitive and physical deficits and behaviors that affect risk of falls.   - [place] fall precautions as indicated by assessment.  - Educate pt/family on patient safety including physical limitations  - Instruct pt to call for assistance with activity based on assessment  - Modify environment to reduce risk of injury  - Provide assistive devices as appropriate  - Consider OT/PT consult to assist with strengthening/mobility  - Encourage toileting schedule  Outcome: Progressing     Problem: DISCHARGE PLANNING  Goal: Discharge to home or other facility with appropriate resources  Description: INTERVENTIONS:  - Identify barriers to discharge w/pt and caregiver  - Include patient/family/discharge partner in discharge planning  - Arrange for needed discharge resources and transportation as appropriate  - Identify discharge learning needs (meds, wound care, etc)  - Arrange for interpreters to assist at discharge as needed  - Consider post-discharge preferences of patient/family/discharge partner  - Complete POLST form as appropriate  - Assess patient's ability to be responsible for managing their own health  - Refer to Case Management Department for coordinating discharge planning if the patient needs post-hospital services based on physician/LIP order or complex needs related to functional status, cognitive ability or social support system  Outcome: Progressing     Problem: CARDIOVASCULAR - ADULT  Goal: Maintains optimal cardiac output and hemodynamic stability  Description: INTERVENTIONS:  - Monitor vital signs, rhythm, and trends  - Monitor for bleeding, hypotension and signs of decreased cardiac output  - Evaluate effectiveness of vasoactive medications to optimize hemodynamic stability  - Monitor arterial and/or venous puncture sites for bleeding and/or hematoma  - Assess quality of pulses, skin color and temperature  - Assess for signs of decreased coronary artery perfusion - ex.  Angina  - Evaluate fluid balance, assess for edema, trend weights  Outcome: Progressing  Goal: Absence of cardiac arrhythmias or at baseline  Description: INTERVENTIONS:  - Continuous cardiac monitoring, monitor vital signs, obtain 12 lead EKG if indicated  - Evaluate effectiveness of antiarrhythmic and heart rate control medications as ordered  - Initiate emergency measures for life threatening arrhythmias  - Monitor electrolytes and administer replacement therapy as ordered  Outcome: Progressing     Problem: RESPIRATORY - ADULT  Goal: Achieves optimal ventilation and oxygenation  Description: INTERVENTIONS:  - Assess for changes in respiratory status  - Assess for changes in mentation and behavior  - Position to facilitate oxygenation and minimize respiratory effort  - Oxygen supplementation based on oxygen saturation or ABGs  - Provide Smoking Cessation handout, if applicable  - Encourage broncho-pulmonary hygiene including cough, deep breathe, Incentive Spirometry  - Assess the need for suctioning and perform as needed  - Assess and instruct to report SOB or any respiratory difficulty  - Respiratory Therapy support as indicated  - Manage/alleviate anxiety  - Monitor for signs/symptoms of CO2 retention  Outcome: Progressing     Problem: Impaired Functional Mobility  Goal: Achieve highest/safest level of mobility/gait  Description: Interventions:  - Assess patient's functional ability and stability  - Promote increasing activity/tolerance for mobility and gait  - Educate and engage patient/family in tolerated activity level and precautions  Outcome: Progressing     Problem: Impaired Activities of Daily Living  Goal: Achieve highest/safest level of independence in self care  Description: Interventions:  - Assess ability and encourage patient to participate in ADLs to maximize function  - Promote sitting position while performing ADLs such as feeding, grooming, and bathing  - Educate and encourage patient/family in tolerated functional activity level and precautions during self-care  Outcome: Progressing     Problem: Diabetes/Glucose Control  Goal: Glucose maintained within prescribed range  Description: INTERVENTIONS:  - Monitor Blood Glucose as ordered  - Assess for signs and symptoms of hyperglycemia and hypoglycemia  - Administer ordered medications to maintain glucose within target range  - Assess barriers to adequate nutritional intake and initiate nutrition consult as needed  - Instruct patient on self management of diabetes  Outcome: Progressing     Problem: Patient/Family Goals  Goal: Patient/Family Long Term Goal  Description: Patient's Long Term Goal: to stay out of hospital    Interventions:  - take meds as prescribed, attend follow up appts, eat healthy/exercise  - See additional Care Plan goals for specific interventions     Outcome: Progressing  Goal: Patient/Family Short Term Goal  Description: Patient's Short Term Goal: to go home    Interventions:   - follow medication regimen, therapeutic labs/procedures, walk TID  - See additional Care Plan goals for specific interventions     Outcome: Progressing     Problem: PAIN - ADULT  Goal: Verbalizes/displays adequate comfort level or patient's stated pain goal  Description: INTERVENTIONS:  - Encourage pt to monitor pain and request assistance  - Assess pain using appropriate pain scale  - Administer analgesics based on type and severity of pain and evaluate response  - Implement non-pharmacological measures as appropriate and evaluate response  - Consider cultural and social influences on pain and pain management  - Manage/alleviate anxiety  - Utilize distraction and/or relaxation techniques  - Monitor for opioid side effects  - Notify MD/LIP if interventions unsuccessful or patient reports new pain  - Anticipate increased pain with activity and pre-medicate as appropriate  Outcome: Progressing

## 2022-11-15 NOTE — PLAN OF CARE
Explained discharge instructions including medications and follow-ups to the patient, verbalized understanding, IV removed, tele monitor discontinued, will be transported via wheelchair. Problem: SAFETY ADULT - FALL  Goal: Free from fall injury  Description: INTERVENTIONS:  - Assess pt frequently for physical needs  - Identify cognitive and physical deficits and behaviors that affect risk of falls.   - [place] fall precautions as indicated by assessment.  - Educate pt/family on patient safety including physical limitations  - Instruct pt to call for assistance with activity based on assessment  - Modify environment to reduce risk of injury  - Provide assistive devices as appropriate  - Consider OT/PT consult to assist with strengthening/mobility  - Encourage toileting schedule  11/15/2022 1216 by Daija Miller RN  Outcome: Completed  11/15/2022 1126 by Daija Miller RN  Outcome: Progressing     Problem: DISCHARGE PLANNING  Goal: Discharge to home or other facility with appropriate resources  Description: INTERVENTIONS:  - Identify barriers to discharge w/pt and caregiver  - Include patient/family/discharge partner in discharge planning  - Arrange for needed discharge resources and transportation as appropriate  - Identify discharge learning needs (meds, wound care, etc)  - Arrange for interpreters to assist at discharge as needed  - Consider post-discharge preferences of patient/family/discharge partner  - Complete POLST form as appropriate  - Assess patient's ability to be responsible for managing their own health  - Refer to Case Management Department for coordinating discharge planning if the patient needs post-hospital services based on physician/LIP order or complex needs related to functional status, cognitive ability or social support system  11/15/2022 1216 by Daija Miller RN  Outcome: Completed  11/15/2022 1126 by Daija Miller RN  Outcome: Progressing     Problem: CARDIOVASCULAR - ADULT  Goal: Maintains optimal cardiac output and hemodynamic stability  Description: INTERVENTIONS:  - Monitor vital signs, rhythm, and trends  - Monitor for bleeding, hypotension and signs of decreased cardiac output  - Evaluate effectiveness of vasoactive medications to optimize hemodynamic stability  - Monitor arterial and/or venous puncture sites for bleeding and/or hematoma  - Assess quality of pulses, skin color and temperature  - Assess for signs of decreased coronary artery perfusion - ex.  Angina  - Evaluate fluid balance, assess for edema, trend weights  11/15/2022 1216 by Charlotte Haddad RN  Outcome: Completed  11/15/2022 1126 by Charlotte Haddad RN  Outcome: Progressing  Goal: Absence of cardiac arrhythmias or at baseline  Description: INTERVENTIONS:  - Continuous cardiac monitoring, monitor vital signs, obtain 12 lead EKG if indicated  - Evaluate effectiveness of antiarrhythmic and heart rate control medications as ordered  - Initiate emergency measures for life threatening arrhythmias  - Monitor electrolytes and administer replacement therapy as ordered  11/15/2022 1216 by Charlotte Haddad RN  Outcome: Completed  11/15/2022 1126 by Charlotte Haddad RN  Outcome: Progressing     Problem: RESPIRATORY - ADULT  Goal: Achieves optimal ventilation and oxygenation  Description: INTERVENTIONS:  - Assess for changes in respiratory status  - Assess for changes in mentation and behavior  - Position to facilitate oxygenation and minimize respiratory effort  - Oxygen supplementation based on oxygen saturation or ABGs  - Provide Smoking Cessation handout, if applicable  - Encourage broncho-pulmonary hygiene including cough, deep breathe, Incentive Spirometry  - Assess the need for suctioning and perform as needed  - Assess and instruct to report SOB or any respiratory difficulty  - Respiratory Therapy support as indicated  - Manage/alleviate anxiety  - Monitor for signs/symptoms of CO2 retention  11/15/2022 1216 by Iam Leal RN  Outcome: Completed  11/15/2022 1126 by Iam Leal RN  Outcome: Progressing     Problem: Impaired Functional Mobility  Goal: Achieve highest/safest level of mobility/gait  Description: Interventions:  - Assess patient's functional ability and stability  - Promote increasing activity/tolerance for mobility and gait  - Educate and engage patient/family in tolerated activity level and precautions  11/15/2022 1216 by Iam Leal RN  Outcome: Completed  11/15/2022 1126 by Iam Leal RN  Outcome: Progressing     Problem: Impaired Activities of Daily Living  Goal: Achieve highest/safest level of independence in self care  Description: Interventions:  - Assess ability and encourage patient to participate in ADLs to maximize function  - Promote sitting position while performing ADLs such as feeding, grooming, and bathing  - Educate and encourage patient/family in tolerated functional activity level and precautions during self-care  11/15/2022 1216 by Iam Leal RN  Outcome: Completed  11/15/2022 1126 by Iam Leal RN  Outcome: Progressing     Problem: Diabetes/Glucose Control  Goal: Glucose maintained within prescribed range  Description: INTERVENTIONS:  - Monitor Blood Glucose as ordered  - Assess for signs and symptoms of hyperglycemia and hypoglycemia  - Administer ordered medications to maintain glucose within target range  - Assess barriers to adequate nutritional intake and initiate nutrition consult as needed  - Instruct patient on self management of diabetes  11/15/2022 1216 by Iam Leal RN  Outcome: Completed  11/15/2022 1126 by Iam Leal RN  Outcome: Progressing     Problem: Patient/Family Goals  Goal: Patient/Family Long Term Goal  Description: Patient's Long Term Goal: to stay out of hospital    Interventions:  - take meds as prescribed, attend follow up appts, eat healthy/exercise  - See additional Care Plan goals for specific interventions     11/15/2022 1216 by Nitesh Arboleda RN  Outcome: Completed  11/15/2022 1126 by Nitesh Arboleda RN  Outcome: Progressing  Goal: Patient/Family Short Term Goal  Description: Patient's Short Term Goal: to go home    Interventions:   - follow medication regimen, therapeutic labs/procedures, walk TID  - See additional Care Plan goals for specific interventions     11/15/2022 1216 by Nitesh Arboleda RN  Outcome: Completed  11/15/2022 1126 by Nitesh Arboleda RN  Outcome: Progressing     Problem: PAIN - ADULT  Goal: Verbalizes/displays adequate comfort level or patient's stated pain goal  Description: INTERVENTIONS:  - Encourage pt to monitor pain and request assistance  - Assess pain using appropriate pain scale  - Administer analgesics based on type and severity of pain and evaluate response  - Implement non-pharmacological measures as appropriate and evaluate response  - Consider cultural and social influences on pain and pain management  - Manage/alleviate anxiety  - Utilize distraction and/or relaxation techniques  - Monitor for opioid side effects  - Notify MD/LIP if interventions unsuccessful or patient reports new pain  - Anticipate increased pain with activity and pre-medicate as appropriate  11/15/2022 1216 by Nitesh Arboleda RN  Outcome: Completed  11/15/2022 1126 by Nitesh Arboleda RN  Outcome: Progressing

## 2022-11-16 ENCOUNTER — PATIENT OUTREACH (OUTPATIENT)
Dept: CASE MANAGEMENT | Age: 82
End: 2022-11-16

## 2022-11-16 NOTE — PROGRESS NOTES
[facility]. [facility] contact information provided as well as [facility] office number, [phone].

## 2022-11-16 NOTE — PROGRESS NOTES
[facility] for post hospital follow up. [facility] contact information provided as well as [facility] office number, [phone].

## 2022-11-23 ENCOUNTER — LAB ENCOUNTER (OUTPATIENT)
Dept: LAB | Age: 82
End: 2022-11-23
Attending: INTERNAL MEDICINE
Payer: MEDICARE

## 2022-11-23 DIAGNOSIS — I48.92 ATRIAL FLUTTER (HCC): ICD-10-CM

## 2022-11-23 DIAGNOSIS — I48.0 PAF (PAROXYSMAL ATRIAL FIBRILLATION) (HCC): ICD-10-CM

## 2022-11-23 DIAGNOSIS — Z79.01 CHRONIC ANTICOAGULATION: ICD-10-CM

## 2022-11-23 LAB
INR BLD: 3.07 (ref 0.85–1.16)
PROTHROMBIN TIME: 31.1 SECONDS (ref 11.6–14.8)

## 2022-11-23 PROCEDURE — 85610 PROTHROMBIN TIME: CPT

## 2022-11-23 PROCEDURE — 36415 COLL VENOUS BLD VENIPUNCTURE: CPT

## 2022-11-28 ENCOUNTER — OFFICE VISIT (OUTPATIENT)
Dept: SLEEP CENTER | Age: 82
End: 2022-11-28
Attending: NURSE PRACTITIONER
Payer: MEDICARE

## 2022-11-28 DIAGNOSIS — E66.9 OBESITY: ICD-10-CM

## 2022-11-28 DIAGNOSIS — R06.81 APNEA: ICD-10-CM

## 2022-11-28 DIAGNOSIS — R06.83 SNORING: ICD-10-CM

## 2022-11-28 PROCEDURE — 95810 POLYSOM 6/> YRS 4/> PARAM: CPT

## 2022-11-28 RX ORDER — AMLODIPINE BESYLATE 10 MG/1
TABLET ORAL
Qty: 90 TABLET | Refills: 0 | Status: SHIPPED | OUTPATIENT
Start: 2022-11-28

## 2022-11-30 ENCOUNTER — OFFICE VISIT (OUTPATIENT)
Dept: FAMILY MEDICINE CLINIC | Facility: CLINIC | Age: 82
End: 2022-11-30
Payer: MEDICARE

## 2022-11-30 VITALS
WEIGHT: 182 LBS | RESPIRATION RATE: 21 BRPM | TEMPERATURE: 98 F | SYSTOLIC BLOOD PRESSURE: 140 MMHG | DIASTOLIC BLOOD PRESSURE: 60 MMHG | OXYGEN SATURATION: 98 % | HEIGHT: 57 IN | BODY MASS INDEX: 39.26 KG/M2 | HEART RATE: 65 BPM

## 2022-11-30 DIAGNOSIS — Z79.01 CURRENT USE OF LONG TERM ANTICOAGULATION: ICD-10-CM

## 2022-11-30 DIAGNOSIS — E03.9 ACQUIRED HYPOTHYROIDISM: ICD-10-CM

## 2022-11-30 DIAGNOSIS — I50.32 CHRONIC HEART FAILURE WITH PRESERVED EJECTION FRACTION (HCC): ICD-10-CM

## 2022-11-30 DIAGNOSIS — J44.9 CHRONIC OBSTRUCTIVE PULMONARY DISEASE, UNSPECIFIED COPD TYPE (HCC): Primary | ICD-10-CM

## 2022-11-30 DIAGNOSIS — M79.89 RIGHT LEG SWELLING: ICD-10-CM

## 2022-11-30 PROCEDURE — 99214 OFFICE O/P EST MOD 30 MIN: CPT | Performed by: FAMILY MEDICINE

## 2022-11-30 PROCEDURE — 1111F DSCHRG MED/CURRENT MED MERGE: CPT | Performed by: FAMILY MEDICINE

## 2022-11-30 PROCEDURE — 3008F BODY MASS INDEX DOCD: CPT | Performed by: FAMILY MEDICINE

## 2022-11-30 PROCEDURE — 3077F SYST BP >= 140 MM HG: CPT | Performed by: FAMILY MEDICINE

## 2022-11-30 PROCEDURE — 3078F DIAST BP <80 MM HG: CPT | Performed by: FAMILY MEDICINE

## 2022-11-30 RX ORDER — LEVOTHYROXINE SODIUM 0.1 MG/1
100 TABLET ORAL
COMMUNITY
End: 2022-11-30

## 2022-11-30 NOTE — PATIENT INSTRUCTIONS
Track weight and blood pressure daily - bring with you to any doctor's appointment  Take mucinex 600mg daily to thin mucous and cough up  Schedule pulmonary rehab (lung physical therapy)   Get clotrimazole (anti-fungal) cream and apply to right leg red bumps   Keep right leg uncovered at night   Make sure you are only take 88mcg of levothyroxine daily   Schedule follow up with pulmonologist

## 2022-12-01 ENCOUNTER — TELEPHONE (OUTPATIENT)
Dept: FAMILY MEDICINE CLINIC | Facility: CLINIC | Age: 82
End: 2022-12-01

## 2022-12-01 NOTE — TELEPHONE ENCOUNTER
Spoke with patient who confirms that she did obtain Rx on discharge and bottles indicate refills. Asked if it would be PCP v. [facility] who would continue to prescribe. Has appt w/ both coming up in December and hopes to get this clarified at that time.

## 2022-12-01 NOTE — TELEPHONE ENCOUNTER
She was prescribed afib medications by cardiology when admitted  -Metoprolol and dofetilide     Amlodipine and furosemide control BP and fluid status

## 2022-12-01 NOTE — TELEPHONE ENCOUNTER
Pt was seen 11/30 and says she was expecting to be prescribed a medication for a-fib. Please advise, thanks. [address]

## 2022-12-01 NOTE — TELEPHONE ENCOUNTER
Pt had appt yesterday states she was suppose to get medication for afib to go to [place] [address]. Pt wants to know if another dr is suppose to be prescribing.

## 2022-12-07 ENCOUNTER — LAB ENCOUNTER (OUTPATIENT)
Dept: LAB | Age: 82
End: 2022-12-07
Attending: INTERNAL MEDICINE
Payer: MEDICARE

## 2022-12-07 DIAGNOSIS — I48.0 PAF (PAROXYSMAL ATRIAL FIBRILLATION) (HCC): ICD-10-CM

## 2022-12-07 DIAGNOSIS — Z79.01 CHRONIC ANTICOAGULATION: ICD-10-CM

## 2022-12-07 DIAGNOSIS — I48.92 ATRIAL FLUTTER (HCC): ICD-10-CM

## 2022-12-07 LAB
INR BLD: 3.6 (ref 0.85–1.16)
PROTHROMBIN TIME: 35.1 SECONDS (ref 11.6–14.8)

## 2022-12-07 PROCEDURE — 85610 PROTHROMBIN TIME: CPT

## 2022-12-07 PROCEDURE — 36415 COLL VENOUS BLD VENIPUNCTURE: CPT

## 2022-12-09 ENCOUNTER — SLEEP STUDY (OUTPATIENT)
Facility: CLINIC | Age: 82
End: 2022-12-09
Payer: MEDICARE

## 2022-12-09 DIAGNOSIS — G47.33 OBSTRUCTIVE SLEEP APNEA SYNDROME: Primary | ICD-10-CM

## 2022-12-09 PROCEDURE — 95810 POLYSOM 6/> YRS 4/> PARAM: CPT | Performed by: OTHER

## 2022-12-12 NOTE — TELEPHONE ENCOUNTER
Discussed INR result with Dr Obie Ford and then pt. Verified current dose is 5mg daily. Pt instructed to continue current dose. Patient aware to repeat a INR in 1 month. Flowsheet updated. Virtual AWV Consent    Verification of patient location: confirmed at home     Patient is located in the following state in which I hold an active license [place]    Reason for visit is therapy follow up     Encounter provider PADDY [facility]    Provider located at [address] [place] [address]  [address] [facility] [id number] [phone]      Recent Visits  No visits were found meeting these conditions  Showing recent visits within past 7 days and meeting all other requirements  Today's Visits  Date Type Provider Dept   [facility]/22 [facility]   Showing today's visits and meeting all other requirements  Future Appointments  No visits were found meeting these conditions  Showing future appointments within next 150 days and meeting all other requirements       After connecting through [employer], the patient was identified by name and date of birth  Charmaynemayne Poli was informed that this is a telemedicine visit and that the visit is being conducted through the [address] [employer] platform  She agrees to proceed  My office door was closed  No one else was in the room  She acknowledged consent and understanding of privacy and security of the video platform  Charmayne Poli verbally agrees to participate in [employer]  Pt is aware that [employer] could be limited without vital signs or the ability to perform a full hands-on physical exam  Michelle Noland understands she or the provider may request at any time to terminate the video visit and request the patient to seek care or treatment in person  Patient is aware this is a billable service  Psychotherapy Provided: Individual Psychotherapy 43 minutes     Length of time in session: 43 minutes, follow up in 1 month    Encounter Diagnosis     ICD-10-CM    1   SARA (generalized anxiety disorder)  F41 1           Goals addressed in session: Goal 1 and Goal 2     Pain:      none    0    Current suicide risk : Low     D: Marce Hernandez presented virtually in session  She confirmed that she is at home  The session focused on review of mood as she is 28 weeks gestation right now  She reports continued positive mood since her work adjustments were made  She feels more connected with her  and that they are working well together  Discussed her relationship with her daughters, which she feels is good at this time  Will follow up in one months  Marce Hernandez will continue to focus on behavioral activation and managing her mood  A: Marce Hernandez presented as oriented x3  She denies any SI/Hi/SIB  Her mood overall is intact at this time  P: Marce Hernandez will continue to meet monthly with this provider  Behavioral Health Treatment Plan [facility]: Diagnosis and Treatment Plan explained to Telma Aschoff relates understanding diagnosis and is agreeable to Treatment Plan   Yes     Visit start and stop times:    12/12/22  Start Time: 1407  Stop Time: 1450  Total Visit Time: 43 minutes

## 2022-12-14 ENCOUNTER — OFFICE VISIT (OUTPATIENT)
Dept: FAMILY MEDICINE CLINIC | Facility: CLINIC | Age: 82
End: 2022-12-14
Payer: MEDICARE

## 2022-12-14 VITALS
HEART RATE: 75 BPM | BODY MASS INDEX: 39.48 KG/M2 | HEIGHT: 57 IN | TEMPERATURE: 98 F | RESPIRATION RATE: 21 BRPM | DIASTOLIC BLOOD PRESSURE: 40 MMHG | OXYGEN SATURATION: 98 % | SYSTOLIC BLOOD PRESSURE: 120 MMHG | WEIGHT: 183 LBS

## 2022-12-14 DIAGNOSIS — J44.9 CHRONIC OBSTRUCTIVE PULMONARY DISEASE, UNSPECIFIED COPD TYPE (HCC): Primary | ICD-10-CM

## 2022-12-14 DIAGNOSIS — I10 ESSENTIAL HYPERTENSION: ICD-10-CM

## 2022-12-14 DIAGNOSIS — M79.89 RIGHT LEG SWELLING: ICD-10-CM

## 2022-12-14 DIAGNOSIS — I48.20 CHRONIC ATRIAL FIBRILLATION (HCC): ICD-10-CM

## 2022-12-14 PROCEDURE — 3078F DIAST BP <80 MM HG: CPT | Performed by: FAMILY MEDICINE

## 2022-12-14 PROCEDURE — 99214 OFFICE O/P EST MOD 30 MIN: CPT | Performed by: FAMILY MEDICINE

## 2022-12-14 PROCEDURE — 1111F DSCHRG MED/CURRENT MED MERGE: CPT | Performed by: FAMILY MEDICINE

## 2022-12-14 PROCEDURE — 3008F BODY MASS INDEX DOCD: CPT | Performed by: FAMILY MEDICINE

## 2022-12-14 PROCEDURE — 3074F SYST BP LT 130 MM HG: CPT | Performed by: FAMILY MEDICINE

## 2022-12-14 NOTE — PATIENT INSTRUCTIONS
Continue tracking weight and blood pressure daily     Get mucinex (guaifenesin) and have ready in your medicine cabinet.  Start taking when you feel an increase in your congestion/mucous     Keep appointment with pulmonology

## 2022-12-15 ENCOUNTER — LAB ENCOUNTER (OUTPATIENT)
Dept: LAB | Age: 82
End: 2022-12-15
Attending: INTERNAL MEDICINE
Payer: MEDICARE

## 2022-12-15 DIAGNOSIS — I48.92 ATRIAL FLUTTER (HCC): ICD-10-CM

## 2022-12-15 DIAGNOSIS — I48.0 PAF (PAROXYSMAL ATRIAL FIBRILLATION) (HCC): ICD-10-CM

## 2022-12-15 DIAGNOSIS — Z79.01 CHRONIC ANTICOAGULATION: ICD-10-CM

## 2022-12-15 LAB
INR BLD: 2.73 (ref 0.85–1.16)
PROTHROMBIN TIME: 28.6 SECONDS (ref 11.6–14.8)

## 2022-12-15 PROCEDURE — 85610 PROTHROMBIN TIME: CPT

## 2022-12-15 PROCEDURE — 36415 COLL VENOUS BLD VENIPUNCTURE: CPT

## 2023-01-04 ENCOUNTER — LAB ENCOUNTER (OUTPATIENT)
Dept: LAB | Age: 83
End: 2023-01-04
Attending: INTERNAL MEDICINE
Payer: MEDICARE

## 2023-01-04 DIAGNOSIS — I48.92 ATRIAL FLUTTER (HCC): ICD-10-CM

## 2023-01-04 DIAGNOSIS — I48.0 PAF (PAROXYSMAL ATRIAL FIBRILLATION) (HCC): ICD-10-CM

## 2023-01-04 DIAGNOSIS — Z79.01 CHRONIC ANTICOAGULATION: ICD-10-CM

## 2023-01-04 LAB
INR BLD: 2.58 (ref 0.85–1.16)
PROTHROMBIN TIME: 27.4 SECONDS (ref 11.6–14.8)

## 2023-01-04 PROCEDURE — 85610 PROTHROMBIN TIME: CPT

## 2023-01-04 PROCEDURE — 36415 COLL VENOUS BLD VENIPUNCTURE: CPT

## 2023-01-09 ENCOUNTER — LAB ENCOUNTER (OUTPATIENT)
Dept: LAB | Age: 83
End: 2023-01-09
Attending: FAMILY MEDICINE
Payer: MEDICARE

## 2023-01-09 ENCOUNTER — OFFICE VISIT (OUTPATIENT)
Dept: FAMILY MEDICINE CLINIC | Facility: CLINIC | Age: 83
End: 2023-01-09
Payer: MEDICARE

## 2023-01-09 VITALS
HEIGHT: 57 IN | TEMPERATURE: 98 F | SYSTOLIC BLOOD PRESSURE: 110 MMHG | RESPIRATION RATE: 16 BRPM | HEART RATE: 110 BPM | DIASTOLIC BLOOD PRESSURE: 50 MMHG | BODY MASS INDEX: 37.76 KG/M2 | WEIGHT: 175 LBS | OXYGEN SATURATION: 97 %

## 2023-01-09 DIAGNOSIS — L30.9 DERMATITIS OF LOWER EXTREMITY: ICD-10-CM

## 2023-01-09 DIAGNOSIS — E03.9 ACQUIRED HYPOTHYROIDISM: ICD-10-CM

## 2023-01-09 DIAGNOSIS — M79.89 LEFT LEG SWELLING: Primary | ICD-10-CM

## 2023-01-09 LAB
ANION GAP SERPL CALC-SCNC: 5 MMOL/L (ref 0–18)
BASOPHILS # BLD AUTO: 0.05 X10(3) UL (ref 0–0.2)
BASOPHILS NFR BLD AUTO: 0.4 %
BUN BLD-MCNC: 32 MG/DL (ref 7–18)
CALCIUM BLD-MCNC: 9.2 MG/DL (ref 8.5–10.1)
CHLORIDE SERPL-SCNC: 106 MMOL/L (ref 98–112)
CO2 SERPL-SCNC: 30 MMOL/L (ref 21–32)
CREAT BLD-MCNC: 1.65 MG/DL
EOSINOPHIL # BLD AUTO: 1.07 X10(3) UL (ref 0–0.7)
EOSINOPHIL NFR BLD AUTO: 7.8 %
ERYTHROCYTE [DISTWIDTH] IN BLOOD BY AUTOMATED COUNT: 15.5 %
FASTING STATUS PATIENT QL REPORTED: YES
GFR SERPLBLD BASED ON 1.73 SQ M-ARVRAT: 31 ML/MIN/1.73M2 (ref 60–?)
GLUCOSE BLD-MCNC: 100 MG/DL (ref 70–99)
HCT VFR BLD AUTO: 42.9 %
HGB BLD-MCNC: 13.4 G/DL
IMM GRANULOCYTES # BLD AUTO: 0.05 X10(3) UL (ref 0–1)
IMM GRANULOCYTES NFR BLD: 0.4 %
LYMPHOCYTES # BLD AUTO: 3.4 X10(3) UL (ref 1–4)
LYMPHOCYTES NFR BLD AUTO: 24.9 %
MCH RBC QN AUTO: 28.8 PG (ref 26–34)
MCHC RBC AUTO-ENTMCNC: 31.2 G/DL (ref 31–37)
MCV RBC AUTO: 92.3 FL
MONOCYTES # BLD AUTO: 1.33 X10(3) UL (ref 0.1–1)
MONOCYTES NFR BLD AUTO: 9.7 %
NEUTROPHILS # BLD AUTO: 7.75 X10 (3) UL (ref 1.5–7.7)
NEUTROPHILS # BLD AUTO: 7.75 X10(3) UL (ref 1.5–7.7)
NEUTROPHILS NFR BLD AUTO: 56.8 %
OSMOLALITY SERPL CALC.SUM OF ELEC: 299 MOSM/KG (ref 275–295)
PLATELET # BLD AUTO: 353 10(3)UL (ref 150–450)
POTASSIUM SERPL-SCNC: 4.5 MMOL/L (ref 3.5–5.1)
RBC # BLD AUTO: 4.65 X10(6)UL
SODIUM SERPL-SCNC: 141 MMOL/L (ref 136–145)
T4 FREE SERPL-MCNC: 1 NG/DL (ref 0.8–1.7)
TSI SER-ACNC: 7.45 MIU/ML (ref 0.36–3.74)
WBC # BLD AUTO: 13.7 X10(3) UL (ref 4–11)

## 2023-01-09 PROCEDURE — 3078F DIAST BP <80 MM HG: CPT | Performed by: FAMILY MEDICINE

## 2023-01-09 PROCEDURE — 84443 ASSAY THYROID STIM HORMONE: CPT

## 2023-01-09 PROCEDURE — 3008F BODY MASS INDEX DOCD: CPT | Performed by: FAMILY MEDICINE

## 2023-01-09 PROCEDURE — 80048 BASIC METABOLIC PNL TOTAL CA: CPT

## 2023-01-09 PROCEDURE — 99214 OFFICE O/P EST MOD 30 MIN: CPT | Performed by: FAMILY MEDICINE

## 2023-01-09 PROCEDURE — 85025 COMPLETE CBC W/AUTO DIFF WBC: CPT

## 2023-01-09 PROCEDURE — 3074F SYST BP LT 130 MM HG: CPT | Performed by: FAMILY MEDICINE

## 2023-01-09 PROCEDURE — 84439 ASSAY OF FREE THYROXINE: CPT

## 2023-01-09 PROCEDURE — 36415 COLL VENOUS BLD VENIPUNCTURE: CPT

## 2023-01-09 RX ORDER — METOPROLOL SUCCINATE 50 MG/1
TABLET, EXTENDED RELEASE ORAL
COMMUNITY
Start: 2022-12-29

## 2023-01-09 RX ORDER — DOXYCYCLINE HYCLATE 100 MG
100 TABLET ORAL 2 TIMES DAILY
Qty: 20 TABLET | Refills: 0 | Status: SHIPPED | OUTPATIENT
Start: 2023-01-09 | End: 2023-01-19

## 2023-01-09 RX ORDER — CLOBETASOL PROPIONATE 0.5 MG/G
1 OINTMENT TOPICAL 2 TIMES DAILY
Qty: 120 G | Refills: 2 | Status: SHIPPED | OUTPATIENT
Start: 2023-01-09

## 2023-01-09 RX ORDER — FUROSEMIDE 40 MG/1
40 TABLET ORAL DAILY
Qty: 30 TABLET | Refills: 3 | Status: SHIPPED | OUTPATIENT
Start: 2023-01-09

## 2023-01-09 NOTE — PATIENT INSTRUCTIONS
Schedule ultrasound of leg to make sure there is no blood clot   Notify coumadin clinic you are on an antibiotic   Start oral antibiotic   Use topical steroid twice a day, 7 days in a row, then hold for 7 days; then 7 days in a row, then hold for 7 days until redness and itching resolve  Take oral antihistamine such as zyrtec or claritin daily

## 2023-01-11 DIAGNOSIS — E03.9 ACQUIRED HYPOTHYROIDISM: ICD-10-CM

## 2023-01-11 DIAGNOSIS — N18.4 CKD (CHRONIC KIDNEY DISEASE) STAGE 4, GFR 15-29 ML/MIN (HCC): ICD-10-CM

## 2023-01-13 ENCOUNTER — LAB ENCOUNTER (OUTPATIENT)
Dept: LAB | Age: 83
End: 2023-01-13
Attending: NURSE PRACTITIONER
Payer: MEDICARE

## 2023-01-13 DIAGNOSIS — Z79.01 CHRONIC ANTICOAGULATION: ICD-10-CM

## 2023-01-13 DIAGNOSIS — I48.0 PAF (PAROXYSMAL ATRIAL FIBRILLATION) (HCC): ICD-10-CM

## 2023-01-13 DIAGNOSIS — I48.92 ATRIAL FLUTTER (HCC): ICD-10-CM

## 2023-01-13 LAB
INR BLD: 3.33 (ref 0.85–1.16)
PROTHROMBIN TIME: 33.3 SECONDS (ref 11.6–14.8)

## 2023-01-13 PROCEDURE — 36415 COLL VENOUS BLD VENIPUNCTURE: CPT

## 2023-01-13 PROCEDURE — 85610 PROTHROMBIN TIME: CPT

## 2023-01-23 ENCOUNTER — LAB ENCOUNTER (OUTPATIENT)
Dept: LAB | Age: 83
End: 2023-01-23
Attending: FAMILY MEDICINE
Payer: MEDICARE

## 2023-01-23 DIAGNOSIS — I48.0 PAF (PAROXYSMAL ATRIAL FIBRILLATION) (HCC): ICD-10-CM

## 2023-01-23 DIAGNOSIS — N18.4 CKD (CHRONIC KIDNEY DISEASE) STAGE 4, GFR 15-29 ML/MIN (HCC): ICD-10-CM

## 2023-01-23 DIAGNOSIS — I48.92 ATRIAL FLUTTER (HCC): ICD-10-CM

## 2023-01-23 DIAGNOSIS — Z79.01 CHRONIC ANTICOAGULATION: ICD-10-CM

## 2023-01-23 LAB
ANION GAP SERPL CALC-SCNC: 4 MMOL/L (ref 0–18)
BUN BLD-MCNC: 32 MG/DL (ref 7–18)
CALCIUM BLD-MCNC: 9.2 MG/DL (ref 8.5–10.1)
CHLORIDE SERPL-SCNC: 114 MMOL/L (ref 98–112)
CO2 SERPL-SCNC: 26 MMOL/L (ref 21–32)
CREAT BLD-MCNC: 1.24 MG/DL
FASTING STATUS PATIENT QL REPORTED: NO
GFR SERPLBLD BASED ON 1.73 SQ M-ARVRAT: 43 ML/MIN/1.73M2 (ref 60–?)
GLUCOSE BLD-MCNC: 81 MG/DL (ref 70–99)
INR BLD: 3.36 (ref 0.85–1.16)
OSMOLALITY SERPL CALC.SUM OF ELEC: 304 MOSM/KG (ref 275–295)
POTASSIUM SERPL-SCNC: 3.8 MMOL/L (ref 3.5–5.1)
PROTHROMBIN TIME: 33.6 SECONDS (ref 11.6–14.8)
SODIUM SERPL-SCNC: 144 MMOL/L (ref 136–145)

## 2023-01-23 PROCEDURE — 85610 PROTHROMBIN TIME: CPT

## 2023-01-23 PROCEDURE — 80048 BASIC METABOLIC PNL TOTAL CA: CPT

## 2023-01-23 PROCEDURE — 36415 COLL VENOUS BLD VENIPUNCTURE: CPT

## 2023-01-23 NOTE — TELEPHONE ENCOUNTER
I called the office of Dr. Júnior Ruff pt's cardiologist left message with nurse that pt has stopped taking her ezetimibe 10mg. It was making her feel off balanced pt stated.
Hide Include Location In Plan Question?: No
Detail Level: Generalized
Additional Note: spf daily

## 2023-01-25 ENCOUNTER — OFFICE VISIT (OUTPATIENT)
Dept: FAMILY MEDICINE CLINIC | Facility: CLINIC | Age: 83
End: 2023-01-25
Payer: MEDICARE

## 2023-01-25 VITALS
BODY MASS INDEX: 39.05 KG/M2 | WEIGHT: 181 LBS | DIASTOLIC BLOOD PRESSURE: 70 MMHG | SYSTOLIC BLOOD PRESSURE: 120 MMHG | OXYGEN SATURATION: 96 % | RESPIRATION RATE: 20 BRPM | HEIGHT: 57 IN | TEMPERATURE: 98 F | HEART RATE: 74 BPM

## 2023-01-25 DIAGNOSIS — I50.32 CHRONIC HEART FAILURE WITH PRESERVED EJECTION FRACTION (HCC): ICD-10-CM

## 2023-01-25 DIAGNOSIS — K76.6 PORTOPULMONARY HYPERTENSION (HCC): ICD-10-CM

## 2023-01-25 DIAGNOSIS — J44.9 CHRONIC OBSTRUCTIVE PULMONARY DISEASE, UNSPECIFIED COPD TYPE (HCC): ICD-10-CM

## 2023-01-25 DIAGNOSIS — I27.20 PORTOPULMONARY HYPERTENSION (HCC): ICD-10-CM

## 2023-01-25 DIAGNOSIS — I10 ESSENTIAL HYPERTENSION: Primary | ICD-10-CM

## 2023-01-25 PROCEDURE — 3078F DIAST BP <80 MM HG: CPT | Performed by: FAMILY MEDICINE

## 2023-01-25 PROCEDURE — 3008F BODY MASS INDEX DOCD: CPT | Performed by: FAMILY MEDICINE

## 2023-01-25 PROCEDURE — 99214 OFFICE O/P EST MOD 30 MIN: CPT | Performed by: FAMILY MEDICINE

## 2023-01-25 PROCEDURE — 3074F SYST BP LT 130 MM HG: CPT | Performed by: FAMILY MEDICINE

## 2023-01-25 RX ORDER — POTASSIUM CHLORIDE 1500 MG/1
20 TABLET, FILM COATED, EXTENDED RELEASE ORAL DAILY
Qty: 90 TABLET | Refills: 1 | Status: SHIPPED | OUTPATIENT
Start: 2023-01-25 | End: 2023-02-24

## 2023-01-25 NOTE — PATIENT INSTRUCTIONS
Continue furosemide 40mg daily (water pill)   Add potassium supplement daily   Continue to track/write down your weight every day   Call doctor if weight increases more than 3 lbs in 1 day

## 2023-02-08 ENCOUNTER — LAB ENCOUNTER (OUTPATIENT)
Dept: LAB | Age: 83
End: 2023-02-08
Attending: INTERNAL MEDICINE
Payer: MEDICARE

## 2023-02-08 DIAGNOSIS — I48.0 PAF (PAROXYSMAL ATRIAL FIBRILLATION) (HCC): ICD-10-CM

## 2023-02-08 DIAGNOSIS — Z79.01 CHRONIC ANTICOAGULATION: ICD-10-CM

## 2023-02-08 DIAGNOSIS — I48.92 ATRIAL FLUTTER (HCC): ICD-10-CM

## 2023-02-08 LAB
INR BLD: 4.27 (ref 0.85–1.16)
PROTHROMBIN TIME: 40.3 SECONDS (ref 11.6–14.8)

## 2023-02-08 PROCEDURE — 85610 PROTHROMBIN TIME: CPT

## 2023-02-08 PROCEDURE — 36415 COLL VENOUS BLD VENIPUNCTURE: CPT

## 2023-02-21 ENCOUNTER — TELEPHONE (OUTPATIENT)
Dept: FAMILY MEDICINE CLINIC | Facility: CLINIC | Age: 83
End: 2023-02-21

## 2023-03-08 RX ORDER — AMLODIPINE BESYLATE 10 MG/1
10 TABLET ORAL DAILY
Qty: 90 TABLET | Refills: 0 | Status: SHIPPED
Start: 2023-03-08

## 2023-03-14 ENCOUNTER — TELEPHONE (OUTPATIENT)
Dept: FAMILY MEDICINE CLINIC | Facility: CLINIC | Age: 83
End: 2023-03-14

## 2023-03-20 ENCOUNTER — TELEPHONE (OUTPATIENT)
Dept: FAMILY MEDICINE CLINIC | Facility: CLINIC | Age: 83
End: 2023-03-20

## 2023-03-20 RX ORDER — FUROSEMIDE 40 MG/1
40 TABLET ORAL DAILY
Qty: 30 TABLET | Refills: 3 | Status: SHIPPED | OUTPATIENT
Start: 2023-03-20

## 2023-03-20 RX ORDER — DILTIAZEM HYDROCHLORIDE 120 MG/1
CAPSULE, COATED, EXTENDED RELEASE ORAL
COMMUNITY
Start: 2023-02-22

## 2023-03-20 NOTE — TELEPHONE ENCOUNTER
amLODIPine 10 MG Oral Tab    Pt called to say yo will not fill this rx. They said  needed to fill something out, said the last time she talked to  that she said she filled it out and sent it in, but yo still will not fill.

## 2023-03-20 NOTE — TELEPHONE ENCOUNTER
We faxed a reply to pharmacy 2 weeks ago stating amlodipine should be off her rx list and to not fill the amlodipine sent on 3/8/23.        Reconciled amlodipine and diltiazem

## 2023-03-20 NOTE — TELEPHONE ENCOUNTER
[facility]. Spoke with [employer]. Janeen states that amlodipine was discontinued by cardiologist, Dr. Nirmal Keene, due to being on Cardizem 120mg. Confirmed on care everywhere. Per OV note from 2/22/2023, Dr. Nirmal Keene discontinued amlodipine and patient started on Cardizem 120mg daily for better control of HR and BP. Rx sent on 3/8 for amlodipine. Please confirm. Is patient to be on amlodipine?

## 2023-03-20 NOTE — TELEPHONE ENCOUNTER
Notified the patient that she was to stop amlodipine per OV with Dr. Myranda Smith. Patient verbalized understanding; however, stated that she was not aware that she was supposed to stop the amlodipine, so she has been taking both diltiazem and amlodipine. Patient states that she will stop amlodipine as of today. While on the phone, patient requested to have furosemide transferred from [address] [place] [address] to [facility]. Rx sent to requested pharmacy.

## 2023-04-05 RX ORDER — FUROSEMIDE 40 MG/1
40 TABLET ORAL DAILY
Qty: 90 TABLET | Refills: 0 | Status: SHIPPED | OUTPATIENT
Start: 2023-04-05

## 2023-04-05 RX ORDER — LEVOTHYROXINE SODIUM 88 UG/1
88 TABLET ORAL DAILY
Qty: 90 TABLET | Refills: 0 | Status: SHIPPED | OUTPATIENT
Start: 2023-04-05

## 2023-04-26 NOTE — TELEPHONE ENCOUNTER
Noted.  Pt aware the meds were going to be sent today except for amiodarone. Pt was met in her room where she was observed resting but easily arouse when her named was called out to her. Pt was observed to be isolative to her room during this shift. She came out of room for snack and to make her needs known. Pt did not display any signs of distress or complaints of adverse reactions to medications. Pt denies HI, AVH, rates depression and anxiety both 10/10. Pt reports pain 9/10 in left ankle to be aching. Pt endorses SI thoughts without a plan. Pt stated that she feels safe here in the hospital and can contract for safety while her in the hospital. Pt stated that her mood is neutral. Pt was calm and cooperative during assessment. Pt will continue to be monitored for her safety. Pt slept for a total of 4.5 hrs.     Problem: Suicide  Goal: *STG: Remains safe in hospital  Outcome: Progressing Towards Goal  Goal: *STG/LTG: Complies with medication therapy  Outcome: Progressing Towards Goal     Problem: Patient Education: Go to Patient Education Activity  Goal: Patient/Family Education  Outcome: Progressing Towards Goal     Problem: Falls - Risk of  Goal: *Absence of Falls  Description: Document Sanrdita Fall Risk and appropriate interventions in the flowsheet.  Outcome: Progressing Towards Goal  Note: Fall Risk Interventions:       Mentation Interventions: Reorient patient    Medication Interventions: Teach patient to arise slowly      Problem: Depressed Mood (Adult/Pediatric)  Goal: *STG: Remains safe in hospital  Outcome: Progressing Towards Goal  Goal: *STG: Complies with medication therapy  Outcome: Progressing Towards Goal

## 2023-05-01 DIAGNOSIS — J44.9 CHRONIC OBSTRUCTIVE PULMONARY DISEASE, UNSPECIFIED COPD TYPE (HCC): ICD-10-CM

## 2023-05-02 RX ORDER — ALBUTEROL SULFATE 90 UG/1
2 AEROSOL, METERED RESPIRATORY (INHALATION) EVERY 6 HOURS PRN
Qty: 3 EACH | Refills: 1 | Status: SHIPPED | OUTPATIENT
Start: 2023-05-02

## 2023-05-10 DIAGNOSIS — I10 ESSENTIAL HYPERTENSION: Primary | ICD-10-CM

## 2023-05-10 DIAGNOSIS — E78.2 MIXED HYPERLIPIDEMIA: ICD-10-CM

## 2023-05-13 ENCOUNTER — LAB ENCOUNTER (OUTPATIENT)
Dept: LAB | Age: 83
End: 2023-05-13
Attending: FAMILY MEDICINE
Payer: MEDICARE

## 2023-05-13 DIAGNOSIS — E03.9 ACQUIRED HYPOTHYROIDISM: ICD-10-CM

## 2023-05-13 DIAGNOSIS — I10 ESSENTIAL HYPERTENSION: ICD-10-CM

## 2023-05-13 DIAGNOSIS — E78.2 MIXED HYPERLIPIDEMIA: ICD-10-CM

## 2023-05-13 LAB
ANION GAP SERPL CALC-SCNC: <0 MMOL/L (ref 0–18)
BASOPHILS # BLD AUTO: 0.04 X10(3) UL (ref 0–0.2)
BASOPHILS NFR BLD AUTO: 0.4 %
BUN BLD-MCNC: 22 MG/DL (ref 7–18)
CALCIUM BLD-MCNC: 9.2 MG/DL (ref 8.5–10.1)
CHLORIDE SERPL-SCNC: 113 MMOL/L (ref 98–112)
CHOLEST SERPL-MCNC: 126 MG/DL (ref ?–200)
CO2 SERPL-SCNC: 30 MMOL/L (ref 21–32)
CREAT BLD-MCNC: 1.22 MG/DL
EOSINOPHIL # BLD AUTO: 0.48 X10(3) UL (ref 0–0.7)
EOSINOPHIL NFR BLD AUTO: 4.7 %
ERYTHROCYTE [DISTWIDTH] IN BLOOD BY AUTOMATED COUNT: 15.5 %
FASTING PATIENT LIPID ANSWER: NO
FASTING STATUS PATIENT QL REPORTED: NO
GFR SERPLBLD BASED ON 1.73 SQ M-ARVRAT: 44 ML/MIN/1.73M2 (ref 60–?)
GLUCOSE BLD-MCNC: 85 MG/DL (ref 70–99)
HCT VFR BLD AUTO: 41.2 %
HDLC SERPL-MCNC: 40 MG/DL (ref 40–59)
HGB BLD-MCNC: 12.2 G/DL
IMM GRANULOCYTES # BLD AUTO: 0.04 X10(3) UL (ref 0–1)
IMM GRANULOCYTES NFR BLD: 0.4 %
LDLC SERPL CALC-MCNC: 61 MG/DL (ref ?–100)
LYMPHOCYTES # BLD AUTO: 2.63 X10(3) UL (ref 1–4)
LYMPHOCYTES NFR BLD AUTO: 25.8 %
MCH RBC QN AUTO: 28 PG (ref 26–34)
MCHC RBC AUTO-ENTMCNC: 29.6 G/DL (ref 31–37)
MCV RBC AUTO: 94.7 FL
MONOCYTES # BLD AUTO: 0.99 X10(3) UL (ref 0.1–1)
MONOCYTES NFR BLD AUTO: 9.7 %
NEUTROPHILS # BLD AUTO: 6 X10 (3) UL (ref 1.5–7.7)
NEUTROPHILS # BLD AUTO: 6 X10(3) UL (ref 1.5–7.7)
NEUTROPHILS NFR BLD AUTO: 59 %
NONHDLC SERPL-MCNC: 86 MG/DL (ref ?–130)
OSMOLALITY SERPL CALC.SUM OF ELEC: 295 MOSM/KG (ref 275–295)
PLATELET # BLD AUTO: 395 10(3)UL (ref 150–450)
POTASSIUM SERPL-SCNC: 4.7 MMOL/L (ref 3.5–5.1)
RBC # BLD AUTO: 4.35 X10(6)UL
SODIUM SERPL-SCNC: 141 MMOL/L (ref 136–145)
T4 FREE SERPL-MCNC: 1.2 NG/DL (ref 0.8–1.7)
TRIGL SERPL-MCNC: 145 MG/DL (ref 30–149)
TSI SER-ACNC: 2.05 MIU/ML (ref 0.36–3.74)
VLDLC SERPL CALC-MCNC: 21 MG/DL (ref 0–30)
WBC # BLD AUTO: 10.2 X10(3) UL (ref 4–11)

## 2023-05-13 PROCEDURE — 84439 ASSAY OF FREE THYROXINE: CPT

## 2023-05-13 PROCEDURE — 84443 ASSAY THYROID STIM HORMONE: CPT

## 2023-05-13 PROCEDURE — 85025 COMPLETE CBC W/AUTO DIFF WBC: CPT

## 2023-05-13 PROCEDURE — 80061 LIPID PANEL: CPT

## 2023-05-13 PROCEDURE — 80048 BASIC METABOLIC PNL TOTAL CA: CPT

## 2023-05-13 PROCEDURE — 36415 COLL VENOUS BLD VENIPUNCTURE: CPT

## 2023-05-16 ENCOUNTER — TELEPHONE (OUTPATIENT)
Dept: FAMILY MEDICINE CLINIC | Facility: CLINIC | Age: 83
End: 2023-05-16

## 2023-05-16 RX ORDER — ASPIRIN 81 MG/1
81 TABLET ORAL DAILY
COMMUNITY

## 2023-05-16 RX ORDER — BEVACIZUMAB 2MG/0.08ML
1.25 SYRINGE (ML) INTRAOCULAR
COMMUNITY
Start: 2023-03-17

## 2023-05-16 RX ORDER — WARFARIN SODIUM 4 MG/1
6 TABLET ORAL SEE ADMIN INSTRUCTIONS
COMMUNITY

## 2023-05-16 RX ORDER — WARFARIN SODIUM 4 MG/1
4 TABLET ORAL SEE ADMIN INSTRUCTIONS
COMMUNITY

## 2023-05-16 RX ORDER — FUROSEMIDE 20 MG/1
20 TABLET ORAL
COMMUNITY
Start: 2023-02-22

## 2023-05-17 ENCOUNTER — OFFICE VISIT (OUTPATIENT)
Dept: FAMILY MEDICINE CLINIC | Facility: CLINIC | Age: 83
End: 2023-05-17
Payer: MEDICARE

## 2023-05-17 VITALS
SYSTOLIC BLOOD PRESSURE: 124 MMHG | OXYGEN SATURATION: 97 % | RESPIRATION RATE: 20 BRPM | TEMPERATURE: 98 F | HEIGHT: 57 IN | BODY MASS INDEX: 37.54 KG/M2 | HEART RATE: 64 BPM | WEIGHT: 174 LBS | DIASTOLIC BLOOD PRESSURE: 70 MMHG

## 2023-05-17 DIAGNOSIS — H35.3122 INTERMEDIATE STAGE NONEXUDATIVE AGE-RELATED MACULAR DEGENERATION OF LEFT EYE: ICD-10-CM

## 2023-05-17 DIAGNOSIS — Z00.00 ENCOUNTER FOR ANNUAL HEALTH EXAMINATION: Primary | ICD-10-CM

## 2023-05-17 DIAGNOSIS — D63.1 ANEMIA DUE TO CHRONIC KIDNEY DISEASE, UNSPECIFIED CKD STAGE: ICD-10-CM

## 2023-05-17 DIAGNOSIS — I27.20 PORTOPULMONARY HYPERTENSION (HCC): ICD-10-CM

## 2023-05-17 DIAGNOSIS — E66.01 SEVERE OBESITY (BMI 35.0-39.9) WITH COMORBIDITY (HCC): ICD-10-CM

## 2023-05-17 DIAGNOSIS — M47.816 ARTHRITIS, LUMBAR SPINE: ICD-10-CM

## 2023-05-17 DIAGNOSIS — H35.3211 EXUDATIVE AGE-RELATED MACULAR DEGENERATION OF RIGHT EYE WITH ACTIVE CHOROIDAL NEOVASCULARIZATION (HCC): ICD-10-CM

## 2023-05-17 DIAGNOSIS — J44.9 CHRONIC OBSTRUCTIVE PULMONARY DISEASE, UNSPECIFIED COPD TYPE (HCC): ICD-10-CM

## 2023-05-17 DIAGNOSIS — R73.03 PREDIABETES: Chronic | ICD-10-CM

## 2023-05-17 DIAGNOSIS — M19.011 PRIMARY OSTEOARTHRITIS OF RIGHT SHOULDER: ICD-10-CM

## 2023-05-17 DIAGNOSIS — Z79.01 CURRENT USE OF LONG TERM ANTICOAGULATION: ICD-10-CM

## 2023-05-17 DIAGNOSIS — I48.20 CHRONIC ATRIAL FIBRILLATION (HCC): ICD-10-CM

## 2023-05-17 DIAGNOSIS — M19.90 ARTHRITIS: ICD-10-CM

## 2023-05-17 DIAGNOSIS — E03.9 ACQUIRED HYPOTHYROIDISM: ICD-10-CM

## 2023-05-17 DIAGNOSIS — E78.2 MIXED HYPERLIPIDEMIA: ICD-10-CM

## 2023-05-17 DIAGNOSIS — I77.9 BILATERAL CAROTID ARTERY DISEASE, UNSPECIFIED TYPE (HCC): ICD-10-CM

## 2023-05-17 DIAGNOSIS — I10 ESSENTIAL HYPERTENSION: ICD-10-CM

## 2023-05-17 DIAGNOSIS — N18.9 ANEMIA DUE TO CHRONIC KIDNEY DISEASE, UNSPECIFIED CKD STAGE: ICD-10-CM

## 2023-05-17 DIAGNOSIS — N18.32 STAGE 3B CHRONIC KIDNEY DISEASE (HCC): ICD-10-CM

## 2023-05-17 DIAGNOSIS — I50.32 CHRONIC DIASTOLIC HEART FAILURE (HCC): ICD-10-CM

## 2023-05-17 DIAGNOSIS — Z79.899 LONG TERM CURRENT USE OF AMIODARONE: ICD-10-CM

## 2023-05-17 DIAGNOSIS — K76.6 PORTOPULMONARY HYPERTENSION (HCC): ICD-10-CM

## 2023-05-17 RX ORDER — CLOBETASOL PROPIONATE 0.5 MG/G
1 CREAM TOPICAL 2 TIMES DAILY PRN
Qty: 30 G | Refills: 0 | Status: SHIPPED | OUTPATIENT
Start: 2023-05-17

## 2023-06-15 PROBLEM — R73.01 IMPAIRED FASTING GLUCOSE: Status: RESOLVED | Noted: 2021-02-21 | Resolved: 2023-06-15

## 2023-06-15 PROBLEM — J44.1 COPD EXACERBATION (HCC): Status: RESOLVED | Noted: 2022-11-09 | Resolved: 2023-06-15

## 2023-06-15 PROBLEM — R07.9 CHEST PAIN OF UNCERTAIN ETIOLOGY: Status: RESOLVED | Noted: 2022-11-09 | Resolved: 2023-06-15

## 2023-06-21 RX ORDER — FUROSEMIDE 40 MG/1
TABLET ORAL
Qty: 90 TABLET | Refills: 1 | Status: SHIPPED | OUTPATIENT
Start: 2023-06-21

## 2023-06-21 RX ORDER — LEVOTHYROXINE SODIUM 88 UG/1
TABLET ORAL
Qty: 90 TABLET | Refills: 1 | Status: SHIPPED | OUTPATIENT
Start: 2023-06-21

## 2023-06-24 ENCOUNTER — HOSPITAL ENCOUNTER (OUTPATIENT)
Dept: CT IMAGING | Facility: HOSPITAL | Age: 83
Discharge: HOME | End: 2023-06-24
Payer: MEDICARE

## 2023-06-24 DIAGNOSIS — R91.8 PULMONARY NODULES: ICD-10-CM

## 2023-06-24 PROCEDURE — 71250 CT THORAX DX C-: CPT

## 2023-07-10 RX ORDER — FLUTICASONE FUROATE, UMECLIDINIUM BROMIDE AND VILANTEROL TRIFENATATE 200; 62.5; 25 UG/1; UG/1; UG/1
1 POWDER RESPIRATORY (INHALATION) DAILY
Qty: 90 EACH | Refills: 1 | Status: SHIPPED | OUTPATIENT
Start: 2023-07-10

## 2023-07-10 NOTE — TELEPHONE ENCOUNTER
Medication(s) to Refill:   Requested Prescriptions     Pending Prescriptions Disp Refills    RESHMA VERATA 200-62.5-25 MCG/ACT Inhalation Aerosol Powder, Breath Activated [Pharmacy Med Name: Noah Mcnamara 200-62.5-25 MCG/ACT Aerosol Powder Breath Activated] 90 each 1     Sig: INHALE 1 PUFF INTO THE LUNGS DAILY. Reason for Medication Refill being sent to Provider / Reason Protocol Failed:  [] 90 day refill has already been granted  [] Blood Pressure out of range  [] Labs Abnormal/over due  [] Medication not previously prescribed by Provider  [] Non-Protocol Medication  [] Controlled Substance   [] Due for appointment- no future appointment scheduled  [] No Follow up specified      Last Time Medication was Filled:  8/26/22      Last Office Visit with PCP: 5/17/23    When Patient was Due Back to the Office:  5 months  (from when PCP last addressed condition)    Future Appointments:  No future appointments.       Last Blood Pressures:  BP Readings from Last 2 Encounters:  05/17/23 : 124/70  01/25/23 : 120/70          Action taken:  [] Refill approved per protocol  [] Routing to provider for approval

## 2023-07-27 ENCOUNTER — PATIENT OUTREACH (OUTPATIENT)
Dept: CASE MANAGEMENT | Age: 83
End: 2023-07-27

## 2023-08-04 ENCOUNTER — TELEPHONE (OUTPATIENT)
Dept: FAMILY MEDICINE CLINIC | Facility: CLINIC | Age: 83
End: 2023-08-04

## 2023-08-04 NOTE — TELEPHONE ENCOUNTER
Pt called stating her medication is not being covered by insurance, it  has increased in price to $500. Pt wondering if pcp can give her free medications, states she has done it before.     fluticasone-umeclidin-vilant (Debbi Tavares) 805-35.2-75 MCG/ACT Inhalation Aerosol Powder, Breath Activated     LOV:5/17/23

## 2023-08-04 NOTE — TELEPHONE ENCOUNTER
Asked patient if she can see if pulm has any samples. Patient verbalized understanding and stated that she has an appointment on 8/16 with pulm and will ask at that time. Has enough of inhaler to last until appointment.

## 2023-08-06 ENCOUNTER — APPOINTMENT (OUTPATIENT)
Dept: MRI IMAGING | Facility: HOSPITAL | Age: 83
End: 2023-08-06
Attending: EMERGENCY MEDICINE
Payer: MEDICARE

## 2023-08-06 ENCOUNTER — HOSPITAL ENCOUNTER (EMERGENCY)
Facility: HOSPITAL | Age: 83
Discharge: HOME | End: 2023-08-06
Attending: EMERGENCY MEDICINE
Payer: MEDICARE

## 2023-08-06 VITALS
BODY MASS INDEX: 34.68 KG/M2 | HEART RATE: 117 BPM | TEMPERATURE: 98 F | HEIGHT: 59 IN | SYSTOLIC BLOOD PRESSURE: 137 MMHG | OXYGEN SATURATION: 94 % | RESPIRATION RATE: 20 BRPM | WEIGHT: 172 LBS | DIASTOLIC BLOOD PRESSURE: 96 MMHG

## 2023-08-06 DIAGNOSIS — H81.10 BENIGN PAROXYSMAL POSITIONAL VERTIGO, UNSPECIFIED LATERALITY: Primary | ICD-10-CM

## 2023-08-06 LAB
ALBUMIN SERPL-MCNC: 3.2 G/DL (ref 3.4–5)
ALBUMIN/GLOB SERPL: 0.9 {RATIO} (ref 1–2)
ALP LIVER SERPL-CCNC: 94 U/L
ALT SERPL-CCNC: 17 U/L
ANION GAP SERPL CALC-SCNC: 6 MMOL/L (ref 0–18)
AST SERPL-CCNC: 22 U/L (ref 15–37)
BASOPHILS # BLD AUTO: 0.05 X10(3) UL (ref 0–0.2)
BASOPHILS NFR BLD AUTO: 0.5 %
BILIRUB SERPL-MCNC: 0.4 MG/DL (ref 0.1–2)
BUN BLD-MCNC: 22 MG/DL (ref 7–18)
CALCIUM BLD-MCNC: 8.9 MG/DL (ref 8.5–10.1)
CHLORIDE SERPL-SCNC: 108 MMOL/L (ref 98–112)
CO2 SERPL-SCNC: 26 MMOL/L (ref 21–32)
CREAT BLD-MCNC: 1.34 MG/DL
EGFRCR SERPLBLD CKD-EPI 2021: 39 ML/MIN/1.73M2 (ref 60–?)
EOSINOPHIL # BLD AUTO: 0.47 X10(3) UL (ref 0–0.7)
EOSINOPHIL NFR BLD AUTO: 4.5 %
ERYTHROCYTE [DISTWIDTH] IN BLOOD BY AUTOMATED COUNT: 14.6 %
GLOBULIN PLAS-MCNC: 3.5 G/DL (ref 2.8–4.4)
GLUCOSE BLD-MCNC: 154 MG/DL (ref 70–99)
HCT VFR BLD AUTO: 40 %
HGB BLD-MCNC: 12.8 G/DL
IMM GRANULOCYTES # BLD AUTO: 0.03 X10(3) UL (ref 0–1)
IMM GRANULOCYTES NFR BLD: 0.3 %
INR BLD: 2.44 (ref 0.85–1.16)
LYMPHOCYTES # BLD AUTO: 3.75 X10(3) UL (ref 1–4)
LYMPHOCYTES NFR BLD AUTO: 36.1 %
MCH RBC QN AUTO: 28.5 PG (ref 26–34)
MCHC RBC AUTO-ENTMCNC: 32 G/DL (ref 31–37)
MCV RBC AUTO: 89.1 FL
MONOCYTES # BLD AUTO: 1 X10(3) UL (ref 0.1–1)
MONOCYTES NFR BLD AUTO: 9.6 %
NEUTROPHILS # BLD AUTO: 5.08 X10 (3) UL (ref 1.5–7.7)
NEUTROPHILS # BLD AUTO: 5.08 X10(3) UL (ref 1.5–7.7)
NEUTROPHILS NFR BLD AUTO: 49 %
OSMOLALITY SERPL CALC.SUM OF ELEC: 296 MOSM/KG (ref 275–295)
PLATELET # BLD AUTO: 303 10(3)UL (ref 150–450)
POTASSIUM SERPL-SCNC: 4.3 MMOL/L (ref 3.5–5.1)
PROT SERPL-MCNC: 6.7 G/DL (ref 6.4–8.2)
PROTHROMBIN TIME: 26.2 SECONDS (ref 11.6–14.8)
RBC # BLD AUTO: 4.49 X10(6)UL
SODIUM SERPL-SCNC: 140 MMOL/L (ref 136–145)
WBC # BLD AUTO: 10.4 X10(3) UL (ref 4–11)

## 2023-08-06 PROCEDURE — 85025 COMPLETE CBC W/AUTO DIFF WBC: CPT | Performed by: EMERGENCY MEDICINE

## 2023-08-06 PROCEDURE — 96374 THER/PROPH/DIAG INJ IV PUSH: CPT

## 2023-08-06 PROCEDURE — 96361 HYDRATE IV INFUSION ADD-ON: CPT

## 2023-08-06 PROCEDURE — 93005 ELECTROCARDIOGRAM TRACING: CPT

## 2023-08-06 PROCEDURE — 93010 ELECTROCARDIOGRAM REPORT: CPT

## 2023-08-06 PROCEDURE — 99285 EMERGENCY DEPT VISIT HI MDM: CPT

## 2023-08-06 PROCEDURE — 96375 TX/PRO/DX INJ NEW DRUG ADDON: CPT

## 2023-08-06 PROCEDURE — 85610 PROTHROMBIN TIME: CPT | Performed by: EMERGENCY MEDICINE

## 2023-08-06 PROCEDURE — 70551 MRI BRAIN STEM W/O DYE: CPT | Performed by: EMERGENCY MEDICINE

## 2023-08-06 PROCEDURE — 80053 COMPREHEN METABOLIC PANEL: CPT | Performed by: EMERGENCY MEDICINE

## 2023-08-06 RX ORDER — ONDANSETRON 2 MG/ML
4 INJECTION INTRAMUSCULAR; INTRAVENOUS ONCE
Status: COMPLETED | OUTPATIENT
Start: 2023-08-06 | End: 2023-08-06

## 2023-08-06 RX ORDER — ONDANSETRON 4 MG/1
4 TABLET, ORALLY DISINTEGRATING ORAL EVERY 4 HOURS PRN
Qty: 10 TABLET | Refills: 0 | Status: SHIPPED | OUTPATIENT
Start: 2023-08-06 | End: 2023-08-13

## 2023-08-06 RX ORDER — DIPHENHYDRAMINE HYDROCHLORIDE 50 MG/ML
25 INJECTION INTRAMUSCULAR; INTRAVENOUS ONCE
Status: COMPLETED | OUTPATIENT
Start: 2023-08-06 | End: 2023-08-06

## 2023-08-06 RX ORDER — MECLIZINE HYDROCHLORIDE 25 MG/1
25 TABLET ORAL ONCE
Status: COMPLETED | OUTPATIENT
Start: 2023-08-06 | End: 2023-08-06

## 2023-08-06 RX ORDER — MECLIZINE HYDROCHLORIDE 25 MG/1
25 TABLET ORAL 3 TIMES DAILY PRN
Qty: 20 TABLET | Refills: 0 | Status: SHIPPED | OUTPATIENT
Start: 2023-08-06 | End: 2023-08-17

## 2023-08-06 RX ORDER — METOCLOPRAMIDE HYDROCHLORIDE 5 MG/ML
10 INJECTION INTRAMUSCULAR; INTRAVENOUS ONCE
Status: COMPLETED | OUTPATIENT
Start: 2023-08-06 | End: 2023-08-06

## 2023-08-06 NOTE — ED INITIAL ASSESSMENT (HPI)
Pt c/o nausea and dizziness since this afternoon, denies c/o pain. Pt describes dizziness as room spinning.

## 2023-08-07 LAB
Q-T INTERVAL: 390 MS
QRS DURATION: 78 MS
QTC CALCULATION (BEZET): 469 MS
R AXIS: 9 DEGREES
T AXIS: 10 DEGREES
VENTRICULAR RATE: 87 BPM

## 2023-08-07 NOTE — DISCHARGE INSTRUCTIONS
Take the meclizine as needed Zofran as needed bedrest next couple days gradually increase your activity follow-up for with your primary care doctor and physical therapy in few days if not better

## 2023-08-08 ENCOUNTER — PATIENT OUTREACH (OUTPATIENT)
Dept: CASE MANAGEMENT | Age: 83
End: 2023-08-08

## 2023-08-08 NOTE — PROGRESS NOTES
1st attempt ED f/up apt request   PCP -decline, pt doesn't want to schedule at this time because she doesn't have transportation  Closing encounter

## 2023-08-17 ENCOUNTER — PATIENT OUTREACH (OUTPATIENT)
Dept: CASE MANAGEMENT | Age: 83
End: 2023-08-17

## 2023-08-17 DIAGNOSIS — I50.32 CHRONIC DIASTOLIC HEART FAILURE (HCC): ICD-10-CM

## 2023-08-17 DIAGNOSIS — I48.20 CHRONIC ATRIAL FIBRILLATION (HCC): ICD-10-CM

## 2023-08-17 DIAGNOSIS — R73.03 PREDIABETES: ICD-10-CM

## 2023-08-17 DIAGNOSIS — M19.90 ARTHRITIS: Primary | ICD-10-CM

## 2023-08-17 DIAGNOSIS — J44.9 CHRONIC OBSTRUCTIVE PULMONARY DISEASE, UNSPECIFIED COPD TYPE (HCC): ICD-10-CM

## 2023-08-17 DIAGNOSIS — I10 ESSENTIAL HYPERTENSION: ICD-10-CM

## 2023-09-08 ENCOUNTER — OFFICE VISIT (OUTPATIENT)
Dept: FAMILY MEDICINE CLINIC | Facility: CLINIC | Age: 83
End: 2023-09-08
Payer: MEDICARE

## 2023-09-08 ENCOUNTER — LAB ENCOUNTER (OUTPATIENT)
Dept: LAB | Age: 83
End: 2023-09-08
Attending: FAMILY MEDICINE
Payer: MEDICARE

## 2023-09-08 VITALS
OXYGEN SATURATION: 96 % | TEMPERATURE: 98 F | HEIGHT: 59 IN | BODY MASS INDEX: 34.68 KG/M2 | RESPIRATION RATE: 16 BRPM | SYSTOLIC BLOOD PRESSURE: 134 MMHG | DIASTOLIC BLOOD PRESSURE: 84 MMHG | HEART RATE: 94 BPM | WEIGHT: 172 LBS

## 2023-09-08 DIAGNOSIS — E03.9 ACQUIRED HYPOTHYROIDISM: ICD-10-CM

## 2023-09-08 DIAGNOSIS — J44.9 CHRONIC OBSTRUCTIVE PULMONARY DISEASE, UNSPECIFIED COPD TYPE (HCC): ICD-10-CM

## 2023-09-08 DIAGNOSIS — Z71.89 COUNSELING REGARDING ADVANCE DIRECTIVES AND GOALS OF CARE: ICD-10-CM

## 2023-09-08 DIAGNOSIS — I10 ESSENTIAL HYPERTENSION: ICD-10-CM

## 2023-09-08 DIAGNOSIS — E78.2 MIXED HYPERLIPIDEMIA: ICD-10-CM

## 2023-09-08 DIAGNOSIS — I48.20 CHRONIC ATRIAL FIBRILLATION (HCC): ICD-10-CM

## 2023-09-08 DIAGNOSIS — R73.03 PREDIABETES: Chronic | ICD-10-CM

## 2023-09-08 DIAGNOSIS — Z23 NEED FOR VACCINATION: ICD-10-CM

## 2023-09-08 DIAGNOSIS — R42 VERTIGO: Primary | ICD-10-CM

## 2023-09-08 LAB
ALBUMIN SERPL-MCNC: 3.6 G/DL (ref 3.4–5)
ALBUMIN/GLOB SERPL: 1 {RATIO} (ref 1–2)
ALP LIVER SERPL-CCNC: 92 U/L
ALT SERPL-CCNC: 19 U/L
ANION GAP SERPL CALC-SCNC: 7 MMOL/L (ref 0–18)
AST SERPL-CCNC: 20 U/L (ref 15–37)
BASOPHILS # BLD AUTO: 0.04 X10(3) UL (ref 0–0.2)
BASOPHILS NFR BLD AUTO: 0.3 %
BILIRUB SERPL-MCNC: 0.4 MG/DL (ref 0.1–2)
BUN BLD-MCNC: 38 MG/DL (ref 7–18)
CALCIUM BLD-MCNC: 9.2 MG/DL (ref 8.5–10.1)
CHLORIDE SERPL-SCNC: 110 MMOL/L (ref 98–112)
CO2 SERPL-SCNC: 25 MMOL/L (ref 21–32)
CREAT BLD-MCNC: 1.44 MG/DL
EGFRCR SERPLBLD CKD-EPI 2021: 36 ML/MIN/1.73M2 (ref 60–?)
EOSINOPHIL # BLD AUTO: 0.67 X10(3) UL (ref 0–0.7)
EOSINOPHIL NFR BLD AUTO: 5.3 %
ERYTHROCYTE [DISTWIDTH] IN BLOOD BY AUTOMATED COUNT: 14.6 %
EST. AVERAGE GLUCOSE BLD GHB EST-MCNC: 131 MG/DL (ref 68–126)
FASTING STATUS PATIENT QL REPORTED: NO
GLOBULIN PLAS-MCNC: 3.5 G/DL (ref 2.8–4.4)
GLUCOSE BLD-MCNC: 96 MG/DL (ref 70–99)
HBA1C MFR BLD: 6.2 % (ref ?–5.7)
HCT VFR BLD AUTO: 45.3 %
HGB BLD-MCNC: 14.1 G/DL
IMM GRANULOCYTES # BLD AUTO: 0.04 X10(3) UL (ref 0–1)
IMM GRANULOCYTES NFR BLD: 0.3 %
LYMPHOCYTES # BLD AUTO: 4.41 X10(3) UL (ref 1–4)
LYMPHOCYTES NFR BLD AUTO: 34.9 %
MCH RBC QN AUTO: 29 PG (ref 26–34)
MCHC RBC AUTO-ENTMCNC: 31.1 G/DL (ref 31–37)
MCV RBC AUTO: 93.2 FL
MONOCYTES # BLD AUTO: 1.2 X10(3) UL (ref 0.1–1)
MONOCYTES NFR BLD AUTO: 9.5 %
NEUTROPHILS # BLD AUTO: 6.26 X10 (3) UL (ref 1.5–7.7)
NEUTROPHILS # BLD AUTO: 6.26 X10(3) UL (ref 1.5–7.7)
NEUTROPHILS NFR BLD AUTO: 49.7 %
OSMOLALITY SERPL CALC.SUM OF ELEC: 303 MOSM/KG (ref 275–295)
PLATELET # BLD AUTO: 324 10(3)UL (ref 150–450)
POTASSIUM SERPL-SCNC: 4.2 MMOL/L (ref 3.5–5.1)
PROT SERPL-MCNC: 7.1 G/DL (ref 6.4–8.2)
RBC # BLD AUTO: 4.86 X10(6)UL
SODIUM SERPL-SCNC: 142 MMOL/L (ref 136–145)
T4 FREE SERPL-MCNC: 1.2 NG/DL (ref 0.8–1.7)
TSI SER-ACNC: 1.43 MIU/ML (ref 0.36–3.74)
WBC # BLD AUTO: 12.6 X10(3) UL (ref 4–11)

## 2023-09-08 PROCEDURE — 36415 COLL VENOUS BLD VENIPUNCTURE: CPT

## 2023-09-08 PROCEDURE — 1159F MED LIST DOCD IN RCRD: CPT | Performed by: FAMILY MEDICINE

## 2023-09-08 PROCEDURE — 1160F RVW MEDS BY RX/DR IN RCRD: CPT | Performed by: FAMILY MEDICINE

## 2023-09-08 PROCEDURE — 85025 COMPLETE CBC W/AUTO DIFF WBC: CPT

## 2023-09-08 PROCEDURE — 80053 COMPREHEN METABOLIC PANEL: CPT

## 2023-09-08 PROCEDURE — 3079F DIAST BP 80-89 MM HG: CPT | Performed by: FAMILY MEDICINE

## 2023-09-08 PROCEDURE — 3008F BODY MASS INDEX DOCD: CPT | Performed by: FAMILY MEDICINE

## 2023-09-08 PROCEDURE — 83036 HEMOGLOBIN GLYCOSYLATED A1C: CPT

## 2023-09-08 PROCEDURE — 1111F DSCHRG MED/CURRENT MED MERGE: CPT | Performed by: FAMILY MEDICINE

## 2023-09-08 PROCEDURE — 84443 ASSAY THYROID STIM HORMONE: CPT

## 2023-09-08 PROCEDURE — G0008 ADMIN INFLUENZA VIRUS VAC: HCPCS | Performed by: FAMILY MEDICINE

## 2023-09-08 PROCEDURE — 90662 IIV NO PRSV INCREASED AG IM: CPT | Performed by: FAMILY MEDICINE

## 2023-09-08 PROCEDURE — 3075F SYST BP GE 130 - 139MM HG: CPT | Performed by: FAMILY MEDICINE

## 2023-09-08 PROCEDURE — 99214 OFFICE O/P EST MOD 30 MIN: CPT | Performed by: FAMILY MEDICINE

## 2023-09-08 PROCEDURE — 84439 ASSAY OF FREE THYROXINE: CPT

## 2023-09-08 RX ORDER — MECLIZINE HYDROCHLORIDE 25 MG/1
25 TABLET ORAL 3 TIMES DAILY PRN
Qty: 30 TABLET | Refills: 2 | Status: SHIPPED | OUTPATIENT
Start: 2023-09-08

## 2023-09-08 RX ORDER — FAMOTIDINE 20 MG/1
20 TABLET, FILM COATED ORAL 2 TIMES DAILY PRN
Qty: 60 TABLET | Refills: 1 | Status: SHIPPED | OUTPATIENT
Start: 2023-09-08

## 2023-09-08 RX ORDER — ONDANSETRON 4 MG/1
4 TABLET, ORALLY DISINTEGRATING ORAL EVERY 8 HOURS PRN
Qty: 30 TABLET | Refills: 0 | Status: SHIPPED | OUTPATIENT
Start: 2023-09-08

## 2023-09-14 DIAGNOSIS — J44.9 CHRONIC OBSTRUCTIVE PULMONARY DISEASE, UNSPECIFIED COPD TYPE (HCC): ICD-10-CM

## 2023-09-15 RX ORDER — ALBUTEROL SULFATE 90 UG/1
2 AEROSOL, METERED RESPIRATORY (INHALATION) EVERY 6 HOURS PRN
Qty: 54 G | Refills: 0 | Status: SHIPPED | OUTPATIENT
Start: 2023-09-15

## 2023-09-16 NOTE — TELEPHONE ENCOUNTER
----- Message from Cordelia Alexandre MD sent at 2/13/2018  7:55 AM CST -----  Results reviewed. Please inform patient INR too low - take extra 1/2 tablet tonight, then starting 2/15 increase to 7mg daily. Will need rx for 1mg tablets.  Repeat in 1 week tympanic

## 2023-09-18 ENCOUNTER — PATIENT OUTREACH (OUTPATIENT)
Dept: CASE MANAGEMENT | Age: 83
End: 2023-09-18

## 2023-09-25 RX ORDER — PRAVASTATIN SODIUM 40 MG
TABLET ORAL
Qty: 90 TABLET | Refills: 3 | Status: SHIPPED | OUTPATIENT
Start: 2023-09-25

## 2023-10-05 NOTE — PLAN OF CARE
HPI    Referred by Dr.Mary Barbara Najera NP  Patient here for evaluation of papilledema.  Vision seem to be decreasing peripherally past 2 months.  Occasional eye pain.    I have personally interviewed the patient, reviewed the history and   examined the patient and agree with the technician's exam.     No TOV, pulsatile tinnitus, recent weight gain. History of lazy eye.   Scheduled for transverse sinus stent procedure.  Last edited by Art Kelsey MD on 10/5/2023 10:11 AM.            Assessment /Plan     For exam results, see Encounter Report.    IIH (idiopathic intracranial hypertension)  -     Leon Visual Field - OU - Extended - Both Eyes; Future  -     OCT, Optic Nerve - OU - Both Eyes; Future    Papilledema associated with increased intracranial pressure      Can go ahead with stent procedure. Repeat exam and HVF in 3 weeks.                     Problem: Impaired Activities of Daily Living  Goal: Achieve highest/safest level of independence in self care  Description  Interventions:  - Assess ability and encourage patient to participate in ADLs to maximize function  - Promote sitting position i

## 2023-10-16 ENCOUNTER — PATIENT OUTREACH (OUTPATIENT)
Dept: CASE MANAGEMENT | Age: 83
End: 2023-10-16

## 2023-10-16 DIAGNOSIS — I48.20 CHRONIC ATRIAL FIBRILLATION (HCC): ICD-10-CM

## 2023-10-16 DIAGNOSIS — M19.90 ARTHRITIS: ICD-10-CM

## 2023-10-16 DIAGNOSIS — I50.32 CHRONIC DIASTOLIC HEART FAILURE (HCC): ICD-10-CM

## 2023-10-16 DIAGNOSIS — I10 ESSENTIAL HYPERTENSION: Primary | ICD-10-CM

## 2023-10-16 DIAGNOSIS — M47.816 ARTHRITIS, LUMBAR SPINE: ICD-10-CM

## 2023-10-16 DIAGNOSIS — J44.9 CHRONIC OBSTRUCTIVE PULMONARY DISEASE, UNSPECIFIED COPD TYPE (HCC): ICD-10-CM

## 2023-10-16 DIAGNOSIS — N18.4 CKD (CHRONIC KIDNEY DISEASE) STAGE 4, GFR 15-29 ML/MIN (HCC): ICD-10-CM

## 2023-10-16 RX ORDER — DILTIAZEM HYDROCHLORIDE 240 MG/1
240 CAPSULE, COATED, EXTENDED RELEASE ORAL DAILY
COMMUNITY
Start: 2023-10-04

## 2023-10-16 NOTE — PROGRESS NOTES
Spoke to Tiffanie for CCM. Updates to patient care team/comments: UTD  Patient reported changes in medications: reports changes   Med Adherence  Comment: reports adherence      Health Maintenance: Zoster Vaccines(1 of 2) Never done  COVID-19 Vaccine(4 - 2023-24 season) due on 09/01/2023  DEXA Scan due on 04/04/2024  Influenza Vaccine Completed  MA Annual Health Assessment Completed  Annual Depression Screening Completed  Fall Risk Screening (Annual) Completed  Pneumococcal Vaccine: 65+ Years Completed    Patient updates/concerns:   spoke with patient. Patient relates doing ok. Mood good/stable. Blood pressure unknown hasn't checked but states recently seen cardiology. Pulse better controlled with increased diltiazem and has been tolerating. Able to walk to short distances with out SOB and feeling beats. Reminded patient that she is to monitor and track B/P was a goal of hers. She will attempt to improve. Breathing stable. Edema stable. Elevating feet as needed. Vertigo feeling has improved. Using medication as needed if symptoms present. Vision stable had injection. Complaining of right side pain. States near waist and radiates towards lungs. On going for about 1 year but increasing. Follow up scheduled for 1 month. I was going to reach out to PCP office for sooner visit but patient states she will wait. She has been dealing with it this long and has other visits scheduled. Continues to stay active as tolerated. Weight stable. No other questions or concerns. Goals/Action Plan:     Active goal from previous outreach: monitor B/P for better control     Patient reported progress towards goals: stable                - What: followed up with cardiology but hasn't checked b/p a home            - Where/When/How: n/a  Patient Reported Barriers and Concerns: as per above                    - Plan for overcoming barriers: encourged patient to call with any questions or concerns     Care Managers Interventions: Continue to provide encouragement and education for healthy coping and diagnosis. Future Appointments:   Future Appointments   Date Time Provider Gustabo Arellanoi   11/15/2023 10:00 AM Golden Fuchs MD [facility] Care Manager Follow Up Date: 1 month     Reason For Follow Up: review progress and or barriers towards patient's goals. Time Spent This Encounter Total: 21 min medical record review, telephone communication, care plan updates where needed, education, goals, and action plan recreation/update. Provided acknowledgment and validation to patient's concerns.    Monthly Minute Total including today: 21 min   Physical assessment, complete health history, and need for CCM established by Ella Adames MD.

## 2023-11-15 ENCOUNTER — OFFICE VISIT (OUTPATIENT)
Dept: FAMILY MEDICINE CLINIC | Facility: CLINIC | Age: 83
End: 2023-11-15
Payer: MEDICARE

## 2023-11-15 VITALS
HEART RATE: 99 BPM | OXYGEN SATURATION: 95 % | WEIGHT: 169 LBS | BODY MASS INDEX: 34.07 KG/M2 | HEIGHT: 59 IN | SYSTOLIC BLOOD PRESSURE: 120 MMHG | RESPIRATION RATE: 23 BRPM | DIASTOLIC BLOOD PRESSURE: 70 MMHG

## 2023-11-15 DIAGNOSIS — I10 ESSENTIAL HYPERTENSION: ICD-10-CM

## 2023-11-15 DIAGNOSIS — R73.03 PREDIABETES: ICD-10-CM

## 2023-11-15 DIAGNOSIS — G89.29 CHRONIC RIGHT-SIDED LOW BACK PAIN WITHOUT SCIATICA: Primary | ICD-10-CM

## 2023-11-15 DIAGNOSIS — M54.50 CHRONIC RIGHT-SIDED LOW BACK PAIN WITHOUT SCIATICA: Primary | ICD-10-CM

## 2023-11-15 DIAGNOSIS — R22.2 PALPABLE MASS OF LOWER BACK: ICD-10-CM

## 2023-11-15 DIAGNOSIS — Z78.0 MENOPAUSE: ICD-10-CM

## 2023-11-15 PROCEDURE — 3078F DIAST BP <80 MM HG: CPT | Performed by: FAMILY MEDICINE

## 2023-11-15 PROCEDURE — 99214 OFFICE O/P EST MOD 30 MIN: CPT | Performed by: FAMILY MEDICINE

## 2023-11-15 PROCEDURE — 1160F RVW MEDS BY RX/DR IN RCRD: CPT | Performed by: FAMILY MEDICINE

## 2023-11-15 PROCEDURE — 1159F MED LIST DOCD IN RCRD: CPT | Performed by: FAMILY MEDICINE

## 2023-11-15 PROCEDURE — 3008F BODY MASS INDEX DOCD: CPT | Performed by: FAMILY MEDICINE

## 2023-11-15 PROCEDURE — 3074F SYST BP LT 130 MM HG: CPT | Performed by: FAMILY MEDICINE

## 2023-11-15 RX ORDER — TIZANIDINE 2 MG/1
2 TABLET ORAL EVERY 8 HOURS PRN
Qty: 60 TABLET | Refills: 0 | Status: SHIPPED | OUTPATIENT
Start: 2023-11-15

## 2023-11-15 NOTE — PATIENT INSTRUCTIONS
Tizanidine - muscle relaxant - ok to take 1-3 times per day.    May cause drowsiness - do not drive while taking     Start physical therapy - call to schedule    Schedule ultrasound of the lump of the lower back     If physical therapy does not help we will order an MRI of the back and refer to an orthopedic surgeon

## 2023-11-16 ENCOUNTER — PATIENT OUTREACH (OUTPATIENT)
Dept: CASE MANAGEMENT | Age: 83
End: 2023-11-16

## 2023-11-16 NOTE — PROGRESS NOTES
ARTHUR verified for CCM monthly outreach. I called patient and left a detailed message to call back. I will follow up with patient at a later time.       Medical record reviewed including recent office visits and test results

## 2023-11-30 ENCOUNTER — TELEPHONE (OUTPATIENT)
Dept: FAMILY MEDICINE CLINIC | Facility: CLINIC | Age: 83
End: 2023-11-30

## 2023-11-30 DIAGNOSIS — M54.50 CHRONIC RIGHT-SIDED LOW BACK PAIN WITHOUT SCIATICA: Primary | ICD-10-CM

## 2023-11-30 DIAGNOSIS — G89.29 CHRONIC RIGHT-SIDED LOW BACK PAIN WITHOUT SCIATICA: Primary | ICD-10-CM

## 2023-11-30 DIAGNOSIS — I10 ESSENTIAL HYPERTENSION: ICD-10-CM

## 2023-11-30 DIAGNOSIS — R42 VERTIGO: ICD-10-CM

## 2023-11-30 DIAGNOSIS — J44.9 CHRONIC OBSTRUCTIVE PULMONARY DISEASE, UNSPECIFIED COPD TYPE (HCC): ICD-10-CM

## 2023-11-30 NOTE — TELEPHONE ENCOUNTER
Blythe Heimlich from [facility] called ([phone]) & is requesting home health orders for pt to have PT done at home instead of outpt. She called pt for care management & pt requested it. She did not know what [address] [place] [address] insurance required. Please advise on orders for pt. Blythe Heimlich also requesting LOV notes be faxed to her at [phone].

## 2023-12-05 ENCOUNTER — TELEPHONE (OUTPATIENT)
Dept: PHYSICAL THERAPY | Facility: HOSPITAL | Age: 83
End: 2023-12-05

## 2023-12-05 NOTE — TELEPHONE ENCOUNTER
Notified patient of [facility] referral. Patient verbalized understanding. Answered all questions at this time.

## 2023-12-05 NOTE — TELEPHONE ENCOUNTER
Called [facility]. Spoke with Aury. Kacie Shepherd states that they did not receive the referral from yesterday; however, she can see the referral in [employer]. Will look into the order to make sure that they can accept the patient, then call this nurse back.

## 2023-12-05 NOTE — TELEPHONE ENCOUNTER
Confirmed with Bony Webb. ([phone]) [facility] can see patient. Will reach out to patient.

## 2023-12-21 ENCOUNTER — TELEPHONE (OUTPATIENT)
Dept: FAMILY MEDICINE CLINIC | Facility: CLINIC | Age: 83
End: 2023-12-21

## 2023-12-21 ENCOUNTER — PATIENT OUTREACH (OUTPATIENT)
Dept: CASE MANAGEMENT | Age: 83
End: 2023-12-21

## 2023-12-21 DIAGNOSIS — R73.03 PREDIABETES: Primary | ICD-10-CM

## 2023-12-21 DIAGNOSIS — Z79.01 CURRENT USE OF LONG TERM ANTICOAGULATION: ICD-10-CM

## 2023-12-21 DIAGNOSIS — M47.816 ARTHRITIS, LUMBAR SPINE: ICD-10-CM

## 2023-12-21 DIAGNOSIS — I48.20 CHRONIC ATRIAL FIBRILLATION (HCC): ICD-10-CM

## 2023-12-21 DIAGNOSIS — I50.32 CHRONIC DIASTOLIC HEART FAILURE (HCC): ICD-10-CM

## 2023-12-21 DIAGNOSIS — I10 ESSENTIAL HYPERTENSION: ICD-10-CM

## 2023-12-21 DIAGNOSIS — N18.4 CKD (CHRONIC KIDNEY DISEASE) STAGE 4, GFR 15-29 ML/MIN (HCC): ICD-10-CM

## 2023-12-21 NOTE — PROGRESS NOTES
Spoke to Tiffanie for [facility]. Updates to patient care team/comments: UTD  Patient reported changes in medications: reports no changes   Med Adherence  Comment: reports adherence      Health Maintenance:   Health Maintenance   Topic Date Due    Zoster Vaccines (1 of 2) Never done    COVID-19 Vaccine (4 - 2023-24 season) 09/01/2023    DEXA Scan  04/04/2024    Influenza Vaccine  Completed    MA Annual Health Assessment  Completed    Annual Depression Screening  Completed    Fall Risk Screening (Annual)  Completed    Pneumococcal Vaccine: 65+ Years  Completed       Patient updates/concerns:   spoke with patient. Patient relates doing well. Mood good/stable. Blood pressure and pulse stable. Has been checking daily. Denies any frequent palpations. Has not checked today yet. Breathing stable/same. Edema stable. Elevating feet as needed. Vertigo no longer present. Vision stable. Followed up with PCP for chronic back pain. X ray and ultrasound ordered patient cancelled and feels she doesn't need it. Doing PT at home and feels come improvements. Denies any recent falls. Appetite not the best but she does eat. Weight unknown. Encourged a well balanced diet and to avoid high carbs and sugars. Labs due in spring. Encourged to stay hydrated. Continues to stay active as tolerated. No other questions or concerns. Encouraged patient:   Self care: Take the time to do the things you love. Nutrition:  Good nutrition helps us to maintain our weight, fight off infection, and help reduce the risk of developing other chronic issues. Physical activity:  Physical activity is important to help maintain independence and improve quality of life. Goals/Action Plan: Active goal from previous outreach: Staying healthy, maintain independence, and stability.      Patient reported progress towards goals: progressing                - What: continues to follow up on health conditions            - Where/When/How: doing PT currently Patient Reported Barriers and Concerns: n/a                   - Plan for overcoming barriers: encourged patient to call with any questions or concerns. Care Managers Interventions: Continue to provide encouragement and education for healthy coping and diagnosis. Future Appointments: No future appointments. Next Care Manager Follow Up Date: 1 month     Reason For Follow Up: review progress and or barriers towards patient's goals. Time Spent This Encounter Total: 20 min medical record review, telephone communication, care plan updates where needed, education, goals, and action plan recreation/update. Provided acknowledgment and validation to patient's concerns.    Monthly Minute Total including today: 20 min   Physical assessment, complete health history, and need for CCM established by Kvng Orourke MD.

## 2024-01-17 DIAGNOSIS — J44.9 CHRONIC OBSTRUCTIVE PULMONARY DISEASE, UNSPECIFIED COPD TYPE (HCC): ICD-10-CM

## 2024-01-17 RX ORDER — ALBUTEROL SULFATE 90 UG/1
2 AEROSOL, METERED RESPIRATORY (INHALATION) EVERY 6 HOURS PRN
Qty: 54 G | Refills: 3 | Status: SHIPPED | OUTPATIENT
Start: 2024-01-17

## 2024-01-17 NOTE — TELEPHONE ENCOUNTER
Medication(s) to Refill:   Requested Prescriptions     Pending Prescriptions Disp Refills    ALBUTEROL 108 (90 Base) MCG/ACT Inhalation Aero Soln [Pharmacy Med Name: ALBUTEROL SULFATE  (90 Base) MCG/ACT Aerosol Solution] 54 g 3     Sig: INHALE 2 PUFFS INTO THE LUNGS EVERY 6 HOURS AS NEEDED FOR WHEEZING OR SHORTNESS OF BREATH.         Reason for Medication Refill being sent to Provider / Reason Protocol Failed:  [] 90 day refill has already been granted  [] Blood Pressure out of range  [] Labs Abnormal/over due  [] Medication not previously prescribed by Provider  [] Non-Protocol Medication  [] Controlled Substance   [] Due for appointment- no future appointment scheduled  [] No Follow up specified      Last Time Medication was Filled:  9/15/23      Last Office Visit with PCP: 11/15/23    When Patient was Due Back to the Office:  3 months  (from when PCP last addressed condition)    Future Appointments:  No future appointments.      Last Blood Pressures:  BP Readings from Last 2 Encounters:   11/15/23 120/70   09/08/23 134/84         Action taken:  [] Refill approved per protocol  [] Routing to provider for approval

## 2024-01-22 ENCOUNTER — PATIENT OUTREACH (OUTPATIENT)
Dept: CASE MANAGEMENT | Age: 84
End: 2024-01-22

## 2024-01-22 NOTE — PROGRESS NOTES
Spoke to Diana for CCM.      Updates to patient care team/comments: UTD  Patient reported changes in medications: reports no changes   Med Adherence  Comment: reports adherence      Health Maintenance:   Health Maintenance   Topic Date Due    Zoster Vaccines (1 of 2) Never done    COVID-19 Vaccine (4 - 2023-24 season) 09/01/2023    MA Annual Health Assessment  01/01/2024    Annual Depression Screening  01/01/2024    Fall Risk Screening (Annual)  01/01/2024    DEXA Scan  04/04/2024    Influenza Vaccine  Completed    Pneumococcal Vaccine: 65+ Years  Completed       Patient updates/concerns:   spoke with patient.  Patient relates doing well. Mood good/stable.  Blood pressure and pulse stable. Has been checking daily. Denies any frequent palpations Aware due for labs continues to have PT/INR checked and was last stable. Breathing stable/same. Edema stable. Elevating feet as needed. Denies any vertigo. Vision stable. Chronic back pain continues to be on and off. Doing PT at home and feels some improvements. Has 1 day left. Denies any recent falls.  Appetite the same. Weight unknown. Encourged a well balanced diet and to avoid high carbs and sugars. Labs due in spring aware. Declines scheduling PCP follow up will call when care taker available.  Encourged to stay hydrated. Continues to stay active as tolerated and doing stretches/exercises.  No other questions or concerns.     Encouraged patient:   Self care: Take the time to do the things you love.   Nutrition:  Good nutrition helps us to maintain our weight, fight off infection, and help reduce the risk of developing other chronic issues.   Physical activity:  Physical activity is important to help maintain independence and improve quality of life.     Goals/Action Plan:    Active goal from previous outreach: Staying healthy, maintain independence, and stability.     Patient reported progress towards goals: progressing                - What: continues to follow up on  health            - Where/When/How: currently doing PT. Continues to check PT/INR.   Patient Reported Barriers and Concerns: n/a                   - Plan for overcoming barriers: encourged patient to call with any questions or concerns     Care Managers Interventions: Continue to provide encouragement and education for healthy coping and diagnosis.      Future Appointments: No future appointments.      Next Care Manager Follow Up Date: 1 month     Reason For Follow Up: review progress and or barriers towards patient's goals.     Time Spent This Encounter Total: 17 min medical record review, telephone communication, care plan updates where needed, education, goals, and action plan recreation/update. Provided acknowledgment and validation to patient's concerns.   Monthly Minute Total including today: 17 min   Physical assessment, complete health history, and need for CCM established by SHERIF SÁNCHEZ MD.

## 2024-02-01 ENCOUNTER — MED REC SCAN ONLY (OUTPATIENT)
Dept: FAMILY MEDICINE CLINIC | Facility: CLINIC | Age: 84
End: 2024-02-01

## 2024-02-16 NOTE — TELEPHONE ENCOUNTER
Medication(s) to Refill:   Requested Prescriptions     Pending Prescriptions Disp Refills    CLOBETASOL 0.05 % External Cream [Pharmacy Med Name: clobetasol 0.05 % topical cream] 30 g 0     Sig: Apply 1 Application topically 2 (two) times daily as needed.         Reason for Medication Refill being sent to Provider / Reason Protocol Failed:  [] 90 day refill has already been granted  [] Blood Pressure out of range  [] Labs Abnormal/over due  [] Medication not previously prescribed by Provider  [] Non-Protocol Medication  [] Controlled Substance   [] Due for appointment- no future appointment scheduled  [] No Follow up specified      Last Time Medication was Filled:  5/17/23      Last Office Visit with PCP: 11/15/23    When Patient was Due Back to the Office:  3 months  (from when PCP last addressed condition)    Future Appointments:  No future appointments.      Last Blood Pressures:  BP Readings from Last 2 Encounters:   11/15/23 120/70   09/08/23 134/84         Action taken:  [] Refill approved per protocol  [] Routing to provider for approval

## 2024-02-17 RX ORDER — CLOBETASOL PROPIONATE 0.5 MG/G
1 CREAM TOPICAL 2 TIMES DAILY PRN
Qty: 30 G | Refills: 2 | Status: SHIPPED | OUTPATIENT
Start: 2024-02-17

## 2024-02-21 ENCOUNTER — PATIENT OUTREACH (OUTPATIENT)
Dept: CASE MANAGEMENT | Age: 84
End: 2024-02-21

## 2024-02-21 NOTE — PROGRESS NOTES
ARTHUR verified for CCM monthly outreach.   I called patient. Patient reports doing well and currently has friends over for lunch. Requesting call back next month.   I will follow up with patient at a later time.      Medical record reviewed including recent office visits and test results

## 2024-03-11 ENCOUNTER — PATIENT OUTREACH (OUTPATIENT)
Dept: CASE MANAGEMENT | Age: 84
End: 2024-03-11

## 2024-03-11 DIAGNOSIS — R73.03 PREDIABETES: ICD-10-CM

## 2024-03-11 DIAGNOSIS — I48.20 CHRONIC ATRIAL FIBRILLATION (HCC): ICD-10-CM

## 2024-03-11 DIAGNOSIS — M19.90 ARTHRITIS: ICD-10-CM

## 2024-03-11 DIAGNOSIS — I50.32 CHRONIC DIASTOLIC HEART FAILURE (HCC): ICD-10-CM

## 2024-03-11 DIAGNOSIS — E03.9 ACQUIRED HYPOTHYROIDISM: Primary | ICD-10-CM

## 2024-03-11 DIAGNOSIS — N18.4 CKD (CHRONIC KIDNEY DISEASE) STAGE 4, GFR 15-29 ML/MIN (HCC): ICD-10-CM

## 2024-03-11 NOTE — PROGRESS NOTES
Spoke to Diana for CCM.      Updates to patient care team/comments: UTD  Patient reported changes in medications: reports no changes   Med Adherence  Comment: reports adherence      Health Maintenance:   Health Maintenance   Topic Date Due    Zoster Vaccines (1 of 2) Never done    COVID-19 Vaccine (4 - 2023-24 season) 09/01/2023    MA Annual Health Assessment  01/01/2024    Annual Depression Screening  01/01/2024    Fall Risk Screening (Annual)  01/01/2024    DEXA Scan  04/04/2024    Influenza Vaccine  Completed    Pneumococcal Vaccine: 65+ Years  Completed       Patient updates/concerns:   spoke with patient.  Patient relates doing well. Mood good/stable.  Blood pressure and pulse stable. Has been checking daily. Denies any frequent palpations. Recently seen cardiology. Labs completed but has not received results. Continues to have PT/INR checked and was last stable. Breathing stable/same. Edema stable. Elevating feet as needed. Vertigo stable. Running low on meclizine. Refills remain. Contacted pharmacy for refill and they will deliver. Vision stable some discomfort. Sees eye doctor in spring. Chronic back pain continues to be on and off. Doing stretches/exercise at home with some improvement. Denies any recent falls.  Appetite the same. Weight stable. Encourged a well balanced diet and to avoid high carbs and sugars   Encourged to stay hydrated. Patient due for DM labs and PCP AW[facility] follow up. Declined scheduling and will call her self.  No other questions or concerns.       Encouraged patient:   Self care: Take the time to do the things you love.   Nutrition:  Good nutrition helps us to maintain our weight, fight off infection, and help reduce the risk of developing other chronic issues.   Physical activity:  Physical activity is important to help maintain independence and improve quality of life.     Goals/Action Plan:    Active goal from previous outreach: Staying healthy, maintain independence, and stability.      Patient reported progress towards goals: progressing                - What: continues to follow up on health conditions            - Where/When/How: recently seen cardiology   Patient Reported Barriers and Concerns: as per above                    - Plan for overcoming barriers: encourged patient to call with any questions or concerns     Care Managers Interventions: Continue to provide encouragement and education for healthy coping and diagnosis.      Future Appointments: No future appointments.      Next Care Manager Follow Up Date: 1 month     Reason For Follow Up: review progress and or barriers towards patient's goals.     Time Spent This Encounter Total: 20 min medical record review, telephone communication, care plan updates where needed, education, goals, and action plan recreation/update. Provided acknowledgment and validation to patient's concerns.   Monthly Minute Total including today: 20 min   Physical assessment, complete health history, and need for CCM established by SHERIF SÁNCHEZ MD.

## 2024-03-22 ENCOUNTER — TELEPHONE (OUTPATIENT)
Dept: FAMILY MEDICINE CLINIC | Facility: CLINIC | Age: 84
End: 2024-03-22

## 2024-03-25 ENCOUNTER — TELEPHONE (OUTPATIENT)
Dept: FAMILY MEDICINE CLINIC | Facility: CLINIC | Age: 84
End: 2024-03-25

## 2024-03-25 NOTE — TELEPHONE ENCOUNTER
Pt called asking for rx for activstyle pads. She said they are faxing a paper rx and will just need almeida to sign off on it and send it back to them.     Pt said that they have faxed it 3 times.

## 2024-04-10 ENCOUNTER — OFFICE VISIT (OUTPATIENT)
Dept: FAMILY MEDICINE CLINIC | Facility: CLINIC | Age: 84
End: 2024-04-10

## 2024-04-10 ENCOUNTER — PATIENT OUTREACH (OUTPATIENT)
Dept: CASE MANAGEMENT | Age: 84
End: 2024-04-10

## 2024-04-10 ENCOUNTER — LAB ENCOUNTER (OUTPATIENT)
Dept: LAB | Age: 84
End: 2024-04-10
Attending: FAMILY MEDICINE
Payer: MEDICARE

## 2024-04-10 VITALS
WEIGHT: 165 LBS | DIASTOLIC BLOOD PRESSURE: 70 MMHG | OXYGEN SATURATION: 98 % | HEART RATE: 110 BPM | SYSTOLIC BLOOD PRESSURE: 136 MMHG | BODY MASS INDEX: 33.26 KG/M2 | RESPIRATION RATE: 24 BRPM | HEIGHT: 59 IN | TEMPERATURE: 98 F

## 2024-04-10 DIAGNOSIS — E03.9 ACQUIRED HYPOTHYROIDISM: ICD-10-CM

## 2024-04-10 DIAGNOSIS — E78.2 MIXED HYPERLIPIDEMIA: ICD-10-CM

## 2024-04-10 DIAGNOSIS — I25.10 CORONARY ARTERY DISEASE INVOLVING NATIVE CORONARY ARTERY OF NATIVE HEART WITHOUT ANGINA PECTORIS: Chronic | ICD-10-CM

## 2024-04-10 DIAGNOSIS — K21.9 GASTROESOPHAGEAL REFLUX DISEASE, UNSPECIFIED WHETHER ESOPHAGITIS PRESENT: ICD-10-CM

## 2024-04-10 DIAGNOSIS — K76.6 PORTOPULMONARY HYPERTENSION (HCC): ICD-10-CM

## 2024-04-10 DIAGNOSIS — H35.3211 EXUDATIVE AGE-RELATED MACULAR DEGENERATION OF RIGHT EYE WITH ACTIVE CHOROIDAL NEOVASCULARIZATION (HCC): ICD-10-CM

## 2024-04-10 DIAGNOSIS — Z79.01 CURRENT USE OF LONG TERM ANTICOAGULATION: ICD-10-CM

## 2024-04-10 DIAGNOSIS — D63.1 ANEMIA DUE TO CHRONIC KIDNEY DISEASE, UNSPECIFIED CKD STAGE: ICD-10-CM

## 2024-04-10 DIAGNOSIS — I48.20 CHRONIC ATRIAL FIBRILLATION (HCC): ICD-10-CM

## 2024-04-10 DIAGNOSIS — N18.9 ANEMIA DUE TO CHRONIC KIDNEY DISEASE, UNSPECIFIED CKD STAGE: ICD-10-CM

## 2024-04-10 DIAGNOSIS — E66.01 SEVERE OBESITY (BMI 35.0-39.9) WITH COMORBIDITY (HCC): ICD-10-CM

## 2024-04-10 DIAGNOSIS — M19.011 PRIMARY OSTEOARTHRITIS OF RIGHT SHOULDER: ICD-10-CM

## 2024-04-10 DIAGNOSIS — R73.03 PREDIABETES: ICD-10-CM

## 2024-04-10 DIAGNOSIS — N18.4 CKD (CHRONIC KIDNEY DISEASE) STAGE 4, GFR 15-29 ML/MIN (HCC): ICD-10-CM

## 2024-04-10 DIAGNOSIS — J44.9 CHRONIC OBSTRUCTIVE PULMONARY DISEASE, UNSPECIFIED COPD TYPE (HCC): ICD-10-CM

## 2024-04-10 DIAGNOSIS — Z00.00 ENCOUNTER FOR ANNUAL HEALTH EXAMINATION: Primary | ICD-10-CM

## 2024-04-10 DIAGNOSIS — M47.816 ARTHRITIS, LUMBAR SPINE: ICD-10-CM

## 2024-04-10 DIAGNOSIS — I77.9 BILATERAL CAROTID ARTERY DISEASE, UNSPECIFIED TYPE (HCC): ICD-10-CM

## 2024-04-10 DIAGNOSIS — I10 ESSENTIAL HYPERTENSION: ICD-10-CM

## 2024-04-10 DIAGNOSIS — R73.03 PREDIABETES: Chronic | ICD-10-CM

## 2024-04-10 DIAGNOSIS — I50.32 CHRONIC DIASTOLIC HEART FAILURE (HCC): ICD-10-CM

## 2024-04-10 DIAGNOSIS — I27.20 PORTOPULMONARY HYPERTENSION (HCC): ICD-10-CM

## 2024-04-10 LAB
ALBUMIN SERPL-MCNC: 3.5 G/DL (ref 3.4–5)
ALBUMIN/GLOB SERPL: 1 {RATIO} (ref 1–2)
ALP LIVER SERPL-CCNC: 98 U/L
ALT SERPL-CCNC: 18 U/L
ANION GAP SERPL CALC-SCNC: 4 MMOL/L (ref 0–18)
AST SERPL-CCNC: 15 U/L (ref 15–37)
BASOPHILS # BLD AUTO: 0.05 X10(3) UL (ref 0–0.2)
BASOPHILS NFR BLD AUTO: 0.5 %
BILIRUB SERPL-MCNC: 0.4 MG/DL (ref 0.1–2)
BUN BLD-MCNC: 31 MG/DL (ref 9–23)
CALCIUM BLD-MCNC: 9.4 MG/DL (ref 8.5–10.1)
CHLORIDE SERPL-SCNC: 109 MMOL/L (ref 98–112)
CHOLEST SERPL-MCNC: 163 MG/DL (ref ?–200)
CO2 SERPL-SCNC: 30 MMOL/L (ref 21–32)
CREAT BLD-MCNC: 1.8 MG/DL
EGFRCR SERPLBLD CKD-EPI 2021: 27 ML/MIN/1.73M2 (ref 60–?)
EOSINOPHIL # BLD AUTO: 0.54 X10(3) UL (ref 0–0.7)
EOSINOPHIL NFR BLD AUTO: 5.1 %
ERYTHROCYTE [DISTWIDTH] IN BLOOD BY AUTOMATED COUNT: 13.9 %
EST. AVERAGE GLUCOSE BLD GHB EST-MCNC: 128 MG/DL (ref 68–126)
FASTING PATIENT LIPID ANSWER: NO
FASTING STATUS PATIENT QL REPORTED: NO
GLOBULIN PLAS-MCNC: 3.4 G/DL (ref 2.8–4.4)
GLUCOSE BLD-MCNC: 101 MG/DL (ref 70–99)
HBA1C MFR BLD: 6.1 % (ref ?–5.7)
HCT VFR BLD AUTO: 43.2 %
HDLC SERPL-MCNC: 53 MG/DL (ref 40–59)
HGB BLD-MCNC: 13.9 G/DL
IMM GRANULOCYTES # BLD AUTO: 0.03 X10(3) UL (ref 0–1)
IMM GRANULOCYTES NFR BLD: 0.3 %
LDLC SERPL CALC-MCNC: 87 MG/DL (ref ?–100)
LYMPHOCYTES # BLD AUTO: 3.72 X10(3) UL (ref 1–4)
LYMPHOCYTES NFR BLD AUTO: 34.8 %
MCH RBC QN AUTO: 30.2 PG (ref 26–34)
MCHC RBC AUTO-ENTMCNC: 32.2 G/DL (ref 31–37)
MCV RBC AUTO: 93.9 FL
MONOCYTES # BLD AUTO: 0.99 X10(3) UL (ref 0.1–1)
MONOCYTES NFR BLD AUTO: 9.3 %
NEUTROPHILS # BLD AUTO: 5.35 X10 (3) UL (ref 1.5–7.7)
NEUTROPHILS # BLD AUTO: 5.35 X10(3) UL (ref 1.5–7.7)
NEUTROPHILS NFR BLD AUTO: 50 %
NONHDLC SERPL-MCNC: 110 MG/DL (ref ?–130)
OSMOLALITY SERPL CALC.SUM OF ELEC: 303 MOSM/KG (ref 275–295)
PLATELET # BLD AUTO: 286 10(3)UL (ref 150–450)
POTASSIUM SERPL-SCNC: 4.9 MMOL/L (ref 3.5–5.1)
PROT SERPL-MCNC: 6.9 G/DL (ref 6.4–8.2)
RBC # BLD AUTO: 4.6 X10(6)UL
SODIUM SERPL-SCNC: 143 MMOL/L (ref 136–145)
T4 FREE SERPL-MCNC: 1.1 NG/DL (ref 0.8–1.7)
TRIGL SERPL-MCNC: 133 MG/DL (ref 30–149)
TSI SER-ACNC: 2.27 MIU/ML (ref 0.36–3.74)
VLDLC SERPL CALC-MCNC: 21 MG/DL (ref 0–30)
WBC # BLD AUTO: 10.7 X10(3) UL (ref 4–11)

## 2024-04-10 PROCEDURE — 99499 UNLISTED E&M SERVICE: CPT | Performed by: FAMILY MEDICINE

## 2024-04-10 PROCEDURE — 83036 HEMOGLOBIN GLYCOSYLATED A1C: CPT

## 2024-04-10 PROCEDURE — 80053 COMPREHEN METABOLIC PANEL: CPT

## 2024-04-10 PROCEDURE — 85025 COMPLETE CBC W/AUTO DIFF WBC: CPT

## 2024-04-10 PROCEDURE — 96160 PT-FOCUSED HLTH RISK ASSMT: CPT | Performed by: FAMILY MEDICINE

## 2024-04-10 PROCEDURE — 80061 LIPID PANEL: CPT

## 2024-04-10 PROCEDURE — 84439 ASSAY OF FREE THYROXINE: CPT

## 2024-04-10 PROCEDURE — 99213 OFFICE O/P EST LOW 20 MIN: CPT | Performed by: FAMILY MEDICINE

## 2024-04-10 PROCEDURE — 36415 COLL VENOUS BLD VENIPUNCTURE: CPT

## 2024-04-10 PROCEDURE — G0439 PPPS, SUBSEQ VISIT: HCPCS | Performed by: FAMILY MEDICINE

## 2024-04-10 PROCEDURE — 84443 ASSAY THYROID STIM HORMONE: CPT

## 2024-04-10 NOTE — PROGRESS NOTES
HIPPA verified for CCM monthly outreach.   Patient had PCP OV today.  I will follow up with patient at a later time.      Medical record reviewed including recent office visits and test results

## 2024-04-26 ENCOUNTER — TELEPHONE (OUTPATIENT)
Dept: FAMILY MEDICINE CLINIC | Facility: CLINIC | Age: 84
End: 2024-04-26

## 2024-04-26 DIAGNOSIS — H91.90 HEARING DISORDER, UNSPECIFIED LATERALITY: Primary | ICD-10-CM

## 2024-04-26 NOTE — TELEPHONE ENCOUNTER
Patient needs new hearing aids - the ones she has are 5 years old.  She was trying to get them from [facility] but they said they can't do it until they get some sort of reference or order from the doctor.  Please help.    # [phone]  Fax# [phone]

## 2024-05-14 ENCOUNTER — MED REC SCAN ONLY (OUTPATIENT)
Dept: FAMILY MEDICINE CLINIC | Facility: CLINIC | Age: 84
End: 2024-05-14

## 2024-05-19 PROBLEM — I27.20 PULMONARY HYPERTENSION (HCC): Status: ACTIVE | Noted: 2021-03-11

## 2024-05-19 PROBLEM — Z79.899 LONG TERM CURRENT USE OF AMIODARONE: Status: RESOLVED | Noted: 2020-06-04 | Resolved: 2024-05-19

## 2024-05-22 ENCOUNTER — LAB ENCOUNTER (OUTPATIENT)
Dept: LAB | Age: 84
End: 2024-05-22
Attending: FAMILY MEDICINE

## 2024-05-22 DIAGNOSIS — N18.4 CKD (CHRONIC KIDNEY DISEASE) STAGE 4, GFR 15-29 ML/MIN (HCC): ICD-10-CM

## 2024-05-22 LAB
ANION GAP SERPL CALC-SCNC: 5 MMOL/L (ref 0–18)
BUN BLD-MCNC: 27 MG/DL (ref 9–23)
CALCIUM BLD-MCNC: 8.9 MG/DL (ref 8.5–10.1)
CHLORIDE SERPL-SCNC: 108 MMOL/L (ref 98–112)
CO2 SERPL-SCNC: 28 MMOL/L (ref 21–32)
CREAT BLD-MCNC: 1.51 MG/DL
EGFRCR SERPLBLD CKD-EPI 2021: 34 ML/MIN/1.73M2 (ref 60–?)
FASTING STATUS PATIENT QL REPORTED: NO
GLUCOSE BLD-MCNC: 107 MG/DL (ref 70–99)
OSMOLALITY SERPL CALC.SUM OF ELEC: 298 MOSM/KG (ref 275–295)
POTASSIUM SERPL-SCNC: 4.7 MMOL/L (ref 3.5–5.1)
SODIUM SERPL-SCNC: 141 MMOL/L (ref 136–145)

## 2024-05-22 PROCEDURE — 36415 COLL VENOUS BLD VENIPUNCTURE: CPT

## 2024-05-22 PROCEDURE — 80048 BASIC METABOLIC PNL TOTAL CA: CPT

## 2024-05-28 ENCOUNTER — PATIENT OUTREACH (OUTPATIENT)
Dept: CASE MANAGEMENT | Age: 84
End: 2024-05-28

## 2024-06-17 ENCOUNTER — TELEPHONE (OUTPATIENT)
Dept: FAMILY MEDICINE CLINIC | Facility: CLINIC | Age: 84
End: 2024-06-17

## 2024-06-19 ENCOUNTER — MED REC SCAN ONLY (OUTPATIENT)
Dept: FAMILY MEDICINE CLINIC | Facility: CLINIC | Age: 84
End: 2024-06-19

## 2024-06-19 RX ORDER — ALENDRONATE SODIUM 35 MG/1
35 TABLET ORAL
Qty: 4 TABLET | Refills: 0 | Status: SHIPPED | OUTPATIENT
Start: 2024-06-19

## 2024-06-19 NOTE — TELEPHONE ENCOUNTER
Called pt and was informed of below response from provider.  Pt in agreement with fosamax 35 mg weekly.   Pt would like to try medication for 1 month first  Rx sent   Pt verbalized understanding

## 2024-06-19 NOTE — TELEPHONE ENCOUNTER
Results received Monday - shows osteopenia degree of bone loss   Not significant change from last test, but she does require steroids fairly regularly for COPD exacerbations - this can increase risk of progression to osteoporosis       If she has not tried fosamax or boniva would recommend start fosamax 35mg weekly

## 2024-06-28 ENCOUNTER — PATIENT OUTREACH (OUTPATIENT)
Dept: CASE MANAGEMENT | Age: 84
End: 2024-06-28

## 2024-07-03 ENCOUNTER — TELEPHONE (OUTPATIENT)
Dept: FAMILY MEDICINE CLINIC | Facility: CLINIC | Age: 84
End: 2024-07-03

## 2024-07-03 NOTE — TELEPHONE ENCOUNTER
Patient called to say she is not going to take alendronate 35 MG Oral Tab because she is having bowel issues (runny) and she said that one of the side effects of the medication is that and she does not want to make it worse.     Correction: Patient has not started the medication yet. She was already having runny bowels.

## 2024-07-03 NOTE — TELEPHONE ENCOUNTER
Pt called stating she is not going to take alendronate as it is causing her to have runny bowels. Please advise

## 2024-07-05 NOTE — TELEPHONE ENCOUNTER
Advise her that risk of GI upset like diarrhea is low/minimal less than 3% of patients report that as a side effect. It would be beneficial to try even for a few months.

## 2024-07-16 ENCOUNTER — TELEPHONE (OUTPATIENT)
Dept: FAMILY MEDICINE CLINIC | Facility: CLINIC | Age: 84
End: 2024-07-16

## 2024-07-16 NOTE — TELEPHONE ENCOUNTER
Patient has a few questions regarding alendronate 35 MG Oral Tab and would like a call back from the nurse.     LF 6/19/24

## 2024-07-17 NOTE — TELEPHONE ENCOUNTER
Patient asking if alendronate has any adverse effects with avastin injection for her macular degeneration  Please advise

## 2024-07-17 NOTE — TELEPHONE ENCOUNTER
Major interaction is increased risk of osteonecrosis of jaw. This is a very rare condition. She is on a low dose though so this will help reduce risk of this as well. Advise to monitor for jaw pain, swelling etc.

## 2024-07-29 ENCOUNTER — TELEPHONE (OUTPATIENT)
Dept: FAMILY MEDICINE CLINIC | Facility: CLINIC | Age: 84
End: 2024-07-29

## 2024-07-29 NOTE — TELEPHONE ENCOUNTER
Patient requesting refill alendronate 35mg  LF 6/19/24  LOV 4/10/24  If ok for refill she would like it sent to [facility] pharmacy  Ok for refill?

## 2024-07-29 NOTE — TELEPHONE ENCOUNTER
Diana Eisenberg requesting Medication Refill for:    Medication name and dose (copy and paste from medication list): lendronate 35 MG Oral Tab       Preferred Pharmacy: [facility] - [place], [place]     LOV: 4/10/2024   Last Refill date: 06/19/24  Next Scheduled appointment: NA    They said they sent a fax on 7/22.

## 2024-07-30 RX ORDER — ALENDRONATE SODIUM 35 MG/1
35 TABLET ORAL
Qty: 12 TABLET | Refills: 3 | Status: SHIPPED | OUTPATIENT
Start: 2024-07-30

## 2024-07-31 ENCOUNTER — PATIENT OUTREACH (OUTPATIENT)
Dept: CASE MANAGEMENT | Age: 84
End: 2024-07-31

## 2024-07-31 NOTE — PROGRESS NOTES
Spoke to Diana for Chronic Care Management.      Updates to patient care team/comments: UTD   Patient reported changes in medications: reports no changes   Med Adherence  Comment: reports adherence      Health Maintenance:   Health Maintenance   Topic Date Due    Zoster Vaccines (1 of 2) Never done    COVID-19 Vaccine (4 - 2023-24 season) 09/01/2023    Influenza Vaccine (1) 10/01/2024    DEXA Scan  06/12/2026    MA Annual Health Assessment  Completed    Annual Depression Screening  Completed    Fall Risk Screening (Annual)  Completed    Pneumococcal Vaccine: 65+ Years  Completed       Patient updates/concerns:   Spoke with patient.  Patient relates doing well. Mood good/stable.  Blood pressure and pulse stable. Has been checking daily. Denies any frequent palpations. Continues to have PT/INR checked and was last stable. Breathing stable/same. Edema stable. Elevating feet as needed. Vertigo stable. Vision stable. Chronic back pain continues to be on and off. Doing stretches/exercise at home with some improvement. Denies any recent falls. Appetite the same. Weight stable. Encourged a well balanced diet and to avoid high carbs and sugars  Encourged to stay hydrated. Patient due for PCP follow up. Scheduled patient for when care taker available. No other questions or concerns.     Encouraged patient:   Self care: Take the time to do the things you love.   Nutrition:  Good nutrition helps us to maintain our weight, fight off infection, and help reduce the risk of developing other chronic issues.   Physical activity:  Physical activity is important to help maintain independence and improve quality of life.     Goals/Action Plan:    Active goal from previous outreach Staying healthy, maintain independence, and stability.     Patient reported progress towards goals: progressing                - What: continues to follow up on health conditions            - Where/When/How: sees specialist scheduled PCP follow up   Patient  Reported Barriers and Concerns: as per above                    - Plan for overcoming barriers: encouraged patient to call with any questions or concerns     Care Managers Interventions: Continue to provide encouragement and education for healthy coping and diagnosis.      Future Appointments:   Future Appointments   Date Time Provider Department Center   9/11/2024 10:30 AM Sherif Monterroso MD [facility] Care Manager Follow Up Date: 1 month     Reason For Follow Up: review progress and or barriers towards patient's goals.     Time Spent This Encounter Total: 18 min medical record review, telephone communication, care plan updates where needed, education, goals, and action plan recreation/update. Provided acknowledgment and validation to patient's concerns.   Monthly Minute Total including today: 18 min   Physical assessment, complete health history, and need for CCM established by SHERIF MONTERROSO MD.

## 2024-08-29 ENCOUNTER — PATIENT OUTREACH (OUTPATIENT)
Dept: CASE MANAGEMENT | Age: 84
End: 2024-08-29

## 2024-08-29 NOTE — PROGRESS NOTES
Spoke to Diana for Chronic Care Management.      Updates to patient care team/comments: UTD   Patient reported changes in medications: reports no changes   Med Adherence  Comment: reports adherence      Health Maintenance:   Health Maintenance   Topic Date Due    Zoster Vaccines (1 of 2) Never done    COVID-19 Vaccine (4 - 2023-24 season) 09/01/2023    Influenza Vaccine (1) 10/01/2024    DEXA Scan  06/12/2026    MA Annual Health Assessment  Completed    Annual Depression Screening  Completed    Fall Risk Screening (Annual)  Completed    Pneumococcal Vaccine: 65+ Years  Completed       Patient updates/concerns:   Spoke with patient.  Patient relates doing well. Mood good/stable.  Blood pressure and pulse stable. Has been checking daily. Denies any frequent palpations. Continues to have PT/INR checked and was last stable. Recently seen cardiology. Breathing stable/same. Edema stable. Elevating feet as needed. Vertigo stable. Vision recently see by eye doctor. Chronic back pain continues to be on and off. Doing stretches/exercise at home with some improvement. Denies any recent falls. Appetite the same. Weight stable. Encourged a well balanced diet and to avoid high carbs and sugars Encourged to stay hydrated. Seeing PCP next month.  No other questions or concerns.      Encouraged patient:   Self care: Take the time to do the things you love.   Nutrition:  Good nutrition helps us to maintain our weight, fight off infection, and help reduce the risk of developing other chronic issues.   Physical activity:  Physical activity is important to help maintain independence and improve quality of life.       Goals/Action Plan:    Active goal from previous outreach: Staying healthy, maintain independence, and stability.     Patient reported progress towards goals: progressing                - What: continues to follow up on health conditions            - Where/When/How: seeing specialist and PCP   Patient Reported Barriers and  Concerns: n/a                   - Plan for overcoming barriers: encourged patient shantel all with an questions or concerns.     Care Managers Interventions: Continue to provide encouragement and education for healthy coping and diagnosis.      Future Appointments:   Future Appointments   Date Time Provider Department Center   9/11/2024 10:30 AM Sherif Monterroso MD [facility] Care Manager Follow Up Date: 1 MONTH     Reason For Follow Up: review progress and or barriers towards patient's goals.     Time Spent This Encounter Total: 20 min medical record review, telephone communication, care plan updates where needed, education, goals, and action plan recreation/update. Provided acknowledgment and validation to patient's concerns.   Monthly Minute Total including today: 20   Physical assessment, complete health history, and need for CCM established by SHERIF MONTERROSO MD.

## 2024-09-11 ENCOUNTER — LAB ENCOUNTER (OUTPATIENT)
Dept: LAB | Age: 84
End: 2024-09-11
Attending: FAMILY MEDICINE
Payer: MEDICARE

## 2024-09-11 ENCOUNTER — OFFICE VISIT (OUTPATIENT)
Dept: FAMILY MEDICINE CLINIC | Facility: CLINIC | Age: 84
End: 2024-09-11
Payer: MEDICARE

## 2024-09-11 VITALS
DIASTOLIC BLOOD PRESSURE: 80 MMHG | HEART RATE: 59 BPM | BODY MASS INDEX: 32.66 KG/M2 | RESPIRATION RATE: 25 BRPM | HEIGHT: 59 IN | OXYGEN SATURATION: 96 % | SYSTOLIC BLOOD PRESSURE: 122 MMHG | WEIGHT: 162 LBS

## 2024-09-11 DIAGNOSIS — I48.20 CHRONIC ATRIAL FIBRILLATION (HCC): ICD-10-CM

## 2024-09-11 DIAGNOSIS — G89.29 CHRONIC LEFT-SIDED LOW BACK PAIN WITHOUT SCIATICA: Primary | ICD-10-CM

## 2024-09-11 DIAGNOSIS — I27.20 PULMONARY HYPERTENSION (HCC): ICD-10-CM

## 2024-09-11 DIAGNOSIS — N18.32 STAGE 3B CHRONIC KIDNEY DISEASE (HCC): ICD-10-CM

## 2024-09-11 DIAGNOSIS — R73.03 PREDIABETES: Chronic | ICD-10-CM

## 2024-09-11 DIAGNOSIS — M54.50 CHRONIC LEFT-SIDED LOW BACK PAIN WITHOUT SCIATICA: Primary | ICD-10-CM

## 2024-09-11 DIAGNOSIS — J44.9 CHRONIC OBSTRUCTIVE PULMONARY DISEASE, UNSPECIFIED COPD TYPE (HCC): ICD-10-CM

## 2024-09-11 DIAGNOSIS — Z79.01 CURRENT USE OF LONG TERM ANTICOAGULATION: ICD-10-CM

## 2024-09-11 LAB
ANION GAP SERPL CALC-SCNC: 6 MMOL/L (ref 0–18)
BUN BLD-MCNC: 28 MG/DL (ref 9–23)
CALCIUM BLD-MCNC: 10.2 MG/DL (ref 8.7–10.4)
CHLORIDE SERPL-SCNC: 107 MMOL/L (ref 98–112)
CO2 SERPL-SCNC: 27 MMOL/L (ref 21–32)
CREAT BLD-MCNC: 1.49 MG/DL
EGFRCR SERPLBLD CKD-EPI 2021: 34 ML/MIN/1.73M2 (ref 60–?)
ERYTHROCYTE [DISTWIDTH] IN BLOOD BY AUTOMATED COUNT: 13.2 %
EST. AVERAGE GLUCOSE BLD GHB EST-MCNC: 123 MG/DL (ref 68–126)
FASTING STATUS PATIENT QL REPORTED: NO
GLUCOSE BLD-MCNC: 99 MG/DL (ref 70–99)
HBA1C MFR BLD: 5.9 % (ref ?–5.7)
HCT VFR BLD AUTO: 44.2 %
HGB BLD-MCNC: 14.2 G/DL
MCH RBC QN AUTO: 30.4 PG (ref 26–34)
MCHC RBC AUTO-ENTMCNC: 32.1 G/DL (ref 31–37)
MCV RBC AUTO: 94.6 FL
OSMOLALITY SERPL CALC.SUM OF ELEC: 296 MOSM/KG (ref 275–295)
PLATELET # BLD AUTO: 305 10(3)UL (ref 150–450)
POTASSIUM SERPL-SCNC: 4.8 MMOL/L (ref 3.5–5.1)
RBC # BLD AUTO: 4.67 X10(6)UL
SODIUM SERPL-SCNC: 140 MMOL/L (ref 136–145)
WBC # BLD AUTO: 9.5 X10(3) UL (ref 4–11)

## 2024-09-11 PROCEDURE — 99214 OFFICE O/P EST MOD 30 MIN: CPT | Performed by: FAMILY MEDICINE

## 2024-09-11 PROCEDURE — 1160F RVW MEDS BY RX/DR IN RCRD: CPT | Performed by: FAMILY MEDICINE

## 2024-09-11 PROCEDURE — 36415 COLL VENOUS BLD VENIPUNCTURE: CPT

## 2024-09-11 PROCEDURE — 3008F BODY MASS INDEX DOCD: CPT | Performed by: FAMILY MEDICINE

## 2024-09-11 PROCEDURE — 83036 HEMOGLOBIN GLYCOSYLATED A1C: CPT

## 2024-09-11 PROCEDURE — 85027 COMPLETE CBC AUTOMATED: CPT

## 2024-09-11 PROCEDURE — 1159F MED LIST DOCD IN RCRD: CPT | Performed by: FAMILY MEDICINE

## 2024-09-11 PROCEDURE — 3074F SYST BP LT 130 MM HG: CPT | Performed by: FAMILY MEDICINE

## 2024-09-11 PROCEDURE — G2211 COMPLEX E/M VISIT ADD ON: HCPCS | Performed by: FAMILY MEDICINE

## 2024-09-11 PROCEDURE — 3079F DIAST BP 80-89 MM HG: CPT | Performed by: FAMILY MEDICINE

## 2024-09-11 PROCEDURE — 80048 BASIC METABOLIC PNL TOTAL CA: CPT

## 2024-09-11 NOTE — PATIENT INSTRUCTIONS
For back pain --  Get over the counter lidocaine patch 4% (ex: Salonpas) - place one patch on left lower back each day   Other options may be injections from a pain specialist or physical therapy exercises      For constipation --  Try using metamucil every day

## 2024-09-11 NOTE — PROGRESS NOTES
Subjective:   Diana Eisenberg is a 84 year old female who presents for Follow - Up (3 month f/u)     COPD   Pulmonary hypertension   Saw pulm - stable   Remains on trelegey and lasix     Low back pain   Left sided   Worse with walking   Better when sitting   No topical medications   Feels like something    Leg swelling   Does not take diuretic until after doctors appts   States overall not bad today      History/Other:    Chief Complaint Reviewed and Verified  Nursing Notes Reviewed and   Verified  Tobacco Reviewed  Allergies Reviewed  Medications Reviewed    Problem List Reviewed  Medical History Reviewed  Surgical History   Reviewed  OB Status Reviewed  Family History Reviewed  Social History   Reviewed         Tobacco:  She smoked tobacco in the past but quit greater than 12 months ago.  Social History     Tobacco Use   Smoking Status Former    Current packs/day: 0.00    Types: Cigarettes    Quit date: 1992    Years since quittin.4   Smokeless Tobacco Never        Current Outpatient Medications   Medication Sig Dispense Refill    alendronate 35 MG Oral Tab Take 1 tablet (35 mg total) by mouth every 7 days. 12 tablet 3    clobetasol 0.05 % External Cream Apply 1 Application topically 2 (two) times daily as needed. 30 g 2    levothyroxine 88 MCG Oral Tab Take 1 tablet (88 mcg total) by mouth daily. 90 tablet 3    furosemide 40 MG Oral Tab Take 1 tablet (40 mg total) by mouth daily. 90 tablet 3    albuterol 108 (90 Base) MCG/ACT Inhalation Aero Soln Inhale 2 puffs into the lungs every 6 (six) hours as needed for Wheezing or Shortness of Breath. 54 g 3    tiZANidine 2 MG Oral Tab Take 1 tablet (2 mg total) by mouth every 8 (eight) hours as needed (muscle spasm or back pain). Do not drive while taking 60 tablet 0    dilTIAZem  MG Oral Capsule SR 24 Hr Take 1 capsule (240 mg total) by mouth daily.      PRAVASTATIN 40 MG Oral Tab TAKE 1 TABLET EVERY NIGHT 90 tablet 3    ondansetron 4 MG Oral  Tablet Dispersible Take 1 tablet (4 mg total) by mouth every 8 (eight) hours as needed for Nausea. 30 tablet 0    meclizine 25 MG Oral Tab Take 1 tablet (25 mg total) by mouth 3 (three) times daily as needed for Dizziness. 30 tablet 2    famotidine 20 MG Oral Tab Take 1 tablet (20 mg total) by mouth 2 (two) times daily as needed (acid stomach). 60 tablet 1    fluticasone-umeclidin-vilant (TRELEGY ELLIPTA) 200-62.5-25 MCG/ACT Inhalation Aerosol Powder, Breath Activated Inhale 1 puff into the lungs daily. 90 each 1    aspirin 81 MG Oral Tab EC Take 1 tablet (81 mg total) by mouth daily.      warfarin 4 MG Oral Tab Take 1 tablet (4 mg total) by mouth See Admin Instructions. On Monday, Tuesday, Wednesday, Friday and Sunday.      furosemide 20 MG Oral Tab Take 1 tablet (20 mg total) by mouth daily as needed (In addition to 40 mg daily).      Multiple Vitamins-Minerals (ICAPS AREDS 2 OR) Take 1 tablet by mouth 2 (two) times daily.      warfarin 4 MG Oral Tab Take 1.5 tablets (6 mg total) by mouth See Admin Instructions. On Thursday and Saturday.      metoprolol succinate  MG Oral Tablet 24 Hr Take 1 tablet (100 mg total) by mouth 2 (two) times daily.      Multiple Vitamins-Minerals (COMPLETE DAILY/LUTEIN) Oral Tab Take 1 tablet by mouth daily.        Calcium Carb-Cholecalciferol (CALCIUM 600+D3 OR) Take 1 tablet by mouth daily.        Omega-3 Fatty Acids (FISH OIL) 1200 MG Oral Capsule Delayed Release Take 1 capsule by mouth daily.             Review of Systems:  Review of Systems   Constitutional:  Negative for chills and fever.   HENT:  Negative for rhinorrhea and sinus pressure.    Eyes:  Negative for pain and visual disturbance.   Respiratory:  Negative for cough and shortness of breath.    Cardiovascular:  Positive for leg swelling. Negative for chest pain and palpitations.   Gastrointestinal:  Negative for abdominal pain, nausea and vomiting.   Genitourinary:  Negative for dysuria, frequency and urgency.    Musculoskeletal:  Positive for back pain and gait problem. Negative for arthralgias and myalgias.   Skin:  Negative for pallor and rash.   Neurological:  Negative for dizziness and headaches.         Objective:   /80   Pulse 59   Resp 25   Ht 4' 11\" (1.499 m)   Wt 162 lb (73.5 kg)   SpO2 96%   BMI 32.72 kg/m²  Estimated body mass index is 32.72 kg/m² as calculated from the following:    Height as of this encounter: 4' 11\" (1.499 m).    Weight as of this encounter: 162 lb (73.5 kg).  Physical Exam  Constitutional:       General: She is not in acute distress.  Cardiovascular:      Rate and Rhythm: Normal rate. Rhythm irregularly irregular.      Pulses: Normal pulses.   Pulmonary:      Effort: Pulmonary effort is normal.      Breath sounds: Normal breath sounds. No transmitted upper airway sounds. No wheezing, rhonchi or rales.   Musculoskeletal:      Lumbar back: Spasms and tenderness present. No deformity or bony tenderness. Negative right straight leg raise test and negative left straight leg raise test.   Skin:     Findings: No rash.   Neurological:      General: No focal deficit present.      Mental Status: She is alert and oriented to person, place, and time.   Psychiatric:         Mood and Affect: Mood normal.         Behavior: Behavior normal.           Assessment & Plan:   1. Chronic left-sided low back pain without sciatica (Primary)  -     XR LUMBAR SPINE COMPLETE W/FLEX + EXT (CPT=72114); Future; Expected date: 09/11/2024  OTC medication discussed   Supportive care recommended     2. Chronic obstructive pulmonary disease, unspecified COPD type (HCC)  3. Pulmonary hypertension (HCC)  - stable  - cont present management    4. Chronic atrial fibrillation (HCC)  - stable  - cont present management    5. Current use of long term anticoagulation  - stable  - cont present management    6. Stage 3b chronic kidney disease (HCC)  -     Basic Metabolic Panel (8); Future; Expected date: 09/11/2024  -      CBC, Platelet; No Differential; Future; Expected date: 09/11/2024    7. Prediabetes  -     Hemoglobin A1C; Future; Expected date: 09/11/2024  -     Basic Metabolic Panel (8); Future; Expected date: 09/11/2024  Stable   Cont to monitor         Return in about 6 months (around 3/11/2025) for medicare wellness.    SHERIF SÁNCHEZ MD, 9/11/2024, 10:44 AM

## 2024-09-30 ENCOUNTER — PATIENT OUTREACH (OUTPATIENT)
Dept: CASE MANAGEMENT | Age: 84
End: 2024-09-30

## 2024-09-30 NOTE — PROGRESS NOTES
HIPPA verified for CCM monthly outreach.   Care everywhere updated.    I will follow up with patient at a later time.      Medical record reviewed including recent office visits and test results

## 2024-10-25 ENCOUNTER — PATIENT OUTREACH (OUTPATIENT)
Dept: CASE MANAGEMENT | Age: 84
End: 2024-10-25

## 2024-10-25 NOTE — PROGRESS NOTES
MARIBETHA verified for CCM monthly outreach.   Patient is currently busy but reports no changes and feeling well. Will back if she needs .  I will follow up with patient at a later time.      Medical record reviewed including recent office visits and test results

## 2024-11-26 DIAGNOSIS — R42 VERTIGO: ICD-10-CM

## 2024-11-26 RX ORDER — MECLIZINE HYDROCHLORIDE 25 MG/1
25 TABLET ORAL 3 TIMES DAILY PRN
Qty: 30 TABLET | Refills: 2 | Status: SHIPPED | OUTPATIENT
Start: 2024-11-26

## 2024-11-27 ENCOUNTER — PATIENT OUTREACH (OUTPATIENT)
Dept: CASE MANAGEMENT | Age: 84
End: 2024-11-27

## 2024-11-27 RX ORDER — PRAVASTATIN SODIUM 40 MG
40 TABLET ORAL NIGHTLY
Qty: 90 TABLET | Refills: 1 | Status: SHIPPED | OUTPATIENT
Start: 2024-11-27

## 2024-11-27 NOTE — PROGRESS NOTES
HIPPA verified for CCM monthly outreach.   Care everywhere updated and [employer] message sent.  I will follow up with patient at a later time.      Medical record reviewed including recent office visits and test results

## 2024-12-17 DIAGNOSIS — J44.9 CHRONIC OBSTRUCTIVE PULMONARY DISEASE, UNSPECIFIED COPD TYPE (HCC): ICD-10-CM

## 2024-12-17 RX ORDER — ALBUTEROL SULFATE 90 UG/1
2 INHALANT RESPIRATORY (INHALATION) EVERY 6 HOURS PRN
Qty: 54 G | Refills: 1 | Status: SHIPPED | OUTPATIENT
Start: 2024-12-17

## 2024-12-23 ENCOUNTER — PATIENT OUTREACH (OUTPATIENT)
Dept: CASE MANAGEMENT | Age: 84
End: 2024-12-23

## 2024-12-23 NOTE — PROGRESS NOTES
Spoke to Diana for Chronic Care Management.      Updates to patient care team/comments: UTD  Patient reported changes in medications: reports no changes   Med Adherence  Comment: reports adherence      Health Maintenance:   Health Maintenance   Topic Date Due    Zoster Vaccines (1 of 2) Never done    COVID-19 Vaccine (4 - 2024-25 season) 09/01/2024    Influenza Vaccine (1) 10/01/2024    DEXA Scan  06/12/2026    MA Annual Health Assessment  Completed    Annual Depression Screening  Completed    Fall Risk Screening (Annual)  Completed    Pneumococcal Vaccine: 65+ Years  Completed       Patient updates/concerns:   Spoke with patient.  Patient relates doing well. Mood good/stable.  Blood pressure and pulse stable. Continues to monitor. Continues to have PT/INR checked and was last stable. Followed by cardiology. Breathing stable/same. Edema stable. Elevating feet as needed. Vertigo stable. Vision stable continues to do injections and tolerating. Chronic back pain continues to be on and off. Doing stretches/exercise at home with some improvement. Denies any recent falls. Appetite the same. Weight stable. Encourged a well balanced diet and to avoid high carbs and sugars Encourged to stay hydrated. No other questions or concerns.     Encouraged patient:   Self care: Take the time to do the things you love.   Nutrition:  Good nutrition helps us to maintain our weight, fight off infection, and help reduce the risk of developing other chronic issues.   Physical activity:  Physical activity is important to help maintain independence and improve quality of life.     Goals/Action Plan:    Active goal from previous outreach: Staying healthy, maintain independence, and stability    Patient reported progress towards goals: progressing             - What: continues to follow up on health conditions            - Where/When/How: seeing PCP and specialist   Patient Reported Barriers and Concerns: as per above                   - Plan  for overcoming barriers: encouraged patient to call with any questions or concerns.     Care Managers Interventions: Continue to provide encouragement and education for healthy coping and diagnosis.      Future Appointments: No future appointments.      Next Care Manager Follow Up Date: 1 MONTH     Reason For Follow Up: review progress and or barriers towards patient's goals.     Time Spent This Encounter Total: 17 min medical record review, telephone communication, care plan updates where needed, education, goals, and action plan recreation/update. Provided acknowledgment and validation to patient's concerns.   Monthly Minute Total including today: 17 minutes  Physical assessment, complete health history, and need for CCM established by SHERIF SÁNCHEZ MD.

## 2024-12-29 ENCOUNTER — HOSPITAL ENCOUNTER (INPATIENT)
Facility: HOSPITAL | Age: 84
LOS: 6 days | Discharge: HOME HEALTH | End: 2025-01-04
Attending: EMERGENCY MEDICINE | Admitting: INTERNAL MEDICINE
Payer: MEDICARE

## 2024-12-29 ENCOUNTER — APPOINTMENT (OUTPATIENT)
Dept: GENERAL RADIOLOGY | Facility: HOSPITAL | Age: 84
End: 2024-12-29
Attending: EMERGENCY MEDICINE
Payer: MEDICARE

## 2024-12-29 DIAGNOSIS — E87.6 HYPOKALEMIA: ICD-10-CM

## 2024-12-29 DIAGNOSIS — J44.1 COPD EXACERBATION (HCC): ICD-10-CM

## 2024-12-29 DIAGNOSIS — I48.91 ATRIAL FIBRILLATION WITH RVR (HCC): Primary | ICD-10-CM

## 2024-12-29 DIAGNOSIS — J96.01 ACUTE RESPIRATORY FAILURE WITH HYPOXIA (HCC): ICD-10-CM

## 2024-12-29 PROBLEM — E87.3 METABOLIC ALKALOSIS: Status: ACTIVE | Noted: 2024-12-29

## 2024-12-29 LAB
ADENOVIRUS PCR:: NOT DETECTED
ALBUMIN SERPL-MCNC: 4.2 G/DL (ref 3.2–4.8)
ALBUMIN SERPL-MCNC: 4.6 G/DL (ref 3.2–4.8)
ALBUMIN/GLOB SERPL: 1.5 {RATIO} (ref 1–2)
ALBUMIN/GLOB SERPL: 1.6 {RATIO} (ref 1–2)
ALP LIVER SERPL-CCNC: 73 U/L
ALP LIVER SERPL-CCNC: 88 U/L
ALT SERPL-CCNC: 11 U/L
ALT SERPL-CCNC: 13 U/L
ANION GAP SERPL CALC-SCNC: 11 MMOL/L (ref 0–18)
ANION GAP SERPL CALC-SCNC: 8 MMOL/L (ref 0–18)
APTT PPP: 36.8 SECONDS (ref 23–36)
ARTERIAL PATENCY WRIST A: POSITIVE
AST SERPL-CCNC: 20 U/L (ref ?–34)
AST SERPL-CCNC: 24 U/L (ref ?–34)
B PARAPERT DNA SPEC QL NAA+PROBE: NOT DETECTED
B PERT DNA SPEC QL NAA+PROBE: NOT DETECTED
BASE EXCESS BLDA CALC-SCNC: -0.8 MMOL/L (ref ?–2)
BASOPHILS # BLD AUTO: 0.01 X10(3) UL (ref 0–0.2)
BASOPHILS # BLD AUTO: 0.03 X10(3) UL (ref 0–0.2)
BASOPHILS NFR BLD AUTO: 0.1 %
BASOPHILS NFR BLD AUTO: 0.3 %
BILIRUB SERPL-MCNC: 0.3 MG/DL (ref 0.2–1.1)
BILIRUB SERPL-MCNC: 0.5 MG/DL (ref 0.2–1.1)
BODY TEMPERATURE: 98.6 F
BUN BLD-MCNC: 32 MG/DL (ref 9–23)
BUN BLD-MCNC: 32 MG/DL (ref 9–23)
C PNEUM DNA SPEC QL NAA+PROBE: NOT DETECTED
CA-I BLD-SCNC: 1.1 MMOL/L (ref 0.95–1.32)
CALCIUM BLD-MCNC: 8.7 MG/DL (ref 8.7–10.4)
CALCIUM BLD-MCNC: 9.1 MG/DL (ref 8.7–10.4)
CHLORIDE SERPL-SCNC: 103 MMOL/L (ref 98–112)
CHLORIDE SERPL-SCNC: 106 MMOL/L (ref 98–112)
CO2 SERPL-SCNC: 24 MMOL/L (ref 21–32)
CO2 SERPL-SCNC: 29 MMOL/L (ref 21–32)
COHGB MFR BLD: 1.9 % SAT (ref 0–3)
CORONAVIRUS 229E PCR:: NOT DETECTED
CORONAVIRUS HKU1 PCR:: NOT DETECTED
CORONAVIRUS NL63 PCR:: NOT DETECTED
CORONAVIRUS OC43 PCR:: NOT DETECTED
CREAT BLD-MCNC: 1.77 MG/DL
CREAT BLD-MCNC: 1.84 MG/DL
EGFRCR SERPLBLD CKD-EPI 2021: 27 ML/MIN/1.73M2 (ref 60–?)
EGFRCR SERPLBLD CKD-EPI 2021: 28 ML/MIN/1.73M2 (ref 60–?)
EOSINOPHIL # BLD AUTO: 0 X10(3) UL (ref 0–0.7)
EOSINOPHIL # BLD AUTO: 0.09 X10(3) UL (ref 0–0.7)
EOSINOPHIL NFR BLD AUTO: 0 %
EOSINOPHIL NFR BLD AUTO: 0.9 %
ERYTHROCYTE [DISTWIDTH] IN BLOOD BY AUTOMATED COUNT: 13.6 %
ERYTHROCYTE [DISTWIDTH] IN BLOOD BY AUTOMATED COUNT: 13.8 %
EST. AVERAGE GLUCOSE BLD GHB EST-MCNC: 128 MG/DL (ref 68–126)
FIO2: 21 %
FLUAV + FLUBV RNA SPEC NAA+PROBE: NEGATIVE
FLUAV + FLUBV RNA SPEC NAA+PROBE: NEGATIVE
FLUAV RNA SPEC QL NAA+PROBE: NOT DETECTED
FLUBV RNA SPEC QL NAA+PROBE: NOT DETECTED
GLOBULIN PLAS-MCNC: 2.8 G/DL (ref 2–3.5)
GLOBULIN PLAS-MCNC: 2.8 G/DL (ref 2–3.5)
GLUCOSE BLD-MCNC: 119 MG/DL (ref 70–99)
GLUCOSE BLD-MCNC: 170 MG/DL (ref 70–99)
GLUCOSE BLD-MCNC: 244 MG/DL (ref 70–99)
HBA1C MFR BLD: 6.1 % (ref ?–5.7)
HCO3 BLDA-SCNC: 24.1 MEQ/L (ref 21–27)
HCT VFR BLD AUTO: 40.1 %
HCT VFR BLD AUTO: 45.1 %
HGB BLD-MCNC: 13.2 G/DL
HGB BLD-MCNC: 13.3 G/DL
HGB BLD-MCNC: 14.6 G/DL
IMM GRANULOCYTES # BLD AUTO: 0.02 X10(3) UL (ref 0–1)
IMM GRANULOCYTES # BLD AUTO: 0.03 X10(3) UL (ref 0–1)
IMM GRANULOCYTES NFR BLD: 0.3 %
IMM GRANULOCYTES NFR BLD: 0.3 %
INR BLD: 1.84 (ref 0.8–1.2)
LACTATE BLD-SCNC: 1.2 MMOL/L (ref 0.5–2)
LYMPHOCYTES # BLD AUTO: 0.37 X10(3) UL (ref 1–4)
LYMPHOCYTES # BLD AUTO: 3.27 X10(3) UL (ref 1–4)
LYMPHOCYTES NFR BLD AUTO: 32.9 %
LYMPHOCYTES NFR BLD AUTO: 4.8 %
MAGNESIUM SERPL-MCNC: 2 MG/DL (ref 1.6–2.6)
MCH RBC QN AUTO: 30.5 PG (ref 26–34)
MCH RBC QN AUTO: 30.8 PG (ref 26–34)
MCHC RBC AUTO-ENTMCNC: 32.4 G/DL (ref 31–37)
MCHC RBC AUTO-ENTMCNC: 33.2 G/DL (ref 31–37)
MCV RBC AUTO: 92.8 FL
MCV RBC AUTO: 94.4 FL
METAPNEUMOVIRUS PCR:: NOT DETECTED
METHGB MFR BLD: 0.6 % SAT (ref 0.4–1.5)
MONOCYTES # BLD AUTO: 0.13 X10(3) UL (ref 0.1–1)
MONOCYTES # BLD AUTO: 1.35 X10(3) UL (ref 0.1–1)
MONOCYTES NFR BLD AUTO: 1.7 %
MONOCYTES NFR BLD AUTO: 13.6 %
MRSA DNA SPEC QL NAA+PROBE: NEGATIVE
MYCOPLASMA PNEUMONIA PCR:: NOT DETECTED
NEUTROPHILS # BLD AUTO: 5.17 X10 (3) UL (ref 1.5–7.7)
NEUTROPHILS # BLD AUTO: 5.17 X10(3) UL (ref 1.5–7.7)
NEUTROPHILS # BLD AUTO: 7.21 X10 (3) UL (ref 1.5–7.7)
NEUTROPHILS # BLD AUTO: 7.21 X10(3) UL (ref 1.5–7.7)
NEUTROPHILS NFR BLD AUTO: 52 %
NEUTROPHILS NFR BLD AUTO: 93.1 %
NT-PROBNP SERPL-MCNC: 5176 PG/ML (ref ?–450)
NT-PROBNP SERPL-MCNC: 6317 PG/ML (ref ?–450)
OSMOLALITY SERPL CALC.SUM OF ELEC: 301 MOSM/KG (ref 275–295)
OSMOLALITY SERPL CALC.SUM OF ELEC: 304 MOSM/KG (ref 275–295)
OXYHGB MFR BLDA: 90.8 % (ref 92–100)
PARAINFLUENZA 1 PCR:: NOT DETECTED
PARAINFLUENZA 2 PCR:: NOT DETECTED
PARAINFLUENZA 3 PCR:: NOT DETECTED
PARAINFLUENZA 4 PCR:: NOT DETECTED
PCO2 BLDA: 41 MM HG (ref 35–45)
PH BLDA: 7.38 [PH] (ref 7.35–7.45)
PLATELET # BLD AUTO: 193 10(3)UL (ref 150–450)
PLATELET # BLD AUTO: 212 10(3)UL (ref 150–450)
PO2 BLDA: 62 MM HG (ref 80–100)
POTASSIUM BLD-SCNC: 3.2 MMOL/L (ref 3.6–5.1)
POTASSIUM SERPL-SCNC: 3.6 MMOL/L (ref 3.5–5.1)
POTASSIUM SERPL-SCNC: 3.8 MMOL/L (ref 3.5–5.1)
PROCALCITONIN SERPL-MCNC: 0.1 NG/ML (ref ?–0.05)
PROT SERPL-MCNC: 7 G/DL (ref 5.7–8.2)
PROT SERPL-MCNC: 7.4 G/DL (ref 5.7–8.2)
PROTHROMBIN TIME: 21.5 SECONDS (ref 11.6–14.8)
RBC # BLD AUTO: 4.32 X10(6)UL
RBC # BLD AUTO: 4.78 X10(6)UL
RHINOVIRUS/ENTERO PCR:: NOT DETECTED
RSV RNA SPEC NAA+PROBE: NEGATIVE
RSV RNA SPEC QL NAA+PROBE: NOT DETECTED
SARS-COV-2 RNA NPH QL NAA+NON-PROBE: NOT DETECTED
SARS-COV-2 RNA RESP QL NAA+PROBE: NOT DETECTED
SODIUM BLD-SCNC: 137 MMOL/L (ref 135–145)
SODIUM SERPL-SCNC: 138 MMOL/L (ref 136–145)
SODIUM SERPL-SCNC: 143 MMOL/L (ref 136–145)
TROPONIN I SERPL HS-MCNC: 22 NG/L
TROPONIN I SERPL HS-MCNC: 9 NG/L
WBC # BLD AUTO: 7.7 X10(3) UL (ref 4–11)
WBC # BLD AUTO: 9.9 X10(3) UL (ref 4–11)

## 2024-12-29 PROCEDURE — 99291 CRITICAL CARE FIRST HOUR: CPT | Performed by: EMERGENCY MEDICINE

## 2024-12-29 PROCEDURE — 71045 X-RAY EXAM CHEST 1 VIEW: CPT | Performed by: EMERGENCY MEDICINE

## 2024-12-29 PROCEDURE — 99223 1ST HOSP IP/OBS HIGH 75: CPT | Performed by: HOSPITALIST

## 2024-12-29 RX ORDER — ALBUTEROL SULFATE 5 MG/ML
10 SOLUTION RESPIRATORY (INHALATION) ONCE
Status: COMPLETED | OUTPATIENT
Start: 2024-12-29 | End: 2024-12-29

## 2024-12-29 RX ORDER — METOPROLOL SUCCINATE 100 MG/1
100 TABLET, EXTENDED RELEASE ORAL
Status: DISCONTINUED | OUTPATIENT
Start: 2024-12-29 | End: 2024-12-29

## 2024-12-29 RX ORDER — BENZONATATE 200 MG/1
200 CAPSULE ORAL 3 TIMES DAILY PRN
Status: DISCONTINUED | OUTPATIENT
Start: 2024-12-29 | End: 2025-01-04

## 2024-12-29 RX ORDER — POTASSIUM CHLORIDE 1.5 G/1.58G
40 POWDER, FOR SOLUTION ORAL ONCE
Status: COMPLETED | OUTPATIENT
Start: 2024-12-29 | End: 2024-12-29

## 2024-12-29 RX ORDER — WARFARIN SODIUM 5 MG/1
5 TABLET ORAL
Status: DISCONTINUED | OUTPATIENT
Start: 2024-12-29 | End: 2024-12-29

## 2024-12-29 RX ORDER — GUAIFENESIN 600 MG/1
600 TABLET, EXTENDED RELEASE ORAL 2 TIMES DAILY
Status: DISCONTINUED | OUTPATIENT
Start: 2024-12-29 | End: 2025-01-04

## 2024-12-29 RX ORDER — METHYLPREDNISOLONE SODIUM SUCCINATE 40 MG/ML
40 INJECTION INTRAMUSCULAR; INTRAVENOUS EVERY 8 HOURS
Status: DISCONTINUED | OUTPATIENT
Start: 2024-12-30 | End: 2024-12-29

## 2024-12-29 RX ORDER — FUROSEMIDE 10 MG/ML
40 INJECTION INTRAMUSCULAR; INTRAVENOUS ONCE
Status: COMPLETED | OUTPATIENT
Start: 2024-12-29 | End: 2024-12-29

## 2024-12-29 RX ORDER — MAGNESIUM SULFATE HEPTAHYDRATE 40 MG/ML
2 INJECTION, SOLUTION INTRAVENOUS ONCE
Status: COMPLETED | OUTPATIENT
Start: 2024-12-29 | End: 2024-12-29

## 2024-12-29 RX ORDER — ONDANSETRON 2 MG/ML
4 INJECTION INTRAMUSCULAR; INTRAVENOUS EVERY 6 HOURS PRN
Status: DISCONTINUED | OUTPATIENT
Start: 2024-12-29 | End: 2025-01-04

## 2024-12-29 RX ORDER — METOPROLOL TARTRATE 1 MG/ML
5 INJECTION, SOLUTION INTRAVENOUS ONCE
Status: COMPLETED | OUTPATIENT
Start: 2024-12-29 | End: 2024-12-29

## 2024-12-29 RX ORDER — ACETAMINOPHEN 500 MG
1000 TABLET ORAL EVERY 6 HOURS PRN
Status: DISCONTINUED | OUTPATIENT
Start: 2024-12-29 | End: 2025-01-04

## 2024-12-29 RX ORDER — DILTIAZEM HYDROCHLORIDE 30 MG/1
30 TABLET, FILM COATED ORAL EVERY 6 HOURS SCHEDULED
Status: DISCONTINUED | OUTPATIENT
Start: 2024-12-29 | End: 2024-12-31

## 2024-12-29 RX ORDER — ACETAMINOPHEN 500 MG
1000 TABLET ORAL ONCE
Status: COMPLETED | OUTPATIENT
Start: 2024-12-29 | End: 2024-12-29

## 2024-12-29 RX ORDER — DILTIAZEM HYDROCHLORIDE 5 MG/ML
10 INJECTION INTRAVENOUS ONCE
Status: DISCONTINUED | OUTPATIENT
Start: 2024-12-29 | End: 2024-12-30

## 2024-12-29 RX ORDER — DILTIAZEM HYDROCHLORIDE 240 MG/1
240 CAPSULE, COATED, EXTENDED RELEASE ORAL DAILY
Status: DISCONTINUED | OUTPATIENT
Start: 2024-12-29 | End: 2024-12-29

## 2024-12-29 RX ORDER — METHYLPREDNISOLONE SODIUM SUCCINATE 125 MG/2ML
125 INJECTION INTRAMUSCULAR; INTRAVENOUS ONCE
Status: COMPLETED | OUTPATIENT
Start: 2024-12-29 | End: 2024-12-29

## 2024-12-29 RX ORDER — ATORVASTATIN CALCIUM 10 MG/1
10 TABLET, FILM COATED ORAL NIGHTLY
Status: DISCONTINUED | OUTPATIENT
Start: 2024-12-29 | End: 2025-01-04

## 2024-12-29 RX ORDER — IPRATROPIUM BROMIDE AND ALBUTEROL SULFATE 2.5; .5 MG/3ML; MG/3ML
3 SOLUTION RESPIRATORY (INHALATION)
Status: DISCONTINUED | OUTPATIENT
Start: 2024-12-29 | End: 2024-12-30

## 2024-12-29 RX ORDER — NICOTINE POLACRILEX 4 MG
30 LOZENGE BUCCAL
Status: DISCONTINUED | OUTPATIENT
Start: 2024-12-29 | End: 2025-01-04

## 2024-12-29 RX ORDER — FUROSEMIDE 40 MG/1
40 TABLET ORAL DAILY
Status: DISCONTINUED | OUTPATIENT
Start: 2024-12-30 | End: 2025-01-04

## 2024-12-29 RX ORDER — LEVOTHYROXINE SODIUM 88 UG/1
88 TABLET ORAL
Status: DISCONTINUED | OUTPATIENT
Start: 2024-12-29 | End: 2025-01-04

## 2024-12-29 RX ORDER — ASPIRIN 81 MG/1
81 TABLET ORAL DAILY
Status: DISCONTINUED | OUTPATIENT
Start: 2024-12-29 | End: 2025-01-04

## 2024-12-29 RX ORDER — DEXTROSE MONOHYDRATE 25 G/50ML
50 INJECTION, SOLUTION INTRAVENOUS
Status: DISCONTINUED | OUTPATIENT
Start: 2024-12-29 | End: 2025-01-04

## 2024-12-29 RX ORDER — METOCLOPRAMIDE HYDROCHLORIDE 5 MG/ML
5 INJECTION INTRAMUSCULAR; INTRAVENOUS EVERY 8 HOURS PRN
Status: DISCONTINUED | OUTPATIENT
Start: 2024-12-29 | End: 2025-01-04

## 2024-12-29 RX ORDER — METHYLPREDNISOLONE SODIUM SUCCINATE 40 MG/ML
40 INJECTION INTRAMUSCULAR; INTRAVENOUS EVERY 12 HOURS
Status: DISCONTINUED | OUTPATIENT
Start: 2024-12-30 | End: 2024-12-30

## 2024-12-29 RX ORDER — METOPROLOL SUCCINATE 100 MG/1
100 TABLET, EXTENDED RELEASE ORAL
Status: DISCONTINUED | OUTPATIENT
Start: 2024-12-29 | End: 2025-01-04

## 2024-12-29 RX ORDER — FAMOTIDINE 20 MG/1
20 TABLET, FILM COATED ORAL 2 TIMES DAILY PRN
Status: DISCONTINUED | OUTPATIENT
Start: 2024-12-29 | End: 2025-01-02

## 2024-12-29 RX ORDER — POTASSIUM CHLORIDE 1500 MG/1
40 TABLET, EXTENDED RELEASE ORAL ONCE
Status: DISCONTINUED | OUTPATIENT
Start: 2024-12-29 | End: 2024-12-29

## 2024-12-29 RX ORDER — FLUTICASONE PROPIONATE AND SALMETEROL 500; 50 UG/1; UG/1
1 POWDER RESPIRATORY (INHALATION) 2 TIMES DAILY
Status: DISCONTINUED | OUTPATIENT
Start: 2024-12-29 | End: 2025-01-04

## 2024-12-29 RX ORDER — NICOTINE POLACRILEX 4 MG
15 LOZENGE BUCCAL
Status: DISCONTINUED | OUTPATIENT
Start: 2024-12-29 | End: 2025-01-04

## 2024-12-29 NOTE — ED PROVIDER NOTES
Patient Seen in: [facility] Emergency Department      History     Chief Complaint   Patient presents with    Difficulty Breathing     Stated Complaint: CHELSIE since friday    Subjective:   HPI    This is a 84-year-old woman history of COPD not on home O2, history of atrial fibrillation minimal improvement with albuterol nebulizer treatments at home.  Denies any fevers, denies any chest pain any leg swelling, any other complaints or concerns.    Objective:     Past Medical History:    A-fib (HCC)    Arrhythmia    Atherosclerosis of coronary artery    Atrial fibrillation with rapid ventricular response (HCC)    Back pain    Back problem    COPD (chronic obstructive pulmonary disease) (HCC)    Coronary atherosclerosis    Disorder of thyroid    Esophageal reflux    Essential hypertension    Hearing impairment    uses hearing aide    High blood pressure    High cholesterol    Hyperlipidemia    Hypothyroidism    Leg hematoma, right, subsequent encounter    Osteoarthritis    Visual impairment              Past Surgical History:   Procedure Laterality Date    Carotid endarterectomy Right     Colonoscopy      Hernia surgery      Other surgical history      lower right leg, skin graph    Other surgical history      cataract surgery                Social History     Socioeconomic History    Marital status:    Tobacco Use    Smoking status: Former     Current packs/day: 0.00     Types: Cigarettes     Quit date: 1992     Years since quittin.6    Smokeless tobacco: Never   Vaping Use    Vaping status: Never Used   Substance and Sexual Activity    Alcohol use: Not Currently    Drug use: No     Social Drivers of Health     Financial Resource Strain: Low Risk  (2023)    Financial Resource Strain     Difficulty of Paying Living Expenses: Not very hard     Med Affordability: No   Food Insecurity: No Food Insecurity (2024)    Food Insecurity     Food Insecurity: Never true   Transportation Needs: No  Transportation Needs (12/29/2024)    Transportation Needs     Lack of Transportation: No   Physical Activity: Insufficiently Active (8/17/2023)    Exercise Vital Sign     Days of Exercise per Week: 7 days     Minutes of Exercise per Session: 20 min   Stress: No Stress Concern Present (8/17/2023)    Stress     Feeling of Stress : No   Social Connections: Socially Integrated (8/17/2023)    Social Connections     Frequency of Socialization with Friends and Family: 3   Housing Stability: Low Risk  (12/29/2024)    Housing Stability     Housing Instability: No                  Physical Exam     ED Triage Vitals [12/29/24 0626]   /61   Pulse 102   Resp 26   Temp 99.1 °F (37.3 °C)   Temp src Oral   SpO2 97 %   O2 Device None (Room air)       Current Vitals:   Vital Signs  BP: 109/42  Pulse: 116  Resp: 24  Temp: 98.6 °F (37 °C)  Temp src: Temporal  MAP (mmHg): (!) 60    Oxygen Therapy  SpO2: 98 %  O2 Device: Nasal cannula  Mode: Spontaneous/Timed  O2 Flow Rate (L/min): 2 L/min  Pulse Oximetry Type: Continuous  Oximetry Probe Site Changed: No  Pulse Ox Probe Location: Left hand        Physical Exam      Physical Exam  Vitals signs and nursing note reviewed.   General:  Patient laying supine in the bed in no acute distress  Head: Normocephalic and atraumatic.   HEENT:  Mucous membranes are moist.   Cardiovascular:  Normal rate and regular rhythm.  No Edema  Pulmonary: Pulmonary effort is increased, there is diffuse expiratory wheezing bilaterally, diminished air entry bilaterally.  Productive cough noted.  Abdominal: Soft nontender nondistended, normal bowel sounds, no guarding no rebound tenderness  Skin: Warm and dry  Neurological: Awake alert, speech is normal        ED Course     Labs Reviewed   CBC WITH DIFFERENTIAL WITH PLATELET - Abnormal; Notable for the following components:       Result Value    Monocyte Absolute 1.35 (*)     All other components within normal limits   COMP METABOLIC PANEL (14) - Abnormal;  Notable for the following components:    Glucose 119 (*)     BUN 32 (*)     Creatinine 1.77 (*)     Calculated Osmolality 304 (*)     eGFR-Cr 28 (*)     All other components within normal limits   ABG PANEL W ELECT AND LACTATE - Abnormal; Notable for the following components:    ABG pO2 62 (*)     Arterial Blood Gas O2Hb 90.8 (*)     Potassium Blood Gas 3.2 (*)     All other components within normal limits   PROTHROMBIN TIME (PT) - Abnormal; Notable for the following components:    PT 21.5 (*)     INR 1.84 (*)     All other components within normal limits   PRO BETA NATRIURETIC PEPTIDE - Abnormal; Notable for the following components:    Pro-Beta Natriuretic Peptide 5,176 (*)     All other components within normal limits   PROCALCITONIN - Abnormal; Notable for the following components:    Procalcitonin 0.10 (*)     All other components within normal limits   CBC WITH DIFFERENTIAL WITH PLATELET - Abnormal; Notable for the following components:    Lymphocyte Absolute 0.37 (*)     All other components within normal limits   PRO BETA NATRIURETIC PEPTIDE - Abnormal; Notable for the following components:    Pro-Beta Natriuretic Peptide 6,317 (*)     All other components within normal limits   MAGNESIUM - Normal   TROPONIN I HIGH SENSITIVITY - Normal   TROPONIN I HIGH SENSITIVITY - Normal   SARS-COV-2/FLU A AND B/RSV BY PCR (GENEXPERT) - Normal    Narrative:     This test is intended for the qualitative detection and differentiation of SARS-CoV-2, influenza A, influenza B, and respiratory syncytial virus (RSV) viral RNA in nasopharyngeal or nares swabs from individuals suspected of respiratory viral infection consistent with COVID-19 by their healthcare provider. Signs and symptoms of respiratory viral infection due to SARS-CoV-2, influenza, and RSV can be similar.    Test performed using the Xpert Xpress SARS-CoV-2/FLU/RSV (real time RT-PCR)  assay on the [employer] instrument, [employer], [place] [address].   This test is  being used under the Food and Drug Administration's Emergency Use Authorization.    The authorized Fact Sheet for Healthcare Providers for this assay is available upon request from the laboratory.   RESPIRATORY FLU EXPAND PANEL + COVID-19 - Normal    Narrative:     This test is intended for the simultaneous qualitative detection and differentiation of nucleic acids from multiple viral and bacterial respiratory organisms, including nucleic acid from Severe Acute Respiratory Syndrome Coronavirus 2 (SARS-CoV-2) in nasopharyngeal swab from individuals suspected of respiratory viral infection consistent with COVID-19 by their healthcare provider.    Test performed using the MemSQL Respiratory Panel 2.1 (RP2.1) assay on the [employer] System, [employer], [employer], [place] [address].    This test is being used under the Food and Drug Administration's Emergency Use Authorization.    The authorized Fact Sheet for Healthcare Providers for this assay is available upon request from the laboratory.    SARS and MERS coronaviruses are not tested on this assay.   ED/MRSA SCREEN BY PCR-CC - Normal   COMP METABOLIC PANEL (14)   PTT, ACTIVATED   RAINBOW DRAW LAVENDER   RAINBOW DRAW LIGHT GREEN   RAINBOW DRAW BLUE     EKG    Rate, intervals and axes as noted on EKG Report.  Rate: 99  Rhythm: Atrial Fibrillation  Reading: No acute ischemic changes                XR CHEST AP PORTABLE  (CPT=71045)    Result Date: 12/29/2024  PROCEDURE:  XR CHEST AP PORTABLE  (CPT=71045)  TECHNIQUE:  AP chest radiograph was obtained.  COMPARISON:  [facility] , XR, XR CHEST PA + LAT CHEST (CPT=71046), 11/14/2022, 2:08 PM.  INDICATIONS:  CHELSIE since friday  PATIENT STATED HISTORY: (As transcribed by Technologist)  Patient states shortness of breath with central anterior chest tightness x2 days. Patient receiving breathing treatment during xray. HxCOPD per pt.               CONCLUSION:  Stable cardiac and mediastinal contours.  Discoid atelectasis or  scar of the left lung base.  The lungs and pleural spaces are otherwise clear.  No acute osseous findings.   LOCATION:  [facility]      Dictated by (CST): Zander Hanley MD on 12/29/2024 at 7:38 AM     Finalized by (CST): Zander Hanley MD on 12/29/2024 at 7:39 AM             MDM      84-year-old woman history of COPD here for evaluation of worsening cough congestion.  Differential includes pneumonia viral syndrome COPD exacerbation.  Atrial fibrillation RVR noted, progressively worsened with albuterol treatments.  Patient ultimately started on diltiazem bolus and drip given that heart rate was in the 150s and 160s.  Some improvement after hour-long nebulizer treatment, however patient still with increased work of breathing, low O2 saturation on ABG with PaO2 of 60.  Chest x-ray shows no focal infiltrate patient will likely be admitted for further monitoring evaluation, high flow oxygen started decreased work of breathing, patient admitted for further treatment evaluation.  Discussed with hospitalist, as well as intensivist patient was switched to BiPAP, atrial fibrillation somewhat uncontrolled given patient's nebulizer treatments that she is receiving, she is currently on a diltiazem drip with poor control of heart rate, case discussed with intensivist will be likely switch to amnio upstairs in the ICU.      Case endorsed to Ron hospitalist who agrees with plan for admission.    Admission disposition: 12/29/2024 10:05 AM         I independently viewed and interpreted the following imaging: Chest x-ray without acute infiltrate    Total critical care time: Approximately 50 minutes  Due to a high probability of clinically significant, life threatening deterioration, the patient required my highest level of preparedness to intervene emergently and I personally spent this critical care time directly and personally managing the patient. This critical care time included obtaining a history; examining the patient; pulse  oximetry; ordering and review of studies; arranging urgent treatment with development of a management plan; evaluation of patient's response to treatment; frequent reassessment; and, discussions with other providers.  This critical care time was performed to assess and manage the high probability of imminent, life-threatening deterioration that could result in multi-organ failure. It was exclusive of separately billable procedures and treating other patients and teaching time.  Medical Decision Making      Disposition and Plan     Clinical Impression:  1. Atrial fibrillation with RVR (HCC)    2. COPD exacerbation (HCC)    3. Hypokalemia    4. Acute respiratory failure with hypoxia (HCC)         Disposition:  Admit  12/29/2024 10:05 am    Follow-up:  No follow-up provider specified.        Medications Prescribed:  Current Discharge Medication List              Supplementary Documentation:         Hospital Problems       Present on Admission  Date Reviewed: 9/11/2024            ICD-10-CM Noted POA    * (Principal) Atrial fibrillation with RVR (HCC) I48.91 12/29/2024 Unknown    Acute hypoxemic respiratory failure (HCC) J96.01 12/29/2024 Unknown    COPD exacerbation (HCC) J44.1 12/29/2024 Unknown    Hypokalemia E87.6 12/29/2024 Yes    Metabolic alkalosis E87.3 12/29/2024 Yes

## 2024-12-29 NOTE — ED QUICK NOTES
Orders for admission, patient is aware of plan and ready to go upstairs. Any questions, please call ED RN Parker at extension .     Patient Covid vaccination status: Fully vaccinated     COVID Test Ordered in ED: $$$$Respiratory Flu Expanded Panel + Covid-19$$$$    COVID Suspicion at Admission: N/A    Running Infusions:    dilTIAZem 10 mg/hr (12/29/24 0851)        Mental Status/LOC at time of transport: A/OX4    BiPap    Other pertinent information:   CIWA score: N/A   NIH score:  N/A

## 2024-12-29 NOTE — CONSULTS
[facility] Pharmacy Dosing Service  Warfarin (Coumadin) Initial Dosing    Diana Eisenberg is a 84 year old patient for whom pharmacy has been consulted to dose warfarin (COUMADIN) for atrial fibrillation by Dr. Hood.  Based on this indication, goal INR is 2-3.    Pertinent Patient Medical History:  atrial fibrillation  Potential Drug Interactions:  aspirin + levothyroxine (home medications)    Latest INR:   Recent Labs     12/29/24  0634   INR 1.84*       Other Anticoagulants:  none  Home regimen:  warfarin 4 mg nightly   Date/Time last dose was given: prior to admission on 12/28    Based on above -  1.  For today, Give warfarin (COUMADIN) 5 mg at 2100 tonight    2.  PT/INR ordered daily while on warfarin    3.  Pharmacy will continue to follow.  We appreciate the opportunity to assist in the care of this patient.    Nenita Lopez, PharmD  12/29/2024  2:29 PM

## 2024-12-29 NOTE — CONSULTS
Cardiology Consultation    Diana Eisenberg Patient Status:  Inpatient    1940 MRN [id number]   Location [facility] 4SW-A Attending Yobani Pantoja,    Hosp Day # 0 PCP SHERIF SÁNCHEZ MD     Reason for Consultation:  dyspnea, afib      History of Present Illness:  Diana Eisenberg is a a(n) 84 year old female. Sweet woman, originally from [place], here with multiple family members, follows with Jazlyn Miller.  She has chronic afib, on warfarin, former long time smoker s/p CEA, TIA, COPD on home O2.  She presents with several days of worsening dyspnea, quite profound early this morning.  No edema or chest pain.  In the ER, HR was in the 130's, BP stable.  Placed briefly on bipap and given steroids and nebs.  Started on IV diltiazem.  In the ICU, feels much better on 3 L NC at present.    History:  Past Medical History:    A-fib (HCC)    Arrhythmia    Atherosclerosis of coronary artery    Atrial fibrillation with rapid ventricular response (HCC)    Back pain    Back problem    COPD (chronic obstructive pulmonary disease) (HCC)    Coronary atherosclerosis    Disorder of thyroid    Esophageal reflux    Essential hypertension    Hearing impairment    uses hearing aide    High blood pressure    High cholesterol    Hyperlipidemia    Hypothyroidism    Leg hematoma, right, subsequent encounter    Osteoarthritis    Visual impairment     Past Surgical History:   Procedure Laterality Date    Carotid endarterectomy Right     Colonoscopy      Hernia surgery      Other surgical history      lower right leg, skin graph    Other surgical history      cataract surgery     Family History   Problem Relation Age of Onset    Heart Disorder Father     Hypertension Mother     Heart Disorder Sister     Cancer Brother         lung cancer         Allergies:  Allergies[1]    Medications:   aspirin  81 mg Oral Daily    [START ON 2024] furosemide  40 mg Oral Daily    levothyroxine  88 mcg Oral Before breakfast     metoprolol succinate ER  100 mg Oral 2x Daily(Beta Blocker)    dilTIAZem ER  240 mg Oral Daily    atorvastatin  10 mg Oral Nightly    ipratropium-albuterol  3 mL Nebulization 6 times per day    [START ON 12/30/2024] methylPREDNISolone  40 mg Intravenous Q8H    guaiFENesin ER  600 mg Oral BID    fluticasone-salmeterol  1 puff Inhalation BID    And    umeclidinium bromide  1 puff Inhalation Daily    warfarin  5 mg Oral Once at night       Continuous Infusions:   dilTIAZem 10 mg/hr (12/29/24 1415)       Social History:   reports that she quit smoking about 32 years ago. She has never used smokeless tobacco. She reports that she does not currently use alcohol. She reports that she does not use drugs.    Review of Systems:  All systems were reviewed and are negative except as described above in HPI.    Physical Exam:      Temp:  [98.6 °F (37 °C)-99.1 °F (37.3 °C)] 98.6 °F (37 °C)  Pulse:  [] 116  Resp:  [15-30] 24  BP: ()/() 109/42  SpO2:  [91 %-100 %] 98 %    Last 3 Weights   12/29/24 1300 155 lb 6.8 oz (70.5 kg)   12/29/24 0626 115 lb (52.2 kg)   09/11/24 1029 162 lb (73.5 kg)   04/10/24 1036 165 lb (74.8 kg)           General: No apparent distress  HEENT: No focal deficits.  Neck: supple. JVP normal  Cardiac: Irregular rhythm, S1, S2 normal,   No rub or gallop.  No murmur.  Lungs: Coarse  Abdomen: Soft, non-tender.   Extremities: No edema  Neurologic: no focal deficits  Skin: Warm and dry.          Telemetry: fib    Laboratories and Data:  Diagnostics:    EKG, 12/29/2024:  fib, RVR    CXR, 12/29/2024:  mild PVR    Labs:   HEM:  Recent Labs   Lab 12/29/24  0634   WBC 9.9   HGB 14.6   .0       Chem:  Recent Labs   Lab 12/29/24  0634      K 3.6      CO2 29.0   BUN 32*   CREATSERUM 1.77*   CA 9.1   MG 2.0   *       Recent Labs   Lab 12/29/24  0634   ALT 13   AST 24   ALB 4.6       Recent Labs   Lab 12/29/24 0634   PTP 21.5*   INR 1.84*       No results for input(s): \"TROP\",  \"CK\" in the last 168 hours.      Impression:   Acute hypoxic resp failure - due to COPD exacerbation and some heart failure from rapid afib.  Chronic afib - rate control had been fair.  Tachy in setting of acute illness.  On warfarin.  Underlying COPD, on home O2.  Former long time smoker.  Prior TIA with CEA  H/o HTN    Plan:  Continue with rate control strategy.  Continue Toprol 100 mg BID.  Diltiazem  mg daily.  Continue dilt drip as needed, prn diltiazem IVP.  40 mg IV lasix now.  Redose in am pending response.  Aggressive COPD Rx per intensivist.    Steve Porter MD  12/29/2024  3:08 PM  Cr care 35 min         [1]   Allergies  Allergen Reactions    Hydrocodone HIVES    Cephalexin RASH

## 2024-12-29 NOTE — CONSULTS
ICU  Critical Care APRN Progress Note    NAME: Diana Eisenberg - [facility]: [facility] - MRN: [id number] - Age: 84 year old - :1940    History Of Present Illness:  Diana Eisenberg is a 84 year old female with PMHx significant for afib on coumadin, CKD, CAD, COPD on home O2 2L, hypothyroid, HTN, HLD, and GERD presents to ER with a 2 day history of progressive weakness and shortness of breath.  Patient notes that her work of breathing has worsened and her activity tolerance has decreased.  Patient denies any sick contacts.  ER work up reveals afib with RVR in which a cardizem drip was started, COPD exacerbation with bipap placed.  Patient to be admitted to ICU for HR control and close hemodynamic monitoring.    PMH:  Past Medical History:    A-fib (HCC)    Arrhythmia    Atherosclerosis of coronary artery    Atrial fibrillation with rapid ventricular response (HCC)    Back pain    Back problem    COPD (chronic obstructive pulmonary disease) (HCC)    Coronary atherosclerosis    Disorder of thyroid    Esophageal reflux    Essential hypertension    Hearing impairment    uses hearing aide    High blood pressure    High cholesterol    Hyperlipidemia    Hypothyroidism    Leg hematoma, right, subsequent encounter    Osteoarthritis    Visual impairment       Social Hx:  Social History     Socioeconomic History    Marital status:    Tobacco Use    Smoking status: Former     Current packs/day: 0.00     Types: Cigarettes     Quit date: 1992     Years since quittin.6    Smokeless tobacco: Never   Vaping Use    Vaping status: Never Used   Substance and Sexual Activity    Alcohol use: Not Currently    Drug use: No     Social Drivers of Health     Financial Resource Strain: Low Risk  (2023)    Financial Resource Strain     Difficulty of Paying Living Expenses: Not very hard     Med Affordability: No   Food Insecurity: No Food Insecurity (2023)    Food Insecurity     Food Insecurity: Never true    Transportation Needs: No Transportation Needs (8/17/2023)    Transportation Needs     Lack of Transportation: No   Physical Activity: Insufficiently Active (8/17/2023)    Exercise Vital Sign     Days of Exercise per Week: 7 days     Minutes of Exercise per Session: 20 min   Stress: No Stress Concern Present (8/17/2023)    Stress     Feeling of Stress : No   Social Connections: Socially Integrated (8/17/2023)    Social Connections     Frequency of Socialization with Friends and Family: 3   Housing Stability: Low Risk  (8/17/2023)    Housing Stability     Housing Instability: No       Family Hx:  Family History   Problem Relation Age of Onset    Heart Disorder Father     Hypertension Mother     Heart Disorder Sister     Cancer Brother         lung cancer         Review of Systems:   A comprehensive 10 point review of systems was completed.  Pertinent positives and negatives noted in the HPI.    OBJECTIVE  Vitals:  /56 (BP Location: Left arm)   Pulse 120   Temp 98.6 °F (37 °C) (Temporal)   Resp 23   Ht 147.3 cm (4' 10\")   Wt 155 lb 6.8 oz (70.5 kg)   SpO2 98%   BMI 32.48 kg/m²                Physical Exam:    General Appearance: Alert, cooperative, mild distress, appears stated age  Neck: No JVD, neck supple, no adenopathy, trachea midline, no carotid bruits  Lungs: Course with wheezing to auscultation bilaterally, respirations slightly labored  Heart: Regular rate and rhythm, S1 and S2 normal, no murmur, rub or gallop  Abdomen: Soft, non-tender, bowel sounds active all four quadrants, no masses, no organomegaly  Extremities: Extremities normal, atraumatic, no cyanosis,capillary refill <3 sec.  +1 edema to BLE  Pulses: 2+ and symmetric all extremities  Skin: Skin color, texture, turgor normal for ethnicity, no rashes or lesions, warm and dry  Neurologic: CNII-XII intact, normal strength    Data this admission:  XR CHEST AP PORTABLE  (CPT=71045)    Result Date: 12/29/2024  CONCLUSION:  Stable cardiac and  mediastinal contours.  Discoid atelectasis or scar of the left lung base.  The lungs and pleural spaces are otherwise clear.  No acute osseous findings.   LOCATION:  [facility]      Dictated by (CST): Zander Hanlye MD on 12/29/2024 at 7:38 AM     Finalized by (CST): Zander Hanley MD on 12/29/2024 at 7:39 AM         Labs:  Lab Results   Component Value Date    WBC 9.9 12/29/2024    HGB 14.6 12/29/2024    HCT 45.1 12/29/2024    .0 12/29/2024    CREATSERUM 1.77 12/29/2024    BUN 32 12/29/2024     12/29/2024    K 3.6 12/29/2024     12/29/2024    CO2 29.0 12/29/2024     12/29/2024    CA 9.1 12/29/2024    ALB 4.6 12/29/2024    ALKPHO 88 12/29/2024    BILT 0.5 12/29/2024    TP 7.4 12/29/2024    AST 24 12/29/2024    ALT 13 12/29/2024    INR 1.84 12/29/2024    MG 2.0 12/29/2024       Assessment/Plan:    Acute on chronic respiratory failure  -On Bipap, wean as able for SpO2 >89%  -Continue IV steroids  -Duonebs q4H  -Continue home medications  -Pro-dana 0.1  -RVP negative  -[facility] following    Afib with RVR on coumadin  -Cardizem drip. May need to change to amiodarone  -Continue coumadin  -Continue home medications  -Cardiology consulted    HTN  CAD  HLD  -continue home medications    HF  -daily weight  -continue home lasix    CKD  -avoid nephrotoxins  -Monitor UOP  -trend labs    GERD  -PPI    F/E/N  -cardiac diet  -replete electrolytes as needed    Proph  -coumadin  -SCD    Dispo  -DNAR select- confirmed with patient and family  -ICU for close monitoring and risk of deterioration      Plan of care discussed with intensivist on-call, Dr. Bailon.    Clover Chavez, [facility]  Critical Care NP  Phone [phone]      A total of 45 minutes of critical care time (exclusive of billable procedures) was administered. This involved direct patient intervention, complex decision making, and/or extensive discussions with the patient, family, and clinical staff.      ICU addenda    I agree with the APC history and  plan and have independently evaluated and examined this patient.    Pateint admitted with COPD, Afib with rvr. Plan to control afib with amiodarone and not dilt. Will go ahead and manage COPD with nebs and steroids and CAP abx.Send pct, at this time as well.    DOS 12/29/24    36 minutes of CCT spent in diagnostic and therapeutic management of this critically ill patient.

## 2024-12-29 NOTE — ED INITIAL ASSESSMENT (HPI)
Patient arrives from home with ems for c/o CHELSIE since Friday. Patient received 2 duonebs at home. Reports +cough

## 2024-12-29 NOTE — H&P
[facility]     Diana Eisenberg Patient Status:  Emergency    1940 MRN [id number]   Location [facility] EMERGENCY DEPARTMENT Attending Fredy Avila MD   Hosp Day # 0 PCP SHERIF SÁNCHEZ MD     Chief Complaint: Dyspnea    Subjective:    History of Present Illness:     Diana Eisenberg is a 84 year old female with CAD, essential hypertension, dyslipidemia, atrial fibrillation on Coumadin, chronic diastolic dysfunction, chronic hypoxic respiratory failure (2L as needed and 2L at night), chronic kidney disease, hypothyroidism and GERD who presents with shortness of breath. Patient states she has had URI symptoms for a month. She began to have a productive cough on Friday. Symptoms progressed and patient with difficulty breathing this morning prompting ER evaluation. She has chest pain, worse with coughing. No fever. Admits to nausea, no vomiting.     History/Other:    Past Medical History:  Past Medical History:    A-fib (HCC)    Arrhythmia    Atherosclerosis of coronary artery    Atrial fibrillation with rapid ventricular response (HCC)    Back pain    Back problem    COPD (chronic obstructive pulmonary disease) (HCC)    Coronary atherosclerosis    Disorder of thyroid    Esophageal reflux    Essential hypertension    Hearing impairment    uses hearing aide    High blood pressure    High cholesterol    Hyperlipidemia    Hypothyroidism    Leg hematoma, right, subsequent encounter    Osteoarthritis    Visual impairment     Past Surgical History:   Past Surgical History:   Procedure Laterality Date    Carotid endarterectomy Right     Colonoscopy      Hernia surgery      Other surgical history      lower right leg, skin graph    Other surgical history      cataract surgery      Family History:   Family History   Problem Relation Age of Onset    Heart Disorder Father     Hypertension Mother     Heart Disorder Sister     Cancer Brother         lung cancer     Social History:     reports that she quit smoking about 32 years ago. She has never used smokeless tobacco. She reports that she does not currently use alcohol. She reports that she does not use drugs.     Allergies: Allergies[1]    Medications:  Medications Ordered Prior to Encounter[2]    Review of Systems:   A comprehensive review of systems was completed.    Pertinent positives and negatives noted in the HPI.    Objective:   Physical Exam:    /82   Pulse (!) 145   Temp 99.1 °F (37.3 °C) (Oral)   Resp 23   Ht 4' 10\" (1.473 m)   Wt 115 lb (52.2 kg)   SpO2 100%   BMI 24.04 kg/m²   General: No acute distress, Alert  Respiratory: Diffuse wheeze bilaterally   Cardiovascular: S1, S2. Irregular, tachycardic  Abdomen: Soft, Non-tender, non-distended, positive bowel sounds  Neuro: No new focal deficits  Extremities: Trace edema    Results:    Labs:      Labs Last 24 Hours:    Recent Labs   Lab 12/29/24  0634   RBC 4.78   HGB 14.6   HCT 45.1   MCV 94.4   MCH 30.5   MCHC 32.4   RDW 13.8   NEPRELIM 5.17   WBC 9.9   .0       Recent Labs   Lab 12/29/24  0634   *   BUN 32*   CREATSERUM 1.77*   EGFRCR 28*   CA 9.1   ALB 4.6      K 3.6      CO2 29.0   ALKPHO 88   AST 24   ALT 13   BILT 0.5   TP 7.4       Lab Results   Component Value Date    PT 19.5 (H) 01/22/2019    PT 39.6 (H) 01/15/2019    PT 26.1 (H) 12/31/2018    INR 1.84 (H) 12/29/2024    INR 2.44 (H) 08/06/2023    INR 4.27 (H) 02/08/2023       No results for input(s): \"TROP\", \"TROPHS\", \"CK\" in the last 168 hours.    No results for input(s): \"TROP\", \"PBNP\" in the last 168 hours.    No results for input(s): \"PCT\" in the last 168 hours.    Imaging: Imaging data reviewed in Epic.    Assessment & Plan:      Acute on chronic hypoxic respiratory failure (2L at needed and 2L at night)  #COPD exacerbation, CXR neg  -Admit  -IV steroids  -BD  -Nebs  -Oxygen: Vapotherm  -Check PCT, RBP  -Pulmonary & CCM consult    #Atrial fibrillation on Coumadin with  RVR  -Toprol   -Cardizem  -Cardizem drip  -Coumadin  -Monitor INR  -Monitor hemodynamics  -Telemetry  -Cardiology consult     #CAD  #Essential hypertension  #Dyslipidemia   -Aspirin  -Statin  -Check troponin      #Chronic diastolic heart failure   -Lasix  -Daily weight    #Chronic kidney disease  -Monitor UOP, creatinine and electrolytes    #Hypothyroidism  -Synthroid    #GERD  -PPI    #DVT Px: Coumadin    #Code Status DNAR/select, patient does not wish to be intubated     Plan of care discussed with patient, family, ER, cardiology and pulmonary teams.    Ronak Hood MD    Supplementary Documentation:     The 21st Century Cures Act makes medical notes like these available to patients in the interest of transparency. Please be advised this is a medical document. Medical documents are intended to carry relevant information, facts as evident, and the clinical opinion of the practitioner. The medical note is intended as peer to peer communication and may appear blunt or direct. It is written in medical language and may contain abbreviations or verbiage that are unfamiliar.                                     [1]   Allergies  Allergen Reactions    Hydrocodone HIVES    Cephalexin RASH   [2]   No current facility-administered medications on file prior to encounter.     Current Outpatient Medications on File Prior to Encounter   Medication Sig Dispense Refill    albuterol 108 (90 Base) MCG/ACT Inhalation Aero Soln Inhale 2 puffs into the lungs every 6 (six) hours as needed for Wheezing or Shortness of Breath. 54 g 1    pravastatin 40 MG Oral Tab Take 1 tablet (40 mg total) by mouth nightly. 90 tablet 1    MECLIZINE 25 MG Oral Tab Take 1 tablet (25 mg total) by mouth 3 (three) times daily as needed for Dizziness. 30 tablet 2    alendronate 35 MG Oral Tab Take 1 tablet (35 mg total) by mouth every 7 days. 12 tablet 3    clobetasol 0.05 % External Cream Apply 1 Application topically 2 (two) times daily as needed. 30 g 2     levothyroxine 88 MCG Oral Tab Take 1 tablet (88 mcg total) by mouth daily. 90 tablet 3    furosemide 40 MG Oral Tab Take 1 tablet (40 mg total) by mouth daily. 90 tablet 3    tiZANidine 2 MG Oral Tab Take 1 tablet (2 mg total) by mouth every 8 (eight) hours as needed (muscle spasm or back pain). Do not drive while taking 60 tablet 0    dilTIAZem  MG Oral Capsule SR 24 Hr Take 1 capsule (240 mg total) by mouth daily.      ondansetron 4 MG Oral Tablet Dispersible Take 1 tablet (4 mg total) by mouth every 8 (eight) hours as needed for Nausea. 30 tablet 0    famotidine 20 MG Oral Tab Take 1 tablet (20 mg total) by mouth 2 (two) times daily as needed (acid stomach). 60 tablet 1    fluticasone-umeclidin-vilant (TRELEGY ELLIPTA) 200-62.5-25 MCG/ACT Inhalation Aerosol Powder, Breath Activated Inhale 1 puff into the lungs daily. 90 each 1    aspirin 81 MG Oral Tab EC Take 1 tablet (81 mg total) by mouth daily.      warfarin 4 MG Oral Tab Take 1 tablet (4 mg total) by mouth See Admin Instructions. On Monday, Tuesday, Wednesday, Friday and Sunday.      furosemide 20 MG Oral Tab Take 1 tablet (20 mg total) by mouth daily as needed (In addition to 40 mg daily).      Multiple Vitamins-Minerals (ICAPS AREDS 2 OR) Take 1 tablet by mouth 2 (two) times daily.      warfarin 4 MG Oral Tab Take 1.5 tablets (6 mg total) by mouth See Admin Instructions. On Thursday and Saturday.      metoprolol succinate  MG Oral Tablet 24 Hr Take 1 tablet (100 mg total) by mouth 2 (two) times daily.      Multiple Vitamins-Minerals (COMPLETE DAILY/LUTEIN) Oral Tab Take 1 tablet by mouth daily.        Calcium Carb-Cholecalciferol (CALCIUM 600+D3 OR) Take 1 tablet by mouth daily.        Omega-3 Fatty Acids (FISH OIL) 1200 MG Oral Capsule Delayed Release Take 1 capsule by mouth daily.

## 2024-12-29 NOTE — HISTORICAL OFFICE NOTE
Continuity of Care Document  2/28/2024  Diana Eisenberg - 84 y.o. Female; born [birth year]Summary of episode note, generated on Mar. 11, 2024March 11, 2024   CHIEF COMPLAINT    CHIEF COMPLAINT  Reason for Visit/Chief Complaint   6 month follow up.   Ms. Eisenberg is a pleasant 84-year-old lady with a history of coronary disease, PAF on chronic  anticoagulation with warfarin, hypertension, carotid disease and dyslipidemia.  She has been a longtime smoker but she did quit.  She has a history of CEA and  has seen Dr. Salmeron who has stated that he does not want to have any further surgery unless she is  having active TIAs. 2023 echocardiogram had normal left ventricular function with moderate  pulmonary hypertension.  She has occasional shortness of breath with underlying COPD and her smoking history.  She denies any chest pain, palpitations, dizziness, syncope.Dr. Magana is thinking about an ablate and pace as her beta-blocker dose is not really controlling heart rates as well as he wants. Will be getting a 7-day MCT monitor for further evaluation.She is agreeable to getting a follow-up echocardiogram in November 2024 but does not want to pursue any carotid ultrasound or Lexiscan stress testing at this point in time. She is going to be talking with her family about a DNR status/advanced directives. She says that she has the papers for that but just has to go through it.Will be updating blood work for now and in 6 months including a CBC, CMP, fasting lipid panel, TSH, T4, proBNP, vitamin D     PROBLEMS  Reconcile with Patient's ChartPROBLEMS  Problem Effective Dates Date resolved Problem Status   COPD (chronic obstructive pulmonary disease), [SNOMED-CT: 40665363] 12/21/2022 - Active   Palpitations, [SNOMED-CT: 73976128] 9/27/2022 - Active   Knee swelling, [SNOMED-CT: 223827746] 8/24/2022 - Active   PAF (paroxysmal atrial fibrillation), [SNOMED-CT: 766225783] 4/4/2019 - Active   CAD (coronary artery disease),  native coronary artery, [SNOMED-CT: 242147000] 4/4/2019 - Active   Pure hypercholesterolemia, [SNOMED-CT: 119637867] 4/4/2019 - Active   Asymptomatic carotid artery stenosis, bilateral, [SNOMED-CT: 782918606] 4/4/2019 - Active   Atrial flutter, typical, [SNOMED-CT: 497510167] 4/4/2019 - Active   Essential hypertension, [SNOMED-CT: 28928165] 6/6/2019 - Active   Long term current use of amiodarone, [SNOMED-CT: 132569745] 6/4/2020 - Active   Chronic anticoagulation, [SNOMED-CT: 965300506] 6/4/2020 - Active   Shortness of breath, [SNOMED-CT: 076376910] 3/11/2021 - Active   Pulmonary hypertension, not otherwise specified or secondary, [SNOMED-CT: 34462658] 3/11/2021 - Active     ENCOUNTER DIAGNOSIS    ENCOUNTER DIAGNOSIS  Problem Effective Dates Date resolved Problem Status   PAF (paroxysmal atrial fibrillation), [SNOMED-CT: 319832866] 4/4/2019 - Active   CAD (coronary artery disease), native coronary artery, [SNOMED-CT: 743785031] 4/4/2019 - Active   Pure hypercholesterolemia, [SNOMED-CT: 588090287] 4/4/2019 - Active   Asymptomatic carotid artery stenosis, bilateral, [SNOMED-CT: 409632397] 4/4/2019 - Active   Atrial flutter, typical, [SNOMED-CT: 744419540] 4/4/2019 - Active   Essential hypertension, [SNOMED-CT: 27873344] 6/6/2019 - Active   Chronic anticoagulation, [SNOMED-CT: 291696892] 6/4/2020 - Active   Shortness of breath, [SNOMED-CT: 608455622] 3/11/2021 - Active   Pulmonary hypertension, not otherwise specified or secondary, [SNOMED-CT: 98668097] 3/11/2021 - Active     VITAL SIGNS    VITAL SIGNS  Date / Time: 2/28/2024   BP Systolic 120 mmHg   BP Diastolic 74 mmHg   Height 58 inches   Weight 164 lbs   Pulse Rate 105 bpm   BSA (Body Surface Area) 1.8 cc/m2   BMI (Body Mass Index) 34.3 cc/m2   Blood Pressure 120 / 74 mmHg     PHYSICAL EXAMINATION    PHYSICAL EXAMINATION  Header Details   Vitals Left Arm Sitting  / 74 mmHg, Pulse rate 105 bpm, Height in 4' 10\", BMI: 34.3, Weight in 164 lbs (or) 74.39 kgs, BSA  : 1.78 cc/m²   General Appearance No Acute Distress, Appropriate   Head/Eyes/Ears/Nose/Mouth/Throat Conjunctiva pink, Sclera Clear, Mucous membranes Moist, No pallor   Neck No carotid bruits, No JVD   Respiratory Lungs clear with normal breath sounds   Cardiovascular irreg, nl s1, s2,   Gastrointestinal Abdomen soft, Non-tender   Musculoskeletal Normal spine   Gait Normal gait   Strength and tone Normal muscle strength, Normal muscle tone   Upper Extremities No edema   Skin Warm and dry   Neurologic / Psychiatric Alert and Oriented   Speech Normal speech     ALLERGIES, ADVERSE REACTIONS, ALERTS    ALLERGIES, ADVERSE REACTIONS, ALERTS  Type Substance Reaction Severity Status   Allergies Cephalexin, [RxNorm: 2711761]   Mild Active   Allergies Hydrocodone, [RxNorm: 643466]   Mild Active     MEDICATIONS ADMINISTERED DURING VISIT    No data available    MEDICATIONS  Reconcile with Patient's ChartMEDICATIONS  Medication Start Date Route/Frequency Status   albuterol sulfate HFA 90 mcg/actuation aerosol inhaler, [RxNorm: 5352142] 2/28/2022 Inhale 2 puffs by mouth every 4-6 hours as needed. Active   aspirin 81 MG chewable tablet, [RxNorm: 874720] 7/22/2021 Chew 81 mg by mouth daily. Active   dilTIAZem  mg capsule,extended release 24 hr, [RxNorm: 844888] 10/4/2023 Take 1 capsule orally once a day. Active   Lasix 20 mg tablet, [RxNorm: 361523] 5/10/2023 Take 1 tablet Monday , Wednesday and Friday in the afternoon as needed Active   levothyroxine 88 mcg tablet, [RxNorm: 463668] 2/22/2023 Take 1 tablet orally once a day. Active   METOPROLOL SUCCINATE  MG Tablet Extended Release 24 Hour, [RxNorm: 718711] - TAKE 1 TABLET TWICE DAILY Active   Multiple Vitamins-Minerals (COMPLETE DAILY/LUTEIN) Tab, [RxNorm: 0] 7/22/2021 Take 1 tablet by mouth. Active   Omega-3 Fatty Acids (FISH OIL) 1200 MG CAPSULE DELAYED RELEASE, [RxNorm: 0] 7/22/2021 Take 1 capsule by mouth. Active   pravastatin (PRAVACHOL) 40 MG tablet, [RxNorm:  198718] 7/22/2021 Take 40 mg by mouth daily. Active   PreserVision AREDS-2 250 mg-90 mg-40 mg-1 mg chewable tablet, [RxNorm: 0] 2/28/2022 Take 2 tablets orally once a day. Active   Trelegy Ellipta 100 mcg-62.5 mcg-25 mcg powder for inhalation, [RxNorm: 5750124] 9/16/2021 (inhalation) once a day. Active   warfarin 4 mg tablet, [RxNorm: 526882] 10/9/2023 Take 1 - 1.5 tablets orally once in the evening as directed by warfarin clinic [phone] Active     ASSESSMENT    Ms. Eisenberg is a pleasant 84-year-old lady with a history of coronary disease, PAF on chronic  anticoagulation with warfarin, hypertension, carotid disease and dyslipidemia.  She has been a longtime smoker but she did quit.  She has a history of CEA and  has seen Dr. Salmeron who has stated that he does not want to have any further surgery unless she is  having active TIAs. 2023 echocardiogram had normal left ventricular function with moderate  pulmonary hypertension.  She has occasional shortness of breath with underlying COPD and her smoking history.  She denies any chest pain, palpitations, dizziness, syncope.She does appear to have some heart failure with preserved ejection fraction and is currently on a diuretic but is susceptible to dehydration so we are being careful with the balance regarding that. She is elderly and we do not want her to fall.Dr. Magana is thinking about an ablate and pace as her beta-blocker dose is not really controlling heart rates as well as he wants. Will be getting a 7-day MCT monitor for further evaluation.She is agreeable to getting a follow-up echocardiogram in November 2024 but does not want to pursue any carotid ultrasound or Lexiscan stress testing at this point in time. She is going to be talking with her family about a DNR status/advanced directives. She says that she has the papers for that but just has to go through it.Will be updating blood work for now and in 6 months including a CBC, CMP, fasting lipid panel,  TSH, T4, proBNP, vitamin DPlease get a visit with Dr. Magana after her 7-day MCT monitorWould continue to recommend a low fat, low cholesterol diet as well as a regular exercise program, as they are able to tolerate, and will follow this very nice person in office. Please return to clinic to see me in 6 months. Please see APN Frommelt in 3 months     FAMILY HISTORY    FAMILY HISTORY  Relationship Age Diagnosis   Father 0 Past heart attack   Brother 66 History of lung cancer - Hx (Reason for death)   Sister 65 Past heart attack (Reason for death)     GENERAL STATUS    No data available    PAST MEDICAL HISTORY    PAST MEDICAL HISTORY  Problem Date diagonsed Date resolved Status   COPD (chronic obstructive pulmonary disease), [SNOMED-CT: 63119782] 12/21/2022 - Active   Palpitations, [SNOMED-CT: 25384303] 9/27/2022 - Active   Knee swelling, [SNOMED-CT: 961394382] 8/24/2022 - Active   PAF (paroxysmal atrial fibrillation), [SNOMED-CT: 525530857] 4/4/2019 - Active   CAD (coronary artery disease), native coronary artery, [SNOMED-CT: 840591050] 4/4/2019 - Active   Pure hypercholesterolemia, [SNOMED-CT: 855864721] 4/4/2019 - Active   Asymptomatic carotid artery stenosis, bilateral, [SNOMED-CT: 330789570] 4/4/2019 - Active   Atrial flutter, typical, [SNOMED-CT: 484924719] 4/4/2019 - Active   Essential hypertension, [SNOMED-CT: 72940329] 6/6/2019 - Active   Long term current use of amiodarone, [SNOMED-CT: 095421383] 6/4/2020 - Active   Chronic anticoagulation, [SNOMED-CT: 268885761] 6/4/2020 - Active   Shortness of breath, [SNOMED-CT: 418990706] 3/11/2021 - Active   Pulmonary hypertension, not otherwise specified or secondary, [SNOMED-CT: 57802058] 3/11/2021 - Active     HISTORY OF PRESENT ILLNESS    Ms. Eisenberg is a pleasant 84-year-old lady with a history of coronary disease, PAF on chronic  anticoagulation with warfarin, hypertension, carotid disease and dyslipidemia.  She has been a longtime smoker but she did quit.  She  has a history of CEA and  has seen Dr. Salmeron who has stated that he does not want to have any further surgery unless she is  having active TIAs. 2023 echocardiogram had normal left ventricular function with moderate  pulmonary hypertension.  She has occasional shortness of breath with underlying COPD and her smoking history.  She denies any chest pain, palpitations, dizziness, syncope.Dr. Magana is thinking about an ablate and pace as her beta-blocker dose is not really controlling heart rates as well as he wants. Will be getting a 7-day MCT monitor for further evaluation.She is agreeable to getting a follow-up echocardiogram in November 2024 but does not want to pursue any carotid ultrasound or Lexiscan stress testing at this point in time. She is going to be talking with her family about a DNR status/advanced directives. She says that she has the papers for that but just has to go through it.Will be updating blood work for now and in 6 months including a CBC, CMP, fasting lipid panel, TSH, T4, proBNP, vitamin D     IMMUNIZATIONS    No data available    PLAN OF CARE    PLAN OF CARE  Planned Care Date   Monitor - MCT/Telemetry 1/1/1900   Echocardiography - Complete 11/1/2024   Lipid panel - Fasting 1/1/1900   TSH (Thyroid Stimulating Hormone) Panel 1/1/1900   CBC (Complete Blood Count) without differential 1/1/1900   CMP (comprehensive metabolic panel) 1/1/1900   proBNP 1/1/1900   T4 1/1/1900   Vitamin D (Total) 1/1/1900   Lipid panel - Fasting 1/1/1900   TSH (Thyroid Stimulating Hormone) Panel 1/1/1900   CBC (Complete Blood Count) without differential 1/1/1900   CMP (comprehensive metabolic panel) 1/1/1900   T4 1/1/1900   Vitamin D (Total) 1/1/1900   Follow up visit - Adria Magana MD 1/1/1900   Follow up visit - Julie Frommelt APRN 1/1/1900     PROCEDURES    No data available    RESULTS    RESULTS  Name Result Date Location - Ordered By   INR [LOINC: INR] 2.2 RATIO [Normal] 02/22/2024 12:00:00 AM   Address: tel:    Lipid panel - Fasting [LOINC: 72571-5] Pending 6 Months     TSH (Thyroid Stimulating Hormone) Panel [LOINC: 09547-2] Pending 6 Months     CBC (Complete Blood Count) without differential [LOINC: 63057-5] Pending 6 Months     CMP (comprehensive metabolic panel) [LOINC: 48521-6] Pending 6 Months     proBNP [LOINC: BPJ5933] Pending 6 Months     T4 [LOINC: KIW0101] Pending 6 Months     Vitamin D (Total) [LOINC: 62389-2] Pending 6 Months     Lipid panel - Fasting [LOINC: 02337-5] Pending Schedule next available     TSH (Thyroid Stimulating Hormone) Panel [LOINC: 38490-1] Pending Schedule next available     CBC (Complete Blood Count) without differential [LOINC: 73313-8] Pending Schedule next available     CMP (comprehensive metabolic panel) [LOINC: 93144-1] Pending Schedule next available     T4 [LOINC: IMI6348] Pending Schedule next available     Vitamin D (Total) [LOINC: 27375-5] Pending Schedule next available     Trans Thoracic Echocardiogram 1.The study quality is average. 2.The left ventricle is normal in size. Global left ventricular systolic function is normal.. The left ventricular ejection fraction is 72%. Left ventricular diastolic function is indeterminate. Wall thickness appears normal. No wall motion abnormality noted.3.The right ventricle is normal in size. Right ventricular systolic function is normal.4.Mild calcification of the mitral valve is noted. Mild to moderate, but probably closer to mild, mitral regurgitation. 5.Mild to moderate tricuspid regurgitation.6.The estimated pulmonary artery systolic pressure is 52 mmHg assuming a right atrial pressure of 3 mmHg. 11/8/2023 3:30:00 PM Jazlyn Miller MD   Lower Extremity Venous Ultrasound 1.The study quality is good. 2.History of bilateral ankle edema. History of fall July,2022. Hit left knee and has a large lump on the anterior/lateral calf.3.Exam performed with the patient in reverse Trendelenburg and sitting positions. 4.Normal compressibility,  augmentation, and phasic flow in the lower extremity venous systems bilaterally. Negative study for DVT.5.No evidence of deep venous reflux in the lower extremities bilaterally.6.Reflux noted in the GSV’s bilaterally.7.No reflux noted in the SSV’s bilaterally.8.Right GSV branches with reflux - distal thigh - 3.8 mm with 2.1 seconds of reflux and 3.2 mm with 1.7 seconds of reflux.9.Left mid calf GSV branch with reflux - 2.5 mm with 1.2 seconds of reflux.10.Probable hematoma visualized in the anterior/lateral left calf. No vascularization visualized. Approximately 8 x 4.3 x 2.0 cm. 9/27/2022 2:00:00 PM Haider Hanley MD   Ambulatory Telemetry Monitor 1.This is an excellent quality study. 2.Predominant rhythm is normal sinus rhythm. 3.The minimum heart rate recorded was 65 beats / minute. The maximum heart rate is 150 beats / minute. The mean heart rate is 88 beats / minute. 4.Rare premature atrial contractions noted. 5.Afib burden 41%6.Rare premature ventricular contractions noted. 7.No evidence of ventricular tachycardia is noted.8.No pauses were noted. 9/28/2022 11:30:00 AM Jamilah Martínez MD     REVIEW OF SYSTEMS    REVIEW OF SYSTEMS  Header Details   Cardiovascular No history of Chest pain, LOVE, Palpitations, Syncope, PND, Orthopnea, Edema, Claudication     SOCIAL HISTORY    SOCIAL HISTORY  Social History Element Description Effective Dates   Smoking status Former smoker -     FUNCTIONAL STATUS    No data available    MEDICAL EQUIPMENT    No data available    Goals Sections    No data available    REASON FOR REFERRAL               Health Concerns Section    No data available    COGNITIVE/MENTAL STATUS    No data available    Patient Demographics    Patient Demographics  Patient Address Patient Name Communication   [address] [place] [address] Diana Eisenberg [phone] (Mobile)     Patient Demographics  Language Race / Ethnicity Marital Status   Unknown - Spoken White / Unknown      Document  Information    Primary Care Provider Other Service Providers Document Coverage Dates   Jazlyn Miller  NPI: [id number]  [phone] (Work)  [address] [place] [address] [id number] [place] [id number]  Interpreting Physicians  [facility]  [phone] (Work)  [address] [place] [id number] Angelina Jolleys  NPI: [id number]  [phone] (Work)  [address] [place] [id number]  [place] [address]  Nurses     Carol Banerjee  NPI: [id number]  [phone] (Work)  [address] [place] [id number]  [place] [address]  Nurses Feb. 28, 2024February 28, 2024      Organization   [facility]  [phone] (Work)  [address] [place] [id number]  [place] [address]     Encounter Providers Encounter Date    Feb. 28, 2024February 28, 2024     Legal Authenticator    Jazlyn Miller  NPI: [id number]  [phone] (Work)  [address] [place] [id number]  [place] [address]

## 2024-12-29 NOTE — PLAN OF CARE
Received patient from ER on BiPap, weaned to 2 L NC. Dilt gtt infusing, transitioned to PO RC mx. External catheter in place, 600 ml UOP since arrival. Comfort and safety maintained.    Problem: CARDIOVASCULAR - ADULT  Goal: Maintains optimal cardiac output and hemodynamic stability  Description: INTERVENTIONS:  - Monitor vital signs, rhythm, and trends  - Monitor for bleeding, hypotension and signs of decreased cardiac output  - Evaluate effectiveness of vasoactive medications to optimize hemodynamic stability  - Monitor arterial and/or venous puncture sites for bleeding and/or hematoma  - Assess quality of pulses, skin color and temperature  - Assess for signs of decreased coronary artery perfusion - ex. Angina  - Evaluate fluid balance, assess for edema, trend weights  Outcome: Progressing  Goal: Absence of cardiac arrhythmias or at baseline  Description: INTERVENTIONS:  - Continuous cardiac monitoring, monitor vital signs, obtain 12 lead EKG if indicated  - Evaluate effectiveness of antiarrhythmic and heart rate control medications as ordered  - Initiate emergency measures for life threatening arrhythmias  - Monitor electrolytes and administer replacement therapy as ordered  Outcome: Progressing     Problem: RESPIRATORY - ADULT  Goal: Achieves optimal ventilation and oxygenation  Description: INTERVENTIONS:  - Assess for changes in respiratory status  - Assess for changes in mentation and behavior  - Position to facilitate oxygenation and minimize respiratory effort  - Oxygen supplementation based on oxygen saturation or ABGs  - Provide Smoking Cessation handout, if applicable  - Encourage broncho-pulmonary hygiene including cough, deep breathe, Incentive Spirometry  - Assess the need for suctioning and perform as needed  - Assess and instruct to report SOB or any respiratory difficulty  - Respiratory Therapy support as indicated  - Manage/alleviate anxiety  - Monitor for signs/symptoms of CO2  retention  Outcome: Progressing

## 2024-12-29 NOTE — HISTORICAL OFFICE NOTE
Continuity of Care Document  10/23/2024  Diana Eisenberg - 84 y.o. Female; born [birth year]Summary of episode note, generated on Oct. 25, 2024October 25, 2024   CHIEF COMPLAINT    CHIEF COMPLAINT  Reason for Visit/Chief Complaint   Annual follow up   84-year-old female referred for evaluation and recommendations for atrial fibrillation. She feels AF when her rates are elevated. She has been on amiodarone but did not tolerate due to a tremor.    Cardiac work-up includes 2D echo demonstrating normal left ventricular structure and function.    MCT monitor demonstrated 40% atrial fibrillation burden. ECG today shows AF with RVR.     PROBLEMS  Reconcile with Patient's ChartPROBLEMS  Problem Effective Dates Date resolved Problem Status   COPD (chronic obstructive pulmonary disease), [SNOMED-CT: 82181934] 12/21/2022 - Active   Palpitations, [SNOMED-CT: 82257614] 9/27/2022 - Active   Knee swelling, [SNOMED-CT: 865805810] 8/24/2022 - Active   PAF (paroxysmal atrial fibrillation), [SNOMED-CT: 893299701] 4/4/2019 - Active   CAD (coronary artery disease), native coronary artery, [SNOMED-CT: 917442751] 4/4/2019 - Active   Pure hypercholesterolemia, [SNOMED-CT: 492210945] 4/4/2019 - Active   Asymptomatic carotid artery stenosis, bilateral, [SNOMED-CT: 974005729] 4/4/2019 - Active   Atrial flutter, typical, [SNOMED-CT: 521304861] 4/4/2019 - Active   Essential hypertension, [SNOMED-CT: 83023959] 6/6/2019 - Active   Long term current use of amiodarone, [SNOMED-CT: 762665504] 6/4/2020 - Active   Chronic anticoagulation, [SNOMED-CT: 526930575] 6/4/2020 - Active   Shortness of breath, [SNOMED-CT: 454804663] 3/11/2021 - Active   Pulmonary hypertension, not otherwise specified or secondary, [SNOMED-CT: 72765979] 3/11/2021 - Active     ENCOUNTER    ENCOUNTER  Problem Effective Dates Date resolved Problem Status   PAF (paroxysmal atrial fibrillation), [SNOMED-CT: 149698044] 4/4/2019 - Active   CAD (coronary artery disease),  native coronary artery, [SNOMED-CT: 309103103] 4/4/2019 - Active   Pure hypercholesterolemia, [SNOMED-CT: 301837505] 4/4/2019 - Active   Asymptomatic carotid artery stenosis, bilateral, [SNOMED-CT: 702621277] 4/4/2019 - Active   Atrial flutter, typical, [SNOMED-CT: 789181644] 4/4/2019 - Active   Essential hypertension, [SNOMED-CT: 98330372] 6/6/2019 - Active   Chronic anticoagulation, [SNOMED-CT: 810654006] 6/4/2020 - Active   Shortness of breath, [SNOMED-CT: 681613551] 3/11/2021 - Active   Pulmonary hypertension, not otherwise specified or secondary, [SNOMED-CT: 88687990] 3/11/2021 - Active     VITAL SIGNS    VITAL SIGNS  Date / Time: 10/23/2024   BP Systolic 118 mmHg   BP Diastolic 60 mmHg   Height 58 inches   Weight 161 lbs   Pulse Rate 94 bpm   BSA (Body Surface Area) 1.8 cc/m2   BMI (Body Mass Index) 33.6 cc/m2   Blood Pressure 118 / 60 mmHg     PHYSICAL EXAMINATION    PHYSICAL EXAMINATION  Header Details   Vitals Right Arm Sitting  / 60 mmHg, Pulse rate 94 bpm, Regular, Height in 4' 10\", BMI: 33.6, Weight in 161 lbs (or) 73.03 kgs, BSA : 1.76 cc/m²   General Appearance No Acute Distress   Head/Eyes/Ears/Nose/Mouth/Throat Conjunctiva pink, Sclera Clear, Mucous membranes Moist   Neck Normal carotid pulsations, No carotid bruits, No JVD   Respiratory Unlabored, Lungs clear with normal breath sounds, Equal bilaterally   Cardiovascular Intact distal pulses, Regular rhythm. Normal rate present. Normal and normal S1 and S2     ALLERGIES, ADVERSE REACTIONS, ALERTS    ALLERGIES, ADVERSE REACTIONS, ALERTS  Type Substance Reaction Severity Status   Allergies Cephalexin, [RxNorm: 8281851]   Mild Active   Allergies Hydrocodone, [RxNorm: 094632]   Mild Active     MEDICATIONS ADMINISTERED DURING VISIT    No data available    MEDICATIONS  Reconcile with Patient's ChartMEDICATIONS  Medication Start Date Route/Frequency Status   albuterol sulfate HFA 90 mcg/actuation aerosol inhaler, [RxNorm: 8730067] 2/28/2022 Inhale 2  puffs by mouth every 4-6 hours as needed. Active   alendronate 35 mg tablet, [RxNorm: 714712] 8/28/2024 Take 1 tablet orally once a day. Active   aspirin 81 MG chewable tablet, [RxNorm: 326936] 7/22/2021 Chew 81 mg by mouth daily. Active   calcium 260 mg (as calcium carbonate 648 mg) tablet, [RxNorm: 707564] 5/29/2024 Take 1 tablet orally once a day. Active   dilTIAZem HCl ER Coated Beads Oral Capsule Extended Release 24 Hour 240 MG, [RxNorm: 180020] - TAKE 1 CAPSULE EVERY DAY Active   famotidine 10 mg tablet, [RxNorm: 583382] 5/29/2024 Take 1 tablet orally once a day. Active   furosemide 40 mg tablet, [RxNorm: 457076] 5/29/2024 Take 1 tablet orally once a day. Additional 20mg as needed Active   levothyroxine 88 mcg tablet, [RxNorm: 406999] 2/22/2023 Take 1 tablet orally once a day. Active   meclizine 12.5 mg tablet, [RxNorm: 827996] 5/29/2024 Take 1 tablet orally once a day as needed. Active   METOPROLOL SUCCINATE  MG Tablet Extended Release 24 Hour, [RxNorm: 705171] - TAKE 1 TABLET TWICE DAILY Active   Multiple Vitamins-Minerals (COMPLETE DAILY/LUTEIN) Tab, [RxNorm: 0] 7/22/2021 Take 1 tablet by mouth. Active   Omega-3 Fatty Acids (FISH OIL) 1200 MG CAPSULE DELAYED RELEASE, [RxNorm: 0] 7/22/2021 Take 1 capsule by mouth. Active   pravastatin (PRAVACHOL) 40 MG tablet, [RxNorm: 369414] 7/22/2021 Take 40 mg by mouth daily. Active   PreserVision AREDS-2 250 mg-90 mg-40 mg-1 mg chewable tablet, [RxNorm: 0] 2/28/2022 Take 2 tablets orally once a day. Active   tiZANidine 2 mg tablet, [RxNorm: 290811] 5/29/2024 Take 1 tablet orally once a day as needed. Active   Trelegy Ellipta 100 mcg-62.5 mcg-25 mcg powder for inhalation, [RxNorm: 7621898] 9/16/2021 (inhalation) once a day. Active   warfarin 4 mg tablet, [RxNorm: 976088] 9/25/2024 Take 1 - 1.5 tablets orally once in the evening as directed by warfarin clinic [phone] Active   metoprolol succinate  mg tablet,extended release 24 hr, [RxNorm: 635680]  10/23/2024 Take 1 tablet orally 2 times a day. Active     ASSESSMENT    83-year-old female referred for evaluation and recommendations for atrial fibrillation. She feels AF when her rates are elevated. She has been on amiodarone but did not tolerate due to a tremor. Recently tried tikosyn in the hospital but she had recurrent AF after loading was completed. She has NVAF, CHADSVasc score (age-2, HTN, Vasc)    Cardiac work-up includes 2D echo demonstrating normal left ventricular structure and function. She has non-obstructive CAD, no angina or CHF.    Rates are still elevated.    - Continue metoprolol tartrate, 100 mg BID  - Increase diltiazem to 240 mg QD.  - Recommend ppm with AV node ablation, she is reluctant at this time.  -Tolerating oral anticoagulation, no signs or symptoms of bleeding.  - Follow up in 12 months with me.     FAMILY HISTORY    FAMILY HISTORY  Relationship Age Diagnosis   Father 0 Past heart attack   Brother 66 History of lung cancer - Hx (Reason for death)   Sister 65 Past heart attack (Reason for death)     GENERAL STATUS    No data available    PAST MEDICAL HISTORY    PAST MEDICAL HISTORY  Problem Date diagonsed Date resolved Status   COPD (chronic obstructive pulmonary disease), [SNOMED-CT: 34706486] 12/21/2022 - Active   Palpitations, [SNOMED-CT: 52934766] 9/27/2022 - Active   Knee swelling, [SNOMED-CT: 452260883] 8/24/2022 - Active   PAF (paroxysmal atrial fibrillation), [SNOMED-CT: 997354123] 4/4/2019 - Active   CAD (coronary artery disease), native coronary artery, [SNOMED-CT: 923704544] 4/4/2019 - Active   Pure hypercholesterolemia, [SNOMED-CT: 875265978] 4/4/2019 - Active   Asymptomatic carotid artery stenosis, bilateral, [SNOMED-CT: 411972329] 4/4/2019 - Active   Atrial flutter, typical, [SNOMED-CT: 506369722] 4/4/2019 - Active   Essential hypertension, [SNOMED-CT: 70329776] 6/6/2019 - Active   Long term current use of amiodarone, [SNOMED-CT: 720635397] 6/4/2020 - Active   Chronic  anticoagulation, [SNOMED-CT: 475258635] 6/4/2020 - Active   Shortness of breath, [SNOMED-CT: 573904174] 3/11/2021 - Active   Pulmonary hypertension, not otherwise specified or secondary, [SNOMED-CT: 69626986] 3/11/2021 - Active     HISTORY OF PRESENT ILLNESS    84-year-old female referred for evaluation and recommendations for atrial fibrillation. She feels AF when her rates are elevated. She has been on amiodarone but did not tolerate due to a tremor.    Cardiac work-up includes 2D echo demonstrating normal left ventricular structure and function.    MCT monitor demonstrated 40% atrial fibrillation burden. ECG today shows AF with RVR.     IMMUNIZATIONS    No data available    PLAN OF CARE    PLAN OF CARE  Planned Care Date   EKG (electrocardiogram) 10/25/2024   Take 1 tablet orally 2 times a day.-metoprolol succinate  mg tablet,extended release 24 hr 10/23/2024   Follow up visit - Adria Maagna MD 1/1/1900     PROCEDURES    No data available    RESULTS    RESULTS  Name Result Date Location - Ordered By   INR [LOINC: INR] 2.87 RATIO [Normal] 08/29/2024 12:00:00 AM   Address: tel:   Trans Thoracic Echocardiogram 1.The study quality is average. 2.The left ventricle is normal in size. Global left ventricular systolic function is normal.. The left ventricular ejection fraction is 72%. Left ventricular diastolic function is indeterminate. Wall thickness appears normal. No wall motion abnormality noted.3.The right ventricle is normal in size. Right ventricular systolic function is normal.4.Mild calcification of the mitral valve is noted. Mild to moderate, but probably closer to mild, mitral regurgitation. 5.Mild to moderate tricuspid regurgitation.6.The estimated pulmonary artery systolic pressure is 52 mmHg assuming a right atrial pressure of 3 mmHg. 11/8/2023 3:30:00 PM Jazlyn Miller MD   Lower Extremity Venous Ultrasound 1.The study quality is good. 2.History of bilateral ankle edema. History of fall July,2022. Hit  left knee and has a large lump on the anterior/lateral calf.3.Exam performed with the patient in reverse Trendelenburg and sitting positions. 4.Normal compressibility, augmentation, and phasic flow in the lower extremity venous systems bilaterally. Negative study for DVT.5.No evidence of deep venous reflux in the lower extremities bilaterally.6.Reflux noted in the GSV’s bilaterally.7.No reflux noted in the SSV’s bilaterally.8.Right GSV branches with reflux - distal thigh - 3.8 mm with 2.1 seconds of reflux and 3.2 mm with 1.7 seconds of reflux.9.Left mid calf GSV branch with reflux - 2.5 mm with 1.2 seconds of reflux.10.Probable hematoma visualized in the anterior/lateral left calf. No vascularization visualized. Approximately 8 x 4.3 x 2.0 cm. 9/27/2022 2:00:00 PM Haider Hanley MD   Ambulatory Telemetry Monitor 1.This is an excellent quality study. 2.Predominant rhythm is normal sinus rhythm. 3.The minimum heart rate recorded was 65 beats / minute. The maximum heart rate is 150 beats / minute. The mean heart rate is 88 beats / minute. 4.Rare premature atrial contractions noted. 5.Afib burden 41%6.Rare premature ventricular contractions noted. 7.No evidence of ventricular tachycardia is noted.8.No pauses were noted. 9/28/2022 11:30:00 AM Jamilah Martínez MD   Holter Monitoring 1.This is an excellent quality study. 2.Predominant rhythm is atrial fibrillation. 3.The minimum heart rate recorded was 41 beats / minute (3/10/2024). The maximum heart rate is 137 beats / minute (3/7/2024). The mean heart rate is 78 beats / minute. 4.No evidence of AV block is noted. 5.Afib [place] 100%6.No pauses were noted. 3/6/2024 10:30:00 AM Adria Magana MD     REVIEW OF SYSTEMS    REVIEW OF SYSTEMS  Header Details   Cardiovascular No history of Chest pain, LOVE, Palpitations, Syncope, PND, Orthopnea, Edema, Claudication     SOCIAL HISTORY    SOCIAL HISTORY  Social History Element Description Effective Dates   Smoking status Former smoker -      FUNCTIONAL STATUS    No data available    MEDICAL EQUIPMENT    No data available    Goals Sections    No data available    REASON FOR REFERRAL           Health Concerns Section    No data available    COGNITIVE/MENTAL STATUS    No data available    Patient Demographics    Patient Demographics  Patient Address Patient Name Communication   [address] [place] [address] Diana Eisenberg [phone] (Mobile)     Patient Demographics  Language Race / Ethnicity Marital Status   Unknown - Spoken White / Unknown      Document Information    Primary Care Provider Other Service Providers Document Coverage Dates   Adria Magana  NPI: [id number]  [phone] (Work)  [address] [place] [address] [id number] [place] [id number]  Interpreting [employer]  [facility]  [phone] (Work)  [address] [place] [id number] Trena Messina  NPI: [id number]  [phone] (Work)  [address] [place] [address]  [place] [address]  Nurses Oct. 23, 2024October 23, 2024      Organization   [facility]  [phone] (Work)  [address] [place] [id number]  [place] [address]     Encounter Providers Encounter Date    Oct. 23, 2024October 23, 2024       Legal Authenticator    Adria Magana  NPI: [id number]  [phone] (Work)  [address] [place] [id number]  [place] [address]

## 2024-12-30 ENCOUNTER — APPOINTMENT (OUTPATIENT)
Dept: CV DIAGNOSTICS | Facility: HOSPITAL | Age: 84
End: 2024-12-30
Attending: INTERNAL MEDICINE
Payer: MEDICARE

## 2024-12-30 LAB
ALBUMIN SERPL-MCNC: 4.1 G/DL (ref 3.2–4.8)
ALBUMIN/GLOB SERPL: 1.5 {RATIO} (ref 1–2)
ALP LIVER SERPL-CCNC: 76 U/L
ALT SERPL-CCNC: 11 U/L
ANION GAP SERPL CALC-SCNC: 9 MMOL/L (ref 0–18)
AST SERPL-CCNC: 22 U/L (ref ?–34)
BILIRUB SERPL-MCNC: 0.3 MG/DL (ref 0.2–1.1)
BUN BLD-MCNC: 32 MG/DL (ref 9–23)
CALCIUM BLD-MCNC: 9.1 MG/DL (ref 8.7–10.4)
CHLORIDE SERPL-SCNC: 107 MMOL/L (ref 98–112)
CO2 SERPL-SCNC: 25 MMOL/L (ref 21–32)
CREAT BLD-MCNC: 1.51 MG/DL
EGFRCR SERPLBLD CKD-EPI 2021: 34 ML/MIN/1.73M2 (ref 60–?)
ERYTHROCYTE [DISTWIDTH] IN BLOOD BY AUTOMATED COUNT: 13.7 %
GLOBULIN PLAS-MCNC: 2.7 G/DL (ref 2–3.5)
GLUCOSE BLD-MCNC: 129 MG/DL (ref 70–99)
GLUCOSE BLD-MCNC: 142 MG/DL (ref 70–99)
GLUCOSE BLD-MCNC: 156 MG/DL (ref 70–99)
GLUCOSE BLD-MCNC: 160 MG/DL (ref 70–99)
GLUCOSE BLD-MCNC: 188 MG/DL (ref 70–99)
HCT VFR BLD AUTO: 38.5 %
HGB BLD-MCNC: 12.9 G/DL
INR BLD: 1.92 (ref 0.8–1.2)
MAGNESIUM SERPL-MCNC: 2.5 MG/DL (ref 1.6–2.6)
MCH RBC QN AUTO: 30.9 PG (ref 26–34)
MCHC RBC AUTO-ENTMCNC: 33.5 G/DL (ref 31–37)
MCV RBC AUTO: 92.1 FL
OSMOLALITY SERPL CALC.SUM OF ELEC: 302 MOSM/KG (ref 275–295)
PLATELET # BLD AUTO: 184 10(3)UL (ref 150–450)
POTASSIUM SERPL-SCNC: 4.4 MMOL/L (ref 3.5–5.1)
POTASSIUM SERPL-SCNC: 4.4 MMOL/L (ref 3.5–5.1)
PROT SERPL-MCNC: 6.8 G/DL (ref 5.7–8.2)
PROTHROMBIN TIME: 22.1 SECONDS (ref 11.6–14.8)
Q-T INTERVAL: 344 MS
QRS DURATION: 76 MS
QTC CALCULATION (BEZET): 441 MS
R AXIS: -7 DEGREES
RBC # BLD AUTO: 4.18 X10(6)UL
SODIUM SERPL-SCNC: 141 MMOL/L (ref 136–145)
T AXIS: 33 DEGREES
T3FREE SERPL-MCNC: 1.78 PG/ML (ref 2.4–4.2)
T4 FREE SERPL-MCNC: 1.5 NG/DL (ref 0.8–1.7)
TSI SER-ACNC: 0.41 UIU/ML (ref 0.55–4.78)
VENTRICULAR RATE: 99 BPM
WBC # BLD AUTO: 9.7 X10(3) UL (ref 4–11)

## 2024-12-30 PROCEDURE — 99233 SBSQ HOSP IP/OBS HIGH 50: CPT | Performed by: EMERGENCY MEDICINE

## 2024-12-30 PROCEDURE — 93306 TTE W/DOPPLER COMPLETE: CPT | Performed by: INTERNAL MEDICINE

## 2024-12-30 PROCEDURE — 99233 SBSQ HOSP IP/OBS HIGH 50: CPT | Performed by: HOSPITALIST

## 2024-12-30 RX ORDER — HALOPERIDOL 5 MG/ML
2.5 INJECTION INTRAMUSCULAR ONCE
Status: COMPLETED | OUTPATIENT
Start: 2024-12-30 | End: 2024-12-30

## 2024-12-30 RX ORDER — WARFARIN SODIUM 2 MG/1
2 TABLET ORAL
Status: COMPLETED | OUTPATIENT
Start: 2024-12-30 | End: 2024-12-30

## 2024-12-30 RX ORDER — HALOPERIDOL 5 MG/ML
INJECTION INTRAMUSCULAR
Status: COMPLETED
Start: 2024-12-30 | End: 2024-12-30

## 2024-12-30 RX ORDER — IPRATROPIUM BROMIDE AND ALBUTEROL SULFATE 2.5; .5 MG/3ML; MG/3ML
3 SOLUTION RESPIRATORY (INHALATION)
Status: DISCONTINUED | OUTPATIENT
Start: 2024-12-30 | End: 2025-01-04

## 2024-12-30 RX ORDER — FUROSEMIDE 10 MG/ML
40 INJECTION INTRAMUSCULAR; INTRAVENOUS ONCE
Status: COMPLETED | OUTPATIENT
Start: 2024-12-30 | End: 2024-12-30

## 2024-12-30 NOTE — OCCUPATIONAL THERAPY NOTE
OCCUPATIONAL THERAPY EVALUATION - INPATIENT     Room Number: 457/457-A  Evaluation Date: 12/30/2024  Type of Evaluation: Initial  Presenting Problem: AF with RVR    Physician Order: IP Consult to Occupational Therapy  Reason for Therapy: ADL/IADL Dysfunction and Discharge Planning    OCCUPATIONAL THERAPY ASSESSMENT   Patient is currently functioning near baseline with toileting, lower body dressing, bed mobility, transfers, static standing balance, dynamic standing balance, and functional standing tolerance. Prior to admission, patient's baseline is modified independent with self care and mobility, assisted with driving and errands, manages her own medications.  Patient is requiring minimum assistance as a result of the following impairments: impaired standing balance, cognitive deficits (decreased short term memory), and decreased insight to deficits. Occupational Therapy will continue to follow for duration of hospitalization.    Patient will benefit from continued skilled OT Services at discharge to promote prior level of function.  Anticipate patient will return home with home health OT    Recommend that patient have assistance with medications and finances.  Patient will likely benefit from first alert or smartwatch with fall detection.    History Related to Current Admission: Patient is a 84 year old female admitted on 12/29/2024 with Presenting Problem: AF with RVR. Co-Morbidities : CAD, pulmonary HTN, macular degeneration, AF, HFpEF    WEIGHT BEARING RESTRICTION       Recommendations for nursing staff:   Transfers: 1 person , RW  Toileting location: bathroom     EVALUATION SESSION:  Patient Start of Session: supine    FUNCTIONAL TRANSFER ASSESSMENT  Sit to Stand: Edge of Bed; Chair  Edge of Bed: Supervision  Chair: Supervision  Toilet Transfer: Supervision    BED MOBILITY  Sit to Supine (OT): Supervision    BALANCE ASSESSMENT     FUNCTIONAL ADL ASSESSMENT  LB Dressing Seated: Minimal Assist  Toileting  Seated: Modified Independent  Toileting Standing: Supervision    ACTIVITY TOLERANCE: stable on 2L                         O2 SATURATIONS  Oxygen Therapy  SPO2% on Room Air at Rest: 96  At rest oxygen flow (liters per minute): 2  SPO2% Ambulation on Oxygen: 92  Ambulation oxygen flow (liters per minute): 2    COGNITION  Arousal/Alertness:  appropriate responses to stimuli  Attention Span:  appears intact  Orientation Level:  oriented to time, oriented to situation, oriented to person, and thought that she was at her apartment building  Memory:  decreased recall to use RW/decreased STM  Following Commands:  follows one step commands without difficulty  Initiation: appears intact  Motor Planning: intact  Awareness of Errors:  decreased awareness of errors     Upper Extremity   ROM: within functional limits   Strength: within functional limits   Coordination  Gross motor: wfl  Fine motor: wfl  Sensation: denied deficits    EDUCATION PROVIDED  Patient Education : Role of Occupational Therapy; Plan of Care; Discharge Recommendations; Functional Transfer Techniques; Fall Prevention  Patient's Response to Education: Verbalized Understanding; Requires Further Education    Equipment used: rw, gait belt  Demonstrates functional use, Would benefit from additional trial yes     Therapist comments:   Patient observed exiting bathroom , forgot to use RW . RW provided. Patient ambulated to edge of bed with CGA. OT explained role. Patient transferred to chair with CGA and RW. She was able to doff socks and son slip on shoes with supervision. Functional mobility completed with CGA and RW for simulated household distances with CGA. Patient O2 stable on 2L. Left in supine (RN request due to forgetful/confused ) with alarm on , needs met. RN aware of session    Patient End of Session: All patient questions and concerns addressed;Hospital anti-slip socks;RN aware of session/findings;Needs met;Call light within reach;In bed;Alarm set;SCDs  in place    OCCUPATIONAL PROFILE    HOME SITUATION  Type of Home: Independent living facility ([facility])  Home Layout: One level;Other (Comment);Elevator (elevator access, one level apartment)  Lives With: Alone    Toilet and Equipment: Standard height toilet          Occupation/Status: retired     Drives: No  Patient Regularly Uses: Glasses;Home O2;Rollator    Prior Level of Function:   Lives alone in ILF , uses rollator. Has assistance with driving, shopping, errands.       SUBJECTIVE   Pleasant, cooperative    PAIN ASSESSMENT  Ratin          OBJECTIVE  Precautions: Bed/chair alarm  Fall Risk: High fall risk    ASSESSMENTS    AM-PAC ‘6-Clicks’ Inpatient Daily Activity Short Form  -   Putting on and taking off regular lower body clothing?: A Little  -   Bathing (including washing, rinsing, drying)?: A Little  -   Toileting, which includes using toilet, bedpan or urinal? : A Little  -   Putting on and taking off regular upper body clothing?: A Little  -   Taking care of personal grooming such as brushing teeth?: None  -   Eating meals?: None    AM-PAC Score:  Score: 20  Approx Degree of Impairment: 38.32%  Standardized Score (AM-PAC Scale): 42.03    ADDITIONAL TESTS     NEUROLOGICAL FINDINGS      COGNITION ASSESSMENTS     PLAN  OT Device Recommendations: Other (Comment) (smartwatch with fall detection versus first alert)  OT Treatment Plan: Cognitive reorientation;Endurance training;UE strengthening/ROM;Functional transfer training;Energy conservation/work simplification techniques;ADL training;Patient/Family training;Equipment eval/education;Compensatory technique education;Patient/Family education  Rehab Potential : Good  Frequency: 3-5x/week  Number of Visits to Meet Established Goals: 4    ADL Goals   Patient will perform grooming: with supervision and while standing at sink  Patient will perform lower body dressing:  with supervision  Patient will perform toileting: with supervision    Functional  Transfer Goals  Patient will transfer to toilet:  with supervision    UE Exercise Program Goal  Patient will be supervision with bilateral AROM HEP (home exercise program).    Therapist Goals  Short Blessed Test    Patient Evaluation Complexity Level:   Occupational Profile/Medical History LOW - Brief history including review of medical or therapy records    Specific performance deficits impacting engagement in ADL/IADL LOW  1 - 3 performance deficits    Client Assessment/Performance Deficits MODERATE - Comorbidities and min to mod modifications of tasks    Clinical Decision Making MODERATE - Analysis of occupational profile, detailed assessments, several treatment options    Overall Complexity LOW     OT Session Time: 23 minutes  Self-Care Home Management: 7 minutes  Therapeutic Activity: 10 minutes

## 2024-12-30 NOTE — DIETARY NOTE
[facility]   part of [facility]   CLINICAL NUTRITION    Diana Eisenberg admitted on 12/29 presents with COPD exacerbation.    PMH: A-fib with RVR, COPD, Coronary atherosclerosis, Esophageal reflux, Essential hypertension, Hyperlipidemia, Hypothyroidism    Admitting diagnosis:  Hypokalemia [E87.6]  COPD exacerbation (HCC) [J44.1]  Atrial fibrillation with RVR (HCC) [I48.91]    Ht: 147.3 cm (4' 10\")  Wt: 71.1 kg (156 lb 12 oz).   Body mass index is 32.76 kg/m².    Wt Readings from Last 6 Encounters:   12/30/24 71.1 kg (156 lb 12 oz)   09/11/24 73.5 kg (162 lb)   04/10/24 74.8 kg (165 lb)   11/15/23 76.7 kg (169 lb)   09/08/23 78 kg (172 lb)   08/06/23 78 kg (172 lb)        Labs/Meds reviewed    Diet:       Procedures    Cardiac diet Cardiac; Is Patient on Accuchecks? No       Percent Meals Eaten (last 3 days)       Date/Time Percent Meals Eaten (%)    12/30/24 0900 100 %            Pt chart reviewed d/t MST score 2. Visited pt at bedside with family member present. Pt reports her appetite is fair and usually eats 2 meals/day + snacks. Denies GI symptoms at this time with last BM today. No chewing or swallowing difficulties and NKFA. She reports her wt has slowly been declining over the past 4 years since moving into her current apartment. She reports her wt in 2020 was ~181 lbs and currently weighs 156 lbs. Offered ONS but pt declined at this time. All questions answered at this time.    Patient is at low nutrition risk at this time.    Please consult if patient status changes or nutrition issues arise.    Tigist Rees, RD, LDN, [facility]  Clinical Dietitian  Spectra: [phone]

## 2024-12-30 NOTE — CM/SW NOTE
12/30/24 1100   CM/SW Referral Data   Referral Source    Reason for Referral Discharge planning   Informant Patient;Daughter   Patient Info   Patient's Home Environment Condo/Apt with elevator   Number of Levels in Home 1   Patient Status Prior to Admission   Independent with ADLs and Mobility Yes      Met with  pt and daughter at bedside to discuss discharge planning. Pt lives [facility] at [facility]. Pt has CG on Wednesdays that takes her grocery shopping. Pt has 3 daughters and a grandson that help pt when needed. Pt is iADLS, using a cane or walker as needed.   [facility] Pt requesting a portable tank. Referral sent to [facility] requesting a portable tank. Per daughter pt may have been offered this in the past and declined. Pt does go out without oxygen but dtr does notice that she get SOB.     14:00  No response on Aidin.  Called Jian who states pt was previously set up with nocturnal oxygen. Pt will need oxygen walk 24 hours prior to discharge to qualify for portable tank.      Manuel Vernon, FEDE RN, CM  [phone]

## 2024-12-30 NOTE — PAYOR COMM NOTE
--------------  ADMISSION REVIEW     Payor: [employer] AD[employer] PPO  Subscriber #:  [id number]  Authorization Number: [id number]    Admit date: 12/29/24  Admit time: 12:33 PM       REVIEW DOCUMENTATION:     ED Provider Notes        ED Provider Notes signed by Fredy Avila MD at 12/29/2024  4:16 PM       Author: Fredy Avila MD Service: -- Author Type: Physician    Filed: 12/29/2024  4:16 PM Date of Service: 12/29/2024  9:24 AM Status: Signed    : Fredy Avila MD (Physician)           Patient Seen in: [facility] Emergency Department      History     Chief Complaint   Patient presents with    Difficulty Breathing     Stated Complaint: CHELSIE since friday    Subjective:   HPI    This is a 84-year-old woman history of COPD not on home O2, history of atrial fibrillation minimal improvement with albuterol nebulizer treatments at home.  Denies any fevers, denies any chest pain any leg swelling, any other complaints or concerns.    Objective:     Past Medical History:    A-fib (HCC)    Arrhythmia    Atherosclerosis of coronary artery    Atrial fibrillation with rapid ventricular response (HCC)    Back pain    Back problem    COPD (chronic obstructive pulmonary disease) (HCC)    Coronary atherosclerosis    Disorder of thyroid    Esophageal reflux    Essential hypertension    Hearing impairment    uses hearing aide    High blood pressure    High cholesterol    Hyperlipidemia    Hypothyroidism    Leg hematoma, right, subsequent encounter    Osteoarthritis    Visual impairment              Past Surgical History:   Procedure Laterality Date    Carotid endarterectomy Right 2015    Colonoscopy      Hernia surgery      Other surgical history      lower right leg, skin graph    Other surgical history      cataract surgery                Social History     Socioeconomic History    Marital status:    Tobacco Use    Smoking status: Former     Current packs/day: 0.00     Types: Cigarettes     Quit date: 5/5/1992      Years since quittin.6    Smokeless tobacco: Never   Vaping Use    Vaping status: Never Used   Substance and Sexual Activity    Alcohol use: Not Currently    Drug use: No     Social Drivers of Health     Financial Resource Strain: Low Risk  (2023)    Financial Resource Strain     Difficulty of Paying Living Expenses: Not very hard     Med Affordability: No   Food Insecurity: No Food Insecurity (2024)    Food Insecurity     Food Insecurity: Never true   Transportation Needs: No Transportation Needs (2024)    Transportation Needs     Lack of Transportation: No   Physical Activity: Insufficiently Active (2023)    Exercise Vital Sign     Days of Exercise per Week: 7 days     Minutes of Exercise per Session: 20 min   Stress: No Stress Concern Present (2023)    Stress     Feeling of Stress : No   Social Connections: Socially Integrated (2023)    Social Connections     Frequency of Socialization with Friends and Family: 3   Housing Stability: Low Risk  (2024)    Housing Stability     Housing Instability: No                  Physical Exam     ED Triage Vitals [24 0626]   /61   Pulse 102   Resp 26   Temp 99.1 °F (37.3 °C)   Temp src Oral   SpO2 97 %   O2 Device None (Room air)       Current Vitals:   Vital Signs  BP: 109/42  Pulse: 116  Resp: 24  Temp: 98.6 °F (37 °C)  Temp src: Temporal  MAP (mmHg): (!) 60    Oxygen Therapy  SpO2: 98 %  O2 Device: Nasal cannula  Mode: Spontaneous/Timed  O2 Flow Rate (L/min): 2 L/min  Pulse Oximetry Type: Continuous  Oximetry Probe Site Changed: No  Pulse Ox Probe Location: Left hand        Physical Exam      Physical Exam  Vitals signs and nursing note reviewed.   General:  Patient laying supine in the bed in no acute distress  Head: Normocephalic and atraumatic.   HEENT:  Mucous membranes are moist.   Cardiovascular:  Normal rate and regular rhythm.  No Edema  Pulmonary: Pulmonary effort is increased, there is diffuse expiratory  wheezing bilaterally, diminished air entry bilaterally.  Productive cough noted.  Abdominal: Soft nontender nondistended, normal bowel sounds, no guarding no rebound tenderness  Skin: Warm and dry  Neurological: Awake alert, speech is normal        ED Course     Labs Reviewed   CBC WITH DIFFERENTIAL WITH PLATELET - Abnormal; Notable for the following components:       Result Value    Monocyte Absolute 1.35 (*)     All other components within normal limits   COMP METABOLIC PANEL (14) - Abnormal; Notable for the following components:    Glucose 119 (*)     BUN 32 (*)     Creatinine 1.77 (*)     Calculated Osmolality 304 (*)     eGFR-Cr 28 (*)     All other components within normal limits   ABG PANEL W ELECT AND LACTATE - Abnormal; Notable for the following components:    ABG pO2 62 (*)     Arterial Blood Gas O2Hb 90.8 (*)     Potassium Blood Gas 3.2 (*)     All other components within normal limits   PROTHROMBIN TIME (PT) - Abnormal; Notable for the following components:    PT 21.5 (*)     INR 1.84 (*)     All other components within normal limits   PRO BETA NATRIURETIC PEPTIDE - Abnormal; Notable for the following components:    Pro-Beta Natriuretic Peptide 5,176 (*)     All other components within normal limits   PROCALCITONIN - Abnormal; Notable for the following components:    Procalcitonin 0.10 (*)     All other components within normal limits   CBC WITH DIFFERENTIAL WITH PLATELET - Abnormal; Notable for the following components:    Lymphocyte Absolute 0.37 (*)     All other components within normal limits   PRO BETA NATRIURETIC PEPTIDE - Abnormal; Notable for the following components:    Pro-Beta Natriuretic Peptide 6,317 (*)     All other components within normal limits   MAGNESIUM - Normal   TROPONIN I HIGH SENSITIVITY - Normal   TROPONIN I HIGH SENSITIVITY - Normal   SARS-COV-2/FLU A AND B/RSV BY PCR (GENEXPERT) - Normal    Narrative:     This test is intended for the qualitative detection and differentiation  of SARS-CoV-2, influenza A, influenza B, and respiratory syncytial virus (RSV) viral RNA in nasopharyngeal or nares swabs from individuals suspected of respiratory viral infection consistent with COVID-19 by their healthcare provider. Signs and symptoms of respiratory viral infection due to SARS-CoV-2, influenza, and RSV can be similar.    Test performed using the Xpert Xpress SARS-CoV-2/FLU/RSV (real time RT-PCR)  assay on the GeneXpert instrument, [employer], [place] [address].   This test is being used under the Food and Drug Administration's Emergency Use Authorization.    The authorized Fact Sheet for Healthcare Providers for this assay is available upon request from the laboratory.   RESPIRATORY FLU EXPAND PANEL + COVID-19 - Normal    Narrative:     This test is intended for the simultaneous qualitative detection and differentiation of nucleic acids from multiple viral and bacterial respiratory organisms, including nucleic acid from Severe Acute Respiratory Syndrome Coronavirus 2 (SARS-CoV-2) in nasopharyngeal swab from individuals suspected of respiratory viral infection consistent with COVID-19 by their healthcare provider.    Test performed using the [place] Respiratory Panel 2.1 (RP2.1) assay on the [employer]Sim 2.0 System, [employer], [facility], [place] [address].    This test is being used under the Food and Drug Administration's Emergency Use Authorization.    The authorized Fact Sheet for Healthcare Providers for this assay is available upon request from the laboratory.    SARS and MERS coronaviruses are not tested on this assay.   ED/MRSA SCREEN BY PCR-CC - Normal   COMP METABOLIC PANEL (14)   PTT, ACTIVATED   RAINBOW DRAW LAVENDER   RAINBOW DRAW LIGHT GREEN   RAINBOW DRAW BLUE     EKG    Rate, intervals and axes as noted on EKG Report.  Rate: 99  Rhythm: Atrial Fibrillation  Reading: No acute ischemic changes                XR CHEST AP PORTABLE  (CPT=71045)    Result Date:  12/29/2024  PROCEDURE:  XR CHEST AP PORTABLE  (CPT=71045)  TECHNIQUE:  AP chest radiograph was obtained.  COMPARISON:  [facility] , XR, XR CHEST PA + LAT CHEST (CPT=71046), 11/14/2022, 2:08 PM.  INDICATIONS:  CHELSIE since friday  PATIENT STATED HISTORY: (As transcribed by Technologist)  Patient states shortness of breath with central anterior chest tightness x2 days. Patient receiving breathing treatment during xray. HxCOPD per pt.               CONCLUSION:  Stable cardiac and mediastinal contours.  Discoid atelectasis or scar of the left lung base.  The lungs and pleural spaces are otherwise clear.  No acute osseous findings.   LOCATION:  [facility]      Dictated by (CST): Zander Hanley MD on 12/29/2024 at 7:38 AM     Finalized by (CST): Zander Hanley MD on 12/29/2024 at 7:39 AM            MDM      84-year-old woman history of COPD here for evaluation of worsening cough congestion.  Differential includes pneumonia viral syndrome COPD exacerbation.  Atrial fibrillation RVR noted, progressively worsened with albuterol treatments.  Patient ultimately started on diltiazem bolus and drip given that heart rate was in the 150s and 160s.  Some improvement after hour-long nebulizer treatment, however patient still with increased work of breathing, low O2 saturation on ABG with PaO2 of 60.  Chest x-ray shows no focal infiltrate patient will likely be admitted for further monitoring evaluation, high flow oxygen started decreased work of breathing, patient admitted for further treatment evaluation.  Discussed with hospitalist, as well as intensivist patient was switched to BiPAP, atrial fibrillation somewhat uncontrolled given patient's nebulizer treatments that she is receiving, she is currently on a diltiazem drip with poor control of heart rate, case discussed with intensivist will be likely switch to amnio upstairs in the ICU.      Case endorsed to Ron hospitalist who agrees with plan for admission.    Admission disposition:  12/29/2024 10:05 AM         I independently viewed and interpreted the following imaging: Chest x-ray without acute infiltrate    Total critical care time: Approximately 50 minutes  Due to a high probability of clinically significant, life threatening deterioration, the patient required my highest level of preparedness to intervene emergently and I personally spent this critical care time directly and personally managing the patient. This critical care time included obtaining a history; examining the patient; pulse oximetry; ordering and review of studies; arranging urgent treatment with development of a management plan; evaluation of patient's response to treatment; frequent reassessment; and, discussions with other providers.  This critical care time was performed to assess and manage the high probability of imminent, life-threatening deterioration that could result in multi-organ failure. It was exclusive of separately billable procedures and treating other patients and teaching time.  Medical Decision Making      Disposition and Plan     Clinical Impression:  1. Atrial fibrillation with RVR (HCC)    2. COPD exacerbation (HCC)    3. Hypokalemia    4. Acute respiratory failure with hypoxia (HCC)         Disposition:  Admit  12/29/2024 10:05 am    Follow-up:  No follow-up provider specified.        Medications Prescribed:  Current Discharge Medication List              Supplementary Documentation:         Hospital Problems       Present on Admission  Date Reviewed: 9/11/2024            ICD-10-CM Noted POA    * (Principal) Atrial fibrillation with RVR (HCC) I48.91 12/29/2024 Unknown    Acute hypoxemic respiratory failure (HCC) J96.01 12/29/2024 Unknown    COPD exacerbation (HCC) J44.1 12/29/2024 Unknown    Hypokalemia E87.6 12/29/2024 Yes    Metabolic alkalosis E87.3 12/29/2024 Yes              Signed by Fredy Avila MD on 12/29/2024  4:16 PM         MEDICATIONS ADMINISTERED IN LAST 1 DAY:  acetaminophen (Tylenol  Extra Strength) tab 1,000 mg       Date Action Dose Route User    12/29/2024 1857 Given 1,000 mg Oral Joselito Elder RN          aspirin DR tab 81 mg       Date Action Dose Route User    12/30/2024 0755 Given 81 mg Oral John Peacock RN          atorvastatin (Lipitor) tab 10 mg       Date Action Dose Route User    12/29/2024 1956 Given 10 mg Oral Sandra Marcelino RN          benzocaine-menthol (Cepacol) lozenge 1 lozenge       Date Action Dose Route User    12/30/2024 0630 Given 1 lozenge Oral Sandra Marcelino RN          dilTIAZem (cardIZEM) 100 mg in sodium chloride 0.9% 100 mL IVPB-ADDV       Date Action Dose Route User    12/29/2024 1630 New Bag 5 mg/hr Intravenous Joselito Elder RN          dilTIAZem (cardIZEM) tab 30 mg       Date Action Dose Route User    12/30/2024 1352 Given 30 mg Oral John Peacock RN    12/30/2024 0630 Given 30 mg Oral Sandra Marcelino RN    12/29/2024 2348 Given 30 mg Oral Sandra Marcelino RN          fluticasone-salmeterol (Advair Diskus) 500-50 MCG/ACT inhaler 1 puff       Date Action Dose Route User    12/30/2024 0757 Given 1 puff Inhalation John Peacock RN    12/29/2024 2005 Given 1 puff Inhalation Sandra Marcelino RN    12/29/2024 1553 Given 1 puff Inhalation Prabhu Bonilla, RCP          furosemide (Lasix) 10 mg/mL injection 40 mg       Date Action Dose Route User    12/29/2024 1453 Given 40 mg Intravenous Joselito Elder RN          furosemide (Lasix) 10 mg/mL injection 40 mg       Date Action Dose Route User    12/30/2024 0755 Given 40 mg Intravenous John Peacock RN          furosemide (Lasix) tab 40 mg       Date Action Dose Route User    12/30/2024 1021 Given 40 mg Oral John Peacock RN          guaiFENesin ER (Mucinex) 12 hr tab 600 mg       Date Action Dose Route User    12/30/2024 0755 Given 600 mg Oral Kamaryt, John, RN          insulin aspart (NovoLOG) 100 Units/mL FlexPen 2-10 Units       Date Action Dose Route User    12/30/2024 0756 Given 2 Units Subcutaneous  (Right Upper Arm) John Peacock RN    12/30/2024 0022 Given 2 Units Subcutaneous (Left Lower Abdomen) Patria Patel RN          ipratropium-albuterol (Duoneb) 0.5-2.5 (3) MG/3ML inhalation solution 3 mL       Date Action Dose Route User    12/29/2024 2349 Given 3 mL Nebulization Sorto, Delmy    12/29/2024 1923 Given 3 mL Nebulization Sorto, Delmy    12/29/2024 1538 Given 3 mL Nebulization Prabhu Bonilla RCP          ipratropium-albuterol (Duoneb) 0.5-2.5 (3) MG/3ML inhalation solution 3 mL       Date Action Dose Route User    12/30/2024 1330 Given 3 mL Nebulization SoalexeygIbeth RCP    12/30/2024 0823 Given 3 mL Nebulization SoalexeygIbeth RCP          levothyroxine (Synthroid) tab 88 mcg       Date Action Dose Route User    12/30/2024 0630 Given 88 mcg Oral Sandra Marcelino RN          methylPREDNISolone sodium succinate (Solu-MEDROL) injection 40 mg       Date Action Dose Route User    12/29/2024 2348 Given 40 mg Intravenous Sandra Marcelino RN          metoprolol succinate ER (Toprol XL) 24 hr tab 100 mg       Date Action Dose Route User    12/30/2024 0522 Given 100 mg Oral Sandra Marcelino RN    12/29/2024 1634 Given 100 mg Oral GranJoselito alba RN          potassium chloride (Klor-Con) 20 MEQ oral powder 40 mEq       Date Action Dose Route User    12/29/2024 1956 Given 40 mEq Oral Sandra Marcelino RN          umeclidinium bromide (Incruse Ellipta) 62.5 MCG/ACT inhaler 1 puff       Date Action Dose Route User    12/30/2024 0755 Given 1 puff Inhalation John Peacock RN    12/29/2024 1548 Given 1 puff Inhalation Prabhu Bonilla RCP            Vitals (last day)       Date/Time Temp Pulse Resp BP SpO2 Weight O2 Device O2 Flow Rate (L/min) Who    12/30/24 1330 -- 95 26 -- 96 % -- Nasal cannula 1 L/min JS    12/30/24 1200 98.2 °F (36.8 °C) 94 19 124/78 95 % -- Nasal cannula 1 L/min     12/30/24 0900 -- 87 22 112/44 100 % -- Nasal cannula 1 L/min     12/30/24 0823 -- 86 24 -- 93 % -- High flow nasal cannula 1 L/min JS     24 0800 97.3 °F (36.3 °C) 86 15 122/70 91 % -- Nasal cannula 1 L/min JK    24 0700 -- 87 18 125/96 97 % -- -- -- JK    24 0500 98 °F (36.7 °C) 118 21 143/52 93 % -- Nasal cannula 1 L/min CT    24 0400 -- 109 24 141/97 96 % 156 lb 12 oz (71.1 kg) -- -- CT    24 0200 -- 94 18 123/41 95 % -- -- -- CT    24 0100 -- 99 19 136/55 95 % -- -- -- CT    24 2354 97.8 °F (36.6 °C) 106 21 132/73 95 % -- Nasal cannula 1 L/min CT    24 2315 -- 100 19 139/89 96 % -- -- -- CT    24 220 -- 79 20 133/47 95 % -- -- -- CT    24 -- 84 25 121/56 93 % -- -- -- CT    24 1944 97.9 °F (36.6 °C) 90 24 -- 95 % -- Nasal cannula 1 L/min CT    24 1800 -- 90 22 110/66 97 % -- -- -- NG    24 1700 -- 89 18 124/56 97 % -- -- -- NG    24 1600 98.5 °F (36.9 °C) 101 23 122/85 97 % -- Nasal cannula 2 L/min NG    24 1500 -- 99 20 111/44 96 % -- -- -- NG    24 1400 -- 116 24 109/42 98 % -- -- -- NG    24 1315 -- -- -- -- 98 % -- Nasal cannula 2 L/min AD    24 1315 -- 99 15 143/55 -- -- -- -- NG    24 1300 98.6 °F (37 °C) 120 23 120/56 97 % 155 lb 6.8 oz (70.5 kg) Nasal cannula 4 L/min NG    24 1237 98.6 °F (37 °C) 121 23 136/101 94 % -- Nasal cannula 4 L/min KB    24 1215 -- 106 20 139/69 100 % -- Bi-PAP --     24 1200 -- 149 19 127/99 94 % -- -- --     24 1045 -- 155 30 90/74 94 % -- -- --     24 0930 -- 145 23 112/82 100 % -- Aerosol mask --     24 08 -- 133 27 140/124 91 % -- -- --     24 0745 -- 126 26 135/45 100 % -- None (Room air) --     24 07 -- 97 25 -- 100 % -- Aerosol mask 15 L/min     24 0629 -- -- -- -- -- -- None (Room air) --     24 0626 99.1 °F (37.3 °C) 102 26 121/61 97 % 115 lb (52.2 kg) None (Room air) --           2024 H&P  [facility]  History and Physical            Diana Eisenberg Patient Status:  Emergency     1/30/1940 MRN [id number]   Location [facility] EMERGENCY DEPARTMENT Attending Fredy Avila MD   Hosp Day # 0 PCP SHERIF SÁNCHEZ MD      Chief Complaint: Dyspnea        Subjective:  History of Present Illness:      Diana Eisenberg is a 84 year old female with CAD, essential hypertension, dyslipidemia, atrial fibrillation on Coumadin, chronic diastolic dysfunction, chronic hypoxic respiratory failure (2L as needed and 2L at night), chronic kidney disease, hypothyroidism and GERD who presents with shortness of breath. Patient states she has had URI symptoms for a month. She began to have a productive cough on Friday. Symptoms progressed and patient with difficulty breathing this morning prompting ER evaluation. She has chest pain, worse with coughing. No fever. Admits to nausea, no vomiting.         History/Other:  Past Medical History:  Past Medical History       Past Medical History:    A-fib (HCC)    Arrhythmia    Atherosclerosis of coronary artery    Atrial fibrillation with rapid ventricular response (HCC)    Back pain    Back problem    COPD (chronic obstructive pulmonary disease) (HCC)    Coronary atherosclerosis    Disorder of thyroid    Esophageal reflux    Essential hypertension    Hearing impairment     uses hearing aide    High blood pressure    High cholesterol    Hyperlipidemia    Hypothyroidism    Leg hematoma, right, subsequent encounter    Osteoarthritis    Visual impairment        Past Surgical History:   Past Surgical History         Past Surgical History:   Procedure Laterality Date    Carotid endarterectomy Right 2015    Colonoscopy        Hernia surgery        Other surgical history         lower right leg, skin graph    Other surgical history         cataract surgery         Family History:   Family History         Family History   Problem Relation Age of Onset    Heart Disorder Father      Hypertension Mother      Heart Disorder Sister      Cancer Brother           lung cancer         Social History:    reports that she quit smoking about 32 years ago. She has never used smokeless tobacco. She reports that she does not currently use alcohol. She reports that she does not use drugs.      Allergies: [Allergies]    [Allergies]       Allergen Reactions    Hydrocodone HIVES    Cephalexin RASH        Medications:  [Medications Ordered Prior to Encounter]    [Medications Ordered Prior to Encounter]  No current facility-administered medications on file prior to encounter.             Current Outpatient Medications on File Prior to Encounter   Medication Sig Dispense Refill    albuterol 108 (90 Base) MCG/ACT Inhalation Aero Soln Inhale 2 puffs into the lungs every 6 (six) hours as needed for Wheezing or Shortness of Breath. 54 g 1    pravastatin 40 MG Oral Tab Take 1 tablet (40 mg total) by mouth nightly. 90 tablet 1    MECLIZINE 25 MG Oral Tab Take 1 tablet (25 mg total) by mouth 3 (three) times daily as needed for Dizziness. 30 tablet 2    alendronate 35 MG Oral Tab Take 1 tablet (35 mg total) by mouth every 7 days. 12 tablet 3    clobetasol 0.05 % External Cream Apply 1 Application topically 2 (two) times daily as needed. 30 g 2    levothyroxine 88 MCG Oral Tab Take 1 tablet (88 mcg total) by mouth daily. 90 tablet 3    furosemide 40 MG Oral Tab Take 1 tablet (40 mg total) by mouth daily. 90 tablet 3    tiZANidine 2 MG Oral Tab Take 1 tablet (2 mg total) by mouth every 8 (eight) hours as needed (muscle spasm or back pain). Do not drive while taking 60 tablet 0    dilTIAZem  MG Oral Capsule SR 24 Hr Take 1 capsule (240 mg total) by mouth daily.        ondansetron 4 MG Oral Tablet Dispersible Take 1 tablet (4 mg total) by mouth every 8 (eight) hours as needed for Nausea. 30 tablet 0    famotidine 20 MG Oral Tab Take 1 tablet (20 mg total) by mouth 2 (two) times daily as needed (acid stomach). 60 tablet 1    fluticasone-umeclidin-vilant (TRELEGY ELLIPTA) 200-62.5-25 MCG/ACT Inhalation Aerosol  Powder, Breath Activated Inhale 1 puff into the lungs daily. 90 each 1    aspirin 81 MG Oral Tab EC Take 1 tablet (81 mg total) by mouth daily.        warfarin 4 MG Oral Tab Take 1 tablet (4 mg total) by mouth See Admin Instructions. On Monday, Tuesday, Wednesday, Friday and Sunday.        furosemide 20 MG Oral Tab Take 1 tablet (20 mg total) by mouth daily as needed (In addition to 40 mg daily).        Multiple Vitamins-Minerals (ICAPS AREDS 2 OR) Take 1 tablet by mouth 2 (two) times daily.        warfarin 4 MG Oral Tab Take 1.5 tablets (6 mg total) by mouth See Admin Instructions. On Thursday and Saturday.        metoprolol succinate  MG Oral Tablet 24 Hr Take 1 tablet (100 mg total) by mouth 2 (two) times daily.        Multiple Vitamins-Minerals (COMPLETE DAILY/LUTEIN) Oral Tab Take 1 tablet by mouth daily.          Calcium Carb-Cholecalciferol (CALCIUM 600+D3 OR) Take 1 tablet by mouth daily.          Omega-3 Fatty Acids (FISH OIL) 1200 MG Oral Capsule Delayed Release Take 1 capsule by mouth daily.              Review of Systems:   A comprehensive review of systems was completed.    Pertinent positives and negatives noted in the HPI.        Objective:  Physical Exam:    /82   Pulse (!) 145   Temp 99.1 °F (37.3 °C) (Oral)   Resp 23   Ht 4' 10\" (1.473 m)   Wt 115 lb (52.2 kg)   SpO2 100%   BMI 24.04 kg/m²   General: No acute distress, Alert  Respiratory: Diffuse wheeze bilaterally   Cardiovascular: S1, S2. Irregular, tachycardic  Abdomen: Soft, Non-tender, non-distended, positive bowel sounds  Neuro: No new focal deficits  Extremities: Trace edema        Results:  Labs:        Labs Last 24 Hours:         Recent Labs   Lab 12/29/24  0634   RBC 4.78   HGB 14.6   HCT 45.1   MCV 94.4   MCH 30.5   MCHC 32.4   RDW 13.8   NEPRELIM 5.17   WBC 9.9   .0             Recent Labs   Lab 12/29/24  0634   *   BUN 32*   CREATSERUM 1.77*   EGFRCR 28*   CA 9.1   ALB 4.6      K 3.6       CO2 29.0   ALKPHO 88   AST 24   ALT 13   BILT 0.5   TP 7.4               Lab Results   Component Value Date     PT 19.5 (H) 2019     PT 39.6 (H) 01/15/2019     PT 26.1 (H) 2018     INR 1.84 (H) 2024     INR 2.44 (H) 2023     INR 4.27 (H) 2023         No results for input(s): \"TROP\", \"TROPHS\", \"CK\" in the last 168 hours.     No results for input(s): \"TROP\", \"PBNP\" in the last 168 hours.     No results for input(s): \"PCT\" in the last 168 hours.     Imaging: Imaging data reviewed in Epic.        Assessment & Plan:  Acute on chronic hypoxic respiratory failure (2L at needed and 2L at night)  #COPD exacerbation, CXR neg  -Admit  -IV steroids  -BD  -Nebs  -Oxygen: Vapotherm  -Check PCT, RBP  -Pulmonary & CCM consult     #Atrial fibrillation on Coumadin with RVR  -Toprol   -Cardizem  -Cardizem drip  -Coumadin  -Monitor INR  -Monitor hemodynamics  -Telemetry  -Cardiology consult      #CAD  #Essential hypertension  #Dyslipidemia   -Aspirin  -Statin  -Check troponin      #Chronic diastolic heart failure   -Lasix  -Daily weight     #Chronic kidney disease  -Monitor UOP, creatinine and electrolytes     #Hypothyroidism  -Synthroid     #GERD  -PPI     #DVT Px: Coumadin     #Code Status DNAR/select, patient does not wish to be intubated      Plan of care discussed with patient, family, ER, cardiology and pulmonary teams.     Ronak Hood MD       2024 Critical Care Consult   Critical Care APRN Progress Note     NAME: Diana Eisenberg - ROOM: [address] [place] [address] - MRN: [id number] - Age: 84 year old - :1940     History Of Present Illness:  Diana Eisenberg is a 84 year old female with PMHx significant for afib on coumadin, CKD, CAD, COPD on home O2 2L, hypothyroid, HTN, HLD, and GERD presents to ER with a 2 day history of progressive weakness and shortness of breath.  Patient notes that her work of breathing has worsened and her activity tolerance has decreased.  Patient denies any sick  contacts.  ER work up reveals afib with RVR in which a cardizem drip was started, COPD exacerbation with bipap placed.  Patient to be admitted to ICU for HR control and close hemodynamic monitoring.     PMH:  Past Medical History       Past Medical History:    A-fib (HCC)    Arrhythmia    Atherosclerosis of coronary artery    Atrial fibrillation with rapid ventricular response (HCC)    Back pain    Back problem    COPD (chronic obstructive pulmonary disease) (HCC)    Coronary atherosclerosis    Disorder of thyroid    Esophageal reflux    Essential hypertension    Hearing impairment     uses hearing aide    High blood pressure    High cholesterol    Hyperlipidemia    Hypothyroidism    Leg hematoma, right, subsequent encounter    Osteoarthritis    Visual impairment            Social Hx:  Short Social Hx on File   Social History            Socioeconomic History    Marital status:    Tobacco Use    Smoking status: Former       Current packs/day: 0.00       Types: Cigarettes       Quit date: 1992       Years since quittin.6    Smokeless tobacco: Never   Vaping Use    Vaping status: Never Used   Substance and Sexual Activity    Alcohol use: Not Currently    Drug use: No      Social Drivers of Health           Financial Resource Strain: Low Risk  (2023)     Financial Resource Strain      Difficulty of Paying Living Expenses: Not very hard      Med Affordability: No   Food Insecurity: No Food Insecurity (2023)     Food Insecurity      Food Insecurity: Never true   Transportation Needs: No Transportation Needs (2023)     Transportation Needs      Lack of Transportation: No   Physical Activity: Insufficiently Active (2023)     Exercise Vital Sign      Days of Exercise per Week: 7 days      Minutes of Exercise per Session: 20 min   Stress: No Stress Concern Present (2023)     Stress      Feeling of Stress : No   Social Connections: Socially Integrated (2023)     Social Connections       Frequency of Socialization with Friends and Family: 3   Housing Stability: Low Risk  (8/17/2023)     Housing Stability      Housing Instability: No            Family Hx:  Family History         Family History   Problem Relation Age of Onset    Heart Disorder Father      Hypertension Mother      Heart Disorder Sister      Cancer Brother           lung cancer               Review of Systems:   A comprehensive 10 point review of systems was completed.  Pertinent positives and negatives noted in the HPI.     OBJECTIVE  Vitals:  /56 (BP Location: Left arm)   Pulse 120   Temp 98.6 °F (37 °C) (Temporal)   Resp 23   Ht 147.3 cm (4' 10\")   Wt 155 lb 6.8 oz (70.5 kg)   SpO2 98%   BMI 32.48 kg/m²                 Physical Exam:    General Appearance: Alert, cooperative, mild distress, appears stated age  Neck: No JVD, neck supple, no adenopathy, trachea midline, no carotid bruits  Lungs: Course with wheezing to auscultation bilaterally, respirations slightly labored  Heart: Regular rate and rhythm, S1 and S2 normal, no murmur, rub or gallop  Abdomen: Soft, non-tender, bowel sounds active all four quadrants, no masses, no organomegaly  Extremities: Extremities normal, atraumatic, no cyanosis,capillary refill <3 sec.  +1 edema to BLE  Pulses: 2+ and symmetric all extremities  Skin: Skin color, texture, turgor normal for ethnicity, no rashes or lesions, warm and dry  Neurologic: CNII-XII intact, normal strength     Data this admission:  XR CHEST AP PORTABLE  (CPT=71045)     Result Date: 12/29/2024  CONCLUSION:  Stable cardiac and mediastinal contours.  Discoid atelectasis or scar of the left lung base.  The lungs and pleural spaces are otherwise clear.  No acute osseous findings.   LOCATION:  [facility]      Dictated by (CST): Zander Hanley MD on 12/29/2024 at 7:38 AM     Finalized by (CST): Zander Hanley MD on 12/29/2024 at 7:39 AM           Labs:        Lab Results   Component Value Date     WBC 9.9 12/29/2024     HGB  14.6 12/29/2024     HCT 45.1 12/29/2024     .0 12/29/2024     CREATSERUM 1.77 12/29/2024     BUN 32 12/29/2024      12/29/2024     K 3.6 12/29/2024      12/29/2024     CO2 29.0 12/29/2024      12/29/2024     CA 9.1 12/29/2024     ALB 4.6 12/29/2024     ALKPHO 88 12/29/2024     BILT 0.5 12/29/2024     TP 7.4 12/29/2024     AST 24 12/29/2024     ALT 13 12/29/2024     INR 1.84 12/29/2024     MG 2.0 12/29/2024         Assessment/Plan:     Acute on chronic respiratory failure  -On Bipap, wean as able for SpO2 >89%  -Continue IV steroids  -Duonebs q4H  -Continue home medications  -Pro-dana 0.1  -RVP negative  -[facility] following     Afib with RVR on coumadin  -Cardizem drip. May need to change to amiodarone  -Continue coumadin  -Continue home medications  -Cardiology consulted     HTN  CAD  HLD  -continue home medications     HF  -daily weight  -continue home lasix     CKD  -avoid nephrotoxins  -Monitor UOP  -trend labs     GERD  -PPI     F/E/N  -cardiac diet  -replete electrolytes as needed     Proph  -coumadin  -SCD     Dispo  -DNAR select- confirmed with patient and family  -ICU for close monitoring and risk of deterioration        Plan of care discussed with intensivist on-call, Dr. Bailon.     PERLITA Bahena-BC  Critical Care NP  Phone [phone]        A total of 45 minutes of critical care time (exclusive of billable procedures) was administered. This involved direct patient intervention, complex decision making, and/or extensive discussions with the patient, family, and clinical staff.        ICU addenda     I agree with the APC history and plan and have independently evaluated and examined this patient.     Pateint admitted with COPD, Afib with rvr. Plan to control afib with amiodarone and not dilt. Will go ahead and manage COPD with nebs and steroids and CAP abx.Send pct, at this time as well.     DOS 12/29/24 12/30/2024 Hospitalist Progress Note   [facility]   part  of [facility]     Hospitalist Progress Note            Diana Eisenberg Patient Status:  Inpatient    1940 MRN [id number]   Location [facility] 4SW-A Attending Ronak Hood MD   Hosp Day # 1 PCP SHERIF SÁNCHEZ MD      Chief Complaint: COPD exacerbation         Subjective:  Patient doing better. Down to 1L. Breathing better. Up in chair.      Current medications:  Scheduled Medications    furosemide  40 mg Intravenous Once    ipratropium-albuterol  3 mL Nebulization 4 times per day    aspirin  81 mg Oral Daily    furosemide  40 mg Oral Daily    levothyroxine  88 mcg Oral Before breakfast    atorvastatin  10 mg Oral Nightly    guaiFENesin ER  600 mg Oral BID    fluticasone-salmeterol  1 puff Inhalation BID     And    umeclidinium bromide  1 puff Inhalation Daily    metoprolol succinate ER  100 mg Oral 2x Daily(Beta Blocker)    dilTIAZem  30 mg Oral 4 times per day    insulin aspart  2-10 Units Subcutaneous TID AC and HS    methylPREDNISolone  40 mg Intravenous Q12H               Objective:  Review of Systems:   10 point ROS completed and was negative, except for pertinent positive and negatives stated in subjective.     Vital signs:  Temp:  [97.8 °F (36.6 °C)-98.6 °F (37 °C)] 98 °F (36.7 °C)  Pulse:  [] 118  Resp:  [15-30] 21  BP: ()/() 143/52  SpO2:  [91 %-100 %] 93 %  Patient Weight for the past 72 hrs:    Weight   24 0626 115 lb (52.2 kg)   24 1300 155 lb 6.8 oz (70.5 kg)   24 0400 156 lb 12 oz (71.1 kg)      Physical Exam:    General: No acute distress.   Respiratory: Diminished breath sounds on right   Cardiovascular: S1, S2.  Abdomen: Soft, nontender, nondistended.  Positive bowel sounds.  Extremities: No edema.  Neuro: AAOx3     Diagnostic Data:    Labs:        Recent Labs   Lab 24  0634 24  1523 24  0438   WBC 9.9 7.7 9.7   HGB 14.6 13.3 12.9   MCV 94.4 92.8 92.1   .0 193.0 184.0   INR 1.84*  --  1.92*               Recent Labs    Lab 12/29/24  0634 12/29/24  1523 12/30/24  0438   * 244* 156*   BUN 32* 32* 32*   CREATSERUM 1.77* 1.84* 1.51*   CA 9.1 8.7 9.1   ALB 4.6 4.2 4.1    138 141   K 3.6 3.8 4.4  4.4    103 107   CO2 29.0 24.0 25.0   ALKPHO 88 73 76   AST 24 20 22   ALT 13 11 11   BILT 0.5 0.3 0.3   TP 7.4 7.0 6.8         Estimated Creatinine Clearance: 30.6 mL/min (A) (based on SCr of 1.51 mg/dL (H)).          Recent Labs   Lab 12/29/24  0634 12/30/24  0438   PTP 21.5* 22.1*   INR 1.84* 1.92*            COVID-19 Lab Results     COVID-19        Lab Results   Component Value Date     COVID19 Not Detected 12/29/2024     COVID19 Not Detected 12/29/2024     COVID19 Not Detected 11/14/2022         Pro-Calcitonin      Recent Labs   Lab 12/29/24  0634   PCT 0.10*         Cardiac      Recent Labs   Lab 12/29/24  1523   PBNP 6,317*         Creatinine Kinase  No results for input(s): \"CK\" in the last 168 hours.     Inflammatory Markers  No results for input(s): \"CRP\", \"JEVON\", \"LDH\", \"DDIMER\" in the last 168 hours.          Recent Labs   Lab 12/29/24  0634 12/29/24  1523   TROPHS 9 22         Imaging: Imaging data reviewed in Epic.     Medications:   Scheduled Medications    furosemide  40 mg Intravenous Once    ipratropium-albuterol  3 mL Nebulization 4 times per day    aspirin  81 mg Oral Daily    furosemide  40 mg Oral Daily    levothyroxine  88 mcg Oral Before breakfast    atorvastatin  10 mg Oral Nightly    guaiFENesin ER  600 mg Oral BID    fluticasone-salmeterol  1 puff Inhalation BID     And    umeclidinium bromide  1 puff Inhalation Daily    metoprolol succinate ER  100 mg Oral 2x Daily(Beta Blocker)    dilTIAZem  30 mg Oral 4 times per day    insulin aspart  2-10 Units Subcutaneous TID AC and HS    methylPREDNISolone  40 mg Intravenous Q12H               Assessment & Plan:  #Acute on chronic hypoxic respiratory failure (2L at needed and 2L at night)  #COPD exacerbation, CXR neg, RVP neg, PCT 0.1  -IV  steroids  -BD  -Nebs  -Antitussuves   -Oxygen, currently 1L  -Pulmonary & CCM consult  -SW consult - patient wants portable tanks at home      #Atrial fibrillation on Coumadin with RVR  -Toprol   -Cardizem - change to extended?   -Coumadin  -Monitor INR  -Monitor hemodynamics  -Telemetry  -Cardiology consult      #CAD, trop neg  #Essential hypertension  #Dyslipidemia   -Aspirin  -Statin     #Chronic diastolic heart failure   -Lasix PO daily, additional dose IV today   -Daily weight     #Chronic kidney disease  -Monitor UOP, creatinine and electrolytes     #Hypothyroidism  -Synthroid     #GERD  -PPI     #Prediabetes A1c 6.1  #Cerebrovascular disease     #DVT Px: Coumadin     At this point Ms. Eisenberg is expected to be discharge to: Home     Plan of care discussed with patient and CCM team.      Ronak Hood MD

## 2024-12-30 NOTE — PHYSICAL THERAPY NOTE
PHYSICAL THERAPY EVALUATION - INPATIENT     Room Number: 457/457-A  Evaluation Date: 12/30/2024  Type of Evaluation: Initial  Physician Order: PT Eval and Treat    Presenting Problem: A fib w/ RVR, metabolic acidosis, COPD exacerbation  Co-Morbidities : CAD, pulmonary HTN, macular degeneration, AF, HFpEF  Reason for Therapy: Mobility Dysfunction and Discharge Planning    PHYSICAL THERAPY ASSESSMENT   Patient is a 84 year old female admitted 12/29/2024 for a fib w/ rvr, copd exacerbation.  Prior to admission, patient's baseline is mod I for gait w/ rolling walker and adl's, has assist w/ cleaning/errands.  Patient is currently functioning near baseline with bed mobility, transfers, and gait.  Patient is requiring supervision as a result of the following impairments: decreased functional strength, decreased endurance/aerobic capacity, impaired standing balance, decreased muscular endurance, and cognitive deficits (confusion during session and A&Ox1).  Physical Therapy will continue to follow for duration of hospitalization.    Patient will benefit from continued skilled PT Services at discharge to promote prior level of function and safety with additional support and return home with home health PT.    PLAN DURING HOSPITALIZATION  Nursing Mobility Recommendation : 1 Assist  PT Device Recommendation: Rolling walker  PT Treatment Plan: Energy conservation;Endurance;Patient education;Gait training;Strengthening;Transfer training;Balance training  Rehab Potential : Good  Frequency (Obs): 3-5x/week     CURRENT GOALS    Goal #1 Patient is able to demonstrate supine - sit EOB @ level: modified independent     Goal #2 Patient is able to demonstrate transfers EOB to/from BSC at assistance level: modified independent     Goal #3 Patient is able to ambulate 300 feet with assist device: walker - rolling at assistance level: supervision     Goal #4    Goal #5    Goal #6    Goal Comments: Goals established on  2024      PHYSICAL THERAPY MEDICAL/SOCIAL HISTORY  History related to current admission: Patient is a 84 year old female admitted on 2024 from home for worsening cough/congestion.  Pt diagnosed with a fib w/ rvr, metabolic acidosis, COPD exacerbation.    HOME SITUATION  Type of Home: Independent living facility ([facility])  Home Layout: One level;Other (Comment);Elevator (elevator access, one level apartment)  Stairs to Enter : 0        Stairs to Bedroom: 0         Lives With: Alone    Drives: No   Patient Regularly Uses: Glasses;Home O2;Rollator      Prior Level of [place]: Pt lives at [facility] and reports that she is independent w/ basic adl's and gait w/ rw.  Has a caregiver that comes 1x a week to assist w/ errands and cleaning.    SUBJECTIVE  \"I see things other people don't see\" - note confusion throughout session, but able to describe her home situation well and w/ ease.    OBJECTIVE  Precautions: Bed/chair alarm  Fall Risk: High fall risk    WEIGHT BEARING RESTRICTION     PAIN ASSESSMENT  Ratin          COGNITION  Overall Cognitive Status:  Impaired  Attention Span:  attends with cues to redirect  Orientation Level:  oriented to person, disoriented to place, disoriented to time, and disoriented to situation  Memory:  impaired working memory and decreased recall of recent events  Following Commands:  follows one step commands without difficulty  Awareness of Deficits:  decreased awareness of deficits  Once pt was re-oriented was able to recall the new information    RANGE OF MOTION AND STRENGTH ASSESSMENT  Upper extremity ROM and strength -see OT evaluation    Lower extremity ROM is within functional limits     Lower extremity strength is within functional limits - grossly 4-/5    BALANCE  Static Sitting: Fair +  Dynamic Sitting: Fair +  Static Standing: Fair -  Dynamic Standing: Fair -    ADDITIONAL TESTS                                    ACTIVITY TOLERANCE  Pulse:  92  Heart Rate Source: Monitor  Resp: 22                O2 WALK  Oxygen Therapy  SPO2% on Oxygen at Rest: 99  At rest oxygen flow (liters per minute): 2  SPO2% Ambulation on Oxygen: 93  Ambulation oxygen flow (liters per minute): 2    NEUROLOGICAL FINDINGS                        AM-PAC '6-Clicks' INPATIENT SHORT FORM - BASIC MOBILITY  How much difficulty does the patient currently have...  Patient Difficulty: Turning over in bed (including adjusting bedclothes, sheets and blankets)?: None   Patient Difficulty: Sitting down on and standing up from a chair with arms (e.g., wheelchair, bedside commode, etc.): None   Patient Difficulty: Moving from lying on back to sitting on the side of the bed?: None   How much help from another person does the patient currently need...   Help from Another: Moving to and from a bed to a chair (including a wheelchair)?: A Little   Help from Another: Need to walk in hospital room?: A Little   Help from Another: Climbing 3-5 steps with a railing?: A Lot     AM-PAC Score:  Raw Score: 20   Approx Degree of Impairment: 35.83%   Standardized Score (AM-PAC Scale): 47.67   CMS Modifier (G-Code): CJ    FUNCTIONAL ABILITY STATUS  Gait Assessment   Functional Mobility/Gait Assessment  Gait Assistance: Supervision  Distance (ft): 225  Assistive Device: Rolling walker  Pattern: Within Functional Limits (One standing rest due to mild sob)    Skilled Therapy Provided     Bed Mobility:  Rolling: Right w/ hob flat - mod I  Supine to sit: NT   Sit to supine: Mod I     Transfer Mobility:  Sit to stand: Cues for proper hand placement - pt very impulsive during session.  Popping up several times in attempt to change socks and shoes.  Needs cues for safety- mod I   Stand to sit: Mod I  Gait = Pt completed gait 225'x1 w/ rw and supervision assist.  Steady pattern w/ rw, but note mild sob toward last 75' of gait.  Instructed pt to have one standing break to improve her breathing.  Pt did not desaturate, but  did agree that she was slightly sob.    Therapist's Comments: Had pt return to bed at end of session due to confusion and pt had been trying to get up on her own upon entering the room.    Exercise/Education Provided:  Bed mobility  Functional activity tolerated  Gait training  Transfer training    Patient End of Session: In bed;Needs met;Call light within reach;RN aware of session/findings;All patient questions and concerns addressed;Alarm set (Rn COOPER aware of eval session)      Patient Evaluation Complexity Level:  History Moderate - 1 or 2 personal factors and/or co-morbidities   Examination of body systems Moderate - addressing a total of 3 or more elements   Clinical Presentation  Moderate - Evolving   Clinical Decision Making Moderate Complexity       PT Session Time: 25 minutes  Gait Trainin minutes  Therapeutic Activity: 7 minutes  Neuromuscular Re-education: 0 minutes  Therapeutic Exercise: 0 minutes

## 2024-12-30 NOTE — PROGRESS NOTES
Erp at bedside    [facility]   part of [facility]     Hospitalist Progress Note     Diana Eisenberg Patient Status:  Inpatient    1940 MRN [id number]   Location [facility] 4SW-A Attending Ronak Hood MD   Hosp Day # 1 PCP SHERIF SÁNCHEZ MD     Chief Complaint: COPD exacerbation     Subjective:   Patient doing better. Down to 1L. Breathing better. Up in chair.     Current medications:   furosemide  40 mg Intravenous Once    ipratropium-albuterol  3 mL Nebulization 4 times per day    aspirin  81 mg Oral Daily    furosemide  40 mg Oral Daily    levothyroxine  88 mcg Oral Before breakfast    atorvastatin  10 mg Oral Nightly    guaiFENesin ER  600 mg Oral BID    fluticasone-salmeterol  1 puff Inhalation BID    And    umeclidinium bromide  1 puff Inhalation Daily    metoprolol succinate ER  100 mg Oral 2x Daily(Beta Blocker)    dilTIAZem  30 mg Oral 4 times per day    insulin aspart  2-10 Units Subcutaneous TID AC and HS    methylPREDNISolone  40 mg Intravenous Q12H       Objective:    Review of Systems:   10 point ROS completed and was negative, except for pertinent positive and negatives stated in subjective.    Vital signs:  Temp:  [97.8 °F (36.6 °C)-98.6 °F (37 °C)] 98 °F (36.7 °C)  Pulse:  [] 118  Resp:  [15-30] 21  BP: ()/() 143/52  SpO2:  [91 %-100 %] 93 %  Patient Weight for the past 72 hrs:   Weight   24 0626 115 lb (52.2 kg)   24 1300 155 lb 6.8 oz (70.5 kg)   24 0400 156 lb 12 oz (71.1 kg)     Physical Exam:    General: No acute distress.   Respiratory: Diminished breath sounds on right   Cardiovascular: S1, S2.  Abdomen: Soft, nontender, nondistended.  Positive bowel sounds.  Extremities: No edema.  Neuro: AAOx3    Diagnostic Data:    Labs:  Recent Labs   Lab 24  0634 24  1523 24  0438   WBC 9.9 7.7 9.7   HGB 14.6 13.3 12.9   MCV 94.4 92.8 92.1   .0 193.0 184.0   INR 1.84*  --  1.92*       Recent Labs   Lab 24  0634 24  1524  12/30/24  0438   * 244* 156*   BUN 32* 32* 32*   CREATSERUM 1.77* 1.84* 1.51*   CA 9.1 8.7 9.1   ALB 4.6 4.2 4.1    138 141   K 3.6 3.8 4.4  4.4    103 107   CO2 29.0 24.0 25.0   ALKPHO 88 73 76   AST 24 20 22   ALT 13 11 11   BILT 0.5 0.3 0.3   TP 7.4 7.0 6.8       Estimated Creatinine Clearance: 30.6 mL/min (A) (based on SCr of 1.51 mg/dL (H)).    Recent Labs   Lab 12/29/24  0634 12/30/24  0438   PTP 21.5* 22.1*   INR 1.84* 1.92*            COVID-19 Lab Results    COVID-19  Lab Results   Component Value Date    COVID19 Not Detected 12/29/2024    COVID19 Not Detected 12/29/2024    COVID19 Not Detected 11/14/2022       Pro-Calcitonin  Recent Labs   Lab 12/29/24  0634   PCT 0.10*       Cardiac  Recent Labs   Lab 12/29/24  1523   PBNP 6,317*       Creatinine Kinase  No results for input(s): \"CK\" in the last 168 hours.    Inflammatory Markers  No results for input(s): \"CRP\", \"JEVON\", \"LDH\", \"DDIMER\" in the last 168 hours.    Recent Labs   Lab 12/29/24  0634 12/29/24  1523   TROPHS 9 22       Imaging: Imaging data reviewed in Epic.    Medications:    furosemide  40 mg Intravenous Once    ipratropium-albuterol  3 mL Nebulization 4 times per day    aspirin  81 mg Oral Daily    furosemide  40 mg Oral Daily    levothyroxine  88 mcg Oral Before breakfast    atorvastatin  10 mg Oral Nightly    guaiFENesin ER  600 mg Oral BID    fluticasone-salmeterol  1 puff Inhalation BID    And    umeclidinium bromide  1 puff Inhalation Daily    metoprolol succinate ER  100 mg Oral 2x Daily(Beta Blocker)    dilTIAZem  30 mg Oral 4 times per day    insulin aspart  2-10 Units Subcutaneous TID AC and HS    methylPREDNISolone  40 mg Intravenous Q12H       Assessment & Plan:      #Acute on chronic hypoxic respiratory failure (2L at needed and 2L at night)  #COPD exacerbation, CXR neg, RVP neg, PCT 0.1  -IV steroids  -BD  -Nebs  -Antitussuves   -Oxygen, currently 1L  -Pulmonary & CCM consult  - consult - patient wants portable  tanks at home      #Atrial fibrillation on Coumadin with RVR  -Toprol   -Cardizem - change to extended?   -Coumadin  -Monitor INR  -Monitor hemodynamics  -Telemetry  -Cardiology consult      #CAD, trop neg  #Essential hypertension  #Dyslipidemia   -Aspirin  -Statin     #Chronic diastolic heart failure   -Lasix PO daily, additional dose IV today   -Daily weight     #Chronic kidney disease  -Monitor UOP, creatinine and electrolytes     #Hypothyroidism  -Synthroid     #GERD  -PPI    #Prediabetes A1c 6.1  #Cerebrovascular disease     #DVT Px: Coumadin    At this point Ms. Eisenberg is expected to be discharge to: Home    Plan of care discussed with patient and CCM team.     Ronak Hood MD        Supplementary Documentation:   DVT Mechanical Prophylaxis:   SCDs,    DVT Pharmacologic Prophylaxis   Medication   None         DVT Pharmacologic prophylaxis: Aspirin 81 mg      Code Status: DNAR/Selective Treatment  Srinivasan: No urinary catheter in place  Srinivasan Duration (in days):   Central line:    RICHAR:         **Certification      PHYSICIAN Certification of Need for Inpatient Hospitalization - Initial Certification    Patient will require inpatient services that will reasonably be expected to span two midnight's based on the clinical documentation in H+P.   Based on patients current state of illness, I anticipate that, after discharge, patient will require TBD.

## 2024-12-30 NOTE — PLAN OF CARE
A&Ox4. 1L NC. Up to chair this AM. Some dyspnea with exertion noted. Remains in afib, HR controlled. Bps WDL. Voiding in commode.

## 2024-12-30 NOTE — TELEPHONE ENCOUNTER
I checked with [facility] who is in network with pts insurance and they states Medicare will not pay for compression stockings at all, no wearable DME.       Dr Wisam Singh would like pt to have thigh high 20-30mmHg would be the best.  I called pt to inform her th Outreach attempt was made to schedule a Medicare Wellness Visit. This was the first attempt. Contact was made, MWV appointment refused.    Patient fell back into the call queue does not do wellness exam visits

## 2024-12-30 NOTE — PLAN OF CARE
Patient AO x 4, but is hallucinating, confused at times, and impulsive, poss r/t steroid regimen.  Critical Care MD made aware of Mental status changes in rounds.  Patient ambulating in room with walker, standby assist.  Worked with PT/OT ambulated halls.  Tolerating ambulation well.  Currently on 1L O2 via NC, maintaining SpO2>92%.  SBP > 120's.  A-fib on monitor, HR 80's - 110's.  On PO meds for rate control.  Transfer orders placed, remains stable for transfer at this time.  Family at bedside and updated on plan of care.

## 2024-12-30 NOTE — PROGRESS NOTES
[facility] Pharmacy Dosing Service  Warfarin (Coumadin) Subsequent Dosing      Diana Eisenberg is a 84 year old patient for whom pharmacy is dosing warfarin. Goal INR is 2-3    Recent Labs   Lab 12/29/24  0634 12/30/24  0438   INR 1.84* 1.92*       Consulted by:  ROSAURA Jacinto  Indication:  Hx Afib  Significant Drug Interactions:  ASA  Other Anticoagulants:  n/a  Home regimen (if applicable):  4mg daily reported      Inpatient Dosing History:    Date 12/29 12/30   INR 1.84 1.92   [facility]               A/P:  1.  The INR is slightly subtherapeutic.    2.  Ordered warfarin 2mg for tonight at 2100.  Will dose conservatively at this time.    3.  PT/INR ordered daily while on warfarin.      Pharmacy will continue to follow.  We appreciate the opportunity to assist in her care.      Nawaf Beckett, PharmD  12/30/2024  12:39 PM

## 2024-12-30 NOTE — PROGRESS NOTES
Cardiology Progress Note        Diana Eisenberg Patient Status:  Inpatient    1940 MRN [id number]   [facility] 4SW-A Attending Ronak Hood MD   Hosp Day # 1 PCP SHERIF SÁNCHEZ MD     Subjective:  Good night, on baseline O2  Rate controlled    Medications:   aspirin  81 mg Oral Daily    furosemide  40 mg Oral Daily    levothyroxine  88 mcg Oral Before breakfast    atorvastatin  10 mg Oral Nightly    ipratropium-albuterol  3 mL Nebulization 6 times per day    guaiFENesin ER  600 mg Oral BID    fluticasone-salmeterol  1 puff Inhalation BID    And    umeclidinium bromide  1 puff Inhalation Daily    metoprolol succinate ER  100 mg Oral 2x Daily(Beta Blocker)    dilTIAZem  30 mg Oral 4 times per day    insulin aspart  2-10 Units Subcutaneous TID AC and HS    methylPREDNISolone  40 mg Intravenous Q12H       Continuous Infusions:   dilTIAZem Stopped (24 1735)         Allergies:  Allergies[1]      Intake/Output:    Intake/Output Summary (Last 24 hours) at 2024 0656  Last data filed at 2024 0522  Gross per 24 hour   Intake 385 ml   Output 470 ml   Net -85 ml           Last 3 Weights   24 0400 156 lb 12 oz (71.1 kg)   24 1300 155 lb 6.8 oz (70.5 kg)   24 0626 115 lb (52.2 kg)   24 1029 162 lb (73.5 kg)   04/10/24 1036 165 lb (74.8 kg)            Physical Exam:    Temp:  [97.8 °F (36.6 °C)-98.6 °F (37 °C)] 98 °F (36.7 °C)  Pulse:  [] 118  Resp:  [15-30] 21  BP: ()/() 143/52  SpO2:  [91 %-100 %] 93 %    General: No apparent distress  HEENT: No focal deficits.  Neck: supple. JVP normal  Cardiac: irregular rhythm, S1, S2 normal,  rub or gallop.  No murmur.  Lungs: CTA  Abdomen: Soft, non-tender.   Extremities: No edema  Neurologic: no focal deficits  Skin: Warm and dry.     Telemetry: fib    EKG:      Echo:      Cardiac Cath:      Labs:  HEM:  Recent Labs   Lab 24  0634 24  1523 24  0438   WBC 9.9 7.7 9.7   HGB 14.6 13.3  12.9   .0 193.0 184.0       Chem:  Recent Labs   Lab 12/29/24  0634 12/29/24  1523 12/30/24  0438    138 141   K 3.6 3.8 4.4  4.4    103 107   CO2 29.0 24.0 25.0   BUN 32* 32* 32*   CREATSERUM 1.77* 1.84* 1.51*   CA 9.1 8.7 9.1   MG 2.0  --  2.5   * 244* 156*       Recent Labs   Lab 12/29/24  0634 12/29/24  1523 12/30/24  0438   ALT 13 11 11   AST 24 20 22   ALB 4.6 4.2 4.1       No results for input(s): \"TROP\", \"CK\" in the last 168 hours.    Recent Labs   Lab 12/29/24  0634 12/30/24  0438   PTP 21.5* 22.1*   INR 1.84* 1.92*                 Impression:  Acute hypoxic resp failure - due to COPD exacerbation and some heart failure from rapid afib.  Much improved.  Chronic afib - rate control had been fair.  Tachy in setting of acute illness, now improved on po meds.  On warfarin.  Underlying COPD, on home O2.  Former long time smoker.  Prior TIA with CEA  H/o HTN  CRI - stable          Plan:  Another dose of IV lasix.  Ok for the floor from my standpoint.  Same warfarin and rate control.        Steve Porter MD  12/30/2024  6:56 AM  L3         [1]   Allergies  Allergen Reactions    Hydrocodone HIVES    Cephalexin RASH

## 2024-12-30 NOTE — PROGRESS NOTES
Diana Eisenberg Patient Status:  Inpatient    1940 MRN [id number]   Location [facility] 4SW-A Attending Ronak Hood MD   Hosp Day # 1 PCP SHERIF SÁNCHEZ MD     Critical Care Progress Note          Subjective:  No acute events overnight, up in chair today on her baseline of 2L/NC.    Objective:    Medications:   furosemide  40 mg Intravenous Once    aspirin  81 mg Oral Daily    furosemide  40 mg Oral Daily    levothyroxine  88 mcg Oral Before breakfast    atorvastatin  10 mg Oral Nightly    ipratropium-albuterol  3 mL Nebulization 6 times per day    guaiFENesin ER  600 mg Oral BID    fluticasone-salmeterol  1 puff Inhalation BID    And    umeclidinium bromide  1 puff Inhalation Daily    metoprolol succinate ER  100 mg Oral 2x Daily(Beta Blocker)    dilTIAZem  30 mg Oral 4 times per day    insulin aspart  2-10 Units Subcutaneous TID AC and HS    methylPREDNISolone  40 mg Intravenous Q12H              Intake/Output Summary (Last 24 hours) at 2024 0726  Last data filed at 2024 0522  Gross per 24 hour   Intake 385 ml   Output 470 ml   Net -85 ml       /52 (BP Location: Left arm)   Pulse 118   Temp 98 °F (36.7 °C) (Temporal)   Resp 21   Ht 147.3 cm (4' 10\")   Wt 156 lb 12 oz (71.1 kg)   SpO2 93%   BMI 32.76 kg/m²     Physical Exam:   General Appearance: Alert, cooperative, no distress, appears stated age  Neck: No JVD, neck supple, no adenopathy, trachea midline  Lungs: Clear to auscultation bilaterally, respirations unlabored  Heart: Regular rate and rhythm, S1 and S2 normal, no murmur, rub or gallop  Abdomen: Soft, non-tender, bowel sounds active all four quadrants, no masses, no organomegaly  Extremities: Extremities normal, atraumatic, no cyanosis or edema,capillary refill <3 sec.    Pulses: 2+ and symmetric all extremities  Skin: Skin color, texture, turgor normal for ethnicity, no rashes or lesions, warm and dry      Recent Labs   Lab 24  1523 24  0438   RBC  4.32 4.18   HGB 13.3 12.9   HCT 40.1 38.5   MCV 92.8 92.1   MCH 30.8 30.9   MCHC 33.2 33.5   RDW 13.6 13.7   NEPRELIM 7.21  --    WBC 7.7 9.7   .0 184.0     Recent Labs   Lab 12/29/24  0634 12/29/24  1523 12/30/24  0438   * 244* 156*   BUN 32* 32* 32*   CREATSERUM 1.77* 1.84* 1.51*   CA 9.1 8.7 9.1   ALB 4.6 4.2 4.1    138 141   K 3.6 3.8 4.4  4.4    103 107   CO2 29.0 24.0 25.0   ALKPHO 88 73 76   AST 24 20 22   ALT 13 11 11   BILT 0.5 0.3 0.3   TP 7.4 7.0 6.8     Recent Labs   Lab 12/29/24  0850   ABGPHT 7.38   KSYBNS2F 41   MMKZN1X 62*   ABGHCO3 24.1   ABGBE -0.8   TEMP 98.6   ALEX Positive   SITE Right Radial   DEV Room Air   THGB 13.2     No results for input(s): \"BNP\" in the last 168 hours.  No results for input(s): \"TROP\", \"CK\" in the last 168 hours.    No results found.       Assessment/Plan:    Acute on chronic hypoxic respiratory failure likely 2/2 to COPD exacerbation  COPD  pHTN (PASP 76mm Hg) - repeat TTE pending  - Home baseline o2, 2L/NC. Currently on 2L/NC.  - Chest xray without acute pulmonary process  - ProBNP markedly elevated (6,317)  - MRSA nares negative  - RVP negative  - On Bipap, wean as able for SpO2 >89%  - Continue home lasix  (40mg daily)  - Continue IV steroids (solumedrol 40mg q12h)- will switch to PO prednisone taper  - Duonebs q4H- will decrease to q6h today  - Pro-dana 0.1  - [facility] following     Afib with RVR on coumadin  - Cardizem drip weaned off- transitioned to PO cardizem  - Continue coumadin- pharmacy to dose. INR 1.92.  - Continue home medications  - TTE pending  - Cardiology consulted     HTN  CAD  HLD  - ASA  - Statin  - Toprol XL  - Troponin  - Cardioogy consulted     HFpEF  - TTE 11/10/2022 LVEF 60-65%, no rwma, G2DD, PASP 76mm Hg. Repeat TTE pending  - daily weights  - continue home lasix  - Cardiology consulted     CKD  - Cr 1.51 today  - avoid nephrotoxins  - Monitor UOP  - Daily BMP- 470cc urine output x 24 hours.     GERD  -  PPI    Hypothyroid  - Check TSH  - PTA synthroid    Hyperglycemia- likely steroid induced  - A1c 6.6  - Accuchecks  - SSI- adjust with steroid weaning     F/E/N  - cardiac diet  - replete electrolytes as needed     Proph  - coumadin- pharmacy to dose  - SCDs  - PT/OT     Dispo  - DNAR select  - Stable to transfer to general medicine floor, discusssed with hospitalist, Dr. Hood at bedside.    Plan of care discussed with intensivist, Dr. Bailon.      Josephine Connell [facility]  Critical Care  [id number]      ICU Attending Alexey    I agree with the APC plan and have indepdently evaluated and examined this pateint.    Essentially patient admitted with COPD and afib, both improved today. Slight confusion will decrease steroids to prednisone 50 mg daily total days 5, not a lot of wheezing on exam.   Remains on nebs can be spaced out    For afib can continue warfarin would use reduce dose given amio use.  Can convert to santino dilt plus metop if desired by cards, or can be given amio but need to be mindful of D-D interactions. Warfarin pharmacy to dose would use lower dose than 5 mg.    Jack Bailon    Critical Care    DOS 12/30/24

## 2024-12-30 NOTE — CONSULTS
Pulmonary H&P/Consult     NAME: Diana Eisenberg - ROOM: [address] [place] [address]-A - MRN: [id number] - Age: 84 year old - :  1940    Date of Admission: 2024  6:21 AM  Admission Diagnosis: Hypokalemia [E87.6]  COPD exacerbation (HCC) [J44.1]  Atrial fibrillation with RVR (HCC) [I48.91]    Assessment/Plan:  Shortness of breath  AECOPD - baseline FEV1 0.87L (57%) DLCO 56%  -prednisone  -LAMA / LABA / ICS chronically  -on chronic O2  -nebs prn  Afib with RVR  -per cardiology  Pulmonary HTN - baseline PAH 40-45  -Echo pending  Chronic hypoxic resp failure due to COPD and Pulm HTN  -baseline O2 is 2 LPM - now at 1 LPM  Dispo - DNAR  -seems better with rate control  -OK to floor.  Hopefully home soon with steroid taper    Discussed with ICU team  Thank you for allowing me to participate in the care of this patient.   Please call with any questions.    Hector Estrada M.D.  Pulmonary and Critical Care Medicine  [facility]    HPI:  Diana Eisenberg is a 84 year old former-smoker with COPD and pulm HTN presents with acute dyspnea.  Was found to be in Afib with RVR.  Feeling better this am.  Viral panel negative.  CXR clear    Past Medical History:    A-fib (HCC)    Arrhythmia    Atherosclerosis of coronary artery    Atrial fibrillation with rapid ventricular response (HCC)    Back pain    Back problem    COPD (chronic obstructive pulmonary disease) (HCC)    Coronary atherosclerosis    Disorder of thyroid    Esophageal reflux    Essential hypertension    Hearing impairment    uses hearing aide    High blood pressure    High cholesterol    Hyperlipidemia    Hypothyroidism    Leg hematoma, right, subsequent encounter    Osteoarthritis    Visual impairment     Past Surgical History:   Procedure Laterality Date    Carotid endarterectomy Right 2015    Colonoscopy      Hernia surgery      Other surgical history      lower right leg, skin graph    Other surgical history      cataract surgery     Family History   Problem  Relation Age of Onset    Heart Disorder Father     Hypertension Mother     Heart Disorder Sister     Cancer Brother         lung cancer     Social History     Tobacco Use    Smoking status: Former     Current packs/day: 0.00     Types: Cigarettes     Quit date: 1992     Years since quittin.6    Smokeless tobacco: Never   Substance Use Topics    Alcohol use: Not Currently     Medications:   ipratropium-albuterol  3 mL Nebulization 4 times per day    [START ON 2024] predniSONE  50 mg Oral Daily with breakfast    aspirin  81 mg Oral Daily    furosemide  40 mg Oral Daily    levothyroxine  88 mcg Oral Before breakfast    atorvastatin  10 mg Oral Nightly    guaiFENesin ER  600 mg Oral BID    fluticasone-salmeterol  1 puff Inhalation BID    And    umeclidinium bromide  1 puff Inhalation Daily    metoprolol succinate ER  100 mg Oral 2x Daily(Beta Blocker)    dilTIAZem  30 mg Oral 4 times per day    insulin aspart  2-10 Units Subcutaneous TID AC and HS       ALLERGIES: Allergies[1]    REVIEW OF SYSTEMS: 12 point review of system negative except per HPI    OBJECTIVE:  Intake/Output Summary (Last 24 hours) at 2024 1133  Last data filed at 2024 0900  Gross per 24 hour   Intake 585 ml   Output 570 ml   Net 15 ml     /44 (BP Location: Left arm)   Pulse 87   Temp 97.3 °F (36.3 °C) (Temporal)   Resp 22   Ht 4' 10\" (1.473 m)   Wt 156 lb 12 oz (71.1 kg)   SpO2 100%   BMI 32.76 kg/m²   Physical Exam:   General: comfortable   HEENT: Atraumatic, Lips, mucosa, and tongue normal.  No thrush noted.   Neck: supple without mass   Lungs: clear but diminished   Chest wall: No tenderness or deformity.   Heart: Rirregular   Abdomen: soft, non-tender, non-distended, positive BS.   Extremity: no edema   Skin: no new rash   Neuro: alert    LABS/STUDIES: Reviewed in EPIC  Recent Labs   Lab 24  1523 24  0438   RBC 4.32 4.18   HGB 13.3 12.9   HCT 40.1 38.5   MCV 92.8 92.1   MCH 30.8 30.9   MCHC 33.2  33.5   RDW 13.6 13.7   NEPRELIM 7.21  --    WBC 7.7 9.7   .0 184.0     Recent Labs   Lab 12/29/24  0634 12/29/24  1523 12/30/24  0438   * 244* 156*   BUN 32* 32* 32*   CREATSERUM 1.77* 1.84* 1.51*   CA 9.1 8.7 9.1   ALB 4.6 4.2 4.1    138 141   K 3.6 3.8 4.4  4.4    103 107   CO2 29.0 24.0 25.0   ALKPHO 88 73 76   AST 24 20 22   ALT 13 11 11   BILT 0.5 0.3 0.3   TP 7.4 7.0 6.8     No results for input(s): \"BNP\" in the last 168 hours.  No results for input(s): \"TROP\", \"CK\" in the last 168 hours.    Imaging: I independently visualized all relevant chest imaging in PACS, agree with radiology interpretation except where noted.       [1]   Allergies  Allergen Reactions    Hydrocodone HIVES    Cephalexin RASH

## 2024-12-31 ENCOUNTER — APPOINTMENT (OUTPATIENT)
Dept: CT IMAGING | Facility: HOSPITAL | Age: 84
End: 2024-12-31
Attending: HOSPITALIST
Payer: MEDICARE

## 2024-12-31 LAB
ANION GAP SERPL CALC-SCNC: 8 MMOL/L (ref 0–18)
ATRIAL RATE: 122 BPM
BILIRUB UR QL STRIP.AUTO: NEGATIVE
BUN BLD-MCNC: 42 MG/DL (ref 9–23)
CALCIUM BLD-MCNC: 9.4 MG/DL (ref 8.7–10.4)
CHLORIDE SERPL-SCNC: 108 MMOL/L (ref 98–112)
CLARITY UR REFRACT.AUTO: CLEAR
CO2 SERPL-SCNC: 27 MMOL/L (ref 21–32)
COLOR UR AUTO: COLORLESS
CREAT BLD-MCNC: 1.53 MG/DL
EGFRCR SERPLBLD CKD-EPI 2021: 33 ML/MIN/1.73M2 (ref 60–?)
GLUCOSE BLD-MCNC: 134 MG/DL (ref 70–99)
GLUCOSE BLD-MCNC: 135 MG/DL (ref 70–99)
GLUCOSE BLD-MCNC: 135 MG/DL (ref 70–99)
GLUCOSE BLD-MCNC: 159 MG/DL (ref 70–99)
GLUCOSE BLD-MCNC: 161 MG/DL (ref 70–99)
GLUCOSE UR STRIP.AUTO-MCNC: NORMAL MG/DL
INR BLD: 1.99 (ref 0.8–1.2)
KETONES UR STRIP.AUTO-MCNC: NEGATIVE MG/DL
LEUKOCYTE ESTERASE UR QL STRIP.AUTO: NEGATIVE
MAGNESIUM SERPL-MCNC: 2.5 MG/DL (ref 1.6–2.6)
NITRITE UR QL STRIP.AUTO: NEGATIVE
OSMOLALITY SERPL CALC.SUM OF ELEC: 308 MOSM/KG (ref 275–295)
PH UR STRIP.AUTO: 5 [PH] (ref 5–8)
POTASSIUM SERPL-SCNC: 5 MMOL/L (ref 3.5–5.1)
PROT UR STRIP.AUTO-MCNC: NEGATIVE MG/DL
PROTHROMBIN TIME: 22.8 SECONDS (ref 11.6–14.8)
Q-T INTERVAL: 340 MS
QRS DURATION: 72 MS
QTC CALCULATION (BEZET): 466 MS
R AXIS: 3 DEGREES
RBC UR QL AUTO: NEGATIVE
SODIUM SERPL-SCNC: 143 MMOL/L (ref 136–145)
SP GR UR STRIP.AUTO: 1.01 (ref 1–1.03)
T AXIS: 26 DEGREES
UROBILINOGEN UR STRIP.AUTO-MCNC: NORMAL MG/DL
VENTRICULAR RATE: 113 BPM

## 2024-12-31 PROCEDURE — 70450 CT HEAD/BRAIN W/O DYE: CPT | Performed by: HOSPITALIST

## 2024-12-31 PROCEDURE — 99233 SBSQ HOSP IP/OBS HIGH 50: CPT | Performed by: HOSPITALIST

## 2024-12-31 RX ORDER — DILTIAZEM HYDROCHLORIDE 240 MG/1
240 CAPSULE, COATED, EXTENDED RELEASE ORAL DAILY
Status: DISCONTINUED | OUTPATIENT
Start: 2024-12-31 | End: 2024-12-31

## 2024-12-31 RX ORDER — WARFARIN SODIUM 2 MG/1
4 TABLET ORAL
Status: COMPLETED | OUTPATIENT
Start: 2024-12-31 | End: 2024-12-31

## 2024-12-31 RX ORDER — METOPROLOL TARTRATE 1 MG/ML
5 INJECTION, SOLUTION INTRAVENOUS EVERY 4 HOURS PRN
Status: DISCONTINUED | OUTPATIENT
Start: 2024-12-31 | End: 2025-01-04

## 2024-12-31 RX ORDER — DILTIAZEM HYDROCHLORIDE 240 MG/1
240 CAPSULE, COATED, EXTENDED RELEASE ORAL 2 TIMES DAILY
Status: DISCONTINUED | OUTPATIENT
Start: 2024-12-31 | End: 2025-01-04

## 2024-12-31 RX ORDER — PREDNISONE 20 MG/1
40 TABLET ORAL
Status: DISCONTINUED | OUTPATIENT
Start: 2025-01-01 | End: 2025-01-01 | Stop reason: ALTCHOICE

## 2024-12-31 NOTE — PROGRESS NOTES
Pulmonary Progress Note      NAME: Diana Eisenberg - ROOM: [address] [place] [address]-A - MRN: [id number] - Age: 84 year old - : 1940    Assessment/Plan:  Shortness of breath and hypoxia  -improving with rate control  AECOPD - baseline FEV1 0.87L (57%) DLCO 56%  -prednisone - reduce to 40 mg tomorrow  -LAMA / LABA / ICS chronically  -on chronic O2  -nebs prn  Afib with RVR  -better  -per cardiology  Pulmonary HTN - baseline PAH 40-45  -Echo with LVEF 60-65% and est PAP 66 (elevated from baseline likely due to above)  Chronic hypoxic resp failure due to COPD and Pulm HTN  -baseline O2 is 2 LPM - now at 2 LPM  Confusion / agitation - supportive care  -delerium management per primary team  Dispo - DNAR  -seems better with rate control  -OK to floor.  Hopefully home soon with steroid taper      Hector Estrada M.D.  Pulmonary and Critical Care Medicine  [facility]      Subjective:  No acute events overnight. Agitation and confusion but no distress resp wise    Medications:   ipratropium-albuterol  3 mL Nebulization 4 times per day    predniSONE  50 mg Oral Daily with breakfast    aspirin  81 mg Oral Daily    furosemide  40 mg Oral Daily    levothyroxine  88 mcg Oral Before breakfast    atorvastatin  10 mg Oral Nightly    guaiFENesin ER  600 mg Oral BID    fluticasone-salmeterol  1 puff Inhalation BID    And    umeclidinium bromide  1 puff Inhalation Daily    metoprolol succinate ER  100 mg Oral 2x Daily(Beta Blocker)    dilTIAZem  30 mg Oral 4 times per day    insulin aspart  2-10 Units Subcutaneous TID AC and HS     Intake/Output Summary (Last 24 hours) at 2024 1004  Last data filed at 2024 1400  Gross per 24 hour   Intake 400 ml   Output --   Net 400 ml       Physical Exam:  /65   Pulse 101   Temp 97.3 °F (36.3 °C) (Tympanic)   Resp 15   Ht 4' 10\" (1.473 m)   Wt 155 lb 6.8 oz (70.5 kg)   SpO2 97%   BMI 32.48 kg/m²   Physical Exam:   General: ano respiratory distress.   HEENT: Normocephalic  atraumatic.Lips, mucosa, and tongue normal.  No thrush noted.   Neck: supple without mass   Lungs: clear   Chest wall: No tenderness or deformity.   Heart: irregular   Abdomen: soft, non-tender, non-distended, positive BS.   Extremity: no edema   Skin: no new rash   Neuro: alert    Recent Labs   Lab 12/29/24  1523 12/30/24  0438   RBC 4.32 4.18   HGB 13.3 12.9   HCT 40.1 38.5   MCV 92.8 92.1   MCH 30.8 30.9   MCHC 33.2 33.5   RDW 13.6 13.7   NEPRELIM 7.21  --    WBC 7.7 9.7   .0 184.0     Recent Labs   Lab 12/29/24  0634 12/29/24  1523 12/30/24  0438 12/31/24  0610   * 244* 156* 134*   BUN 32* 32* 32* 42*   CREATSERUM 1.77* 1.84* 1.51* 1.53*   CA 9.1 8.7 9.1 9.4   ALB 4.6 4.2 4.1  --     138 141 143   K 3.6 3.8 4.4  4.4 5.0    103 107 108   CO2 29.0 24.0 25.0 27.0   ALKPHO 88 73 76  --    AST 24 20 22  --    ALT 13 11 11  --    BILT 0.5 0.3 0.3  --    TP 7.4 7.0 6.8  --        Imaging: I independently visualized all relevant chest imaging in PACS, agree with radiology interpretation except where noted.

## 2024-12-31 NOTE — PROGRESS NOTES
[facility]   part of [facility]     Hospitalist Progress Note     Diana Eisenberg Patient Status:  Inpatient    1940 MRN [id number]   Location [facility] 4SW-A Attending Ronak Hood MD   Hosp Day # 2 PCP SHERIF SÁNCHEZ MD     Chief Complaint: COPD exacerbation     Subjective:   Patient was confused overnight. Being transferred out of the ICU. Denies new complaints.     Current medications:   [START ON 2025] predniSONE  40 mg Oral Daily with breakfast    ipratropium-albuterol  3 mL Nebulization 4 times per day    aspirin  81 mg Oral Daily    furosemide  40 mg Oral Daily    levothyroxine  88 mcg Oral Before breakfast    atorvastatin  10 mg Oral Nightly    guaiFENesin ER  600 mg Oral BID    fluticasone-salmeterol  1 puff Inhalation BID    And    umeclidinium bromide  1 puff Inhalation Daily    metoprolol succinate ER  100 mg Oral 2x Daily(Beta Blocker)    dilTIAZem  30 mg Oral 4 times per day    insulin aspart  2-10 Units Subcutaneous TID AC and HS       Objective:    Review of Systems:   10 point ROS completed and was negative, except for pertinent positive and negatives stated in subjective.    Vital signs:  Temp:  [97.3 °F (36.3 °C)-99 °F (37.2 °C)] 97.6 °F (36.4 °C)  Pulse:  [] 123  Resp:  [14-26] 20  BP: (123-148)/(52-95) 137/88  SpO2:  [81 %-100 %] 98 %  Patient Weight for the past 72 hrs:   Weight   24 0626 115 lb (52.2 kg)   24 1300 155 lb 6.8 oz (70.5 kg)   24 0400 156 lb 12 oz (71.1 kg)     Physical Exam:    General: No acute distress.   Respiratory: Wheeze noted bilaterally   Cardiovascular: S1, S2.  Abdomen: Soft, nontender, nondistended.  Positive bowel sounds.  Extremities: No edema.  Neuro: Alert and awake, moves all ext    Diagnostic Data:    Labs:  Recent Labs   Lab 24  0634 24  1523 24  0438 24  0610   WBC 9.9 7.7 9.7  --    HGB 14.6 13.3 12.9  --    MCV 94.4 92.8 92.1  --    .0 193.0 184.0  --    INR 1.84*  --   1.92* 1.99*       Recent Labs   Lab 12/29/24  0634 12/29/24  1523 12/30/24  0438 12/31/24  0610   * 244* 156* 134*   BUN 32* 32* 32* 42*   CREATSERUM 1.77* 1.84* 1.51* 1.53*   CA 9.1 8.7 9.1 9.4   ALB 4.6 4.2 4.1  --     138 141 143   K 3.6 3.8 4.4  4.4 5.0    103 107 108   CO2 29.0 24.0 25.0 27.0   ALKPHO 88 73 76  --    AST 24 20 22  --    ALT 13 11 11  --    BILT 0.5 0.3 0.3  --    TP 7.4 7.0 6.8  --        Estimated Creatinine Clearance: 29.9 mL/min (A) (based on SCr of 1.53 mg/dL (H)).    Recent Labs   Lab 12/29/24  0634 12/30/24  0438 12/31/24  0610   PTP 21.5* 22.1* 22.8*   INR 1.84* 1.92* 1.99*            COVID-19 Lab Results    COVID-19  Lab Results   Component Value Date    COVID19 Not Detected 12/29/2024    COVID19 Not Detected 12/29/2024    COVID19 Not Detected 11/14/2022       Pro-Calcitonin  Recent Labs   Lab 12/29/24  0634   PCT 0.10*       Cardiac  Recent Labs   Lab 12/29/24  1523   PBNP 6,317*       Creatinine Kinase  No results for input(s): \"CK\" in the last 168 hours.    Inflammatory Markers  No results for input(s): \"CRP\", \"JEVON\", \"LDH\", \"DDIMER\" in the last 168 hours.    Recent Labs   Lab 12/29/24  0634 12/29/24  1523   TROPHS 9 22       Imaging: Imaging data reviewed in Epic.    Medications:    [START ON 1/1/2025] predniSONE  40 mg Oral Daily with breakfast    ipratropium-albuterol  3 mL Nebulization 4 times per day    aspirin  81 mg Oral Daily    furosemide  40 mg Oral Daily    levothyroxine  88 mcg Oral Before breakfast    atorvastatin  10 mg Oral Nightly    guaiFENesin ER  600 mg Oral BID    fluticasone-salmeterol  1 puff Inhalation BID    And    umeclidinium bromide  1 puff Inhalation Daily    metoprolol succinate ER  100 mg Oral 2x Daily(Beta Blocker)    dilTIAZem  30 mg Oral 4 times per day    insulin aspart  2-10 Units Subcutaneous TID AC and HS       Assessment & Plan:      #Acute on chronic hypoxic respiratory failure (2L at needed and 2L at night)  #COPD  exacerbation, CXR neg, RVP neg, PCT 0.1  -IV steroids > Prednisone burst  -BD  -Nebs  -Antitussuves   -Oxygen, currently 2L  -Pulmonary & CCM consult  -SW consult - patient wants portable tanks at home     #Acute encephalopathy d/t hospitalization? Steroids? Infection? CVA?  -Check UA  -Check CT - INR subtherapeutic and in AF     #Atrial fibrillation on Coumadin with RVR  -Toprol   -Cardizem - change to extended?   -Coumadin  -Monitor INR  -Monitor hemodynamics  -Telemetry  -Cardiology consult      #CAD, trop neg  #Essential hypertension  #Dyslipidemia   -Aspirin  -Statin     #Chronic diastolic heart failure   -Lasix PO daily  -Daily weight     #Chronic kidney disease  -Monitor UOP, creatinine and electrolytes     #Hypothyroidism  -Synthroid     #GERD  -PPI    #Prediabetes A1c 6.1  -Correctional scale    #Cerebrovascular disease     #DVT Px: Coumadin    Plan of care discussed with patient and CCM.    Ronak Hood MD        Supplementary Documentation:   DVT Mechanical Prophylaxis:   SCDs,    DVT Pharmacologic Prophylaxis   Medication   None         DVT Pharmacologic prophylaxis: Aspirin 81 mg      Code Status: DNAR/Selective Treatment  Srinivasan: External urinary catheter in place  Srinivasan Duration (in days):   Central line:    RICHAR:

## 2024-12-31 NOTE — PROGRESS NOTES
Diana Eisenberg Patient Status:  Inpatient    1940 MRN [id number]   Location [facility] 4SW-A Attending Ronak Hood MD   Hosp Day # 2 PCP SHERIF SÁNCHEZ MD     Critical Care Progress Note          Subjective:  No acute events overnight.  Patient with some confusion overnight.    Objective:    Medications:   ipratropium-albuterol  3 mL Nebulization 4 times per day    predniSONE  50 mg Oral Daily with breakfast    aspirin  81 mg Oral Daily    furosemide  40 mg Oral Daily    levothyroxine  88 mcg Oral Before breakfast    atorvastatin  10 mg Oral Nightly    guaiFENesin ER  600 mg Oral BID    fluticasone-salmeterol  1 puff Inhalation BID    And    umeclidinium bromide  1 puff Inhalation Daily    metoprolol succinate ER  100 mg Oral 2x Daily(Beta Blocker)    dilTIAZem  30 mg Oral 4 times per day    insulin aspart  2-10 Units Subcutaneous TID AC and HS              Intake/Output Summary (Last 24 hours) at 2024 0723  Last data filed at 2024 1400  Gross per 24 hour   Intake 650 ml   Output 100 ml   Net 550 ml       BP (!) 136/95 (BP Location: Left arm)   Pulse 100   Temp 98 °F (36.7 °C) (Temporal)   Resp 24   Ht 147.3 cm (4' 10\")   Wt 155 lb 6.8 oz (70.5 kg)   SpO2 (!) 89%   BMI 32.48 kg/m²     Physical Exam:   General Appearance: Alert, cooperative, no distress, appears stated age  Neck: No JVD, neck supple, no adenopathy, trachea midline  Lungs: Clear to auscultation bilaterally, respirations unlabored  Heart: Regular rate and rhythm, S1 and S2 normal, no murmur, rub or gallop  Abdomen: Soft, non-tender, bowel sounds active all four quadrants, no masses, no organomegaly  Extremities: Extremities normal, atraumatic, no cyanosis or edema,capillary refill <3 sec.    Pulses: 2+ and symmetric all extremities  Skin: Skin color, texture, turgor normal for ethnicity, no rashes or lesions, warm and dry      Recent Labs   Lab 24  1523 24  0438   RBC 4.32 4.18   HGB 13.3 12.9   HCT  40.1 38.5   MCV 92.8 92.1   MCH 30.8 30.9   MCHC 33.2 33.5   RDW 13.6 13.7   NEPRELIM 7.21  --    WBC 7.7 9.7   .0 184.0     Recent Labs   Lab 12/29/24  0634 12/29/24  1523 12/30/24  0438 12/31/24  0610   * 244* 156* 134*   BUN 32* 32* 32* 42*   CREATSERUM 1.77* 1.84* 1.51* 1.53*   CA 9.1 8.7 9.1 9.4   ALB 4.6 4.2 4.1  --     138 141 143   K 3.6 3.8 4.4  4.4 5.0    103 107 108   CO2 29.0 24.0 25.0 27.0   ALKPHO 88 73 76  --    AST 24 20 22  --    ALT 13 11 11  --    BILT 0.5 0.3 0.3  --    TP 7.4 7.0 6.8  --      Recent Labs   Lab 12/29/24  0850   ABGPHT 7.38   SJTEEK6E 41   GVKRC1S 62*   ABGHCO3 24.1   ABGBE -0.8   TEMP 98.6   ALEX Positive   SITE Right Radial   DEV Room Air   THGB 13.2     No results for input(s): \"BNP\" in the last 168 hours.  No results for input(s): \"TROP\", \"CK\" in the last 168 hours.    No results found.       Assessment/Plan:    Acute on chronic hypoxic respiratory failure likely 2/2 to COPD exacerbation  COPD  pHTN (PASP 76mm Hg) - repeat TTE pending  - Home baseline o2, 2L/NC. Currently on 2L/NC.  - Chest xray without acute pulmonary process  - ProBNP markedly elevated (6,317)  - MRSA nares negative  - RVP negative  - Continue home lasix  (40mg daily)  - Continue IV steroids (solumedrol 40mg q12h)- will switch to PO prednisone taper  - Duonebs q6H  - Pro-dana 0.1  - [facility] following     Afib with RVR on coumadin  - Cardizem drip weaned off- transitioned to PO cardizem  - Continue coumadin- pharmacy to dose.   - Continue home medications  - TTE EF 60-65%  - Cardiology consulted     HTN  CAD  HLD  - ASA  - Statin  - Toprol XL  - Troponin  - Cardioogy consulted     HFpEF  - TTE 11/10/2022 LVEF 60-65%, no rwma, G2DD, PASP 76mm Hg. Repeat TTE EF 60-65%  - daily weights  - continue home lasix  - Cardiology consulted     CKD  - Cr 1.51 today  - avoid nephrotoxins  - Monitor UOP  - Daily BMP- 470cc urine output x 24 hours.     GERD  - PPI    Hypothyroid  - Check  TSH  - PTA synthroid    Hyperglycemia- likely steroid induced  - A1c 6.6  - Accuchecks  - SSI- adjust with steroid weaning     F/E/N  - cardiac diet  - replete electrolytes as needed     Proph  - coumadin- pharmacy to dose  - SCDs  - PT/OT     Dispo  - DNAR select  - Stable to transfer to general medicine floor    Plan of care discussed with intensivist, Dr. Bailon.      PERLITA Bahena-BC  Critical Care NP  Phone [phone]

## 2024-12-31 NOTE — OCCUPATIONAL THERAPY NOTE
OCCUPATIONAL THERAPY TREATMENT NOTE - INPATIENT     Room Number: 2612/2612-A  Session: 1   Number of Visits to Meet Established Goals: 4    Presenting Problem: AF with RVR    ASSESSMENT   Patient demonstrates good  progress this session, goals remain in progress.    Patient continues to function near baseline with toileting, lower body dressing, and energy conservation strategies.   Contributing factors to remaining limitations include decreased endurance.  Next session anticipate patient to progress toileting and lower body dressing.  Occupational Therapy will continue to follow patient for duration of hospitalization.    Patient continues to benefit from continued skilled OT services: at discharge to promote prior level of function and safety with additional support and return home with home health OT.     History:  Patient is a 84 year old female admitted on 12/29/2024 with Presenting Problem: AF with RVR. Co-Morbidities : CAD, pulmonary HTN, macular degeneration, AF, HFpEF       TREATMENT SESSION:  Patient Start of Session: supine    FUNCTIONAL TRANSFER ASSESSMENT  Sit to Stand: Edge of Bed; Chair  Edge of Bed: Supervision  Chair: Supervision  Toilet Transfer: Not Tested    BED MOBILITY  Supine to Sit : Supervision  Sit to Supine (OT): Not Tested    BALANCE ASSESSMENT  Static Sitting: Independent  Static Standing: Supervision    FUNCTIONAL ADL ASSESSMENT  LB Dressing Seated: Not Tested  Toileting Seated: Modified Independent  Toileting Standing: Supervision    O2 SATURATIONS  Oxygen Therapy  SPO2% on Oxygen at Rest: 97  At rest oxygen flow (liters per minute): 2  SPO2% Ambulation on Oxygen: 94  Ambulation oxygen flow (liters per minute): 2    EDUCATION PROVIDED  Patient Education : Role of Occupational Therapy; Plan of Care; Energy Conservation; Proper Body Mechanics  Patient's Response to Education: Returned Demonstration; Verbalized Understanding    Equipment used: rw     Exercises:    Exercises Repetitions  Comments   Scapular elevation     Scapular retraction 10    Shoulder rolls 10    Shoulder flexion     Shoulder abduction     Shoulder internal/external rotation     Forward punch     Elbow flexion     Elbow extension     Forearm pronation/supination     Wrist flexion/extension     Gross grasp/fist pumps     Ankle pumps     Knee extension     Marching         Therapist comments: zakia, daughter present.  2 liters 97% at rest.  Supine to sit supervision. SBP that was taken in supine before the session in 120s, sitting SBP 98, \"little dizziness\" per pt, -127, ok to proceed with session, rn in the room, 2 liters,functional ambulation to just outside of the room and back to simulate distance that she has at her apt.  , 95% using 2 liters. Educated the pt about pursed lip breathing, Allen Dyspnea Scale, and energy conservation. Issued handout. Pt reported \"little\" out of breath, did not rate the scale.  Educated the pt about therapeutic exercises for respiratory functions, performed 2 out of 2 exercises, see above table.   95% 2 liters.  Pt was left with PT, sitter, and family.      Patient End of Session: Up in chair;With  staff;Needs met;Call light within reach;RN aware of session/findings;All patient questions and concerns addressed;Family present (daughter, zakia)    PAIN ASSESSMENT  Ratin           OBJECTIVE  Precautions: Bed/chair alarm    AM-PAC ‘6-Clicks’ Inpatient Daily Activity Short Form  -   Putting on and taking off regular lower body clothing?: A Little  -   Bathing (including washing, rinsing, drying)?: A Little  -   Toileting, which includes using toilet, bedpan or urinal? : A Little  -   Putting on and taking off regular upper body clothing?: A Little  -   Taking care of personal grooming such as brushing teeth?: None  -   Eating meals?: None    AM-PAC Score:  Score: 20  Approx Degree of Impairment: 38.32%  Standardized Score (AM-PAC Scale): 42.03    PLAN  OT Device Recommendations:  Other (Comment) (smartwatch with fall detection versus first alert)  OT Treatment Plan: Cognitive reorientation;Endurance training;UE strengthening/ROM;Functional transfer training;Energy conservation/work simplification techniques;ADL training;Patient/Family training;Equipment eval/education;Compensatory technique education;Patient/Family education  Rehab Potential : Good  Frequency: 3-5x/week    OT Goals:   Progressing 12/31  ADL Goals   Patient will perform grooming: with supervision and while standing at sink  Patient will perform lower body dressing:  with supervision  Patient will perform toileting: with supervision     Functional Transfer Goals  Patient will transfer to toilet:  with supervision     UE Exercise Program Goal  Patient will be supervision with bilateral AROM HEP (home exercise program).     Therapist Goals  Short Blessed Test    OT Session Time: 24 minutes  Self-Care Home Management: 24minutes  Therapeutic Activity:  minutes  Neuromuscular Re-education:  minutes  Therapeutic Exercise:  minutes

## 2024-12-31 NOTE — PROGRESS NOTES
[facility] Dosing Service  Warfarin (Coumadin) Subsequent Dosing    Diana Eisenberg is a 84 year old patient for whom pharmacy is dosing warfarin (Coumadin). Goal INR is 2-3    Recent Labs   Lab 12/29/24  0634 12/30/24  0438 12/31/24  0610   INR 1.84* 1.92* 1.99*       Consulted by:  Anastasia HUBBARD Cardiology  Indication:  afib  Potential Drug Interactions:  levothyroxine (stable home med) - can increase INR, aspirin (stable home med) - can increase risk of bleeding  Other Anticoagulants:  none  Home regimen (if applicable):  warfarin 4mg daily    Inpatient Dosing History:    Date 12/29 12/30 12/31      INR 1.84 1.92 1.99      Coumadin dose Held by Critical Care 2mg                Based on above -  1.  For today, Give warfarin (COUMADIN) 4 mg at 2100 tonight as per home regimen  2   PT/INR ordered daily while on warfarin  3.  Pharmacy will continue to follow.  We appreciate the opportunity to assist in the care of this patient.    Tracey Avila, PharmD  12/31/2024  2:31 PM

## 2024-12-31 NOTE — PLAN OF CARE
Patient is admitted for . Pt is AOX2-3.  Confused.  Sitter at bedside. Restrains are discontinued.  VSS, afebrile and denies any pain. SpO2 maintained on 2L. . Denies any SOB. Congested  productive cough. Scheduled Mucinex. Tele-Afib. PO Coumadin .  Cardiac control  diet/QID ACCU check. Denies any n/v/d. Voids in purewick.   Skin check done with 2nd RN Manjula. EKG done . CT brain ordered . Will continue to monitor. Pt is updated with plan of care.    1600: Pt is calmer and oriented. DC -ed sitter order.     Problem: Patient/Family Goals  Goal: Patient/Family Long Term Goal  Description: Patient's Long Term Goal: \"breathe better\"    Interventions:  - monitor o2 sats  -q4 duonebs  - 1-2L NC prn  - bipap prn  - See additional Care Plan goals for specific interventions  Outcome: Progressing  Goal: Patient/Family Short Term Goal  Description: Patient's Short Term Goal: \"sleep tonight\"    Interventions:   - cluster cares  - See additional Care Plan goals for specific interventions  Outcome: Progressing     Problem: CARDIOVASCULAR - ADULT  Goal: Maintains optimal cardiac output and hemodynamic stability  Description: INTERVENTIONS:  - Monitor vital signs, rhythm, and trends  - Monitor for bleeding, hypotension and signs of decreased cardiac output  - Evaluate effectiveness of vasoactive medications to optimize hemodynamic stability  - Monitor arterial and/or venous puncture sites for bleeding and/or hematoma  - Assess quality of pulses, skin color and temperature  - Assess for signs of decreased coronary artery perfusion - ex. Angina  - Evaluate fluid balance, assess for edema, trend weights  Outcome: Progressing  Goal: Absence of cardiac arrhythmias or at baseline  Description: INTERVENTIONS:  - Continuous cardiac monitoring, monitor vital signs, obtain 12 lead EKG if indicated  - Evaluate effectiveness of antiarrhythmic and heart rate control medications as ordered  - Initiate emergency measures for life  threatening arrhythmias  - Monitor electrolytes and administer replacement therapy as ordered  Outcome: Progressing     Problem: RESPIRATORY - ADULT  Goal: Achieves optimal ventilation and oxygenation  Description: INTERVENTIONS:  - Assess for changes in respiratory status  - Assess for changes in mentation and behavior  - Position to facilitate oxygenation and minimize respiratory effort  - Oxygen supplementation based on oxygen saturation or ABGs  - Provide Smoking Cessation handout, if applicable  - Encourage broncho-pulmonary hygiene including cough, deep breathe, Incentive Spirometry  - Assess the need for suctioning and perform as needed  - Assess and instruct to report SOB or any respiratory difficulty  - Respiratory Therapy support as indicated  - Manage/alleviate anxiety  - Monitor for signs/symptoms of CO2 retention  Outcome: Progressing     Problem: Delirium  Goal: Minimize duration of delirium  Description: Interventions:  - Encourage use of hearing aids, eye glasses  - Promote highest level of mobility daily  - Provide frequent reorientation  - Promote wakefulness i.e. lights on, blinds open  - Promote sleep, encourage patient's normal rest cycle i.e. lights off, TV off, minimize noise and interruptions  - Encourage family to assist in orientation and promotion of home routines  Outcome: Progressing     Problem: Safety Risk - Non-Violent Restraints  Goal: Patient will remain free from self-harm  Description: INTERVENTIONS:  - Apply the least restrictive restraint to prevent harm  - Notify patient and family of reasons restraints applied  - Assess for any contributing factors to confusion (electrolyte disturbances, delirium, medications)  - Discontinue any unnecessary medical devices as soon as possible  - Assess the patient's physical comfort, circulation, skin condition, hydration, nutrition and elimination needs   - Reorient and redirection as needed  - Assess for the need to continue  restraints  Outcome: Progressing

## 2024-12-31 NOTE — PROGRESS NOTES
Cardiology Progress Note        Diana Eisenberg Patient Status:  Inpatient    1940 MRN [id number]   [facility] 4SW-A Attending Ronak Hood MD   Hosp Day # 2 PCP SHERIF SÁNCHEZ MD     Subjective:  On baseline O2  More tachycardic.    Medications:   [START ON 2025] predniSONE  40 mg Oral Daily with breakfast    dilTIAZem ER  240 mg Oral Daily    ipratropium-albuterol  3 mL Nebulization 4 times per day    aspirin  81 mg Oral Daily    furosemide  40 mg Oral Daily    levothyroxine  88 mcg Oral Before breakfast    atorvastatin  10 mg Oral Nightly    guaiFENesin ER  600 mg Oral BID    fluticasone-salmeterol  1 puff Inhalation BID    And    umeclidinium bromide  1 puff Inhalation Daily    metoprolol succinate ER  100 mg Oral 2x Daily(Beta Blocker)    insulin aspart  2-10 Units Subcutaneous TID AC and HS       Continuous Infusions:          Allergies:  Allergies[1]      Intake/Output:    Intake/Output Summary (Last 24 hours) at 2024 1410  Last data filed at 2024 1319  Gross per 24 hour   Intake 360 ml   Output 200 ml   Net 160 ml           Last 3 Weights   24 0500 155 lb 6.8 oz (70.5 kg)   24 0400 156 lb 12 oz (71.1 kg)   24 1300 155 lb 6.8 oz (70.5 kg)   24 0626 115 lb (52.2 kg)   24 1029 162 lb (73.5 kg)   04/10/24 1036 165 lb (74.8 kg)            Physical Exam:    Temp:  [97.3 °F (36.3 °C)-99 °F (37.2 °C)] 97.6 °F (36.4 °C)  Pulse:  [] 118  Resp:  [14-25] 20  BP: (122-148)/(52-95) 122/89  SpO2:  [81 %-100 %] 90 %    General: No apparent distress  HEENT: No focal deficits.  Neck: supple. JVP normal  Cardiac: irregular rhythm, S1, S2 normal,  rub or gallop.  No murmur.  Lungs: CTA  Abdomen: Soft, non-tender.   Extremities: No edema  Neurologic: no focal deficits  Skin: Warm and dry.     Telemetry: fib    EKG:      Echo:      Cardiac Cath:      Labs:  HEM:  Recent Labs   Lab 24  0634 24  1523 24  0438   WBC 9.9 7.7 9.7    HGB 14.6 13.3 12.9   .0 193.0 184.0       Chem:  Recent Labs   Lab 12/29/24  0634 12/29/24  1523 12/30/24  0438 12/31/24  0610    138 141 143   K 3.6 3.8 4.4  4.4 5.0    103 107 108   CO2 29.0 24.0 25.0 27.0   BUN 32* 32* 32* 42*   CREATSERUM 1.77* 1.84* 1.51* 1.53*   CA 9.1 8.7 9.1 9.4   MG 2.0  --  2.5 2.5   * 244* 156* 134*       Recent Labs   Lab 12/29/24  0634 12/29/24  1523 12/30/24  0438   ALT 13 11 11   AST 24 20 22   ALB 4.6 4.2 4.1       No results for input(s): \"TROP\", \"CK\" in the last 168 hours.    Recent Labs   Lab 12/29/24  0634 12/30/24  0438 12/31/24  0610   PTP 21.5* 22.1* 22.8*   INR 1.84* 1.92* 1.99*                 Impression:  Acute hypoxic resp failure - due to COPD exacerbation and some heart failure from rapid afib.  Much improved.  Chronic afib - rate control had been fair.  Tachy in setting of acute illness.  Rate control more challenging as there is some organized atrial activity.  On warfarin.  Underlying COPD, on home O2.  Former long time smoker.  Prior TIA with [facility]  H/o HTN  CRI - stable          Plan:  Same Toprol 100 mg BID.  Increase diltiazem to 240 mg BID.  Prn IV lopressor.  Same warfarin.        Steve Porter MD    L3       [1]   Allergies  Allergen Reactions    Hydrocodone HIVES    Cephalexin RASH

## 2024-12-31 NOTE — PROGRESS NOTES
NURSING ADMISSION NOTE      Patient admitted via Cart  Oriented to room.  Safety precautions initiated.  Bed in low position.  Call light in reach.    Patient  arrived from ICU with [place] restrain. Pt has right wrist skin tear and wrapper with gauze bandage per ICU RN.    Pt is confused and trying to get out of the bed. Sitter ordered for the patient.

## 2024-12-31 NOTE — PLAN OF CARE
Began taking care of patient this am.  VSS.  Patient in restraints and drowsy/sleeping.  Woke patient up at 9, slight confusion but cooperative and pleasant.   Attempted to dc restraints,   A skin tear was noted to the right wrist, cleaned with ns and wrapped.  Patient trying to get out of bed to \"get dressed\"  Unable to redirect, continued posi for safety.   Family updated.  Patient wheezing on 2L, sats wnl.    Problem: Delirium  Goal: Minimize duration of delirium  Description: Interventions:  - Encourage use of hearing aids, eye glasses  - Promote highest level of mobility daily  - Provide frequent reorientation  - Promote wakefulness i.e. lights on, blinds open  - Promote sleep, encourage patient's normal rest cycle i.e. lights off, TV off, minimize noise and interruptions  - Encourage family to assist in orientation and promotion of home routines  Outcome: Progressing     Problem: Safety Risk - Non-Violent Restraints  Goal: Patient will remain free from self-harm  Description: INTERVENTIONS:  - Apply the least restrictive restraint to prevent harm  - Notify patient and family of reasons restraints applied  - Assess for any contributing factors to confusion (electrolyte disturbances, delirium, medications)  - Discontinue any unnecessary medical devices as soon as possible  - Assess the patient's physical comfort, circulation, skin condition, hydration, nutrition and elimination needs   - Reorient and redirection as needed  - Assess for the need to continue restraints  Outcome: Progressing     Problem: RESPIRATORY - ADULT  Goal: Achieves optimal ventilation and oxygenation  Description: INTERVENTIONS:  - Assess for changes in respiratory status  - Assess for changes in mentation and behavior  - Position to facilitate oxygenation and minimize respiratory effort  - Oxygen supplementation based on oxygen saturation or ABGs  - Provide Smoking Cessation handout, if applicable  - Encourage broncho-pulmonary hygiene  including cough, deep breathe, Incentive Spirometry  - Assess the need for suctioning and perform as needed  - Assess and instruct to report SOB or any respiratory difficulty  - Respiratory Therapy support as indicated  - Manage/alleviate anxiety  - Monitor for signs/symptoms of CO2 retention  Outcome: Progressing

## 2025-01-01 ENCOUNTER — APPOINTMENT (OUTPATIENT)
Dept: GENERAL RADIOLOGY | Facility: HOSPITAL | Age: 85
End: 2025-01-01
Attending: NURSE PRACTITIONER
Payer: MEDICARE

## 2025-01-01 LAB
ANION GAP SERPL CALC-SCNC: 8 MMOL/L (ref 0–18)
ARTERIAL PATENCY WRIST A: POSITIVE
BASE EXCESS BLDA CALC-SCNC: 6 MMOL/L (ref ?–2)
BASOPHILS # BLD AUTO: 0.02 X10(3) UL (ref 0–0.2)
BASOPHILS NFR BLD AUTO: 0.2 %
BODY TEMPERATURE: 98.6 F
BUN BLD-MCNC: 41 MG/DL (ref 9–23)
CALCIUM BLD-MCNC: 9.5 MG/DL (ref 8.7–10.4)
CHLORIDE SERPL-SCNC: 106 MMOL/L (ref 98–112)
CO2 SERPL-SCNC: 26 MMOL/L (ref 21–32)
COHGB MFR BLD: 1.1 % SAT (ref 0–3)
CREAT BLD-MCNC: 1.66 MG/DL
EGFRCR SERPLBLD CKD-EPI 2021: 30 ML/MIN/1.73M2 (ref 60–?)
EOSINOPHIL # BLD AUTO: 0.02 X10(3) UL (ref 0–0.7)
EOSINOPHIL NFR BLD AUTO: 0.2 %
ERYTHROCYTE [DISTWIDTH] IN BLOOD BY AUTOMATED COUNT: 13.6 %
GLUCOSE BLD-MCNC: 120 MG/DL (ref 70–99)
GLUCOSE BLD-MCNC: 128 MG/DL (ref 70–99)
GLUCOSE BLD-MCNC: 129 MG/DL (ref 70–99)
GLUCOSE BLD-MCNC: 212 MG/DL (ref 70–99)
GLUCOSE BLD-MCNC: 266 MG/DL (ref 70–99)
HCO3 BLDA-SCNC: 29.5 MEQ/L (ref 21–27)
HCT VFR BLD AUTO: 42.7 %
HGB BLD-MCNC: 13.7 G/DL
HGB BLD-MCNC: 13.9 G/DL
IMM GRANULOCYTES # BLD AUTO: 0.1 X10(3) UL (ref 0–1)
IMM GRANULOCYTES NFR BLD: 0.8 %
INR BLD: 2.21 (ref 0.8–1.2)
L/M: 2 L/MIN
LYMPHOCYTES # BLD AUTO: 1.39 X10(3) UL (ref 1–4)
LYMPHOCYTES NFR BLD AUTO: 10.9 %
MCH RBC QN AUTO: 30.2 PG (ref 26–34)
MCHC RBC AUTO-ENTMCNC: 32.1 G/DL (ref 31–37)
MCV RBC AUTO: 94.3 FL
METHGB MFR BLD: 0.8 % SAT (ref 0.4–1.5)
MONOCYTES # BLD AUTO: 1.16 X10(3) UL (ref 0.1–1)
MONOCYTES NFR BLD AUTO: 9.1 %
NEUTROPHILS # BLD AUTO: 10.03 X10 (3) UL (ref 1.5–7.7)
NEUTROPHILS # BLD AUTO: 10.03 X10(3) UL (ref 1.5–7.7)
NEUTROPHILS NFR BLD AUTO: 78.8 %
OSMOLALITY SERPL CALC.SUM OF ELEC: 302 MOSM/KG (ref 275–295)
OXYHGB MFR BLDA: 93.9 % (ref 92–100)
PCO2 BLDA: 43 MM HG (ref 35–45)
PH BLDA: 7.46 [PH] (ref 7.35–7.45)
PLATELET # BLD AUTO: 234 10(3)UL (ref 150–450)
PO2 BLDA: 72 MM HG (ref 80–100)
POTASSIUM SERPL-SCNC: 4.5 MMOL/L (ref 3.5–5.1)
PROTHROMBIN TIME: 24.7 SECONDS (ref 11.6–14.8)
RBC # BLD AUTO: 4.53 X10(6)UL
SODIUM SERPL-SCNC: 140 MMOL/L (ref 136–145)
WBC # BLD AUTO: 12.7 X10(3) UL (ref 4–11)

## 2025-01-01 PROCEDURE — 99233 SBSQ HOSP IP/OBS HIGH 50: CPT | Performed by: HOSPITALIST

## 2025-01-01 PROCEDURE — 71045 X-RAY EXAM CHEST 1 VIEW: CPT | Performed by: NURSE PRACTITIONER

## 2025-01-01 RX ORDER — IPRATROPIUM BROMIDE AND ALBUTEROL SULFATE 2.5; .5 MG/3ML; MG/3ML
SOLUTION RESPIRATORY (INHALATION)
Status: COMPLETED
Start: 2025-01-01 | End: 2025-01-01

## 2025-01-01 RX ORDER — AZITHROMYCIN 250 MG/1
500 TABLET, FILM COATED ORAL
Status: COMPLETED | OUTPATIENT
Start: 2025-01-01 | End: 2025-01-03

## 2025-01-01 RX ORDER — WARFARIN SODIUM 2 MG/1
4 TABLET ORAL
Status: COMPLETED | OUTPATIENT
Start: 2025-01-01 | End: 2025-01-01

## 2025-01-01 RX ORDER — IPRATROPIUM BROMIDE AND ALBUTEROL SULFATE 2.5; .5 MG/3ML; MG/3ML
3 SOLUTION RESPIRATORY (INHALATION) ONCE
Status: DISCONTINUED | OUTPATIENT
Start: 2025-01-01 | End: 2025-01-04

## 2025-01-01 RX ORDER — METHYLPREDNISOLONE SODIUM SUCCINATE 125 MG/2ML
60 INJECTION INTRAMUSCULAR; INTRAVENOUS EVERY 6 HOURS
Status: DISCONTINUED | OUTPATIENT
Start: 2025-01-01 | End: 2025-01-03

## 2025-01-01 RX ORDER — IPRATROPIUM BROMIDE AND ALBUTEROL SULFATE 2.5; .5 MG/3ML; MG/3ML
3 SOLUTION RESPIRATORY (INHALATION) EVERY 6 HOURS PRN
Status: DISCONTINUED | OUTPATIENT
Start: 2025-01-01 | End: 2025-01-04

## 2025-01-01 RX ORDER — FUROSEMIDE 10 MG/ML
20 INJECTION INTRAMUSCULAR; INTRAVENOUS ONCE
Status: COMPLETED | OUTPATIENT
Start: 2025-01-01 | End: 2025-01-01

## 2025-01-01 NOTE — PLAN OF CARE
Assumed care of patient at 19:30.   Alert and oriented x3, disoriented to situation. Forgetful at times.   Atrial fib on tele.   1-2L NC, lung sounds diminished bilaterally with scant crackles to bases. Audible inspiratory and expiratory wheezes. Congested, productive cough noted. Continuous pulse oximetry maintained. Denies dizziness.   No complaints of pain.   Incontinent of bowel and bladder.   Glasses, upper and lower dentures present at bedside.  Safety precautions maintained.    00:36 Increased work of breathing, expiratory wheezes. Duonebs given without relief. Cardiology notified about crackles to lower lobes. CXR ordered.    03:00 Visual hallucinations. Patient claimed to see a child sitting on the couch. Patient was reoriented to place and situation, and was able to return to sleep.    Discussed plan of care with patient. All questions answered.    Problem: Patient/Family Goals  Goal: Patient/Family Long Term Goal  Description: Patient's Long Term Goal: \"breathe better\"    Interventions:  - monitor o2 sats  -q4 duonebs  - 1-2L NC prn  - bipap prn  - See additional Care Plan goals for specific interventions  Outcome: Progressing  Goal: Patient/Family Short Term Goal  Description: Patient's Short Term Goal: \"sleep tonight\"    Interventions:   - cluster cares  - See additional Care Plan goals for specific interventions  Outcome: Progressing     Problem: CARDIOVASCULAR - ADULT  Goal: Maintains optimal cardiac output and hemodynamic stability  Description: INTERVENTIONS:  - Monitor vital signs, rhythm, and trends  - Monitor for bleeding, hypotension and signs of decreased cardiac output  - Evaluate effectiveness of vasoactive medications to optimize hemodynamic stability  - Monitor arterial and/or venous puncture sites for bleeding and/or hematoma  - Assess quality of pulses, skin color and temperature  - Assess for signs of decreased coronary artery perfusion - ex. Angina  - Evaluate fluid balance, assess for  edema, trend weights  Outcome: Progressing  Goal: Absence of cardiac arrhythmias or at baseline  Description: INTERVENTIONS:  - Continuous cardiac monitoring, monitor vital signs, obtain 12 lead EKG if indicated  - Evaluate effectiveness of antiarrhythmic and heart rate control medications as ordered  - Initiate emergency measures for life threatening arrhythmias  - Monitor electrolytes and administer replacement therapy as ordered  Outcome: Progressing     Problem: RESPIRATORY - ADULT  Goal: Achieves optimal ventilation and oxygenation  Description: INTERVENTIONS:  - Assess for changes in respiratory status  - Assess for changes in mentation and behavior  - Position to facilitate oxygenation and minimize respiratory effort  - Oxygen supplementation based on oxygen saturation or ABGs  - Provide Smoking Cessation handout, if applicable  - Encourage broncho-pulmonary hygiene including cough, deep breathe, Incentive Spirometry  - Assess the need for suctioning and perform as needed  - Assess and instruct to report SOB or any respiratory difficulty  - Respiratory Therapy support as indicated  - Manage/alleviate anxiety  - Monitor for signs/symptoms of CO2 retention  Outcome: Progressing     Problem: Delirium  Goal: Minimize duration of delirium  Description: Interventions:  - Encourage use of hearing aids, eye glasses  - Promote highest level of mobility daily  - Provide frequent reorientation  - Promote wakefulness i.e. lights on, blinds open  - Promote sleep, encourage patient's normal rest cycle i.e. lights off, TV off, minimize noise and interruptions  - Encourage family to assist in orientation and promotion of home routines  Outcome: Progressing     Problem: Safety Risk - Non-Violent Restraints  Goal: Patient will remain free from self-harm  Description: INTERVENTIONS:  - Apply the least restrictive restraint to prevent harm  - Notify patient and family of reasons restraints applied  - Assess for any contributing  factors to confusion (electrolyte disturbances, delirium, medications)  - Discontinue any unnecessary medical devices as soon as possible  - Assess the patient's physical comfort, circulation, skin condition, hydration, nutrition and elimination needs   - Reorient and redirection as needed  - Assess for the need to continue restraints  Outcome: Progressing

## 2025-01-01 NOTE — PROGRESS NOTES
[facility] Cardiology Progress Note    Diana Eisenberg Patient Status:  Inpatient    1940 MRN [id number]   Location [facility] 2NE-A Attending Pedro Sheppard, DO   Hosp Day # 3 PCP SHERIF SÁNCHEZ MD     Subjective:  Notified by rn regarding worsening resp distress.   Upon arrival she is visibly dyspneic, tripoding and restless. I can hear he wheezing from the doorway. f    Objective:  /57 (BP Location: Left arm)   Pulse 111   Temp 97.9 °F (36.6 °C) (Oral)   Resp 20   Ht 4' 10\" (1.473 m)   Wt 157 lb (71.2 kg)   SpO2 100%   BMI 32.81 kg/m²     Telemetry: afib, hr 90s      Intake/Output:    Intake/Output Summary (Last 24 hours) at 2025 0929  Last data filed at 2025 0625  Gross per 24 hour   Intake 780 ml   Output 850 ml   Net -70 ml       Last 3 Weights   25 0315 157 lb (71.2 kg)   24 0500 155 lb 6.8 oz (70.5 kg)   24 0400 156 lb 12 oz (71.1 kg)   24 1300 155 lb 6.8 oz (70.5 kg)   24 0626 115 lb (52.2 kg)   24 1029 162 lb (73.5 kg)   04/10/24 1036 165 lb (74.8 kg)       Labs:  Recent Labs   Lab 24  0438 24  0610 25  0655   * 134* 128*   BUN 32* 42* 41*   CREATSERUM 1.51* 1.53* 1.66*   EGFRCR 34* 33* 30*   CA 9.1 9.4 9.5    143 140   K 4.4  4.4 5.0 4.5    108 106   CO2 25.0 27.0 26.0     Recent Labs   Lab 24  0634 24  1523 24  0438 25  0655   RBC 4.78 4.32 4.18 4.53   HGB 14.6 13.3 12.9 13.7   HCT 45.1 40.1 38.5 42.7   MCV 94.4 92.8 92.1 94.3   MCH 30.5 30.8 30.9 30.2   MCHC 32.4 33.2 33.5 32.1   RDW 13.8 13.6 13.7 13.6   NEPRELIM 5.17 7.21  --  10.03*   WBC 9.9 7.7 9.7 12.7*   .0 193.0 184.0 234.0         Recent Labs   Lab 24  0634 24  1523   TROPHS 9 22       Diagnostics:             Physical Exam:    Physical Exam  Constitutional:       General: She is in acute distress.   Cardiovascular:      Rate and Rhythm: Normal rate. Rhythm irregular.   Pulmonary:       Breath sounds: Wheezing present.      Comments: Poor air entry bilaterally, wheezing throughout, anterior lung sounds equal, but posteriorly minimal aeration.   Musculoskeletal:      Right lower leg: No edema.      Left lower leg: No edema.         Medications:   predniSONE  40 mg Oral Daily with breakfast    dilTIAZem ER  240 mg Oral BID    ipratropium-albuterol  3 mL Nebulization 4 times per day    aspirin  81 mg Oral Daily    furosemide  40 mg Oral Daily    levothyroxine  88 mcg Oral Before breakfast    atorvastatin  10 mg Oral Nightly    guaiFENesin ER  600 mg Oral BID    fluticasone-salmeterol  1 puff Inhalation BID    And    umeclidinium bromide  1 puff Inhalation Daily    metoprolol succinate ER  100 mg Oral 2x Daily(Beta Blocker)    insulin aspart  2-10 Units Subcutaneous TID AC and HS         Assessment:    Acute hypoxic resp failure- r/t COPD exacerbation, on steroids, increased distress this am   [place] Afib- some elevated HR in setting of resp issues, cardizem doubled with HR controlled at present, INR 2.2 today on warfarin  H/o TIA-s/p CEA  Long time smoker  COPD on home O2  HTN-controlled     Plan:    Pt presently in respiratory distress, tripoding and audibly wheezing. Clincally to my eye does not appear to be in heart failure. Her CXR from this am supports this, no significant pulmonary vascular congestion, IVC was collapsible and normal-sized on echo 12/30.  Will give emperic IV lasix 20mg. Additional nebulizer with prolonged chest PT given with mild improvement. my suspicion is bronchoconstriction with mucus plugging.   I updated hospitalist and pulmonology.   Would tolerate some elevation in her HR with afib, as this is related to her resp issues.     I have spent 45 min engaged in work directly related to this individual patient; including time spent at bedside, reviewing test results and/or imaging, discussing plan of care with other medical staff,  and charting in the medical record.      Meggan Almonte, APRN  1/1/2025  9:29 AM  Ph [phone] ([place])  Ph [phone] ([place])      Patient seen and examined independently.  Note reviewed and labs reviewed.  Agree with above assessment and plan.  Treat COPD/CHF - use additional IV lasix this AM as outlined above    DORIAN Wyatt MD

## 2025-01-01 NOTE — PROGRESS NOTES
[facility]   part of [facility]     Hospitalist Progress Note     Diana Eisenberg Patient Status:  Inpatient    1940 MRN [id number]   Location [facility] 4SW-A Attending Ronak Hood MD   Hosp Day # 3 PCP SHERIF SÁNCHEZ MD     Chief Complaint: COPD exacerbation     Subjective:   Pt wheezing more per staff, slight cough. No cp. Fam at the bedside. Discussed w/ APN from cards    Current medications:   ipratropium-albuterol  3 mL Nebulization Once    methylPREDNISolone  60 mg Intravenous Q6H    dilTIAZem ER  240 mg Oral BID    ipratropium-albuterol  3 mL Nebulization 4 times per day    aspirin  81 mg Oral Daily    furosemide  40 mg Oral Daily    levothyroxine  88 mcg Oral Before breakfast    atorvastatin  10 mg Oral Nightly    guaiFENesin ER  600 mg Oral BID    fluticasone-salmeterol  1 puff Inhalation BID    And    umeclidinium bromide  1 puff Inhalation Daily    metoprolol succinate ER  100 mg Oral 2x Daily(Beta Blocker)    insulin aspart  2-10 Units Subcutaneous TID AC and HS       Objective:    Review of Systems:   10 point ROS completed and was negative, except for pertinent positive and negatives stated in subjective.    Vital signs:  Temp:  [97.6 °F (36.4 °C)-97.9 °F (36.6 °C)] 97.9 °F (36.6 °C)  Pulse:  [] 111  Resp:  [16-21] 20  BP: (118-140)/(57-89) 118/57  SpO2:  [90 %-100 %] 100 %  Patient Weight for the past 72 hrs:   Weight   24 0626 115 lb (52.2 kg)   24 1300 155 lb 6.8 oz (70.5 kg)   24 0400 156 lb 12 oz (71.1 kg)     Physical Exam:    General: No acute distress.   Respiratory: positive wheezing noted bilaterally on inhalation and exhalation   Cardiovascular: S1, S2.  Abdomen: Soft, nontender, nondistended.  Positive bowel sounds.  Extremities: No edema.  Neuro: Alert and awake, moves all ext    Diagnostic Data:    Labs:  Recent Labs   Lab 24  0634 24  1523 24  0438 24  0610 25  0655   WBC 9.9 7.7 9.7  --  12.7*   HGB 14.6  13.3 12.9  --  13.7   MCV 94.4 92.8 92.1  --  94.3   .0 193.0 184.0  --  234.0   INR 1.84*  --  1.92* 1.99* 2.21*       Recent Labs   Lab 12/29/24  0634 12/29/24  1523 12/30/24  0438 12/31/24  0610 01/01/25  0655   * 244* 156* 134* 128*   BUN 32* 32* 32* 42* 41*   CREATSERUM 1.77* 1.84* 1.51* 1.53* 1.66*   CA 9.1 8.7 9.1 9.4 9.5   ALB 4.6 4.2 4.1  --   --     138 141 143 140   K 3.6 3.8 4.4  4.4 5.0 4.5    103 107 108 106   CO2 29.0 24.0 25.0 27.0 26.0   ALKPHO 88 73 76  --   --    AST 24 20 22  --   --    ALT 13 11 11  --   --    BILT 0.5 0.3 0.3  --   --    TP 7.4 7.0 6.8  --   --        Estimated Creatinine Clearance: 27.8 mL/min (A) (based on SCr of 1.66 mg/dL (H)).    Recent Labs   Lab 12/30/24 0438 12/31/24  0610 01/01/25  0655   PTP 22.1* 22.8* 24.7*   INR 1.92* 1.99* 2.21*            COVID-19 Lab Results    COVID-19  Lab Results   Component Value Date    COVID19 Not Detected 12/29/2024    COVID19 Not Detected 12/29/2024    COVID19 Not Detected 11/14/2022       Pro-Calcitonin  Recent Labs   Lab 12/29/24  0634   PCT 0.10*       Cardiac  Recent Labs   Lab 12/29/24  1523   PBNP 6,317*       Creatinine Kinase  No results for input(s): \"CK\" in the last 168 hours.    Inflammatory Markers  No results for input(s): \"CRP\", \"JEVON\", \"LDH\", \"DDIMER\" in the last 168 hours.    Recent Labs   Lab 12/29/24  0634 12/29/24  1523   TROPHS 9 22       Imaging: Imaging data reviewed in Epic.    Medications:    ipratropium-albuterol  3 mL Nebulization Once    methylPREDNISolone  60 mg Intravenous Q6H    dilTIAZem ER  240 mg Oral BID    ipratropium-albuterol  3 mL Nebulization 4 times per day    aspirin  81 mg Oral Daily    furosemide  40 mg Oral Daily    levothyroxine  88 mcg Oral Before breakfast    atorvastatin  10 mg Oral Nightly    guaiFENesin ER  600 mg Oral BID    fluticasone-salmeterol  1 puff Inhalation BID    And    umeclidinium bromide  1 puff Inhalation Daily    metoprolol succinate ER  100 mg  Oral 2x Daily(Beta Blocker)    insulin aspart  2-10 Units Subcutaneous TID AC and HS       Assessment & Plan:      #Acute on chronic hypoxic respiratory failure (2L at needed and 2L at night)  #COPD exacerbation, CXR neg, RVP neg, PCT 0.1  -IV steroids > Prednisone burst > back to iv steroids (worsening wheezing)  CXR and abg reviewed from today  -ZENAIDA Nebs schedule + PRN  -Antitussuves   -Oxygen, currently 2L  -Pulmonary & CCM consult  -SW consult - patient wants portable tanks at home     #Acute encephalopathy d/t hospitalization? Steroids? Infection? CVA?  -Check UA: looks okay  -Check CT brain (12/31) neg     #Atrial fibrillation on Coumadin with RVR  -Toprol   -Cardizem - change to extended?   -Coumadin  -Monitor INR  -Monitor hemodynamics  -Telemetry  -Cardiology consult      #CAD, trop neg  #Essential hypertension  #Dyslipidemia   -Aspirin  -Statin     #Chronic diastolic heart failure   -Lasix PO daily  -Daily weight     #Chronic kidney disease  -Monitor UOP, creatinine and electrolytes     #Hypothyroidism  -Synthroid     #GERD  -PPI    #Prediabetes A1c 6.1  -Correctional scale    #Cerebrovascular disease     #DVT Px: Coumadin    Plan of care discussed with patient and family     Pedro Sheppard DO        Supplementary Documentation:   DVT Mechanical Prophylaxis:   SCDs,    DVT Pharmacologic Prophylaxis   Medication   None         DVT Pharmacologic prophylaxis: Aspirin 81 mg      Code Status: DNAR/Selective Treatment  Srinivasan: No urinary catheter in place  Srinivasan Duration (in days):   Central line:    RICHAR:

## 2025-01-01 NOTE — PROGRESS NOTES
[facility] Pulmonary, Critical Care and Sleep    Diana Eisenberg Patient Status:  Inpatient    1940 MRN [id number]   Location [facility] 2NE-A Attending Pedro Sheppard,    Hosp Day # 3 PCP SHERIF SÁNCHEZ MD     Date of Admission: 2024  6:21 AM    Admission Diagnosis: Hypokalemia [E87.6]  COPD exacerbation (HCC) [J44.1]  Atrial fibrillation with RVR (HCC) [I48.91]    S: Increased WOB this am. Cough productive of green sputum.     Scheduled Medications:     ipratropium-albuterol  3 mL Nebulization Once    methylPREDNISolone  60 mg Intravenous Q6H    dilTIAZem ER  240 mg Oral BID    ipratropium-albuterol  3 mL Nebulization 4 times per day    aspirin  81 mg Oral Daily    furosemide  40 mg Oral Daily    levothyroxine  88 mcg Oral Before breakfast    atorvastatin  10 mg Oral Nightly    guaiFENesin ER  600 mg Oral BID    fluticasone-salmeterol  1 puff Inhalation BID    And    umeclidinium bromide  1 puff Inhalation Daily    metoprolol succinate ER  100 mg Oral 2x Daily(Beta Blocker)    insulin aspart  2-10 Units Subcutaneous TID AC and HS       Infusing Medications:      PRN Medications:    ipratropium-albuterol    metoprolol    benzocaine-menthol    famotidine    acetaminophen    ondansetron    metoclopramide    benzonatate    influenza virus vaccine PF    glucose **OR** glucose **OR** glucose-vitamin C **OR** dextrose **OR** glucose **OR** glucose **OR** glucose-vitamin C    OBJECTIVE:  /57 (BP Location: Left arm)   Pulse 111   Temp 97.9 °F (36.6 °C) (Oral)   Resp 20   Ht 147.3 cm (4' 10\")   Wt 157 lb (71.2 kg)   SpO2 100%   BMI 32.81 kg/m²    Temp (24hrs), Av.8 °F (36.6 °C), Min:97.6 °F (36.4 °C), Max:97.9 °F (36.6 °C)         Wt Readings from Last 3 Encounters:   25 157 lb (71.2 kg)   24 162 lb (73.5 kg)   04/10/24 165 lb (74.8 kg)       Intake/Output Summary (Last 24 hours) at 2025 1203  Last data filed at 2025 1100  Gross per 24 hour   Intake 780 ml   Output 1350 ml    Net -570 ml     O2: 2 LPM  General: NAD.   Neuro: Alert, no focal deficits.    HEENT: PERRL  Neck : No LAD  CV: RRR, nl S1, S2, no S4, S3 or murmur.   Lungs: Increased rhonchi and upper airway sounds.   Abd: Nontender, non distended.   Ext: No edema.   Skin: No rashes.     Recent Labs   Lab 12/29/24  1523 12/30/24  0438 01/01/25  0655   WBC 7.7 9.7 12.7*   HGB 13.3 12.9 13.7   HCT 40.1 38.5 42.7   .0 184.0 234.0     Recent Labs   Lab 12/29/24  0634 12/29/24  1523 12/30/24  0438 12/31/24  0610 01/01/25  0655   * 244* 156* 134* 128*   BUN 32* 32* 32* 42* 41*   CREATSERUM 1.77* 1.84* 1.51* 1.53* 1.66*   CA 9.1 8.7 9.1 9.4 9.5    138 141 143 140   K 3.6 3.8 4.4  4.4 5.0 4.5    103 107 108 106   CO2 29.0 24.0 25.0 27.0 26.0   AST 24 20 22  --   --    ALT 13 11 11  --   --    ALB 4.6 4.2 4.1  --   --      Recent Labs   Lab 12/29/24  1523 12/30/24  0438 12/31/24  0610 01/01/25  0655   INR  --  1.92* 1.99* 2.21*   PTT 36.8*  --   --   --      Recent Labs   Lab 01/01/25  0730   ABGPHT 7.46*   OWQTGB1X 43   SXPJY5J 72*   ABGHCO3 29.5*   SITE Left Radial   DEV High flow nasal cannula   THGB 13.9       COVID-19 Lab Results    COVID-19  Lab Results   Component Value Date    COVID19 Not Detected 12/29/2024    COVID19 Not Detected 12/29/2024    COVID19 Not Detected 11/14/2022       Pro-Calcitonin  Recent Labs   Lab 12/29/24  0634   PCT 0.10*       Cardiac  Recent Labs   Lab 12/29/24  1523   PBNP 6,317*       Creatinine Kinase  No results for input(s): \"CK\" in the last 168 hours.    Inflammatory Markers  No results for input(s): \"CRP\", \"JEVON\", \"LDH\", \"DDIMER\" in the last 168 hours.    Imaging:   CXR 1/1/2025:  FINDINGS:  Linear opacities in the lung bases likely represent atelectasis and/or scarring, not significantly changed.  No new consolidation is seen.  No significant pleural fluid or pneumothorax.  Cardiac silhouette is upper normal in size.  Minimally   prominent pulmonary vasculature.  Probable  calcified granuloma within the right upper lung.           Chest images personally reviewed.     Assessment/Plan   Shortness of breath and hypoxia secondary to pulmonary edema and bronchospasm/COPD.   - Continue BD protocol.   - CXR with mucus plugging/atelectasis.   - pulmonary toilet.   - ABG with respiratory alkaosis with baseline CO2 retention.   Consider bipap if WOB worsens  AECOPD - baseline FEV1 0.87L (57%) DLCO 56%  -Back to IV steroids this am.   -LAMA / LABA / ICS chronically  -on chronic O2  -nebs prn  ID:   RVP negative.   Increased mucus production, add azithro x 3 days.   Afib with RVR  -better  -per cardiology  Pulmonary HTN - baseline PAH 40-45  -Echo with LVEF 60-65% and est PAP 66 (elevated from baseline likely due to above)  Chronic hypoxic resp failure due to COPD and Pulm HTN  -baseline O2 is 2 LPM - now at 2 LPM  Confusion / agitation - supportive care  -delerium management per primary team  Dispo - DNAR  -seems better with rate control  My best regards,         Oliverio Herndon MD  [facility] Pulmonary, Critical Care and Sleep Medicine

## 2025-01-01 NOTE — CM/SW NOTE
12/31/24 1810   Choice of Post-Acute Provider   Informed patient of right to choose their preferred provider Yes   List of appropriate post-acute services provided to patient/family with quality data Yes   Patient/family choice Undecided   Information given to Patient;Daughter     CM met w/ pt to provide list of available HHC providers and discuss services. Pt reports she has had HHC in the past and is agreeable to services. She does not remember the name of her previous provider. Pt's dtr at bedside and will assist pt w/ reviewing choices.     Will endorse to CM/SW to follow up for choice prior to DC.    CM/SW will remain available for DC planning and/or support.     SIRISHA RoldanN, CMSRN    [id number]

## 2025-01-01 NOTE — PROGRESS NOTES
01/01/25 0950   Respiratory Therapy / Neb Tx   $ RT Standby Charge (per 15 min) 1   $ Subsequent Tx Charge Nebulizer   MD Order duo   Pre-Treatment Pulse 105   Pre-Treatment Respirations 22   Pre-Treatment O2 Saturation 96 %   Solution Normal saline   Flow rate 8   Delivery device Mouthpiece   Duration (minutes) 10 min   Respiratory Pattern Regular   Breath Sounds Pre-Treatment Bilateral Expiratory wheeze   Breath Sounds Post-Treatment Bilateral Expiratory wheeze   Post-Treatment Pulse 94   Post-Treatment Respirations 22   Post-Treatment O2 Saturation 100 %   Delivery Source Oxygen   Cough Non-productive;Congested   Position High fowlers   Treatment Tolerance Well   Time of treatment 0950   Protocol Recommendations Continue present management   Action Follow protocol     Duoneb  given at 0716 and ABG done, No changes after neb tx, PT still have audible wheezing, RN made aware at 0730 about PT's condition. Manual CPT done at 0950 with PRN duoneb, at 1029 percusser CPT done. RN aware of CPT and neb tx.

## 2025-01-01 NOTE — PROGRESS NOTES
[facility] Pharmacy Dosing Service  Warfarin (Coumadin) Subsequent Dosing    Diana Eisenberg is a 84 year old patient for whom pharmacy is dosing warfarin (Coumadin). Goal INR is 2-3    Recent Labs   Lab 12/29/24  0634 12/30/24  0438 12/31/24  0610 01/01/25  0655   INR 1.84* 1.92* 1.99* 2.21*       Consulted by:  [facility]  Indication:  afib  Potential Drug Interactions:  levothyroxine (stable home med), azithromycin - can increase INR, aspirin (stable home med) - can increase risk of bleeding  Other Anticoagulants:  none  Home regimen (if applicable):  Warfarin 4mg daily    Inpatient Dosing History:    Date 12/29 12/30 12/31 1/1     INR 1.84 1.92 1.99 2.21     Coumadin dose Held by Critical Care 2mg 4mg               Based on above -  1.  For today, Give warfarin (COUMADIN) 4 mg at 2100 tonight as per home regimen  2   PT/INR ordered daily while on warfarin  3.  Pharmacy will continue to follow.  We appreciate the opportunity to assist in the care of this patient.    Tracey Avila, PharmD  1/1/2025  3:21 PM

## 2025-01-02 ENCOUNTER — APPOINTMENT (OUTPATIENT)
Dept: GENERAL RADIOLOGY | Facility: HOSPITAL | Age: 85
End: 2025-01-02
Attending: INTERNAL MEDICINE
Payer: MEDICARE

## 2025-01-02 LAB
ANION GAP SERPL CALC-SCNC: 8 MMOL/L (ref 0–18)
BUN BLD-MCNC: 48 MG/DL (ref 9–23)
CALCIUM BLD-MCNC: 9.2 MG/DL (ref 8.7–10.4)
CHLORIDE SERPL-SCNC: 105 MMOL/L (ref 98–112)
CO2 SERPL-SCNC: 28 MMOL/L (ref 21–32)
CREAT BLD-MCNC: 1.5 MG/DL
EGFRCR SERPLBLD CKD-EPI 2021: 34 ML/MIN/1.73M2 (ref 60–?)
GLUCOSE BLD-MCNC: 146 MG/DL (ref 70–99)
GLUCOSE BLD-MCNC: 161 MG/DL (ref 70–99)
GLUCOSE BLD-MCNC: 168 MG/DL (ref 70–99)
GLUCOSE BLD-MCNC: 169 MG/DL (ref 70–99)
GLUCOSE BLD-MCNC: 178 MG/DL (ref 70–99)
INR BLD: 2.53 (ref 0.8–1.2)
OSMOLALITY SERPL CALC.SUM OF ELEC: 308 MOSM/KG (ref 275–295)
POTASSIUM SERPL-SCNC: 4.4 MMOL/L (ref 3.5–5.1)
PROTHROMBIN TIME: 27.5 SECONDS (ref 11.6–14.8)
SODIUM SERPL-SCNC: 141 MMOL/L (ref 136–145)

## 2025-01-02 PROCEDURE — 99232 SBSQ HOSP IP/OBS MODERATE 35: CPT | Performed by: HOSPITALIST

## 2025-01-02 PROCEDURE — 71045 X-RAY EXAM CHEST 1 VIEW: CPT | Performed by: INTERNAL MEDICINE

## 2025-01-02 RX ORDER — FAMOTIDINE 20 MG/1
20 TABLET, FILM COATED ORAL DAILY PRN
Status: DISCONTINUED | OUTPATIENT
Start: 2025-01-02 | End: 2025-01-04

## 2025-01-02 RX ORDER — DOCUSATE SODIUM 100 MG/1
100 CAPSULE, LIQUID FILLED ORAL 2 TIMES DAILY
Status: DISCONTINUED | OUTPATIENT
Start: 2025-01-02 | End: 2025-01-04

## 2025-01-02 NOTE — PROGRESS NOTES
[facility]   part of [facility]     Hospitalist Progress Note     Diana Eisenberg Patient Status:  Inpatient    1940 MRN [id number]   Location [facility] 4SW-A Attending Ronak Hood MD   Hosp Day # 4 PCP SHERIF SÁNCHEZ MD     Chief Complaint: COPD exacerbation     Subjective:     Pt seen w/ family at the bedside, feels better, less wheezing.   Current medications:   ipratropium-albuterol  3 mL Nebulization Once    methylPREDNISolone  60 mg Intravenous Q6H    azithromycin  500 mg Oral Daily    dilTIAZem ER  240 mg Oral BID    ipratropium-albuterol  3 mL Nebulization 4 times per day    aspirin  81 mg Oral Daily    furosemide  40 mg Oral Daily    levothyroxine  88 mcg Oral Before breakfast    atorvastatin  10 mg Oral Nightly    guaiFENesin ER  600 mg Oral BID    fluticasone-salmeterol  1 puff Inhalation BID    And    umeclidinium bromide  1 puff Inhalation Daily    metoprolol succinate ER  100 mg Oral 2x Daily(Beta Blocker)    insulin aspart  2-10 Units Subcutaneous TID AC and HS       Objective:    Review of Systems:   10 point ROS completed and was negative, except for pertinent positive and negatives stated in subjective.    Vital signs:  Temp:  [96.9 °F (36.1 °C)-97.9 °F (36.6 °C)] 97.9 °F (36.6 °C)  Pulse:  [] 87  Resp:  [18-20] 18  BP: (125-137)/(58-88) 125/76  SpO2:  [93 %-97 %] 94 %  Patient Weight for the past 72 hrs:   Weight   24 0626 115 lb (52.2 kg)   24 1300 155 lb 6.8 oz (70.5 kg)   24 0400 156 lb 12 oz (71.1 kg)     Physical Exam:    General: No acute distress.   Respiratory: no wheezing noted bilaterally on inhalation or exhalation   Cardiovascular: S1, S2.  Abdomen: Soft, nontender, nondistended.  Positive bowel sounds.  Extremities: No edema.  Neuro: Alert and awake, moves all ext    Diagnostic Data:    Labs:  Recent Labs   Lab 24  0634 24  1523 24  0438 24  0610 25  0655 25  0701   WBC 9.9 7.7 9.7  --  12.7*  --     HGB 14.6 13.3 12.9  --  13.7  --    MCV 94.4 92.8 92.1  --  94.3  --    .0 193.0 184.0  --  234.0  --    INR 1.84*  --  1.92* 1.99* 2.21* 2.53*       Recent Labs   Lab 12/29/24  0634 12/29/24  1523 12/30/24  0438 12/31/24  0610 01/01/25  0655 01/02/25  0701   * 244* 156* 134* 128* 168*   BUN 32* 32* 32* 42* 41* 48*   CREATSERUM 1.77* 1.84* 1.51* 1.53* 1.66* 1.50*   CA 9.1 8.7 9.1 9.4 9.5 9.2   ALB 4.6 4.2 4.1  --   --   --     138 141 143 140 141   K 3.6 3.8 4.4  4.4 5.0 4.5 4.4    103 107 108 106 105   CO2 29.0 24.0 25.0 27.0 26.0 28.0   ALKPHO 88 73 76  --   --   --    AST 24 20 22  --   --   --    ALT 13 11 11  --   --   --    BILT 0.5 0.3 0.3  --   --   --    TP 7.4 7.0 6.8  --   --   --        Estimated Creatinine Clearance: 29.9 mL/min (A) (based on SCr of 1.5 mg/dL (H)).    Recent Labs   Lab 12/31/24  0610 01/01/25  0655 01/02/25  0701   PTP 22.8* 24.7* 27.5*   INR 1.99* 2.21* 2.53*            COVID-19 Lab Results    COVID-19  Lab Results   Component Value Date    COVID19 Not Detected 12/29/2024    COVID19 Not Detected 12/29/2024    COVID19 Not Detected 11/14/2022       Pro-Calcitonin  Recent Labs   Lab 12/29/24  0634   PCT 0.10*       Cardiac  Recent Labs   Lab 12/29/24  1523   PBNP 6,317*       Creatinine Kinase  No results for input(s): \"CK\" in the last 168 hours.    Inflammatory Markers  No results for input(s): \"CRP\", \"JEVON\", \"LDH\", \"DDIMER\" in the last 168 hours.    Recent Labs   Lab 12/29/24  0634 12/29/24  1523   TROPHS 9 22       Imaging: Imaging data reviewed in Epic.    Medications:    ipratropium-albuterol  3 mL Nebulization Once    methylPREDNISolone  60 mg Intravenous Q6H    azithromycin  500 mg Oral Daily    dilTIAZem ER  240 mg Oral BID    ipratropium-albuterol  3 mL Nebulization 4 times per day    aspirin  81 mg Oral Daily    furosemide  40 mg Oral Daily    levothyroxine  88 mcg Oral Before breakfast    atorvastatin  10 mg Oral Nightly    guaiFENesin ER  600 mg  Oral BID    fluticasone-salmeterol  1 puff Inhalation BID    And    umeclidinium bromide  1 puff Inhalation Daily    metoprolol succinate ER  100 mg Oral 2x Daily(Beta Blocker)    insulin aspart  2-10 Units Subcutaneous TID AC and HS       Assessment & Plan:      #Acute on chronic hypoxic respiratory failure (2L at needed and 2L at night)  #COPD exacerbation, CXR neg, RVP neg, PCT 0.1  -IV steroids > Prednisone burst > back to iv steroids as of 1/1, cont for now  -ZENAIDA Nebs schedule + PRN  -Antitussuves   -Oxygen, currently 2L  -Pulmonary & CCM consult  -SW consult - patient wants portable tanks at home     #Acute encephalopathy d/t hospitalization? Steroids? Infection? CVA?  -Check UA: looks okay  -Check CT brain (12/31) neg     #Atrial fibrillation on Coumadin with RVR  -Toprol   -Cardizem CD  -Coumadin  -Monitor INR  -Monitor hemodynamics  -Telemetry  -Cardiology consult      #CAD, trop neg  #Essential hypertension  #Dyslipidemia   -Aspirin  -Statin     #Chronic diastolic heart failure   -Lasix PO daily  -Daily weight     #Chronic kidney disease  -Monitor UOP, creatinine and electrolytes     #Hypothyroidism  -Synthroid     #GERD  -PPI    #Prediabetes A1c 6.1  -Correctional scale    #Cerebrovascular disease     #DVT Px: Coumadin    Plan of care discussed with patient and family     Pedro Sheppard DO        Supplementary Documentation:   DVT Mechanical Prophylaxis:   SCDs,    DVT Pharmacologic Prophylaxis   Medication   None         DVT Pharmacologic prophylaxis: Aspirin 81 mg      Code Status: DNAR/Selective Treatment  Srinivasan: No urinary catheter in place  Srinivasan Duration (in days):   Central line:    RICHAR:

## 2025-01-02 NOTE — PROGRESS NOTES
Pulmonary Progress Note      NAME: Diana Eisenberg - ROOM: 2612/[facility]-A - MRN: [id number] - Age: 84 year old - : 1940    Assessment/Plan:  Shortness of breath and hypoxia secondary to pulmonary edema and bronchospasm/COPD.   - Continue BD protocol.   - CXR with mucus plugging/atelectasis.   - pulmonary toilet.   - ABG with respiratory alkaosis with baseline CO2 retention.   - feels more dyspnea today - will get CXR and keep on OV steroids.  She is not sure nebs have been helpful   AECOPD - baseline FEV1 0.87L (57%) DLCO 56%  -Back to IV steroids this am.   -LAMA / LABA / ICS chronically  -on chronic O2  -nebs prn  ID:   RVP negative.   Increased mucus production, added azithro d2/3.   Afib with RVR  -better  -per cardiology  Pulmonary HTN - baseline PAH 40-45  -Echo with LVEF 60-65% and est PAP 66 (elevated from baseline likely due to above)  Chronic hypoxic resp failure due to COPD and Pulm HTN  -baseline O2 is 2 LPM - now at 2 LPM  Confusion / agitation - supportive care  -delerium management per primary team  Dispo - DNAR  -follow with KW as outpatient      Hector Estrada M.D.  Pulmonary and Critical Care Medicine  [facility]      Subjective:  No acute events overnight. Feels breathing is a little harder today    Medications:   ipratropium-albuterol  3 mL Nebulization Once    methylPREDNISolone  60 mg Intravenous Q6H    azithromycin  500 mg Oral Daily    dilTIAZem ER  240 mg Oral BID    ipratropium-albuterol  3 mL Nebulization 4 times per day    aspirin  81 mg Oral Daily    furosemide  40 mg Oral Daily    levothyroxine  88 mcg Oral Before breakfast    atorvastatin  10 mg Oral Nightly    guaiFENesin ER  600 mg Oral BID    fluticasone-salmeterol  1 puff Inhalation BID    And    umeclidinium bromide  1 puff Inhalation Daily    metoprolol succinate ER  100 mg Oral 2x Daily(Beta Blocker)    insulin aspart  2-10 Units Subcutaneous TID AC and HS     Intake/Output Summary (Last 24 hours) at 2025  1146  Last data filed at 1/2/2025 0410  Gross per 24 hour   Intake 720 ml   Output 1390 ml   Net -670 ml       Physical Exam:  /72 (BP Location: Left arm)   Pulse 115   Temp 97.8 °F (36.6 °C) (Oral)   Resp 18   Ht 4' 10\" (1.473 m)   Wt 152 lb 5.4 oz (69.1 kg)   SpO2 93%   BMI 31.84 kg/m²   Physical Exam:   General: alert, cooperative, no respiratory distress.   HEENT: Normocephalic atraumatic.Lips, mucosa, and tongue normal.  No thrush noted.   Neck: supple without mass   Lungs: clear   Chest wall: No tenderness or deformity.   Heart: irregular   Abdomen: soft, non-tender, non-distended, positive BS.   Extremity: no edema   Skin: no new rash   Neuro: alert    Recent Labs   Lab 01/01/25  0655   RBC 4.53   HGB 13.7   HCT 42.7   MCV 94.3   MCH 30.2   MCHC 32.1   RDW 13.6   NEPRELIM 10.03*   WBC 12.7*   .0     Recent Labs   Lab 12/29/24  0634 12/29/24  1523 12/30/24  0438 12/31/24  0610 01/01/25  0655 01/02/25  0701   * 244* 156* 134* 128* 168*   BUN 32* 32* 32* 42* 41* 48*   CREATSERUM 1.77* 1.84* 1.51* 1.53* 1.66* 1.50*   CA 9.1 8.7 9.1 9.4 9.5 9.2   ALB 4.6 4.2 4.1  --   --   --     138 141 143 140 141   K 3.6 3.8 4.4  4.4 5.0 4.5 4.4    103 107 108 106 105   CO2 29.0 24.0 25.0 27.0 26.0 28.0   ALKPHO 88 73 76  --   --   --    AST 24 20 22  --   --   --    ALT 13 11 11  --   --   --    BILT 0.5 0.3 0.3  --   --   --    TP 7.4 7.0 6.8  --   --   --        Imaging: I independently visualized all relevant chest imaging in PACS, agree with radiology interpretation except where noted.

## 2025-01-02 NOTE — PLAN OF CARE
Received patient at 0730. Alert and oriented x3, forgetful. Tele rhythm Afib. O2 sat 98% on 2L. Lung sounds with crackles, expiratory wheezing. Bed is locked and in low position. Call light and personal items within patient reach. Pt voiding with no issues. Pt ambulating in room with 1 assist and walker. Reviewed plan of care and patient verbalized understanding.      Night RN informed of increased WOB for patient overnight. This RN paged [facility] cards NP about pt's episode and that nebs werent helping. Cards NP came to bedside and pt was in resp distress again. Another blake treatment was given. Chest PT given. IV lasix x1, and solumedrol given. Hosp and pulm made aware and came to bedside. Bipap was ordered prn, is in room. Azithro ordered. Pt feeling better. Family at bedside updated on plan of care and treatment.       Problem: Patient/Family Goals  Goal: Patient/Family Long Term Goal  Description: Patient's Long Term Goal: \"breathe better\"    Interventions:  - monitor o2 sats  -q4 duonebs  - 1-2L NC prn  - bipap prn  - See additional Care Plan goals for specific interventions  Outcome: Progressing  Goal: Patient/Family Short Term Goal  Description: Patient's Short Term Goal: \"sleep tonight\"    Interventions:   - cluster cares  - See additional Care Plan goals for specific interventions  Outcome: Progressing     Problem: CARDIOVASCULAR - ADULT  Goal: Maintains optimal cardiac output and hemodynamic stability  Description: INTERVENTIONS:  - Monitor vital signs, rhythm, and trends  - Monitor for bleeding, hypotension and signs of decreased cardiac output  - Evaluate effectiveness of vasoactive medications to optimize hemodynamic stability  - Monitor arterial and/or venous puncture sites for bleeding and/or hematoma  - Assess quality of pulses, skin color and temperature  - Assess for signs of decreased coronary artery perfusion - ex. Angina  - Evaluate fluid balance, assess for edema, trend weights  Outcome:  Progressing  Goal: Absence of cardiac arrhythmias or at baseline  Description: INTERVENTIONS:  - Continuous cardiac monitoring, monitor vital signs, obtain 12 lead EKG if indicated  - Evaluate effectiveness of antiarrhythmic and heart rate control medications as ordered  - Initiate emergency measures for life threatening arrhythmias  - Monitor electrolytes and administer replacement therapy as ordered  Outcome: Progressing     Problem: RESPIRATORY - ADULT  Goal: Achieves optimal ventilation and oxygenation  Description: INTERVENTIONS:  - Assess for changes in respiratory status  - Assess for changes in mentation and behavior  - Position to facilitate oxygenation and minimize respiratory effort  - Oxygen supplementation based on oxygen saturation or ABGs  - Provide Smoking Cessation handout, if applicable  - Encourage broncho-pulmonary hygiene including cough, deep breathe, Incentive Spirometry  - Assess the need for suctioning and perform as needed  - Assess and instruct to report SOB or any respiratory difficulty  - Respiratory Therapy support as indicated  - Manage/alleviate anxiety  - Monitor for signs/symptoms of CO2 retention  Outcome: Progressing     Problem: Delirium  Goal: Minimize duration of delirium  Description: Interventions:  - Encourage use of hearing aids, eye glasses  - Promote highest level of mobility daily  - Provide frequent reorientation  - Promote wakefulness i.e. lights on, blinds open  - Promote sleep, encourage patient's normal rest cycle i.e. lights off, TV off, minimize noise and interruptions  - Encourage family to assist in orientation and promotion of home routines  Outcome: Progressing     Problem: Safety Risk - Non-Violent Restraints  Goal: Patient will remain free from self-harm  Description: INTERVENTIONS:  - Apply the least restrictive restraint to prevent harm  - Notify patient and family of reasons restraints applied  - Assess for any contributing factors to confusion  (electrolyte disturbances, delirium, medications)  - Discontinue any unnecessary medical devices as soon as possible  - Assess the patient's physical comfort, circulation, skin condition, hydration, nutrition and elimination needs   - Reorient and redirection as needed  - Assess for the need to continue restraints  Outcome: Progressing

## 2025-01-02 NOTE — CM/SW NOTE
01/02/25 1308   Choice of Post-Acute Provider   Informed patient of right to choose their preferred provider Yes   List of appropriate post-acute services provided to patient/family with quality data Yes   Patient/family choice [facility]   Information given to Patient;Daughter   Residential [facility]HC/Hospice financial disclosure given Yes     BALWINDER met with pt at bedside to discuss HH choice. Pt stated she is not sure of HH choice and requested for BALWINDER to call her daughter, Cathryn, to further discuss DC plan.    BALWINDER called Cathryn ([phone]) and inquired about HH choice. Cathryn confirmed she received options list and has reviewed it. Cathryn chose [facility] for HH services.    BALWINDER reserved [facility] in [place].     Pt continuing to require continuous supplemental O2. Will need O2 walk and updated home O2 orders to be sent to [facility] prior to DC (pt currently only has nocturnal O2 through them).     to remain available for support and/or discharge planning.    KIKI Abreu  Discharge Planner  [phone]

## 2025-01-02 NOTE — CONSULTS
[facility] hospital Pharmacy Dosing Service  Warfarin (Coumadin) Subsequent Dosing    Diana Eisenberg is a 84 year old patient for whom pharmacy is dosing warfarin (Coumadin). Goal INR is 2-3    Recent Labs   Lab 12/29/24  0634 12/30/24  0438 12/31/24  0610 01/01/25  0655 01/02/25  0701   INR 1.84* 1.92* 1.99* 2.21* 2.53*       Consulted by:  [facility]  Indication:  afib  Potential Drug Interactions:  levothyroxine (stable home med), azithromycin - can increase INR, aspirin (stable home med) - can increase risk of bleeding  Other Anticoagulants:  none  Home regimen (if applicable):  Warfarin 4mg daily    Inpatient Dosing History:    Date 12/29 12/30 12/31 1/1 1/2    INR 1.84 1.92 1.99 2.21 2.53    Coumadin dose Held by Critical Care 2mg 4mg 4 mg                     Based on above -  1.  For today, Give warfarin (COUMADIN) 3 mg at 2100 tonight  2   PT/INR ordered daily while on warfarin  3.  Pharmacy will continue to follow.  We appreciate the opportunity to assist in the care of this patient.    Sandra Ashton, PharmD  1/2/2025  3:02 PM

## 2025-01-02 NOTE — PLAN OF CARE
Assumed care of patient 1930    Patient alert and oriented x2-3. Impulsive, to BR without assist at times. 2L O2 via n.c. with adequate sats. A fib on tele. Continent bowel and bladder. Denies pain at this time. Up x1 with walker. Call light within reach. Fall precautions in place. Continuing to monitor    Plan  PO ABX  Nebs  IV solumedrol    Problem: Patient/Family Goals  Goal: Patient/Family Long Term Goal  Description: Patient's Long Term Goal: \"breathe better\"    Interventions:  - monitor o2 sats  -q4 duonebs  - 1-2L NC prn  - bipap prn  - See additional Care Plan goals for specific interventions  Outcome: Progressing  Goal: Patient/Family Short Term Goal  Description: Patient's Short Term Goal: \"sleep tonight\"    Interventions:   - cluster cares  - See additional Care Plan goals for specific interventions  Outcome: Progressing     Problem: CARDIOVASCULAR - ADULT  Goal: Maintains optimal cardiac output and hemodynamic stability  Description: INTERVENTIONS:  - Monitor vital signs, rhythm, and trends  - Monitor for bleeding, hypotension and signs of decreased cardiac output  - Evaluate effectiveness of vasoactive medications to optimize hemodynamic stability  - Monitor arterial and/or venous puncture sites for bleeding and/or hematoma  - Assess quality of pulses, skin color and temperature  - Assess for signs of decreased coronary artery perfusion - ex. Angina  - Evaluate fluid balance, assess for edema, trend weights  Outcome: Progressing  Goal: Absence of cardiac arrhythmias or at baseline  Description: INTERVENTIONS:  - Continuous cardiac monitoring, monitor vital signs, obtain 12 lead EKG if indicated  - Evaluate effectiveness of antiarrhythmic and heart rate control medications as ordered  - Initiate emergency measures for life threatening arrhythmias  - Monitor electrolytes and administer replacement therapy as ordered  Outcome: Progressing     Problem: RESPIRATORY - ADULT  Goal: Achieves optimal ventilation  and oxygenation  Description: INTERVENTIONS:  - Assess for changes in respiratory status  - Assess for changes in mentation and behavior  - Position to facilitate oxygenation and minimize respiratory effort  - Oxygen supplementation based on oxygen saturation or ABGs  - Provide Smoking Cessation handout, if applicable  - Encourage broncho-pulmonary hygiene including cough, deep breathe, Incentive Spirometry  - Assess the need for suctioning and perform as needed  - Assess and instruct to report SOB or any respiratory difficulty  - Respiratory Therapy support as indicated  - Manage/alleviate anxiety  - Monitor for signs/symptoms of CO2 retention  Outcome: Progressing

## 2025-01-02 NOTE — PHYSICAL THERAPY NOTE
PHYSICAL THERAPY TREATMENT NOTE - INPATIENT    Room Number: 2612/2612-A     Session: 1     Number of Visits to Meet Established Goals: 4    Presenting Problem: A fib w/ RVR, metabolic acidosis, COPD exacerbation  Co-Morbidities : CAD, pulmonary HTN, macular degeneration, AF, HFpEF         PHYSICAL THERAPY MEDICAL/SOCIAL HISTORY  History related to current admission: Patient is a 84 year old female admitted on 12/29/2024 from home for worsening cough/congestion.  Pt diagnosed with a fib w/ rvr, metabolic acidosis, COPD exacerbation.     HOME SITUATION  Type of Home: Independent living facility ([facility])  Home Layout: One level;Other (Comment);Elevator (elevator access, one level apartment)  Stairs to Enter : 0        Stairs to Bedroom: 0         Lives With: Alone    Drives: No   Patient Regularly Uses: Glasses;Home O2;Rollator       Prior Level of [place]: Pt lives at [facility] and reports that she is independent w/ basic adl's and gait w/ rw.  Has a caregiver that comes 1x a week to assist w/ errands and cleaning.    PHYSICAL THERAPY ASSESSMENT   Patient demonstrates excellent progress this session, goals  remain in progress.      Patient is requiring supervision as a result of the following impairments: decreased endurance/aerobic capacity and increased O2 needs from baseline.     Patient continues to function near baseline with bed mobility, transfers, and gait.  Next session anticipate patient to progress gait and standing prolonged periods.  Physical Therapy will continue to follow patient for duration of hospitalization.    Patient continues to benefit from continued skilled PT services: at discharge to promote prior level of function and safety with additional support and return home with home health PT.    PLAN DURING HOSPITALIZATION  Nursing Mobility Recommendation : 1 Assist  PT Device Recommendation: Rolling walker  PT Treatment Plan: Energy conservation;Endurance;Patient  education;Gait training;Strengthening;Transfer training;Balance training  Frequency (Obs): 3-5x/week     CURRENT GOALS     Goal #1 Patient is able to demonstrate supine - sit EOB @ level: modified independent      Goal #2 Patient is able to demonstrate transfers EOB to/from [facility] at assistance level: modified independent      Goal #3 Patient is able to ambulate 300 feet with assist device: walker - rolling at assistance level: supervision      Goal #4     Goal #5     Goal #6     Goal Comments: Goals established on 2024 all goals ongoing     SUBJECTIVE  \"Yeah I would love to walk\"    OBJECTIVE  Precautions: Bed/chair alarm    WEIGHT BEARING RESTRICTION     PAIN ASSESSMENT   Ratin          BALANCE                                                                                                                       Static Sitting: Fair +  Dynamic Sitting: Fair +           Static Standing: Fair -  Dynamic Standing: Fair -    ACTIVITY TOLERANCE                         O2 WALK       AM-PAC '6-Clicks' INPATIENT SHORT FORM - BASIC MOBILITY  How much difficulty does the patient currently have...  Patient Difficulty: Turning over in bed (including adjusting bedclothes, sheets and blankets)?: None   Patient Difficulty: Sitting down on and standing up from a chair with arms (e.g., wheelchair, bedside commode, etc.): None   Patient Difficulty: Moving from lying on back to sitting on the side of the bed?: None   How much help from another person does the patient currently need...   Help from Another: Moving to and from a bed to a chair (including a wheelchair)?: A Little   Help from Another: Need to walk in hospital room?: A Little   Help from Another: Climbing 3-5 steps with a railing?: A Little     AM-PAC Score:  Raw Score: 21   Approx Degree of Impairment: 28.97%   Standardized Score (AM-PAC Scale): 50.25   CMS Modifier (G-Code): CJ    FUNCTIONAL ABILITY STATUS  Gait Assessment   Functional Mobility/Gait  Assessment  Gait Assistance: Supervision  Distance (ft): 175  Assistive Device: Rolling walker  Pattern: Within Functional Limits (One standing rest due to mild sob)    Skilled Therapy Provided    Bed Mobility:  Rolling: Mod I   Supine<>Sit: Mod I   Sit<>Supine: NT     Transfer Mobility:  Sit<>Stand: Mod I   Stand<>Sit: Mod I   Gait: SBA with RW    Therapist's Comments: PT safe with usage of RW - independent for toilet transfer/lynn care.  Pt with active wheezing pre activity/post activity. O2 sats on room air 89/90% upon return to room.  RN Abby made aware.  Pt setup to order breakfast    Encouraged TID ambulation with nursing staff to prevent deconditioning      THERAPEUTIC EXERCISES  Lower Extremity Alternating marching  Ankle pumps     Upper Extremity  - open/close     Position Sitting     Repetitions   10   Sets   1     Patient End of Session: Up in chair;Needs met;Call light within reach;RN aware of session/findings;All patient questions and concerns addressed;Hospital anti-slip socks    PT Session Time: 23 minutes  Gait Trainin minutes  Therapeutic Activity: 15 minutes  Therapeutic Exercise: 0 minutes   Neuromuscular Re-education: 0 minutes

## 2025-01-02 NOTE — OCCUPATIONAL THERAPY NOTE
OCCUPATIONAL THERAPY TREATMENT NOTE - INPATIENT     Room Number: 2612/2612-A  Session: 2  Number of Visits to Meet Established Goals: 4    Presenting Problem: AF with RVR    ASSESSMENT   Patient demonstrates excellent progress this session, goals updated to reflect patient performance.    Patient functions near baseline with  ADLs and functional transfers .   Occupational Therapy will discharge patient at this time as all goals have been met at supervision/mod I.    Patient would benefit from home with HH OT at discharge.    History:  Patient is a 84 year old female admitted on 12/29/2024 with Presenting Problem: AF with RVR. Co-Morbidities : CAD, pulmonary HTN, macular degeneration, AF, HFpEF       TREATMENT SESSION:  Patient Start of Session: seated in chair    FUNCTIONAL TRANSFER ASSESSMENT  Sit to Stand: Chair  Edge of Bed: Not Tested  Chair: Modified Independent  Toilet Transfer: Modified Independent    BED MOBILITY  Supine to Sit : Not tested  Sit to Supine (OT): Not Tested    BALANCE ASSESSMENT  Static Sitting: Independent  Static Standing: Supervision    FUNCTIONAL ADL ASSESSMENT  Grooming Standing: Supervision  LB Dressing Seated: Not Tested  Toileting Seated: Modified Independent  Toileting Standing: Supervision    O2 SATURATIONS       EDUCATION PROVIDED  Patient Education : Role of Occupational Therapy; Plan of Care; Discharge Recommendations; Functional Transfer Techniques; Fall Prevention; Posture/Positioning; Energy Conservation; Proper Body Mechanics  Patient's Response to Education: Verbalized Understanding; Returned Demonstration    Equipment used: RW    Therapist comments: Pt received seated in chair, pleasant and cooperative for OT session. Pt requesting to use the bathroom. Pt stands fro chair requiring mod I and ambulates to bathroom with RW requiring supervision. All aspects of toileting performed at mod I/supervision. Hand washing hygiene in standing performed at supervision. Pt returns to  chair with all needs. Pt encouraged on pursed lip breathing to sustain O2 sats above 90%. Initial reading of 84% on RA. Quick recovery to 92%.    Patient End of Session: Up in chair;Needs met;Call light within reach;RN aware of session/findings;All patient questions and concerns addressed;Hospital anti-slip socks;Alarm set    PAIN ASSESSMENT  Ratin           OBJECTIVE  Precautions: Bed/chair alarm    AM-PAC ‘6-Clicks’ Inpatient Daily Activity Short Form  -   Putting on and taking off regular lower body clothing?: A Little  -   Bathing (including washing, rinsing, drying)?: A Little  -   Toileting, which includes using toilet, bedpan or urinal? : None  -   Putting on and taking off regular upper body clothing?: A Little  -   Taking care of personal grooming such as brushing teeth?: None  -   Eating meals?: None    AM-PAC Score:  Score: 21  Approx Degree of Impairment: 32.79%  Standardized Score (AM-PAC Scale): 44.27    PLAN  OT Device Recommendations: Other (Comment) (smartwatch with fall detection versus first alert)  OT Treatment Plan: Cognitive reorientation;Endurance training;UE strengthening/ROM;Functional transfer training;Energy conservation/work simplification techniques;ADL training;Patient/Family training;Equipment eval/education;Compensatory technique education;Patient/Family education  Rehab Potential : Good  Frequency: 3-5x/week    OT Goals:     All goals ongoing     ADL Goals   Patient will perform grooming: with supervision and while standing at sink- goal met   Patient will perform lower body dressing:  with supervision  Patient will perform toileting: with supervision- goal met      Functional Transfer Goals  Patient will transfer to toilet:  with supervision- goal met      UE Exercise Program Goal  Patient will be supervision with bilateral AROM HEP (home exercise program).     Therapist Goals  Short Blessed Test    OT Session Time: 15 minutes  Self-Care Home Management: 15  minutes

## 2025-01-02 NOTE — PROGRESS NOTES
[facility] Cardiology Progress Note    Diana Eisenberg Patient Status:  Inpatient    1940 MRN [id number]   Location [facility] 2NE-A Attending Pedro Sheppard, DO   Hosp Day # 4 PCP SHERIF SÁNCHEZ MD     Subjective:  AF better rate controlled today am. States breathing feels better than yesterday    Objective:  /72 (BP Location: Left arm)   Pulse 115   Temp 97.8 °F (36.6 °C) (Oral)   Resp 18   Ht 4' 10\" (1.473 m)   Wt 152 lb 5.4 oz (69.1 kg)   SpO2 93%   BMI 31.84 kg/m²     Telemetry: afib, hr 90s-100's      Intake/Output:    Intake/Output Summary (Last 24 hours) at 2025 0853  Last data filed at 2025 0410  Gross per 24 hour   Intake 720 ml   Output 1890 ml   Net -1170 ml       Last 3 Weights   25 0448 152 lb 5.4 oz (69.1 kg)   25 0315 157 lb (71.2 kg)   24 0500 155 lb 6.8 oz (70.5 kg)   24 0400 156 lb 12 oz (71.1 kg)   24 1300 155 lb 6.8 oz (70.5 kg)   24 0626 115 lb (52.2 kg)   24 1029 162 lb (73.5 kg)   04/10/24 1036 165 lb (74.8 kg)       Labs:  Recent Labs   Lab 24  0610 25  0655 25  0701   * 128* 168*   BUN 42* 41* 48*   CREATSERUM 1.53* 1.66* 1.50*   EGFRCR 33* 30* 34*   CA 9.4 9.5 9.2    140 141   K 5.0 4.5 4.4    106 105   CO2 27.0 26.0 28.0     Recent Labs   Lab 24  0634 24  1523 24  0438 25  0655   RBC 4.78 4.32 4.18 4.53   HGB 14.6 13.3 12.9 13.7   HCT 45.1 40.1 38.5 42.7   MCV 94.4 92.8 92.1 94.3   MCH 30.5 30.8 30.9 30.2   MCHC 32.4 33.2 33.5 32.1   RDW 13.8 13.6 13.7 13.6   NEPRELIM 5.17 7.21  --  10.03*   WBC 9.9 7.7 9.7 12.7*   .0 193.0 184.0 234.0         Recent Labs   Lab 24  0634 24  1523   TROPHS 9 22       Diagnostics:             Physical Exam:    Physical Exam  Constitutional:       General: She is in acute distress.   Cardiovascular:      Rate and Rhythm: Normal rate. Rhythm irregular.   Pulmonary:      Breath sounds: Wheezing  present.      Comments: Poor air movement bilaterally, diffuse inspiratory and expiratory wheezes  Musculoskeletal:      Right lower leg: No edema.      Left lower leg: No edema.         Medications:   ipratropium-albuterol  3 mL Nebulization Once    methylPREDNISolone  60 mg Intravenous Q6H    azithromycin  500 mg Oral Daily    dilTIAZem ER  240 mg Oral BID    ipratropium-albuterol  3 mL Nebulization 4 times per day    aspirin  81 mg Oral Daily    furosemide  40 mg Oral Daily    levothyroxine  88 mcg Oral Before breakfast    atorvastatin  10 mg Oral Nightly    guaiFENesin ER  600 mg Oral BID    fluticasone-salmeterol  1 puff Inhalation BID    And    umeclidinium bromide  1 puff Inhalation Daily    metoprolol succinate ER  100 mg Oral 2x Daily(Beta Blocker)    insulin aspart  2-10 Units Subcutaneous TID AC and HS         Assessment:    Acute hypoxic resp failure- r/t COPD exacerbation, on steroids, symptomatically improving  Permanenet Afib- some elevated HR in setting of resp issues, cardizem doubled with HR controlled at present, INR 2.2 today on warfarin  H/o TIA-s/p CEA  Long time smoker  COPD on home O2  HTN-controlled     Plan:    Continue dilt 240mg BID, metoprolol 100mg daily.  Steroids, nebs, abx per primary/pulm. COPD exacerbation appears to be driving symptoms  Continue warfarin per pt dosing schedule    Mitul Mora MD  Cardiac Electrophysiology  [facility]

## 2025-01-02 NOTE — PLAN OF CARE
O2 study:    O2 sat at rest on room air: 94%  O2 sat with ambulation on room air: 92%  O2 sat with ambulation on 2L: 94%

## 2025-01-03 LAB
ANION GAP SERPL CALC-SCNC: 11 MMOL/L (ref 0–18)
BASOPHILS # BLD AUTO: 0.04 X10(3) UL (ref 0–0.2)
BASOPHILS NFR BLD AUTO: 0.3 %
BUN BLD-MCNC: 58 MG/DL (ref 9–23)
CALCIUM BLD-MCNC: 9.3 MG/DL (ref 8.7–10.4)
CHLORIDE SERPL-SCNC: 103 MMOL/L (ref 98–112)
CO2 SERPL-SCNC: 24 MMOL/L (ref 21–32)
CREAT BLD-MCNC: 1.75 MG/DL
EGFRCR SERPLBLD CKD-EPI 2021: 28 ML/MIN/1.73M2 (ref 60–?)
EOSINOPHIL # BLD AUTO: 0 X10(3) UL (ref 0–0.7)
EOSINOPHIL NFR BLD AUTO: 0 %
ERYTHROCYTE [DISTWIDTH] IN BLOOD BY AUTOMATED COUNT: 13.3 %
GLUCOSE BLD-MCNC: 128 MG/DL (ref 70–99)
GLUCOSE BLD-MCNC: 158 MG/DL (ref 70–99)
GLUCOSE BLD-MCNC: 167 MG/DL (ref 70–99)
GLUCOSE BLD-MCNC: 171 MG/DL (ref 70–99)
GLUCOSE BLD-MCNC: 194 MG/DL (ref 70–99)
HCT VFR BLD AUTO: 43.2 %
HGB BLD-MCNC: 14.4 G/DL
IMM GRANULOCYTES # BLD AUTO: 0.14 X10(3) UL (ref 0–1)
IMM GRANULOCYTES NFR BLD: 1.1 %
INR BLD: 2.69 (ref 0.8–1.2)
LYMPHOCYTES # BLD AUTO: 1.51 X10(3) UL (ref 1–4)
LYMPHOCYTES NFR BLD AUTO: 11.9 %
MAGNESIUM SERPL-MCNC: 2.4 MG/DL (ref 1.6–2.6)
MCH RBC QN AUTO: 30.6 PG (ref 26–34)
MCHC RBC AUTO-ENTMCNC: 33.3 G/DL (ref 31–37)
MCV RBC AUTO: 91.9 FL
MONOCYTES # BLD AUTO: 0.38 X10(3) UL (ref 0.1–1)
MONOCYTES NFR BLD AUTO: 3 %
NEUTROPHILS # BLD AUTO: 10.65 X10 (3) UL (ref 1.5–7.7)
NEUTROPHILS # BLD AUTO: 10.65 X10(3) UL (ref 1.5–7.7)
NEUTROPHILS NFR BLD AUTO: 83.7 %
OSMOLALITY SERPL CALC.SUM OF ELEC: 307 MOSM/KG (ref 275–295)
PLATELET # BLD AUTO: 368 10(3)UL (ref 150–450)
POTASSIUM SERPL-SCNC: 4.2 MMOL/L (ref 3.5–5.1)
PROTHROMBIN TIME: 28.8 SECONDS (ref 11.6–14.8)
RBC # BLD AUTO: 4.7 X10(6)UL
SODIUM SERPL-SCNC: 138 MMOL/L (ref 136–145)
WBC # BLD AUTO: 12.7 X10(3) UL (ref 4–11)

## 2025-01-03 PROCEDURE — 99232 SBSQ HOSP IP/OBS MODERATE 35: CPT | Performed by: HOSPITALIST

## 2025-01-03 RX ORDER — PREDNISONE 20 MG/1
40 TABLET ORAL
Status: DISCONTINUED | OUTPATIENT
Start: 2025-01-03 | End: 2025-01-04

## 2025-01-03 RX ORDER — PREDNISONE 10 MG/1
TABLET ORAL
Qty: 18 TABLET | Refills: 0 | Status: SHIPPED | OUTPATIENT
Start: 2025-01-03

## 2025-01-03 RX ORDER — WARFARIN SODIUM 2 MG/1
2 TABLET ORAL
Status: COMPLETED | OUTPATIENT
Start: 2025-01-03 | End: 2025-01-03

## 2025-01-03 NOTE — PLAN OF CARE
Assumed care for pt at 19:30 on 1/2    Able to answer orientation questions x4 slowly. Did remove her IV at start of shift- Denied pain and reported SOB relieved with duomebs. VSS on RA, did  not require O2 overnight. Afib on tele, rates up to 130s on ambulation to BR. INR= 2.53, coumadin given. Continent of B&B, last BM 12/31 requesting colace twice a day. Up with walker and 1 assist. L AC PIV under ace wrap. Declined BIPAP use.    Plan: Solumedrol, scheduled nebs, daily weight, accucheck with novolog correction, IS/flutter/chest PT, dc to home with HH PT when able to.     Bed alarm on, chair alarm activated, call light in reach, able to make needs known.

## 2025-01-03 NOTE — PROGRESS NOTES
Pharmacy Dosing Service: Warfarin (Coumadin)  Diana Eisenberg is a 84 year old female for whom pharmacy has been dosing warfarin (Coumadin). Goal INR is 2-3    Recent Labs   Lab 12/30/24  0438 12/31/24  0610 01/01/25  0655 01/02/25  0701 01/03/25  0655   INR 1.92* 1.99* 2.21* 2.53* 2.69*     Consulted by:  [facility]  Indication:  afib  Potential Drug Interactions:  levothyroxine (stable home med), azithromycin - can increase INR, aspirin (stable home med) - can increase risk of bleeding  Other Anticoagulants:  none  Home regimen (if applicable):  Warfarin 4mg daily     Date 12/29 12/30 12/31 1/1 1/2  1/3   INR 1.84 1.92 1.99 2.21 2.53  2.69   Coumadin dose Held by Critical Care 2mg 4mg 4 mg 3 mg            Based on above -  1.  For today, Give warfarin (COUMADIN) 2 mg at 2100 tonight  2   PT/INR ordered daily while on coumadin  3.  Pharmacy will continue to follow.  We appreciate the opportunity to assist in her care.    Elida Galarza, PharmD  1/3/2025  8:27 AM

## 2025-01-03 NOTE — PROGRESS NOTES
[facility]   part of formerly [facility]     Hospitalist Progress Note     Diana Eisenberg Patient Status:  Inpatient    1940 MRN [id number]   Location [facility] 4SW-A Attending Ronak Hood MD   Hosp Day # 5 PCP SHERIF SÁNCHEZ MD     Chief Complaint: COPD exacerbation     Subjective:     Pt feeling better, pt now on RA. No audible wheezing, mild cough.     Current medications:   warfarin  2 mg Oral Once at night    predniSONE  40 mg Oral Daily with breakfast    docusate sodium  100 mg Oral BID    ipratropium-albuterol  3 mL Nebulization Once    azithromycin  500 mg Oral Daily    dilTIAZem ER  240 mg Oral BID    ipratropium-albuterol  3 mL Nebulization 4 times per day    aspirin  81 mg Oral Daily    furosemide  40 mg Oral Daily    levothyroxine  88 mcg Oral Before breakfast    atorvastatin  10 mg Oral Nightly    guaiFENesin ER  600 mg Oral BID    fluticasone-salmeterol  1 puff Inhalation BID    And    umeclidinium bromide  1 puff Inhalation Daily    metoprolol succinate ER  100 mg Oral 2x Daily(Beta Blocker)    insulin aspart  2-10 Units Subcutaneous TID AC and HS       Objective:    Review of Systems:   10 point ROS completed and was negative, except for pertinent positive and negatives stated in subjective.    Vital signs:  Temp:  [97.5 °F (36.4 °C)-97.9 °F (36.6 °C)] 97.9 °F (36.6 °C)  Pulse:  [] 105  Resp:  [18-22] 20  BP: (125-148)/() 144/58  SpO2:  [90 %-96 %] 94 %  Patient Weight for the past 72 hrs:   Weight   24 0626 115 lb (52.2 kg)   24 1300 155 lb 6.8 oz (70.5 kg)   24 0400 156 lb 12 oz (71.1 kg)     Physical Exam:    General: No acute distress.   Respiratory: no wheezing noted bilaterally on inhalation or exhalation   Cardiovascular: S1, S2.  Abdomen: Soft, nontender, nondistended.  Positive bowel sounds.  Extremities: No edema.  Neuro: Alert and awake, moves all ext    Diagnostic Data:    Labs:  Recent Labs   Lab 24  0634 24  1523  12/30/24 0438 12/31/24 0610 01/01/25 0655 01/02/25 0701 01/03/25 0655   WBC 9.9 7.7 9.7  --  12.7*  --  12.7*   HGB 14.6 13.3 12.9  --  13.7  --  14.4   MCV 94.4 92.8 92.1  --  94.3  --  91.9   .0 193.0 184.0  --  234.0  --  368.0   INR 1.84*  --  1.92* 1.99* 2.21* 2.53* 2.69*       Recent Labs   Lab 12/29/24  0634 12/29/24  1523 12/30/24 0438 12/31/24 0610 01/01/25 0655 01/02/25 0701 01/03/25 0655   * 244* 156*   < > 128* 168* 194*   BUN 32* 32* 32*   < > 41* 48* 58*   CREATSERUM 1.77* 1.84* 1.51*   < > 1.66* 1.50* 1.75*   CA 9.1 8.7 9.1   < > 9.5 9.2 9.3   ALB 4.6 4.2 4.1  --   --   --   --     138 141   < > 140 141 138   K 3.6 3.8 4.4  4.4   < > 4.5 4.4 4.2    103 107   < > 106 105 103   CO2 29.0 24.0 25.0   < > 26.0 28.0 24.0   ALKPHO 88 73 76  --   --   --   --    AST 24 20 22  --   --   --   --    ALT 13 11 11  --   --   --   --    BILT 0.5 0.3 0.3  --   --   --   --    TP 7.4 7.0 6.8  --   --   --   --     < > = values in this interval not displayed.       Estimated Creatinine Clearance: 25.7 mL/min (A) (based on SCr of 1.75 mg/dL (H)).    Recent Labs   Lab 01/01/25 0655 01/02/25 0701 01/03/25 0655   PTP 24.7* 27.5* 28.8*   INR 2.21* 2.53* 2.69*            COVID-19 Lab Results    COVID-19  Lab Results   Component Value Date    COVID19 Not Detected 12/29/2024    COVID19 Not Detected 12/29/2024    COVID19 Not Detected 11/14/2022       Pro-Calcitonin  Recent Labs   Lab 12/29/24  0634   PCT 0.10*       Cardiac  Recent Labs   Lab 12/29/24  1523   PBNP 6,317*       Creatinine Kinase  No results for input(s): \"CK\" in the last 168 hours.    Inflammatory Markers  No results for input(s): \"CRP\", \"JEVON\", \"LDH\", \"DDIMER\" in the last 168 hours.    Recent Labs   Lab 12/29/24  0634 12/29/24  1523   TROPHS 9 22       Imaging: Imaging data reviewed in Epic.    Medications:    warfarin  2 mg Oral Once at night    predniSONE  40 mg Oral Daily with breakfast    docusate sodium  100 mg Oral  BID    ipratropium-albuterol  3 mL Nebulization Once    azithromycin  500 mg Oral Daily    dilTIAZem ER  240 mg Oral BID    ipratropium-albuterol  3 mL Nebulization 4 times per day    aspirin  81 mg Oral Daily    furosemide  40 mg Oral Daily    levothyroxine  88 mcg Oral Before breakfast    atorvastatin  10 mg Oral Nightly    guaiFENesin ER  600 mg Oral BID    fluticasone-salmeterol  1 puff Inhalation BID    And    umeclidinium bromide  1 puff Inhalation Daily    metoprolol succinate ER  100 mg Oral 2x Daily(Beta Blocker)    insulin aspart  2-10 Units Subcutaneous TID AC and HS       Assessment & Plan:      #Dispo  -likely dc tomorrow    #Acute on chronic hypoxic respiratory failure (2L at needed and 2L at night)  #COPD exacerbation, CXR neg, RVP neg, PCT 0.1  -IV steroids > Prednisone burst > back to iv steroids as of 1/1, back to po prednisone as of 1/3  -ZENAIDA Nebs schedule + PRN  -Antitussuves   -weaned to RA  -Pulmonary & CCM consult  -SW consult - patient wants portable tanks at home     #Acute encephalopathy d/t hospitalization? Steroids? Infection? CVA?  -Check UA: looks okay  -Check CT brain (12/31) neg     #Atrial fibrillation on Coumadin with RVR  -Toprol   -Cardizem CD  -Coumadin  -Monitor INR  -Monitor hemodynamics  -Telemetry  -Cardiology consult      #CAD, trop neg  #Essential hypertension  #Dyslipidemia   -Aspirin  -Statin     #Chronic diastolic heart failure   -Lasix PO daily  -Daily weight     #Chronic kidney disease  -Monitor UOP, creatinine and electrolytes     #Hypothyroidism  -Synthroid     #GERD  -PPI    #Prediabetes A1c 6.1  -Correctional scale    #Cerebrovascular disease     #DVT Px: Coumadin    Plan of care discussed with patient and family     Pedro Sheppard DO        Supplementary Documentation:   DVT Mechanical Prophylaxis:   SCDs,    DVT Pharmacologic Prophylaxis   Medication    warfarin (Coumadin) tab 2 mg         DVT Pharmacologic prophylaxis: Aspirin 81 mg      Code Status:  DNAR/Selective Treatment  Srinivasan: No urinary catheter in place  Srinivasan Duration (in days):   Central line:    RICHAR: 1/4/2025

## 2025-01-03 NOTE — PLAN OF CARE
Pt more mobile today, ambulated in halls with walker and tolearted it well. Also better mentation, alert and cooperative. Still wheezing and congested but no oxygen needs. Up to chair most of the day visiting with family. Denies any pain. Resting comfortably at the moment.

## 2025-01-03 NOTE — PROGRESS NOTES
Pulmonary Progress Note      NAME: Diana Eisenberg - ROOM: 2612/[facility]-A - MRN: [id number] - Age: 84 year old - : 1940    Assessment/Plan:  Shortness of breath and hypoxia secondary to pulmonary edema and bronchospasm/COPD.   - Continue BD protocol.   - CXR with mucus plugging/atelectasis.   - pulmonary toilet.   - ABG with respiratory alkaosis with baseline CO2 retention.   - feels better now  AECOPD - baseline FEV1 0.87L (57%) DLCO 56%  -to po steroids  -LAMA / LABA / ICS chronically  -on chronic O2 - now on RA with rest  -nebs prn  ID:   RVP negative.   Increased mucus production, added azithro d3/3.   Afib with RVR  -better  -per cardiology  Pulmonary HTN - baseline PAH 40-45  -Echo with LVEF 60-65% and est PAP 66 (elevated from baseline likely due to above)  Chronic hypoxic resp failure due to COPD and Pulm HTN  -baseline O2 is 2 LPM - now at 2 LPM  Confusion / agitation - supportive care  -delerium management per primary team  Dispo - DNAR  -follow with KW as outpatient - see her in 2 weeks  -steroids reconciled. She has home O2.  If feels better Ok for discharge tomorrow.  Will sign off at this time      Hector Estrada M.D.  Pulmonary and Critical Care Medicine  [facility]      Subjective:  No acute events overnight. Feels better - ready to go home - she thinks Saturday sounds like a good idea    Medications:   warfarin  2 mg Oral Once at night    docusate sodium  100 mg Oral BID    ipratropium-albuterol  3 mL Nebulization Once    methylPREDNISolone  60 mg Intravenous Q6H    azithromycin  500 mg Oral Daily    dilTIAZem ER  240 mg Oral BID    ipratropium-albuterol  3 mL Nebulization 4 times per day    aspirin  81 mg Oral Daily    furosemide  40 mg Oral Daily    levothyroxine  88 mcg Oral Before breakfast    atorvastatin  10 mg Oral Nightly    guaiFENesin ER  600 mg Oral BID    fluticasone-salmeterol  1 puff Inhalation BID    And    umeclidinium bromide  1 puff Inhalation Daily    metoprolol  succinate ER  100 mg Oral 2x Daily(Beta Blocker)    insulin aspart  2-10 Units Subcutaneous TID AC and HS     Intake/Output Summary (Last 24 hours) at 1/3/2025 1049  Last data filed at 1/3/2025 0845  Gross per 24 hour   Intake 360 ml   Output 700 ml   Net -340 ml       Physical Exam:  /58 (BP Location: Left arm)   Pulse 105   Temp 97.9 °F (36.6 °C) (Oral)   Resp 20   Ht 4' 10\" (1.473 m)   Wt 153 lb (69.4 kg)   SpO2 94%   BMI 31.98 kg/m²   Physical Exam:   General: alert, cooperative, no respiratory distress.   HEENT: Normocephalic atraumatic.Lips, mucosa, and tongue normal.  No thrush noted.   Neck: supple without mass   Lungs: clear   Chest wall: No tenderness or deformity.   Heart: Regular rate and rhythm, normal S1S2, no murmur.   Abdomen: soft, non-tender, non-distended, positive BS.   Extremity: no edema   Skin: no new rash   Neuro: alert    Recent Labs   Lab 01/03/25  0655   RBC 4.70   HGB 14.4   HCT 43.2   MCV 91.9   MCH 30.6   MCHC 33.3   RDW 13.3   NEPRELIM 10.65*   WBC 12.7*   .0     Recent Labs   Lab 12/29/24  0634 12/29/24  1523 12/30/24  0438 12/31/24  0610 01/01/25  0655 01/02/25  0701 01/03/25  0655   * 244* 156*   < > 128* 168* 194*   BUN 32* 32* 32*   < > 41* 48* 58*   CREATSERUM 1.77* 1.84* 1.51*   < > 1.66* 1.50* 1.75*   CA 9.1 8.7 9.1   < > 9.5 9.2 9.3   ALB 4.6 4.2 4.1  --   --   --   --     138 141   < > 140 141 138   K 3.6 3.8 4.4  4.4   < > 4.5 4.4 4.2    103 107   < > 106 105 103   CO2 29.0 24.0 25.0   < > 26.0 28.0 24.0   ALKPHO 88 73 76  --   --   --   --    AST 24 20 22  --   --   --   --    ALT 13 11 11  --   --   --   --    BILT 0.5 0.3 0.3  --   --   --   --    TP 7.4 7.0 6.8  --   --   --   --     < > = values in this interval not displayed.       Imaging: I independently visualized all relevant chest imaging in PACS, agree with radiology interpretation except where noted.

## 2025-01-03 NOTE — PROGRESS NOTES
Progress Note  Diana Eisenberg Patient Status:  Inpatient    1940 MRN [id number]   Location [facility] 2NE-A Attending Pedro Sheppard,    Hosp Day # 4 PCP SHERIF SÁNCHEZ MD     Subjective   Feeling better today. Breathing improving, on room air. Heart rates improving. Denies chest pain.       Objective:   BP (!) 146/108 (BP Location: Left arm)   Pulse 94   Temp 97.6 °F (36.4 °C) (Oral)   Resp 18   Ht 4' 10\" (1.473 m)   Wt 152 lb 5.4 oz (69.1 kg)   SpO2 95%   BMI 31.84 kg/m²     Telemetry: afib; rates better this am     Intake/Output:    Intake/Output Summary (Last 24 hours) at 2025  Last data filed at 2025 0410  Gross per 24 hour   Intake 360 ml   Output 490 ml   Net -130 ml       Wt Readings from Last 3 Encounters:   25 152 lb 5.4 oz (69.1 kg)   24 162 lb (73.5 kg)   04/10/24 165 lb (74.8 kg)         Labs:  Recent Labs   Lab 24  0610 25  0655 25  0701   * 128* 168*   BUN 42* 41* 48*   CREATSERUM 1.53* 1.66* 1.50*   EGFRCR 33* 30* 34*   CA 9.4 9.5 9.2    140 141   K 5.0 4.5 4.4    106 105   CO2 27.0 26.0 28.0     Recent Labs   Lab 24  0634 24  1523 24  0438 25  0655   RBC 4.78 4.32 4.18 4.53   HGB 14.6 13.3 12.9 13.7   HCT 45.1 40.1 38.5 42.7   MCV 94.4 92.8 92.1 94.3   MCH 30.5 30.8 30.9 30.2   MCHC 32.4 33.2 33.5 32.1   RDW 13.8 13.6 13.7 13.6   NEPRELIM 5.17 7.21  --  10.03*   WBC 9.9 7.7 9.7 12.7*   .0 193.0 184.0 234.0         Recent Labs   Lab 24  0634 24  1523   TROPHS 9 22       Review of Systems:     10 point review of systems completed and negative except as noted in HPI    Respiratory: Denies cough, wheezing or shortness of breath .  CV: Denies chest pain, palpitations, orthopnea, PND or dizziness .    Exam:     Physical Exam:  General: Alert and oriented x 3. No apparent distress.   HEENT: Normocephalic, neck supple, no thyromegaly or adenopathy.  Neck: No JVD, carotids 2+, no  bruits.  Cardiac: Regular rate and rhythm. S1, S2 normal. No murmur, pericardial rub, S3, or extra cardiac sounds.  Lungs: rales/rhonchi.  Normal excursions and effort.  Abdomen: Soft, non-tender. BS-present.  Extremities: Without clubbing or cyanosis. No edema.    Neurologic: Alert and oriented, normal affect. No focal defects  Skin: Warm and dry.     Medications:     warfarin  3 mg Oral Once at night    docusate sodium  100 mg Oral BID    ipratropium-albuterol  3 mL Nebulization Once    methylPREDNISolone  60 mg Intravenous Q6H    azithromycin  500 mg Oral Daily    dilTIAZem ER  240 mg Oral BID    ipratropium-albuterol  3 mL Nebulization 4 times per day    aspirin  81 mg Oral Daily    furosemide  40 mg Oral Daily    levothyroxine  88 mcg Oral Before breakfast    atorvastatin  10 mg Oral Nightly    guaiFENesin ER  600 mg Oral BID    fluticasone-salmeterol  1 puff Inhalation BID    And    umeclidinium bromide  1 puff Inhalation Daily    metoprolol succinate ER  100 mg Oral 2x Daily(Beta Blocker)    insulin aspart  2-10 Units Subcutaneous TID AC and HS         Diagnostic Studies:     CARD ECHO 2D DOPPLER (CPT=93306)    Result Date: 12/30/2024  Conclusions:     1. Left ventricle: The cavity size was normal. Wall thickness was normal.      Systolic function was normal. The estimated ejection fraction was 60-65%.      Unable to assess LV diastolic function due to heart rhythm.   2. Right ventricle: The cavity size was increased.   3. Left atrium: The atrium was mildly to moderately dilated.   4. Right atrium: The atrium was moderately dilated.   5. Mitral valve: There was mild to moderate regurgitation.   6. Tricuspid valve: There was moderate-severe regurgitation.   7. Pulmonary arteries: Systolic pressure was markedly increased, estimated      to be 66mm Hg.   Impressions:  This study is compared with previous dated 11/10/2022: Patient   appears to be in atrial fibrillation.     Assessment and Plan:     Assessment:  #  Acute hypoxic respiratory failure secondary to COPD exacerbation   On steroids   Symptomatically improving   Pulm following   # Permament Afib   Rates are improving with increased Cardizem   Cardizem doubled 240 mg bid; continue metoprolol 100 bid  On Warfarin; INR stable 2.69  HTN - BP improving   # H/o TIA - s/p CEA  # Long time smoker   # COPD on home O2? - pulm following       Plan:  Continue dilt 240mg BID, metoprolol 100mg bid  Continue warfarin per pt dosing schedule   COPD exacerbation appears to be driving symptoms   Steroids, breathing treatment per pulm/ primary     Will sign off. Contact our service with questions/concerns.      Plan of care discussed with patient, RN.    Anupama Lopez, APRN  1/3/2025  7:41 AM  Ph [phone] ([place])  Ph [phone] ([place])      Patient seen and examined by me today.    I have personally performed the medical decision making in its entirety. My additions include:    - Continue metoprolol and dilt as above  - Continue warfarin  - Cardiology will sign off.     Mitul Mora MD  Cardiac Electrophysiology  [facility] [place]

## 2025-01-04 VITALS
WEIGHT: 153.69 LBS | HEIGHT: 58 IN | BODY MASS INDEX: 32.26 KG/M2 | HEART RATE: 89 BPM | RESPIRATION RATE: 18 BRPM | TEMPERATURE: 98 F | SYSTOLIC BLOOD PRESSURE: 139 MMHG | DIASTOLIC BLOOD PRESSURE: 76 MMHG | OXYGEN SATURATION: 93 %

## 2025-01-04 LAB
GLUCOSE BLD-MCNC: 167 MG/DL (ref 70–99)
GLUCOSE BLD-MCNC: 170 MG/DL (ref 70–99)
INR BLD: 3.44 (ref 0.8–1.2)
PROTHROMBIN TIME: 34.9 SECONDS (ref 11.6–14.8)

## 2025-01-04 PROCEDURE — 99239 HOSP IP/OBS DSCHRG MGMT >30: CPT | Performed by: HOSPITALIST

## 2025-01-04 RX ORDER — DILTIAZEM HYDROCHLORIDE 240 MG/1
240 CAPSULE, COATED, EXTENDED RELEASE ORAL 2 TIMES DAILY
Qty: 60 CAPSULE | Refills: 1 | Status: SHIPPED | OUTPATIENT
Start: 2025-01-04 | End: 2025-01-04

## 2025-01-04 RX ORDER — DILTIAZEM HYDROCHLORIDE 240 MG/1
240 CAPSULE, COATED, EXTENDED RELEASE ORAL 2 TIMES DAILY
Qty: 60 CAPSULE | Refills: 1 | Status: SHIPPED | OUTPATIENT
Start: 2025-01-04

## 2025-01-04 RX ORDER — IPRATROPIUM BROMIDE AND ALBUTEROL SULFATE 2.5; .5 MG/3ML; MG/3ML
3 SOLUTION RESPIRATORY (INHALATION) EVERY 6 HOURS PRN
Qty: 120 EACH | Refills: 1 | Status: SHIPPED | OUTPATIENT
Start: 2025-01-04

## 2025-01-04 NOTE — PLAN OF CARE
Assumed care of pt at 1930. A&O x4. Denies pain. O2 Sat 94% on room air, expiratory wheezes bilaterally, reaches 500 on IS. Continent of bowel and bladder. Activity level is SBA. Monitor shows Afib.  Generalized bruising to upper extremities.  IV wrapped with Ace bandage due to recent history of pulling out her IV.  Call light & belongings within reach. Bed in lowest position with alarm on and wheels locked. Updated on POC & verbalized understanding.    POC:  -Likely DC tomorrow          Problem: Patient/Family Goals  Goal: Patient/Family Long Term Goal  Description: Patient's Long Term Goal: \"breathe better\"    Interventions:  - monitor o2 sats  -q4 duonebs  - 1-2L NC prn  - bipap prn  - See additional Care Plan goals for specific interventions  Outcome: Progressing  Goal: Patient/Family Short Term Goal  Description: Patient's Short Term Goal: \"sleep tonight\"    Interventions:   - cluster cares  - See additional Care Plan goals for specific interventions  Outcome: Progressing     Problem: CARDIOVASCULAR - ADULT  Goal: Maintains optimal cardiac output and hemodynamic stability  Description: INTERVENTIONS:  - Monitor vital signs, rhythm, and trends  - Monitor for bleeding, hypotension and signs of decreased cardiac output  - Evaluate effectiveness of vasoactive medications to optimize hemodynamic stability  - Monitor arterial and/or venous puncture sites for bleeding and/or hematoma  - Assess quality of pulses, skin color and temperature  - Assess for signs of decreased coronary artery perfusion - ex. Angina  - Evaluate fluid balance, assess for edema, trend weights  Outcome: Progressing  Goal: Absence of cardiac arrhythmias or at baseline  Description: INTERVENTIONS:  - Continuous cardiac monitoring, monitor vital signs, obtain 12 lead EKG if indicated  - Evaluate effectiveness of antiarrhythmic and heart rate control medications as ordered  - Initiate emergency measures for life threatening arrhythmias  - Monitor  electrolytes and administer replacement therapy as ordered  Outcome: Progressing     Problem: RESPIRATORY - ADULT  Goal: Achieves optimal ventilation and oxygenation  Description: INTERVENTIONS:  - Assess for changes in respiratory status  - Assess for changes in mentation and behavior  - Position to facilitate oxygenation and minimize respiratory effort  - Oxygen supplementation based on oxygen saturation or ABGs  - Provide Smoking Cessation handout, if applicable  - Encourage broncho-pulmonary hygiene including cough, deep breathe, Incentive Spirometry  - Assess the need for suctioning and perform as needed  - Assess and instruct to report SOB or any respiratory difficulty  - Respiratory Therapy support as indicated  - Manage/alleviate anxiety  - Monitor for signs/symptoms of CO2 retention  Outcome: Progressing     Problem: Delirium  Goal: Minimize duration of delirium  Description: Interventions:  - Encourage use of hearing aids, eye glasses  - Promote highest level of mobility daily  - Provide frequent reorientation  - Promote wakefulness i.e. lights on, blinds open  - Promote sleep, encourage patient's normal rest cycle i.e. lights off, TV off, minimize noise and interruptions  - Encourage family to assist in orientation and promotion of home routines  Outcome: Progressing     Problem: Safety Risk - Non-Violent Restraints  Goal: Patient will remain free from self-harm  Description: INTERVENTIONS:  - Apply the least restrictive restraint to prevent harm  - Notify patient and family of reasons restraints applied  - Assess for any contributing factors to confusion (electrolyte disturbances, delirium, medications)  - Discontinue any unnecessary medical devices as soon as possible  - Assess the patient's physical comfort, circulation, skin condition, hydration, nutrition and elimination needs   - Reorient and redirection as needed  - Assess for the need to continue restraints  Outcome: Progressing     Problem:  Diabetes/Glucose Control  Goal: Glucose maintained within prescribed range  Description: INTERVENTIONS:  - Monitor Blood Glucose as ordered  - Assess for signs and symptoms of hyperglycemia and hypoglycemia  - Administer ordered medications to maintain glucose within target range  - Assess barriers to adequate nutritional intake and initiate nutrition consult as needed  - Instruct patient on self management of diabetes  Outcome: Progressing

## 2025-01-04 NOTE — PLAN OF CARE
Patient tele discontinued. IV removed with catheter intact. Patient denies CP, SOB, dizziness, palpations, and calf tenderness. Discharge instructions reviewed with patient and daughters, verbalized understanding. Medications and side effects reviewed with patient and sent to pharmacy of choice. Patient escorted via wheelchair to lobby.

## 2025-01-04 NOTE — PROGRESS NOTES
Assumed care of patient at 0700; alert and oriented, confused at times. Expiratory wheezes bilaterally; on room air. AFIB on monitor. Patient denies chest pain, shortness of breath, dizziness. No issues while ambulating; O2 walk completed. Active bowel sounds; continent of bowel and bladder. Patient updated on plan of care for discharge; questions answered. Bed in lowest position and call light within reach.

## 2025-01-04 NOTE — DISCHARGE SUMMARY
[place] [facility]OSPITALIST  DISCHARGE SUMMARY     Diana Eisenberg Patient Status:  Inpatient    1940 MRN [id number]   Location [facility] 2NE-A Attending Pedro Sheppard, DO   Hosp Day # 6 PCP SHERIF SÁNCHEZ MD     Date of Admission: 2024  Date of Discharge:   2025    Discharge Disposition: Home or Self Care    Discharge Diagnosis:  #Acute on chronic hypoxic respiratory failure (2L at needed and 2L at night)  #COPD exacerbation, CXR neg, RVP neg, PCT 0.1  -IV steroids > Prednisone burst > back to iv steroids as of , cont for now  -ZENAIDA Nebs schedule + PRN  -Antitussuves   -Oxygen, currently 2L  -Pulmonary & CCM consult  -SW consult - patient wants portable tanks at home      #Acute encephalopathy d/t hospitalization? Steroids? Infection? CVA?  -Check UA: looks okay  -Check CT brain () neg     #Atrial fibrillation on Coumadin with RVR  -Toprol   -Cardizem CD  -Coumadin  -Monitor INR  -Monitor hemodynamics  -Telemetry  -Cardiology consult      #CAD, trop neg  #Essential hypertension  #Dyslipidemia   -Aspirin  -Statin     #Chronic diastolic heart failure   -Lasix PO daily  -Daily weight     #Chronic kidney disease  -Monitor UOP, creatinine and electrolytes     #Hypothyroidism  -Synthroid     #GERD  -PPI     #Prediabetes A1c 6.1  -Correctional scale    History of Present Illness: Diana Eisenberg is a 84 year old female with CAD, essential hypertension, dyslipidemia, atrial fibrillation on Coumadin, chronic diastolic dysfunction, chronic hypoxic respiratory failure (2L as needed and 2L at night), chronic kidney disease, hypothyroidism and GERD who presents with shortness of breath. Patient states she has had URI symptoms for a month. She began to have a productive cough on Friday. Symptoms progressed and patient with difficulty breathing this morning prompting ER evaluation. She has chest pain, worse with coughing. No fever. Admits to nausea, no vomiting.     Brief Synopsis: pt admitted for resp  failure, full details above for each problem list. Pt essentially had improvement in resp status w/ steroids, nebs, iv abx, lasix. Pt eventually weaned to RA on dc. Will complete prednisone taper on dc. Metoprolol adjusted by cards for afib w/ rvr episodes.     Lace+ Score: 67  59-90 High Risk  29-58 Medium Risk  0-28   Low Risk  Patient was referred to the [facility].    TCM Follow-Up Recommendation:  LACE > 58: High Risk of readmission after discharge from the hospital.      Procedures during hospitalization:       Incidental or significant findings and recommendations (brief descriptions):      Lab/Test results pending at Discharge:       Consultants:  Cardiology & pulmonary     Discharge Medication List:     Discharge Medications        START taking these medications        Instructions Prescription details   predniSONE 10 MG Tabs  Commonly known as: Deltasone      3 tabs daily for 3 days then 2 tabs daily for 3 days then 1 tab daily for 3 days   Quantity: 18 tablet  Refills: 0            CONTINUE taking these medications        Instructions Prescription details   albuterol 108 (90 Base) MCG/ACT Aers  Commonly known as: Ventolin HFA      Inhale 2 puffs into the lungs every 6 (six) hours as needed for Wheezing or Shortness of Breath.   Quantity: 54 g  Refills: 1     alendronate 35 MG Tabs  Commonly known as: Fosamax      Take 1 tablet (35 mg total) by mouth every 7 days.   Quantity: 12 tablet  Refills: 3     aspirin 81 MG Tbec      Take 1 tablet (81 mg total) by mouth daily.   Refills: 0     CALCIUM 600+D3 OR      Take 1 tablet by mouth daily.   Refills: 0     clobetasol 0.05 % Crea  Commonly known as: Temovate      Apply 1 Application topically 2 (two) times daily as needed.   Quantity: 30 g  Refills: 2     Complete Daily/Lutein Tabs      Take 1 tablet by mouth daily.   Refills: 0     ICAPS AREDS 2 OR      Take 1 tablet by mouth 2 (two) times daily.   Refills: 0     dilTIAZem  MG  Cp24  Commonly known as: CardIZEM CD      Take 1 capsule (240 mg total) by mouth daily.   Refills: 0     famotidine 20 MG Tabs  Commonly known as: Pepcid      Take 1 tablet (20 mg total) by mouth 2 (two) times daily as needed (acid stomach).   Quantity: 60 tablet  Refills: 1     Fish Oil 1200 MG Cpdr      Take 1 capsule by mouth daily.   Refills: 0     levothyroxine 88 MCG Tabs  Commonly known as: Synthroid      Take 1 tablet (88 mcg total) by mouth daily.   Quantity: 90 tablet  Refills: 3     meclizine 25 MG Tabs  Commonly known as: Antivert      Take 1 tablet (25 mg total) by mouth 3 (three) times daily as needed for Dizziness.   Quantity: 30 tablet  Refills: 2     ondansetron 4 MG Tbdp  Commonly known as: Zofran-ODT      Take 1 tablet (4 mg total) by mouth every 8 (eight) hours as needed for Nausea.   Quantity: 30 tablet  Refills: 0     pravastatin 40 MG Tabs  Commonly known as: Pravachol      Take 1 tablet (40 mg total) by mouth nightly.   Quantity: 90 tablet  Refills: 1     tiZANidine 2 MG Tabs  Commonly known as: Zanaflex      Take 1 tablet (2 mg total) by mouth every 8 (eight) hours as needed (muscle spasm or back pain). Do not drive while taking   Quantity: 60 tablet  Refills: 0     Trelegy Ellipta 200-62.5-25 MCG/ACT Aepb  Generic drug: fluticasone-umeclidin-vilant      Inhale 1 puff into the lungs daily.   Quantity: 90 each  Refills: 1     warfarin 4 MG Tabs  Commonly known as: Coumadin      Take as directed. If you are unsure how to take this medication, talk to your nurse or doctor.  Original instructions: Take 1 tablet (4 mg total) by mouth at bedtime.   Refills: 0            ASK your doctor about these medications        Instructions Prescription details   furosemide 20 MG Tabs  Commonly known as: Lasix      Take 1 tablet (20 mg total) by mouth daily as needed (In addition to 40 mg daily).   Refills: 0     furosemide 40 MG Tabs  Commonly known as: Lasix      Take 1 tablet (40 mg total) by mouth  daily.   Quantity: 90 tablet  Refills: 3     metoprolol succinate  MG Tb24  Commonly known as: Toprol XL      Take 1 tablet (100 mg total) by mouth 2 (two) times daily.   Refills: 0               Where to Get Your Medications        These medications were sent to [address], [address] [phone], [phone]  [address] [facility] [address], [facility] [address]      Phone: [phone]   predniSONE 10 MG Tabs         [facility]     Follow-up appointment:   Symone Monterroso MD  [id number]  [facility] [id number] [phone]    Schedule an appointment as soon as possible for a visit in 1 week(s)      Belen Lee MD  [address] [facility]  [address]  [facility] [id number] [phone]    Schedule an appointment as soon as possible for a visit in 1 week(s)      Appointments for Next 30 Days 2025 - 2/3/2025      None            Vital signs:  Temp:  [97.6 °F (36.4 °C)-98.1 °F (36.7 °C)] 97.6 °F (36.4 °C)  Pulse:  [80-96] 89  Resp:  [18-20] 18  BP: (124-149)/(58-91) 139/76  SpO2:  [90 %-95 %] 93 %    Physical Exam:    General: No acute distress   Lungs: clear to auscultation  Cardiovascular: S1, S2  Abdomen: Soft      -----------------------------------------------------------------------------------------------  PATIENT DISCHARGE INSTRUCTIONS: See electronic chart    Pedro Sheppard DO    Total time spent on discharge plannin minutes     The  Cures Act makes medical notes like these available to patients in the interest of transparency. Please be advised this is a medical document. Medical documents are intended to carry relevant information, facts as evident, and the clinical opinion of the practitioner. The medical note is intended as peer to peer communication and may appear blunt or direct. It is written in medical language and may contain abbreviations or verbiage that are unfamiliar.      abbreviations or verbiage that are unfamiliar.

## 2025-01-04 NOTE — PROGRESS NOTES
Assumed care of patient at 0700; alert and oriented, confused at times. Expiratory wheezes bilaterally; on room air. AFIB on monitor. Patient denies chest pain, shortness of breath, dizziness. No issues while ambulating. Active bowel sounds; continent of bowel and bladder. Patient updated on plan of care for antibiotics, steroids; questions answered. Bed in lowest position and call light within reach.     -----------------------------------------------------------

## 2025-01-04 NOTE — PROGRESS NOTES
01/04/25 1300   Mobility   O2 walk? Yes   SPO2% on Room Air at Rest 95   SPO2% Ambulation on Room Air 88

## 2025-01-07 ENCOUNTER — PATIENT OUTREACH (OUTPATIENT)
Dept: CASE MANAGEMENT | Age: 85
End: 2025-01-07

## 2025-01-07 NOTE — PROGRESS NOTES
Spoke with patient she did not feel like speaking with nurse care manager at this time and requested a call back at a later time today or tomorrow.

## 2025-01-09 NOTE — PAYOR COMM NOTE
--------------  DISCHARGE REVIEW    Payor: [employer]  Subscriber #:  [id number]  Authorization Number: [id number]    Admit date: 24  Admit time:  12:33 PM  Discharge Date: 2025  3:10 PM     Admitting Physician: Yobani Pantoja DO  Attending Physician:  No att. providers found  Primary Care Physician: Sherif Sánchez MD          Discharge Summary Notes        Discharge Summary signed by Pedro Sheppard DO at 2025 10:17 AM       Author: Pedro Sheppard DO Specialty: HOSPITALIST Author Type: Physician    Filed: 2025 10:17 AM Date of Service: 2025 10:15 AM Status: Signed    : Pedro Sheppard DO (Physician)           [facility]     Diana Eisenberg Patient Status:  Inpatient    1940 MRN [id number]   Location [facility] 2NE-A Attending Pedro Sheppard DO   Hosp Day # 6 PCP SHERIF SÁNCHEZ MD     Date of Admission: 2024  Date of Discharge:   2025    Discharge Disposition: Home or Self Care    Discharge Diagnosis:  #Acute on chronic hypoxic respiratory failure (2L at needed and 2L at night)  #COPD exacerbation, CXR neg, RVP neg, PCT 0.1  -IV steroids > Prednisone burst > back to iv steroids as of , cont for now  -ZENAIDA Nebs schedule + PRN  -Antitussuves   -Oxygen, currently 2L  -Pulmonary & CCM consult  -SW consult - patient wants portable tanks at home      #Acute encephalopathy d/t hospitalization? Steroids? Infection? CVA?  -Check UA: looks okay  -Check CT brain () neg     #Atrial fibrillation on Coumadin with RVR  -Toprol   -Cardizem CD  -Coumadin  -Monitor INR  -Monitor hemodynamics  -Telemetry  -Cardiology consult      #CAD, trop neg  #Essential hypertension  #Dyslipidemia   -Aspirin  -Statin     #Chronic diastolic heart failure   -Lasix PO daily  -Daily weight     #Chronic kidney disease  -Monitor UOP, creatinine and electrolytes     #Hypothyroidism  -Synthroid     #GERD  -PPI     #Prediabetes A1c 6.1  -Correctional scale    History of  Present Illness: Diana Eisenberg is a 84 year old female with CAD, essential hypertension, dyslipidemia, atrial fibrillation on Coumadin, chronic diastolic dysfunction, chronic hypoxic respiratory failure (2L as needed and 2L at night), chronic kidney disease, hypothyroidism and GERD who presents with shortness of breath. Patient states she has had URI symptoms for a month. She began to have a productive cough on Friday. Symptoms progressed and patient with difficulty breathing this morning prompting ER evaluation. She has chest pain, worse with coughing. No fever. Admits to nausea, no vomiting.     Brief Synopsis: pt admitted for resp failure, full details above for each problem list. Pt essentially had improvement in resp status w/ steroids, nebs, iv abx, lasix. Pt eventually weaned to RA on dc. Will complete prednisone taper on dc. Metoprolol adjusted by cards for afib w/ rvr episodes.     Lace+ Score: 67  59-90 High Risk  29-58 Medium Risk  0-28   Low Risk  Patient was referred to the [facility].    TCM Follow-Up Recommendation:  LACE > 58: High Risk of readmission after discharge from the hospital.      Procedures during hospitalization:       Incidental or significant findings and recommendations (brief descriptions):      Lab/Test results pending at Discharge:       Consultants:  Cardiology & pulmonary     Discharge Medication List:     Discharge Medications        START taking these medications        Instructions Prescription details   predniSONE 10 MG Tabs  Commonly known as: Deltasone      3 tabs daily for 3 days then 2 tabs daily for 3 days then 1 tab daily for 3 days   Quantity: 18 tablet  Refills: 0            CONTINUE taking these medications        Instructions Prescription details   albuterol 108 (90 Base) MCG/ACT Aers  Commonly known as: Ventolin HFA      Inhale 2 puffs into the lungs every 6 (six) hours as needed for Wheezing or Shortness of Breath.   Quantity: 54  g  Refills: 1     alendronate 35 MG Tabs  Commonly known as: Fosamax      Take 1 tablet (35 mg total) by mouth every 7 days.   Quantity: 12 tablet  Refills: 3     aspirin 81 MG Tbec      Take 1 tablet (81 mg total) by mouth daily.   Refills: 0     CALCIUM 600+D3 OR      Take 1 tablet by mouth daily.   Refills: 0     clobetasol 0.05 % Crea  Commonly known as: Temovate      Apply 1 Application topically 2 (two) times daily as needed.   Quantity: 30 g  Refills: 2     Complete Daily/Lutein Tabs      Take 1 tablet by mouth daily.   Refills: 0     ICAPS AREDS 2 OR      Take 1 tablet by mouth 2 (two) times daily.   Refills: 0     dilTIAZem  MG Cp24  Commonly known as: CardIZEM CD      Take 1 capsule (240 mg total) by mouth daily.   Refills: 0     famotidine 20 MG Tabs  Commonly known as: Pepcid      Take 1 tablet (20 mg total) by mouth 2 (two) times daily as needed (acid stomach).   Quantity: 60 tablet  Refills: 1     Fish Oil 1200 MG Cpdr      Take 1 capsule by mouth daily.   Refills: 0     levothyroxine 88 MCG Tabs  Commonly known as: Synthroid      Take 1 tablet (88 mcg total) by mouth daily.   Quantity: 90 tablet  Refills: 3     meclizine 25 MG Tabs  Commonly known as: Antivert      Take 1 tablet (25 mg total) by mouth 3 (three) times daily as needed for Dizziness.   Quantity: 30 tablet  Refills: 2     ondansetron 4 MG Tbdp  Commonly known as: Zofran-ODT      Take 1 tablet (4 mg total) by mouth every 8 (eight) hours as needed for Nausea.   Quantity: 30 tablet  Refills: 0     pravastatin 40 MG Tabs  Commonly known as: Pravachol      Take 1 tablet (40 mg total) by mouth nightly.   Quantity: 90 tablet  Refills: 1     tiZANidine 2 MG Tabs  Commonly known as: Zanaflex      Take 1 tablet (2 mg total) by mouth every 8 (eight) hours as needed (muscle spasm or back pain). Do not drive while taking   Quantity: 60 tablet  Refills: 0     Trelegy Ellipta 200-62.5-25 MCG/ACT Aepb  Generic drug: fluticasone-umeclidin-vilant       Inhale 1 puff into the lungs daily.   Quantity: 90 each  Refills: 1     warfarin 4 MG Tabs  Commonly known as: Coumadin      Take as directed. If you are unsure how to take this medication, talk to your nurse or doctor.  Original instructions: Take 1 tablet (4 mg total) by mouth at bedtime.   Refills: 0            ASK your doctor about these medications        Instructions Prescription details   furosemide 20 MG Tabs  Commonly known as: Lasix      Take 1 tablet (20 mg total) by mouth daily as needed (In addition to 40 mg daily).   Refills: 0     furosemide 40 MG Tabs  Commonly known as: Lasix      Take 1 tablet (40 mg total) by mouth daily.   Quantity: 90 tablet  Refills: 3     metoprolol succinate  MG Tb24  Commonly known as: Toprol XL      Take 1 tablet (100 mg total) by mouth 2 (two) times daily.   Refills: 0               Where to Get Your Medications        These medications were sent to [address] [phone], [phone]  [address] [place] [address], [place] [address]      Phone: [phone]   predniSONE 10 MG Tabs         [facility] reviewed:     Follow-up appointment:   Symone Monterroso MD  [address]  [facility] [address]  [phone]    Schedule an appointment as soon as possible for a visit in 1 week(s)      Belen Lee MD  [address] [facility]  [address]  [place] [address]  [phone]    Schedule an appointment as soon as possible for a visit in 1 week(s)      Appointments for Next 30 Days 1/4/2025 - 2/3/2025      None            Vital signs:  Temp:  [97.6 °F (36.4 °C)-98.1 °F (36.7 °C)] 97.6 °F (36.4 °C)  Pulse:  [80-96] 89  Resp:  [18-20] 18  BP: (124-149)/(58-91) 139/76  SpO2:  [90 %-95 %] 93 %    Physical Exam:    General: No acute distress   Lungs: clear to auscultation  Cardiovascular: S1, S2  Abdomen: Soft      -----------------------------------------------------------------------------------------------  PATIENT DISCHARGE  INSTRUCTIONS: See electronic chart    Pedro Sheppard DO    Total time spent on discharge plannin minutes     The  [employer] Cures Act makes medical notes like these available to patients in the interest of transparency. Please be advised this is a medical document. Medical documents are intended to carry relevant information, facts as evident, and the clinical opinion of the practitioner. The medical note is intended as peer to peer communication and may appear blunt or direct. It is written in medical language and may contain abbreviations or verbiage that are unfamiliar.       Electronically signed by Pedro Sheppard DO on 2025 10:17 AM         REVIEWER COMMENTS

## 2025-01-09 NOTE — PROGRESS NOTES
Transitions of Care Navigation  Discharge Date: 1/4/25  Contact Date: 1/9/2025    Transitions of Care Assessment:  ABDIRASHID Initial Assessment    General:  Assessment completed with: Patient  Patient Subjective: Spoke with patient and daughter Xin.  Patient reports she has no energy since being discharged from the hospital.  She has not been sleeping well and is only sleeping 2 hours at a time.  Daughter reports patient's oxygen system is not working correctly.  It is making weird noises, gets hot and then turns off.  Due to this patient has not been using oxygen when she sleeps. Khari her oxygen supplier will be out tomorrow to look at the machine. Patient picked up medication prescribed at discharge and will be 20 mg dose of prednisone with her next dose.  Medications reviewed.  Patient's oxygen level has been 94%.  She does have a cough that is dry but has some congestion in her chest.  She also has shortness of breath with exertion but this is not new or worse. Patient also reports swelling in her right calf at times.  She states this has been ongoing for years and is not new. She denies chest pain, nausea, fever, headache, dizziness, abdominal pain or vomiting.  She does not have an appetite as she states her stomach always feels full.  Patient was advised to discuss this with primary care physician tomorrow at appointment. Patient prefers to see primary care physician and not [facility].  Pulmonology appointment on 1/17.  Home health nursing and physical therapy have been out to home.  Daughters have been going to patient's apartment daily and have her walk in the common area of her apartment complex with her walker to get exercise.  Patient was instructed to go to emergency room or call 911 if she begins experiencing chest pain, shortness of breath, difficulty breathing or severe dizziness. Patient and daughter did not have any additional questions or concerns.  Chief Complaint: Acute on  chronic hypoxic respiratory failure,  COPD exacerbation,  Acute encephalopathy  Verify patient name and  with patient/ caregiver: Yes    Hospital Stay/Discharge:  Tell me what you understand of why you were in the hospital or emergency department: I don't remeber  Prior to leaving the hospital were your Discharge Instructions reviewed with you?: Yes  Did you receive a copy of your written Discharge Instructions?: Yes  What questions do you have about your Discharge Instructions?: Patient did not have any additional questions  Do you feel better or worse since you left the hospital or emergency department?: Same    Follow - Up Appointment:  Do you have a follow-up appointment?: Yes  Date: 01/10/25  Physician: PCP  Are there any barriers to getting to your follow-up appointment?: No    Home Health/DME:  Prior to leaving the hospital was Home Health (HH) arranged for you?: Yes  Has HH been out or set up a visit to see you?: Been out     Prior to leaving the hospital or emergency department was Durable Medical Equipment (DME), medical supplies, or infusions arranged for you?: N/A  Are DME/medical supply/infusions needs identified by staff during this assessment?: No     Medications/Diet:  Did any of your medications change, during or after your hospital stay or ED visit?: Yes  Do you have your new or updated medications?: Yes  Do you understand what your medications are for and possible side effects?: Yes  Are there any reasons that keep you from taking your medication as prescribed?: No  Any concerns about medication refills?: No    Were you given a different diet per your Discharge Instructions?: No  Reason: n/a     Questions/Concerns:  Do you have any questions or concerns that have not been discussed?: No         Nursing Interventions:  Assessed respiratory status and for other signs/symptoms.  Reviewed medications. Instructed when to return to emergency room. Confirmed appointment with primary care physician  and/or specialist.     Medications:  Reviewed medications with patient.  Also, discussed new medication and potential side effects.  Patient did not have any additional questions.     Current Outpatient Medications   Medication Sig Dispense Refill    ipratropium-albuterol 0.5-2.5 (3) MG/3ML Inhalation Solution Take 3 mL by nebulization every 6 (six) hours as needed. 120 each 1    dilTIAZem  MG Oral Capsule SR 24 Hr Take 1 capsule (240 mg total) by mouth in the morning and 1 capsule (240 mg total) before bedtime. 60 capsule 1    predniSONE 10 MG Oral Tab 3 tabs daily for 3 days then 2 tabs daily for 3 days then 1 tab daily for 3 days 18 tablet 0    albuterol 108 (90 Base) MCG/ACT Inhalation Aero Soln Inhale 2 puffs into the lungs every 6 (six) hours as needed for Wheezing or Shortness of Breath. 54 g 1    pravastatin 40 MG Oral Tab Take 1 tablet (40 mg total) by mouth nightly. 90 tablet 1    MECLIZINE 25 MG Oral Tab Take 1 tablet (25 mg total) by mouth 3 (three) times daily as needed for Dizziness. 30 tablet 2    alendronate 35 MG Oral Tab Take 1 tablet (35 mg total) by mouth every 7 days. 12 tablet 3    clobetasol 0.05 % External Cream Apply 1 Application topically 2 (two) times daily as needed. 30 g 2    levothyroxine 88 MCG Oral Tab Take 1 tablet (88 mcg total) by mouth daily. 90 tablet 3    furosemide 40 MG Oral Tab Take 1 tablet (40 mg total) by mouth daily. 90 tablet 3    tiZANidine 2 MG Oral Tab Take 1 tablet (2 mg total) by mouth every 8 (eight) hours as needed (muscle spasm or back pain). Do not drive while taking 60 tablet 0    ondansetron 4 MG Oral Tablet Dispersible Take 1 tablet (4 mg total) by mouth every 8 (eight) hours as needed for Nausea. 30 tablet 0    famotidine 20 MG Oral Tab Take 1 tablet (20 mg total) by mouth 2 (two) times daily as needed (acid stomach). 60 tablet 1    aspirin 81 MG Oral Tab EC Take 1 tablet (81 mg total) by mouth daily.      warfarin 4 MG Oral Tab Take 1 tablet (4 mg  total) by mouth at bedtime.      furosemide 20 MG Oral Tab Take 1 tablet (20 mg total) by mouth daily as needed (In addition to 40 mg daily). For leg swelling      Multiple Vitamins-Minerals (ICAPS AREDS 2 OR) Take 1 tablet by mouth 2 (two) times daily.      metoprolol succinate  MG Oral Tablet 24 Hr Take 1 tablet (100 mg total) by mouth 2 (two) times daily.      Multiple Vitamins-Minerals (COMPLETE DAILY/LUTEIN) Oral Tab Take 1 tablet by mouth daily.        Calcium Carb-Cholecalciferol (CALCIUM 600+D3 OR) Take 1 tablet by mouth daily.        Omega-3 Fatty Acids (FISH OIL) 1200 MG Oral Capsule Delayed Release Take 1 capsule by mouth daily.        fluticasone-umeclidin-vilant (TRELEGY ELLIPTA) 200-62.5-25 MCG/ACT Inhalation Aerosol Powder, Breath Activated Inhale 1 puff into the lungs daily. (Patient not taking: Reported on 1/9/2025) 90 each 1         Follow-up Appointments:  Your appointments       Date & Time Appointment Department ([place])    Dima 10, 2025 1:00 PM [facility] Follow Up with Symone Monterroso MD [address] ([facility])              [address]  [id number] [place] [id number] [phone]            [facility]  Was TCC Ordered: Yes  Was TCC Scheduled: No. Explain: patient prefers to see primary care physician        Primary Care Provider (If no TCC appointment)  Does patient already have a PCP appointment scheduled? Yes  Nurse Care Manager Confirmed PCP office HFU appointment with patient       Specialist  Does the patient have any other follow-up appointment(s) need to be scheduled? Yes   -If yes: Nurse Care Manager reviewed upcoming specialist appointments with patient: Yes   -Does the patient need assistance scheduling appointment(s): No    [x]  Patient verbally agrees to additional follow-up calls from Nurse Care Manager    Book  By Date: 1/11/25

## 2025-01-10 ENCOUNTER — TELEPHONE (OUTPATIENT)
Dept: FAMILY MEDICINE CLINIC | Facility: CLINIC | Age: 85
End: 2025-01-10

## 2025-01-10 ENCOUNTER — OFFICE VISIT (OUTPATIENT)
Dept: FAMILY MEDICINE CLINIC | Facility: CLINIC | Age: 85
End: 2025-01-10
Payer: MEDICARE

## 2025-01-10 VITALS
DIASTOLIC BLOOD PRESSURE: 82 MMHG | HEIGHT: 58 IN | SYSTOLIC BLOOD PRESSURE: 130 MMHG | OXYGEN SATURATION: 96 % | BODY MASS INDEX: 31.91 KG/M2 | WEIGHT: 152 LBS | HEART RATE: 74 BPM

## 2025-01-10 DIAGNOSIS — I50.32 CHRONIC DIASTOLIC HEART FAILURE (HCC): ICD-10-CM

## 2025-01-10 DIAGNOSIS — Z79.01 CURRENT USE OF LONG TERM ANTICOAGULATION: ICD-10-CM

## 2025-01-10 DIAGNOSIS — J96.21 ACUTE ON CHRONIC RESPIRATORY FAILURE WITH HYPOXIA (HCC): Primary | ICD-10-CM

## 2025-01-10 DIAGNOSIS — I48.20 CHRONIC ATRIAL FIBRILLATION (HCC): ICD-10-CM

## 2025-01-10 DIAGNOSIS — J44.9 CHRONIC OBSTRUCTIVE PULMONARY DISEASE, UNSPECIFIED COPD TYPE (HCC): ICD-10-CM

## 2025-01-10 PROCEDURE — 3008F BODY MASS INDEX DOCD: CPT | Performed by: FAMILY MEDICINE

## 2025-01-10 PROCEDURE — 99496 TRANSJ CARE MGMT HIGH F2F 7D: CPT | Performed by: FAMILY MEDICINE

## 2025-01-10 PROCEDURE — 3075F SYST BP GE 130 - 139MM HG: CPT | Performed by: FAMILY MEDICINE

## 2025-01-10 PROCEDURE — 3079F DIAST BP 80-89 MM HG: CPT | Performed by: FAMILY MEDICINE

## 2025-01-10 PROCEDURE — 1160F RVW MEDS BY RX/DR IN RCRD: CPT | Performed by: FAMILY MEDICINE

## 2025-01-10 PROCEDURE — 1159F MED LIST DOCD IN RCRD: CPT | Performed by: FAMILY MEDICINE

## 2025-01-10 PROCEDURE — 1111F DSCHRG MED/CURRENT MED MERGE: CPT | Performed by: FAMILY MEDICINE

## 2025-01-10 RX ORDER — GUAIFENESIN 600 MG/1
600 TABLET, EXTENDED RELEASE ORAL 2 TIMES DAILY
COMMUNITY

## 2025-01-10 RX ORDER — UMECLIDINIUM BROMIDE AND VILANTEROL TRIFENATATE 62.5; 25 UG/1; UG/1
1 POWDER RESPIRATORY (INHALATION) DAILY
Qty: 60 EACH | Refills: 0 | Status: SHIPPED | OUTPATIENT
Start: 2025-01-10 | End: 2025-01-14

## 2025-01-10 NOTE — PATIENT INSTRUCTIONS
Treatment of Constipation  Goal is the passage of soft, formed stool without straining - 3x/week  If you have a poor response with a step (no change in 24-48 hours), move on to the next step    Step 1  Increase dietary fiber - supplements otc include Benefiber, Metamucil   Increase water intake    Step 2  Use a stool softener - docusate sodium (Colace) 100mg twice a day    Step 3  Use a stool softener (docusate) plus a stimulant laxative   Examples of stimulant laxatives are   Bisacodyl (Dulcolax) 5-15mg per day  Senna 15mg once a day      Hydrocolloid dressing   Clean the wound: Use a wound cleanser or saline solution to clean the wound and surrounding skin.   Dry the wound: Use a clean cloth or sterile gauze to gently pat the wound dry.   Select the dressing: Choose a dressing that's the right size and shape for the wound. The dressing should extend 2-3 cm beyond the wound.   Apply the dressing: Remove the backing from the dressing, and place it over the wound. Gently press down to smooth it out, starting from the center and working towards the edges.   Leave it in place: Depending on the wound, you can leave the dressing on for up to a week.   Remove the dressing: To remove, press down on the skin outside the dressing and carefully lift an edge.

## 2025-01-10 NOTE — TELEPHONE ENCOUNTER
Per [facility]:   Hello, we received the magic mouthwash order for this patient. We are unable to compound that product. What other providers have been doing is ordering the lidocaine separately and instructing patient to mix on their own. Or you could try ordering from a different pharmacy.       MA  - please check if we can send to another pharmacy.

## 2025-01-10 NOTE — PROGRESS NOTES
Subjective:   Diana Eisenberg is a 84 year old female who presents for hospital follow up.   She was discharged from [facility] to [facility]  Admission Date: 12/29/24   Discharge Date: 1/4/25  Hospital Discharge Diagnosis: acute on chronic respiratory failure, COPD exacerbation      Interactive contact within 2 business days post discharge first initiated on Date: 1/7/2025    I accessed [employer] and/or [employer] Everywhere and personally reviewed the following for the recent hospitalization: provider notes, consults, summaries, labs and other test results and the pertinent findings are documented below.     HPI:   Patient presented to ER with worsening shortness of breath along with productive cough. Dx with respiratory failure, acute on chronic due to COPD exacerbation, atrial fibrillation with RVR       Patient was given IV steroids, transition to oral medications she was also on scheduled albuterol nebs.  Required supplemental O2.  During hospitalization she had altered mental status, possibly due to steroids versus infection versus delirium.       Feels smothered at night wo oxygen   Currently sleeping upright because O2 concentrator is not working   Reports LOVE -  And noticed dec energy and appetite   Denies abd pain   Is having dec BM - only 1 time since dishcarge     Afib + CHF   HR not above 120   Will increase to 100s then back down         Weight   Wt Readings from Last 6 Encounters:   01/10/25 152 lb (68.9 kg)   01/04/25 153 lb 10.6 oz (69.7 kg)   09/11/24 162 lb (73.5 kg)   04/10/24 165 lb (74.8 kg)   11/15/23 169 lb (76.7 kg)   09/08/23 172 lb (78 kg)           History/Other:   Current Medications:  Medication Reconciliation:  I am aware of an inpatient discharge within the last 30 days.  The discharge medication list has been reconciled with the patient's current medication list and reviewed by me. See medication list for additions of new medication, and changes to current doses of  medications and discontinued medications.  Outpatient Medications Marked as Taking for the 1/10/25 encounter (Office Visit) with Symone Monterroso MD   Medication Sig    ipratropium-albuterol 0.5-2.5 (3) MG/3ML Inhalation Solution Take 3 mL by nebulization every 6 (six) hours as needed.    dilTIAZem  MG Oral Capsule SR 24 Hr Take 1 capsule (240 mg total) by mouth in the morning and 1 capsule (240 mg total) before bedtime.    predniSONE 10 MG Oral Tab 3 tabs daily for 3 days then 2 tabs daily for 3 days then 1 tab daily for 3 days    albuterol 108 (90 Base) MCG/ACT Inhalation Aero Soln Inhale 2 puffs into the lungs every 6 (six) hours as needed for Wheezing or Shortness of Breath.    pravastatin 40 MG Oral Tab Take 1 tablet (40 mg total) by mouth nightly.    MECLIZINE 25 MG Oral Tab Take 1 tablet (25 mg total) by mouth 3 (three) times daily as needed for Dizziness.    alendronate 35 MG Oral Tab Take 1 tablet (35 mg total) by mouth every 7 days.    clobetasol 0.05 % External Cream Apply 1 Application topically 2 (two) times daily as needed.    levothyroxine 88 MCG Oral Tab Take 1 tablet (88 mcg total) by mouth daily.    furosemide 40 MG Oral Tab Take 1 tablet (40 mg total) by mouth daily.    tiZANidine 2 MG Oral Tab Take 1 tablet (2 mg total) by mouth every 8 (eight) hours as needed (muscle spasm or back pain). Do not drive while taking    ondansetron 4 MG Oral Tablet Dispersible Take 1 tablet (4 mg total) by mouth every 8 (eight) hours as needed for Nausea.    famotidine 20 MG Oral Tab Take 1 tablet (20 mg total) by mouth 2 (two) times daily as needed (acid stomach).    aspirin 81 MG Oral Tab EC Take 1 tablet (81 mg total) by mouth daily.    warfarin 4 MG Oral Tab Take 1 tablet (4 mg total) by mouth at bedtime.    furosemide 20 MG Oral Tab Take 1 tablet (20 mg total) by mouth daily as needed (In addition to 40 mg daily). For leg swelling    Multiple Vitamins-Minerals (ICAPS AREDS 2 OR) Take 1 tablet by mouth 2  (two) times daily.    metoprolol succinate  MG Oral Tablet 24 Hr Take 1 tablet (100 mg total) by mouth 2 (two) times daily.    Multiple Vitamins-Minerals (COMPLETE DAILY/LUTEIN) Oral Tab Take 1 tablet by mouth daily.      Calcium Carb-Cholecalciferol (CALCIUM 600+D3 OR) Take 1 tablet by mouth daily.      Omega-3 Fatty Acids (FISH OIL) 1200 MG Oral Capsule Delayed Release Take 1 capsule by mouth daily.         Review of Systems:    Review of Systems   Constitutional:  Negative for chills, diaphoresis and fever.   HENT:  Positive for sore throat. Negative for congestion and sinus pain.    Eyes: Negative.    Respiratory:  Positive for sputum production and shortness of breath. Negative for hemoptysis and wheezing.    Cardiovascular:  Positive for orthopnea. Negative for chest pain and leg swelling.   Gastrointestinal:  Negative for constipation, diarrhea, nausea and vomiting.   Genitourinary:  Negative for dysuria, frequency and hematuria.   Musculoskeletal:  Negative for joint pain, myalgias and neck pain.   Neurological:  Negative for dizziness, sensory change, speech change and focal weakness.         Objective:   No LMP recorded. (Menstrual status: Menopause).  Estimated body mass index is 31.77 kg/m² as calculated from the following:    Height as of this encounter: 4' 10\" (1.473 m).    Weight as of this encounter: 152 lb (68.9 kg).   /82   Pulse 74   Ht 4' 10\" (1.473 m)   Wt 152 lb (68.9 kg)   SpO2 96%   BMI 31.77 kg/m²      Physical Exam  Constitutional:       General: She is not in acute distress.  HENT:      Nose: No congestion or rhinorrhea.   Eyes:      General: No scleral icterus.     Extraocular Movements: Extraocular movements intact.      Conjunctiva/sclera: Conjunctivae normal.   Cardiovascular:      Rate and Rhythm: Normal rate and regular rhythm.   Pulmonary:      Effort: Pulmonary effort is normal. No respiratory distress.      Breath sounds: No stridor. Rhonchi (minimal bibasilar)  present. No wheezing.   Lymphadenopathy:      Cervical: No cervical adenopathy.   Neurological:      Mental Status: She is alert.   Psychiatric:         Mood and Affect: Mood normal.         Behavior: Behavior normal.         Assessment & Plan:   1. Acute on chronic respiratory failure with hypoxia (HCC) (Primary)  2. Chronic obstructive pulmonary disease, unspecified COPD type (HCC)  3. Chronic atrial fibrillation (HCC)  4. Chronic diastolic heart failure (HCC)  5. Current use of long term anticoagulation  Other orders  -    Benadryl/Maalox/Lidocaine 1:1:1 solution; Take 5-10 mL by mouth TID AC&HS. Swish and Smallow or Swish and Spit  Dispense: 150 mL; Refill: 0  O2 needed at night   Finishing antibiotics   HR controlled   Will cont monitoring BP and weight   Follow up with cardiology   She is to follow-up with the pulmonologist end of this month   Will try anticholinergic/LABA       Return in about 2 months (around 3/10/2025).

## 2025-01-13 NOTE — TELEPHONE ENCOUNTER
Call patient to see if she still needs magic mouthwash?   If so - will need to change rx. Is she capable of mixing solution at home of 2 or more medications?   If not needed - please discontinue

## 2025-01-13 NOTE — TELEPHONE ENCOUNTER
Closed    Close reason: Other  Payer: [employer]    [phone]  Note from payer: Invalid Medication Quantity submitted in PA Request. Please review and re-submit.  View History

## 2025-01-13 NOTE — TELEPHONE ENCOUNTER
Per pharmacy Stiolto Respimat is formulary alternative, Anoro is not covered by plan.   Please advise.

## 2025-01-14 RX ORDER — TIOTROPIUM BROMIDE AND OLODATEROL 3.124; 2.736 UG/1; UG/1
2 SPRAY, METERED RESPIRATORY (INHALATION) DAILY
Qty: 4 G | Refills: 3 | Status: SHIPPED | OUTPATIENT
Start: 2025-01-14

## 2025-01-15 NOTE — TELEPHONE ENCOUNTER
Pt returned call. Informed pt of [facility] stating they could not compound Magic Mouthwash and discussed options with patient. Pt state she does not want medication.

## 2025-01-15 NOTE — TELEPHONE ENCOUNTER
RN to pt call at only number listed on ANIA, unable to leave detailed VM on unidentified mailbox.  Informed a MC message will be sent and asked pt to view MC message and callback the office.  Will postpone for verification pt has viewed message & called office, if communication is not received RN staff will attempt to reach pt again.

## 2025-01-16 ENCOUNTER — MED REC SCAN ONLY (OUTPATIENT)
Dept: FAMILY MEDICINE CLINIC | Facility: CLINIC | Age: 85
End: 2025-01-16

## 2025-01-20 ENCOUNTER — HOME HEALTH CHARGES (OUTPATIENT)
Dept: FAMILY MEDICINE CLINIC | Facility: CLINIC | Age: 85
End: 2025-01-20

## 2025-01-20 DIAGNOSIS — J44.1 CHRONIC OBSTRUCTIVE ASTHMA WITH EXACERBATION (HCC): Primary | ICD-10-CM

## 2025-01-21 ENCOUNTER — PATIENT OUTREACH (OUTPATIENT)
Dept: CASE MANAGEMENT | Age: 85
End: 2025-01-21

## 2025-01-21 NOTE — PROGRESS NOTES
ABDIRASHID Follow-up Assessment    General:  Assessment completed with: Patient  Community Resources: Home Health    Progress/Care Plan:  Is the patient progressing as planned?: Yes  Care Plan Update: Spoke with patient she is feeling better but not 100%.  Her oxygen did get fixed and she has been wearing it to sleep and sleeping better. She not longer has to sleep sitting up and not waking up in the middle of the night gasping for air.  She is still feeling weak but that has improved.  She still has a cough that is productive cough.  Her shortness of breath is much better.  She denies chest pain but does have some tightness in her chest.  She denies nausea or any other symptoms. Patient did not have any additional questions or concerns.  New Care Plan: Go to pulmonoary appointment, continue to monitor symptoms  Frequency/Follow Up Plan: 1 week     Notes:  Navigator Notes: cough, shortness of breath, chest tightness

## 2025-01-22 RX ORDER — LEVOTHYROXINE SODIUM 88 UG/1
88 TABLET ORAL DAILY
Qty: 90 TABLET | Refills: 3 | Status: SHIPPED | OUTPATIENT
Start: 2025-01-22

## 2025-01-28 ENCOUNTER — PATIENT OUTREACH (OUTPATIENT)
Dept: CASE MANAGEMENT | Age: 85
End: 2025-01-28

## 2025-01-28 NOTE — PROGRESS NOTES
Spoke to Diana for Chronic Care Management.      Updates to patient care team/comments: UTD   Patient reported changes in medications: reports no changes   Med Adherence  Comment: reports adherence      Health Maintenance:   Health Maintenance   Topic Date Due    Zoster Vaccines (1 of 2) Never done    COVID-19 Vaccine (4 - 2024-25 season) 09/01/2024    Influenza Vaccine (1) 10/01/2024    Annual Well Visit  01/01/2025    Annual Depression Screening  01/01/2025    Fall Risk Screening (Annual)  01/01/2025    DEXA Scan  06/12/2026    Pneumococcal Vaccine: 50+ Years  Completed    Meningococcal B Vaccine  Aged Out       Patient updates/concerns:   Spoke with patient.  Patient relates doing well. Mood good/stable.  Blood pressure and pulse stable. Continues to monitor. Continues to have PT/INR checked.. Followed by cardiology. Breathing stable/same. Recently seen pulm. Will schedule CT. Edema stable. Elevating feet as needed. Vertigo stable. Vision stable continues to do injections and tolerating. Chronic back pain continues to be on and off. Doing stretches/exercise at home with some improvement. Denies any recent falls. Appetite the same. Weight stable. No other questions or concerns.     Encouraged patient:   Self care: Take the time to do the things you love.   Nutrition:  Good nutrition helps us to maintain our weight, fight off infection, and help reduce the risk of developing other chronic issues.   Physical activity:  Physical activity is important to help maintain independence and improve quality of life    Goals/Action Plan:    Active goal from previous outreach: Staying healthy, maintain independence, and stability.    Patient reported progress towards goals: progressing                - What: continues to follow up on health conditions            - Where/When/How: seeing PCP and specialist   Patient Reported Barriers and Concerns: as per above                    - Plan for overcoming barriers: encouraged patient  to call with any questions or concerns.     Care Managers Interventions: Continue to provide encouragement and education for healthy coping and diagnosis.      Future Appointments: No future appointments.      Next Care Manager Follow Up Date: 1 month     Reason For Follow Up: review progress and or barriers towards patient's goals.     Time Spent This Encounter Total: 14 min medical record review, telephone communication, care plan updates where needed, education, goals, and action plan recreation/update. Provided acknowledgment and validation to patient's concerns.   Monthly Minute Total including today: 14 min   Physical assessment, complete health history, and need for CCM established by SHERIF SÁNCHEZ MD.

## 2025-01-29 NOTE — PROGRESS NOTES
ABDIRASHID Follow-up Assessment    General:  Assessment completed with: Patient  Community Resources: Other (n/a)    Progress/Care Plan:  Is the patient progressing as planned?: Yes  Care Plan Update: Spoke with patient her weakness continues to improve, her cough minimal and she denies shortness of breath or chest or chest tightness.  She has not scheduled the walk test scheduled with pulmonology as she was constipated today and couldn't leave the house.  She has now had a bowel movement but is very sore. Patient did  Trelogy and began taking it.  Patient did not have any additional questions or concerns.  New Care Plan: schedule walk test, continue meds  Frequency/Follow Up Plan: 1 week     Notes:  Navigator Notes: walk test scheduled, last bowel movement, cough, shortness of breath, chest tightness

## 2025-02-05 ENCOUNTER — PATIENT OUTREACH (OUTPATIENT)
Dept: CASE MANAGEMENT | Age: 85
End: 2025-02-05

## 2025-02-06 ENCOUNTER — NURSE TRIAGE (OUTPATIENT)
Dept: FAMILY MEDICINE CLINIC | Facility: CLINIC | Age: 85
End: 2025-02-06

## 2025-02-06 DIAGNOSIS — E03.9 ACQUIRED HYPOTHYROIDISM: ICD-10-CM

## 2025-02-06 DIAGNOSIS — I50.32 CHRONIC DIASTOLIC HEART FAILURE (HCC): ICD-10-CM

## 2025-02-06 DIAGNOSIS — R73.03 PREDIABETES: ICD-10-CM

## 2025-02-06 DIAGNOSIS — R42 DIZZINESS: ICD-10-CM

## 2025-02-06 DIAGNOSIS — I27.20 PULMONARY HYPERTENSION (HCC): ICD-10-CM

## 2025-02-06 DIAGNOSIS — J44.1 CHRONIC OBSTRUCTIVE ASTHMA WITH EXACERBATION (HCC): ICD-10-CM

## 2025-02-06 DIAGNOSIS — I10 ESSENTIAL HYPERTENSION: ICD-10-CM

## 2025-02-06 DIAGNOSIS — N18.32 STAGE 3B CHRONIC KIDNEY DISEASE (HCC): Primary | ICD-10-CM

## 2025-02-06 NOTE — PROGRESS NOTES
ABDIRASHID Follow-up Assessment    General:  Assessment completed with: Patient  Community Resources: Other (n/a)    Progress/Care Plan:  Is the patient progressing as planned?: Yes  Care Plan Update: Spoke with patient she reports this morning after eating breakfast (cheerios) she began having chest pain in the middle of her chest.  It was constant and varied in intensity.  It began around 9 am and lasted until 1 pm.  She also experienced some dizziness when she got up to answer her door.  She does not remember what symptoms started first and which one stopped first.  She did not check her oxygen levels during the episode.  She still has a producitve cough with white phlegm that has been present since her discharged from [facility] on 1/4/25.  She denies shortness of breath, chest tightness, visions changes, belching or heartburn.  She has also been struggling with constipation.  Her last bowel movement was yesterday but she needed to take a laxative and two stool softeners.  Patient not longer has chest pain or dizziness.  Patient was instructed to go to emergency room or call 911 if either of those symptoms returned. She expressed understanding. Patient reports her weakness continues to improve.  She is scheduled for her walk test with pulmonology tomorrow. Telephone encounter sent to primary care physician clinical team regarding this. Patient did not have any additional questions or concerns.  New Care Plan: Go to ER/call 911 if chest pain or dizziness returns, go to walk test, high fiber diet, push fluids.  Frequency/Follow Up Plan: 1 week     Notes:  Navigator Notes: review TE sent to PCP regarding symptoms, walk test, chest pain, dizziness, cough, shortness of breath

## 2025-02-06 NOTE — TELEPHONE ENCOUNTER
Spoke with patient for TRANSITIONS OF CARE follow up call.  Patient reports this morning after eating breakfast (cheerios) she began having chest pain in the middle of her chest.  It was constant and varied in intensity.  It began around 9 am and lasted until 1 pm.  She also experienced some dizziness when she got up to answer her door.  She does not remember what symptoms started first and which one stopped first.  She still has a producitve cough with white phlegm that has been present since her discharged from [facility] on 1/4/25.  She denies shortness of breath, chest tightness, visions changes, belching or heartburn.  She has also been struggling with constipation.  Her last bowel movement was yesterday but she needed to take a laxative and two stool softeners.  Patient not longer has chest pain or dizziness.  Patient was instructed to go to emergency room or call 911 if either of those symptoms returned. She expressed understanding.

## 2025-02-06 NOTE — TELEPHONE ENCOUNTER
RN to pt call for condition update/triage of symptoms, unable to leave detailed VM on unidentified mailbox.  Asked pt to callback office to speak with a nurse regarding reported symptoms.  Also advised pt a MC message will be sent, although pt's last login was 1/23/25.

## 2025-02-07 ENCOUNTER — PATIENT OUTREACH (OUTPATIENT)
Dept: CASE MANAGEMENT | Age: 85
End: 2025-02-07

## 2025-02-07 ENCOUNTER — MED REC SCAN ONLY (OUTPATIENT)
Dept: FAMILY MEDICINE CLINIC | Facility: CLINIC | Age: 85
End: 2025-02-07

## 2025-02-07 NOTE — TELEPHONE ENCOUNTER
MCM not read. No call back.     Attempted to call pt. Left VM to call back to discuss symptoms   Attempted to call pt's daughter Xin. Left Vm to have pt call back to discuss symptoms     Reason for Disposition   All other patients with chest pain (Exception: Fleeting chest pain lasting a few seconds.)    Protocols used: Chest Pain-A-OH  Called and spoke with pt's daughter Cathryn. Stated she is there now with pt. Spoke with pt. Pt stated she has ongoing dizziness. Takes Meclizine and helps with dizziness. No current chest pain. Stated last a few hours yesterday. Went away with rest. Denies SOB and HA. Chest pain has not returned. Advised if it does, needs to go to ER. Pt verbalizes understanding. Scheduled annual physical with Dr. Monterroso.     Future Appointments   Date Time Provider Department Center   3/12/2025 11:00 AM Symone Monterroso MD [facility]  [facility]     Dr. Monterroso- ANy further recommendations?

## 2025-02-07 NOTE — PROGRESS NOTES
Spoke with patient she reports she has not had any chest pain today but has had some dizziness.  She has taken meclizine for that.  She reports she is doing ok.

## 2025-02-07 NOTE — TELEPHONE ENCOUNTER
Called and spoke with Cathryn (daughter). Stated she has a caregiver that comes in once a week that cleans and does grocery shopping. No current home health. Stated pt would be open to home health PT. Lab orders placed. Provided daughter with central scheduling number. Agreeable to complete labs. Cathryn will see pt tomorrow and let her know.     Dr. Monterroso - No current home health however states pt would be agreeable

## 2025-02-07 NOTE — TELEPHONE ENCOUNTER
Is home health still active?   We could do home health PT. If meclizine helping, we may need vestibular therapy and MSK conditioning.   Lets get CBC, BMP, tsh

## 2025-02-08 ENCOUNTER — LAB ENCOUNTER (OUTPATIENT)
Dept: LAB | Facility: HOSPITAL | Age: 85
End: 2025-02-08
Attending: FAMILY MEDICINE
Payer: MEDICARE

## 2025-02-08 DIAGNOSIS — R73.03 PREDIABETES: ICD-10-CM

## 2025-02-08 DIAGNOSIS — N18.32 STAGE 3B CHRONIC KIDNEY DISEASE (HCC): ICD-10-CM

## 2025-02-08 DIAGNOSIS — J44.1 CHRONIC OBSTRUCTIVE ASTHMA WITH EXACERBATION (HCC): ICD-10-CM

## 2025-02-08 DIAGNOSIS — I50.32 CHRONIC DIASTOLIC HEART FAILURE (HCC): ICD-10-CM

## 2025-02-08 DIAGNOSIS — E03.9 ACQUIRED HYPOTHYROIDISM: ICD-10-CM

## 2025-02-08 DIAGNOSIS — I27.20 PULMONARY HYPERTENSION (HCC): ICD-10-CM

## 2025-02-08 LAB
ANION GAP SERPL CALC-SCNC: 7 MMOL/L (ref 0–18)
BASOPHILS # BLD AUTO: 0.04 X10(3) UL (ref 0–0.2)
BASOPHILS NFR BLD AUTO: 0.3 %
BUN BLD-MCNC: 28 MG/DL (ref 9–23)
CALCIUM BLD-MCNC: 9.1 MG/DL (ref 8.7–10.6)
CHLORIDE SERPL-SCNC: 104 MMOL/L (ref 98–112)
CO2 SERPL-SCNC: 30 MMOL/L (ref 21–32)
CREAT BLD-MCNC: 1.47 MG/DL
EGFRCR SERPLBLD CKD-EPI 2021: 35 ML/MIN/1.73M2 (ref 60–?)
EOSINOPHIL # BLD AUTO: 0.09 X10(3) UL (ref 0–0.7)
EOSINOPHIL NFR BLD AUTO: 0.7 %
ERYTHROCYTE [DISTWIDTH] IN BLOOD BY AUTOMATED COUNT: 14.7 %
FASTING STATUS PATIENT QL REPORTED: NO
GLUCOSE BLD-MCNC: 97 MG/DL (ref 70–99)
HCT VFR BLD AUTO: 38.2 %
HGB BLD-MCNC: 12.1 G/DL
IMM GRANULOCYTES # BLD AUTO: 0.09 X10(3) UL (ref 0–1)
IMM GRANULOCYTES NFR BLD: 0.7 %
LYMPHOCYTES # BLD AUTO: 3.62 X10(3) UL (ref 1–4)
LYMPHOCYTES NFR BLD AUTO: 28.2 %
MCH RBC QN AUTO: 30.9 PG (ref 26–34)
MCHC RBC AUTO-ENTMCNC: 31.7 G/DL (ref 31–37)
MCV RBC AUTO: 97.7 FL
MONOCYTES # BLD AUTO: 1.51 X10(3) UL (ref 0.1–1)
MONOCYTES NFR BLD AUTO: 11.8 %
NEUTROPHILS # BLD AUTO: 7.47 X10 (3) UL (ref 1.5–7.7)
NEUTROPHILS # BLD AUTO: 7.47 X10(3) UL (ref 1.5–7.7)
NEUTROPHILS NFR BLD AUTO: 58.3 %
OSMOLALITY SERPL CALC.SUM OF ELEC: 297 MOSM/KG (ref 275–295)
PLATELET # BLD AUTO: 401 10(3)UL (ref 150–450)
POTASSIUM SERPL-SCNC: 4.6 MMOL/L (ref 3.5–5.1)
RBC # BLD AUTO: 3.91 X10(6)UL
SODIUM SERPL-SCNC: 141 MMOL/L (ref 136–145)
TSI SER-ACNC: 4.04 UIU/ML (ref 0.55–4.78)
WBC # BLD AUTO: 12.8 X10(3) UL (ref 4–11)

## 2025-02-08 PROCEDURE — 84443 ASSAY THYROID STIM HORMONE: CPT

## 2025-02-08 PROCEDURE — 36415 COLL VENOUS BLD VENIPUNCTURE: CPT

## 2025-02-08 PROCEDURE — 85025 COMPLETE CBC W/AUTO DIFF WBC: CPT

## 2025-02-08 PROCEDURE — 80048 BASIC METABOLIC PNL TOTAL CA: CPT

## 2025-02-10 ENCOUNTER — TELEPHONE (OUTPATIENT)
Dept: FAMILY MEDICINE CLINIC | Facility: CLINIC | Age: 85
End: 2025-02-10

## 2025-02-10 NOTE — TELEPHONE ENCOUNTER
Patient called for lab results. Advised patient of provider message. Patient confirms understanding. Next appointment date and time confirmed.     Symone Monterroso MD  2/8/2025  8:36 PM CST Back to Top      Results reviewed. Tests show no significant abnormalities. TSH increased but wnl  Kidney function better  Will discuss at next visit

## 2025-02-10 NOTE — TELEPHONE ENCOUNTER
Called patient's daughter, Cathryn, (ok per ANIA) with update on home health orders for PT. [facility] should be reaching out to set up home visits. Cathryn asked about test results (see 2/8/25 lab results). Informed her of results. Cathryn verbalized understanding.

## 2025-02-14 ENCOUNTER — PATIENT OUTREACH (OUTPATIENT)
Dept: CASE MANAGEMENT | Age: 85
End: 2025-02-14

## 2025-02-14 NOTE — PROGRESS NOTES
Dear SHERIF SÁNCHEZ MD,    My name is Lola Pierson RN and I am this patient's Transition of Care Navigator.     Thank you for allowing me to participate in your patient's care over the past 30 days.     The goal of this program is to assist individuals in meeting their health care needs post discharge.  This is done by providing consistent care coordination and connecting patients with needed resources throughout this episode of care. I'd like to inform you that your patient Diana Eisenberg has completed the 30 day ABDIRASHID navigation program as of today.     In discussion with the patient/family contact, I reviewed their providers and ongoing support contacts for the future.     Areas of need: none  Areas of progress: completed mediations prescribed by emergency room, completed visits with primary care physician and pulmonology.     It has been a pleasure communicating and working with you and thank you for allowing me to participate in the care of your patient.     If for any reason you have questions about the program, please feel free to contact me at the information below.      Sincerely,    Lola Pierson RN    ABDIRASHID Graduation Assessment    General:  Assessment completed with: Patient  Community Resources: Other (n/a)    Care Plan/Instructions:   Care Plan Summary (Recap of navigation period including # of ED & Hospital Admission, and if goals met or unmet): Spoke with patient she is doing ok.  She did complete her walk test @ pulmonology but did not qualify for continuous oxygen use. She continues to have constipation and uses stool softeners.  She denies chest pain.  She feels her last episode was just gas.  Her dizziness is still present at times and she using meclizine PRN.  She still has a cough and always has shortness of breath but it is  at baseline. Goals have been met, therefore TRANSITIONS OF CARE will end.  This will be last outreach call from nurse care manager.  Patient is in agreement. Primary  care physician updated.        Navigation period 1/9/25 - 2/14/25.  One Admission 12/29/24 - 1/4/25 initiated ABDIRASHID.  Goals have been met.  Patient Graduation Instructions (Ongoing barriers to care identified, Areas of Need, Areas of Progress): Barriers:  none   Needs:  none   Progress:  completed mediations prescribed by emergency room, completed visits with primary care physician and pulmonology.

## 2025-02-18 ENCOUNTER — TELEPHONE (OUTPATIENT)
Dept: FAMILY MEDICINE CLINIC | Facility: CLINIC | Age: 85
End: 2025-02-18

## 2025-02-18 NOTE — TELEPHONE ENCOUNTER
Brooke PT with [facility] called. She states she saw pt for PT today and pt reported a fall on 2/16/25. Pt was able to get up unassisted. She states pt has some mild bruising to right leg and forehead. Pt denies hitting head. No mental status changes. Pt was able to participate in PT today and did well per therapist. She complaints of mild pain due to fall.     AGUILA Monterroso

## 2025-02-27 ENCOUNTER — PATIENT OUTREACH (OUTPATIENT)
Dept: CASE MANAGEMENT | Age: 85
End: 2025-02-27

## 2025-02-27 NOTE — PROGRESS NOTES
Spoke to Diana for Chronic Care Management.      Updates to patient care team/comments: UTD   Patient reported changes in medications: reports no changes   Med Adherence  Comment: reports adherence      Health Maintenance:   Health Maintenance   Topic Date Due    Zoster Vaccines (1 of 2) Never done    COVID-19 Vaccine (4 - 2024-25 season) 09/01/2024    Influenza Vaccine (1) 10/01/2024    Annual Well Visit  01/01/2025    Annual Depression Screening  01/01/2025    Fall Risk Screening (Annual)  01/01/2025    DEXA Scan  06/12/2026    Pneumococcal Vaccine: 50+ Years  Completed    Meningococcal B Vaccine  Aged Out       Patient updates/concerns:   Spoke with patient.  Patient relates doing better.  Mood good/stable.  Has regained strength. Blood pressure and pulse stable. Continues to monitor. Continues to have PT/INR checked.. Followed by cardiology. Breathing stable/same. Recently seen pulm. Edema stable. Elevating feet as needed. Vertigo stable. Vision stable continues to do injections and tolerating. Chronic back pain continues to be on and off. Doing stretches/exercise at home with some improvement. Denies any recent falls. Appetite the same. Weight stable. Seeing PCP next week. No other questions or concerns.     Encouraged patient:   Self care: Take the time to do the things you love.   Nutrition:  Good nutrition helps us to maintain our weight, fight off infection, and help reduce the risk of developing other chronic issues.   Physical activity:  Physical activity is important to help maintain independence and improve quality of life.     Goals/Action Plan:    Active goal from previous outreach: Staying healthy, maintain independence, and stability.     Patient reported progress towards goals: continues to follow up on health conditions                - What: continues to follow up on health conditions            - Where/When/How: seeing PCP and specialist   Patient Reported Barriers and Concerns: as per above                     - Plan for overcoming barriers: encouraged patient to dana with any questions or concerns.     Care Managers Interventions: Continue to provide encouragement and education for healthy coping and diagnosis.      Future Appointments:   Future Appointments   Date Time Provider Department Center   3/12/2025 11:00 AM Sherif Monterroso MD [facility] Care Manager Follow Up Date: 1 month     Reason For Follow Up: review progress and or barriers towards patient's goals.     Time Spent This Encounter Total: 15 min medical record review, telephone communication, care plan updates where needed, education, goals, and action plan recreation/update. Provided acknowledgment and validation to patient's concerns.   Monthly Minute Total including today: 15  Physical assessment, complete health history, and need for CCM established by SHERIF MONTERROSO MD.

## 2025-03-12 ENCOUNTER — OFFICE VISIT (OUTPATIENT)
Dept: FAMILY MEDICINE CLINIC | Facility: CLINIC | Age: 85
End: 2025-03-12
Payer: MEDICARE

## 2025-03-12 VITALS
DIASTOLIC BLOOD PRESSURE: 76 MMHG | HEART RATE: 76 BPM | TEMPERATURE: 97 F | SYSTOLIC BLOOD PRESSURE: 124 MMHG | WEIGHT: 156 LBS | HEIGHT: 59 IN | OXYGEN SATURATION: 97 % | BODY MASS INDEX: 31.45 KG/M2

## 2025-03-12 DIAGNOSIS — I27.20 PORTOPULMONARY HYPERTENSION (HCC): ICD-10-CM

## 2025-03-12 DIAGNOSIS — Z91.81 HISTORY OF RECENT FALL: ICD-10-CM

## 2025-03-12 DIAGNOSIS — I25.10 CORONARY ARTERY DISEASE INVOLVING NATIVE CORONARY ARTERY OF NATIVE HEART WITHOUT ANGINA PECTORIS: Chronic | ICD-10-CM

## 2025-03-12 DIAGNOSIS — H35.3211 EXUDATIVE AGE-RELATED MACULAR DEGENERATION OF RIGHT EYE WITH ACTIVE CHOROIDAL NEOVASCULARIZATION (HCC): ICD-10-CM

## 2025-03-12 DIAGNOSIS — K76.6 PORTOPULMONARY HYPERTENSION (HCC): ICD-10-CM

## 2025-03-12 DIAGNOSIS — Z00.00 ENCOUNTER FOR ANNUAL HEALTH EXAMINATION: Primary | ICD-10-CM

## 2025-03-12 DIAGNOSIS — R73.03 PREDIABETES: Chronic | ICD-10-CM

## 2025-03-12 DIAGNOSIS — K21.9 GASTROESOPHAGEAL REFLUX DISEASE, UNSPECIFIED WHETHER ESOPHAGITIS PRESENT: ICD-10-CM

## 2025-03-12 DIAGNOSIS — Z79.01 CURRENT USE OF LONG TERM ANTICOAGULATION: ICD-10-CM

## 2025-03-12 DIAGNOSIS — I10 ESSENTIAL HYPERTENSION: ICD-10-CM

## 2025-03-12 DIAGNOSIS — I48.20 CHRONIC ATRIAL FIBRILLATION (HCC): ICD-10-CM

## 2025-03-12 DIAGNOSIS — M19.90 ARTHRITIS: ICD-10-CM

## 2025-03-12 DIAGNOSIS — J44.9 CHRONIC OBSTRUCTIVE PULMONARY DISEASE, UNSPECIFIED COPD TYPE (HCC): ICD-10-CM

## 2025-03-12 DIAGNOSIS — K59.00 CONSTIPATION, UNSPECIFIED CONSTIPATION TYPE: ICD-10-CM

## 2025-03-12 DIAGNOSIS — I50.32 CHRONIC DIASTOLIC HEART FAILURE (HCC): ICD-10-CM

## 2025-03-12 DIAGNOSIS — N18.32 STAGE 3B CHRONIC KIDNEY DISEASE (HCC): ICD-10-CM

## 2025-03-12 DIAGNOSIS — E03.9 ACQUIRED HYPOTHYROIDISM: ICD-10-CM

## 2025-03-12 PROCEDURE — 3078F DIAST BP <80 MM HG: CPT | Performed by: FAMILY MEDICINE

## 2025-03-12 PROCEDURE — 96160 PT-FOCUSED HLTH RISK ASSMT: CPT | Performed by: FAMILY MEDICINE

## 2025-03-12 PROCEDURE — 1170F FXNL STATUS ASSESSED: CPT | Performed by: FAMILY MEDICINE

## 2025-03-12 PROCEDURE — 1159F MED LIST DOCD IN RCRD: CPT | Performed by: FAMILY MEDICINE

## 2025-03-12 PROCEDURE — 3074F SYST BP LT 130 MM HG: CPT | Performed by: FAMILY MEDICINE

## 2025-03-12 PROCEDURE — 99213 OFFICE O/P EST LOW 20 MIN: CPT | Performed by: FAMILY MEDICINE

## 2025-03-12 PROCEDURE — 3008F BODY MASS INDEX DOCD: CPT | Performed by: FAMILY MEDICINE

## 2025-03-12 PROCEDURE — G0439 PPPS, SUBSEQ VISIT: HCPCS | Performed by: FAMILY MEDICINE

## 2025-03-12 PROCEDURE — 1160F RVW MEDS BY RX/DR IN RCRD: CPT | Performed by: FAMILY MEDICINE

## 2025-03-12 PROCEDURE — 1125F AMNT PAIN NOTED PAIN PRSNT: CPT | Performed by: FAMILY MEDICINE

## 2025-03-12 PROCEDURE — 99499 UNLISTED E&M SERVICE: CPT | Performed by: FAMILY MEDICINE

## 2025-03-12 RX ORDER — FLUTICASONE FUROATE, UMECLIDINIUM BROMIDE AND VILANTEROL TRIFENATATE 200; 62.5; 25 UG/1; UG/1; UG/1
1 POWDER RESPIRATORY (INHALATION) DAILY
Qty: 60 EACH | Refills: 0 | Status: SHIPPED | OUTPATIENT
Start: 2025-03-12

## 2025-03-12 NOTE — PATIENT INSTRUCTIONS
Constipation - take fiber capsule daily like Metamucil (psyllium husk)   Then add doculax as needed (not more than 1x/week)     Schedule follow up with cardiology     Weigh yourself daily     Use breathing device to loosen mucous (\"beat the chest\")    Lab work in May then follow up in office

## 2025-03-12 NOTE — PROGRESS NOTES
Subjective:   Diana Eisenberg is a 85 year old female who presents for a MA AHA (Medicare Advantage Annual Health Assessment) and Subsequent Annual Wellness visit (Pt already had Initial Annual Wellness) and scheduled follow up of multiple significant but stable problems.   History of Present Illness      She experiences a persistent cough that produces white, foamy mucus throughout the day. No fever is present, and the medication previously recommended has not been effective. She has an upcoming appointment with her pulmonologist on the 20th. She uses oxygen at home, primarily at night, but sometimes during the day if she feels unwell. Using oxygen helps her feel better after about 15-20 minutes.    She has a history of heart failure and is supposed to weigh herself daily, although she does not do so regularly. Her weight is stable around 155-156 pounds. She reports occasional swelling in her legs, which she attributes to consuming salty foods and is mindful of her diet to avoid swelling.    She fell off the bed about a month ago, resulting in bruising on her leg, which has mostly resolved. She was able to get up by herself and reports no significant issues with walking, although her leg is slightly swollen today.    She experiences constipation and takes two stool softeners nightly. She occasionally uses a laxative, which she finds helpful. She reports a history of severe constipation and is cautious to prevent recurrence.    She has a history of macular degeneration and receives eye injections every three months. Her vision is described as 'not too good,' and she has difficulty seeing distant objects clearly.    History/Other:   Fall Risk Assessment:   She has been screened for Falls and is High Risk. Fall Prevention information provided to patient in After Visit Summary.    Do you feel unsteady when standing or walking?: Yes  Do you worry about falling?: Yes  Have you fallen in the past year?: Yes  How many  times have you fallen?: 1  Were you injured?: No     Cognitive Assessment:   Abnormal - mild   Short term recall 2/3   Oriented to day, month year    Functional Ability/Status:   Diana Eisenberg has some abnormal functions as listed below:  She has Driving difficulties based on screening of functional status. She has Meal Preparation difficulties based on screening of functional status.She has difficulties Shopping for Groceries based on screening of functional status. She has difficulties Taking Meds as Rx'd based on screening of functional status. She has Vision problems based on screening of functional status. She has Walking problems based on screening of functional status. She has problems with Daily Activities based on screening of functional status.       Depression Screening (PHQ):  PHQ-2 SCORE: 0  , done 3/12/2025             Advanced Directives:   She does NOT have a Living Will. [Do you have a living will?: Yes]    She does NOT have a Power of  for Health Care. [Do you have a healthcare power of ?: Yes]    Discussed Advance Care Planning with patient (and family/surrogate if present). Standard forms made available to patient in After Visit Summary.      Patient Active Problem List   Diagnosis    Chronic atrial fibrillation (HCC)    Acquired hypothyroidism    Mixed hyperlipidemia    Essential hypertension    Primary osteoarthritis of right shoulder    Arthritis    Bilateral carotid artery disease    Severe obesity (BMI 35.0-39.9) with comorbidity (HCC)    Arthritis, lumbar spine    Chronic obstructive pulmonary disease (HCC)    Anemia    CKD (chronic kidney disease) stage 4, GFR 15-29 ml/min (HCC)    Chronic diastolic heart failure (HCC)    Current use of long term anticoagulation    Prediabetes    Portopulmonary hypertension (HCC)    Exudative age-related macular degeneration of right eye with active choroidal neovascularization (HCC)    Coronary artery disease involving native coronary  artery of native heart without angina pectoris    GERD (gastroesophageal reflux disease)    Pulmonary hypertension (HCC)    Hypokalemia    Metabolic alkalosis    Atrial fibrillation with RVR (HCC)    Acute hypoxemic respiratory failure (HCC)    COPD exacerbation (HCC)     Allergies:  She is allergic to hydrocodone and cephalexin.    Current Medications:  Outpatient Medications Marked as Taking for the 3/12/25 encounter (Office Visit) with Symone Monterroso MD   Medication Sig    fluticasone-umeclidin-vilant (TRELEGY ELLIPTA) 200-62.5-25 MCG/ACT Inhalation Aerosol Powder, Breath Activated Inhale 1 puff into the lungs daily.    LEVOTHYROXINE 88 MCG Oral Tab TAKE 1 TABLET EVERY DAY    guaiFENesin  MG Oral Tablet 12 Hr Take 1 tablet (600 mg total) by mouth 2 (two) times daily.    Benadryl/Maalox/Lidocaine 1:1:1 solution Take 5-10 mL by mouth TID AC&HS. Swish and Smallow or Swish and Spit    ipratropium-albuterol 0.5-2.5 (3) MG/3ML Inhalation Solution Take 3 mL by nebulization every 6 (six) hours as needed.    dilTIAZem  MG Oral Capsule SR 24 Hr Take 1 capsule (240 mg total) by mouth in the morning and 1 capsule (240 mg total) before bedtime.    albuterol 108 (90 Base) MCG/ACT Inhalation Aero Soln Inhale 2 puffs into the lungs every 6 (six) hours as needed for Wheezing or Shortness of Breath.    [DISCONTINUED] pravastatin 40 MG Oral Tab Take 1 tablet (40 mg total) by mouth nightly.    MECLIZINE 25 MG Oral Tab Take 1 tablet (25 mg total) by mouth 3 (three) times daily as needed for Dizziness.    alendronate 35 MG Oral Tab Take 1 tablet (35 mg total) by mouth every 7 days.    clobetasol 0.05 % External Cream Apply 1 Application topically 2 (two) times daily as needed.    [DISCONTINUED] furosemide 40 MG Oral Tab Take 1 tablet (40 mg total) by mouth daily.    tiZANidine 2 MG Oral Tab Take 1 tablet (2 mg total) by mouth every 8 (eight) hours as needed (muscle spasm or back pain). Do not drive while taking     ondansetron 4 MG Oral Tablet Dispersible Take 1 tablet (4 mg total) by mouth every 8 (eight) hours as needed for Nausea.    famotidine 20 MG Oral Tab Take 1 tablet (20 mg total) by mouth 2 (two) times daily as needed (acid stomach).    aspirin 81 MG Oral Tab EC Take 1 tablet (81 mg total) by mouth daily.    warfarin 4 MG Oral Tab Take 1 tablet (4 mg total) by mouth at bedtime.    furosemide 20 MG Oral Tab Take 1 tablet (20 mg total) by mouth daily as needed (In addition to 40 mg daily). For leg swelling    Multiple Vitamins-Minerals (ICAPS AREDS 2 OR) Take 1 tablet by mouth 2 (two) times daily.    metoprolol succinate  MG Oral Tablet 24 Hr Take 1 tablet (100 mg total) by mouth 2 (two) times daily.    Multiple Vitamins-Minerals (COMPLETE DAILY/LUTEIN) Oral Tab Take 1 tablet by mouth daily.      Calcium Carb-Cholecalciferol (CALCIUM 600+D3 OR) Take 1 tablet by mouth daily.      Omega-3 Fatty Acids (FISH OIL) 1200 MG Oral Capsule Delayed Release Take 1 capsule by mouth daily.         Medical History:  She  has a past medical history of A-fib ([facility]), Arrhythmia, Atherosclerosis of coronary artery, Atrial fibrillation with rapid ventricular response ([facility]) (3/12/2019), Back pain, Back problem, COPD (chronic obstructive pulmonary disease) ([facility]), Coronary atherosclerosis, Disorder of thyroid, Esophageal reflux, Essential hypertension, Hearing impairment, High blood pressure, High cholesterol, Hyperlipidemia, Hypothyroidism, Leg hematoma, right, subsequent encounter (7/29/2019), Osteoarthritis, and Visual impairment.  Surgical History:  She  has a past surgical history that includes hernia surgery; Carotid endarterectomy (Right, 2015); other surgical history; other surgical history; and colonoscopy.   Family History:  Her family history includes Cancer in her brother; Heart Disorder in her father and sister; Hypertension in her mother.  Social History:  She  reports that she quit smoking about 33 years ago. She has  never used smokeless tobacco. She reports that she does not currently use alcohol. She reports that she does not use drugs.    Tobacco:  She smoked tobacco in the past but quit greater than 12 months ago.  Tobacco Use[1]     CAGE Alcohol Screen:   CAGE screening score of 0 on 3/12/2025, showing low risk of alcohol abuse.      Patient Care Team:  Symone Monterroso MD as PCP - General (Family Medicine)  Bernice Sosa PT as Physical Therapist  Bridget Pugh PT (Physical Therapy)  Preet Pearson MD (NEPHROLOGY)  Jazlyn Miller MD as Consulting Physician (Cardiovascular Diseases)  Belen Lee MD (PULMONARY DISEASES)  Humza Wright (OPHTHALMOLOGY)  Rae Mccall RMA as [facility] Care Manager    Review of Systems   Constitutional:  Negative for chills, diaphoresis, fever and unexpected weight change.   HENT:  Negative for congestion, facial swelling, sinus pain and sore throat.    Eyes:  Negative for redness and visual disturbance.   Respiratory:  Negative for chest tightness, shortness of breath and wheezing.    Cardiovascular:  Negative for chest pain, palpitations and leg swelling.   Gastrointestinal:  Negative for abdominal pain, constipation, diarrhea and nausea.   Endocrine: Negative for cold intolerance, heat intolerance, polydipsia, polyphagia and polyuria.   Genitourinary:  Negative for dysuria, frequency and hematuria.   Musculoskeletal:  Negative for arthralgias, joint swelling, neck pain and neck stiffness.   Skin:  Negative for pallor, rash and wound.   Allergic/Immunologic: Negative for immunocompromised state.   Neurological:  Negative for dizziness, tremors, syncope, facial asymmetry, speech difficulty, light-headedness and headaches.   Hematological:  Negative for adenopathy.   Psychiatric/Behavioral:  Negative for dysphoric mood and sleep disturbance. The patient is not nervous/anxious.           Objective:   Physical Exam  Constitutional:       General: She is not in acute distress.  HENT:       Right Ear: Tympanic membrane normal.      Left Ear: Tympanic membrane normal.      Nose: Nose normal.      Mouth/Throat:      Mouth: Mucous membranes are moist.      Pharynx: Oropharynx is clear.   Eyes:      General: No scleral icterus.     Extraocular Movements: Extraocular movements intact.      Conjunctiva/sclera: Conjunctivae normal.      Pupils: Pupils are equal, round, and reactive to light.   Neck:      Vascular: No carotid bruit.   Cardiovascular:      Rate and Rhythm: Normal rate and regular rhythm.   Pulmonary:      Effort: Pulmonary effort is normal. No respiratory distress.      Breath sounds: Normal breath sounds.   Abdominal:      General: Abdomen is flat. Bowel sounds are normal.      Palpations: Abdomen is soft.   Musculoskeletal:         General: No swelling or tenderness.      Right lower leg: No edema.      Left lower leg: No edema.   Lymphadenopathy:      Cervical: No cervical adenopathy.   Skin:     General: Skin is warm and dry.      Findings: No bruising.   Neurological:      Mental Status: She is alert and oriented to person, place, and time. Mental status is at baseline.   Psychiatric:         Mood and Affect: Mood normal.         Behavior: Behavior normal.         Thought Content: Thought content normal.         Judgment: Judgment normal.            /76   Pulse 76   Temp 97.4 °F (36.3 °C)   Ht 4' 11\" (1.499 m)   Wt 156 lb (70.8 kg)   SpO2 97%   BMI 31.51 kg/m²  Estimated body mass index is 31.51 kg/m² as calculated from the following:    Height as of this encounter: 4' 11\" (1.499 m).    Weight as of this encounter: 156 lb (70.8 kg).    Medicare Hearing Assessment:   Hearing Screening    Screening Method: Whisper Test  Whisper Test Result: Fail               Assessment & Plan:   Diana Eisenberg is a 85 year old female who presents for a Medicare Assessment.     1. Encounter for annual health examination (Primary)    2. Stage 3b chronic kidney disease (HCC)  -     CBC With  Differential With Platelet; Future; Expected date: 05/01/2025  -     Comp Metabolic Panel (14); Future; Expected date: 05/01/2025  Due to hypertensive heart disease   Stable, cont current therapy     3. Chronic atrial fibrillation (HCC)  -     CBC With Differential With Platelet; Future; Expected date: 05/01/2025  - stable  - cont present management    4. Chronic diastolic heart failure (HCC)  Heart failure with stable weight. Not weighing daily. Advised daily weight monitoring.  - Instruct daily weight monitoring for sudden increases.  - Follow up with cardiologist.    5. Portopulmonary hypertension (HCC)  Due to COPD and diastolic HF  - stable  - cont present management    6. 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 Receives injections every three months.  - Continue ophthalmologist follow-ups every three months.    7. Chronic obstructive pulmonary disease, unspecified COPD type (HCC)  COPD with persistent cough and ineffective current medication. Considering Trelegy trial due to cost concerns. Inconsistent mucus clearance device use.  - Prescribe Trelegy for 30 days; reassess before 3-month supply.  - Encourage regular use of oscillating pressure device for mucus clearance.  - Follow up with pulmonologist on March 20th.    8. Arthritis  - stable  - cont present management    9. Gastroesophageal reflux disease, unspecified whether esophagitis present  - stable  - cont present management    10. Current use of long term anticoagulation  - stable  - cont present management    11. Prediabetes  - stable  - cont present management    12. Acquired hypothyroidism  -     TSH and Free T4; Future; Expected date: 05/01/2025  - stable  - cont present management    13. Essential hypertension    - stable  - cont present management    14. Coronary artery disease involving native coronary artery of native heart without angina  pectoris  Overview:   · Cath - 30% mild dz in mid LAD. 5/7/2003   · Adenosine nuclear MPI - EF 88%, normal LV   systolic fxn, fixed inferior defect w/ mild inferolateral ischemia. 7/31/2006   · Cath - 2vCAD w/ a 60% ostial OM lesion and a 50% lesion in a small non-dominant RCA. Dr. Flores 10/11/2012    · Echo - EF 77%, normal LV systolic/diastolic fxn, mild AI. 7/13/2013  Cont current therapy      15. History of recent fall   Fall resulted in leg bruising, mostly resolved. Some swelling noted.  - Monitor for changes in swelling or mobility.    16. Constipation  Constipation managed with stool softeners. Occasional laxative use acceptable. Miralax not recommended due to fluid restrictions.  - Use Dulcolax once a week if needed.  - Report any blood, severe cramping, or vomiting post-laxative use.       Other orders  -     Trelegy Ellipta; Inhale 1 puff into the lungs daily.  Dispense: 60 each; Refill: 0      Follow-up  Multiple specialist follow-ups scheduled. Blood work needed before next visit.  - Order blood work for May.  - Schedule follow-up in two months post-blood work.  - Ensure follow-ups with pulmonologist and cardiologist before next visit.      The patient indicates understanding of these issues and agrees to the plan.      Reinforced healthy diet, lifestyle, and exercise.        Return in about 2 months (around 5/12/2025).     SHERIF SÁNCHEZ MD, 3/12/2025     Supplementary Documentation:   General Health:  In the past six months, have you lost more than 10 pounds without trying?: 2 - No  Has your appetite been poor?: Yes  Type of Diet: Other  How does the patient maintain a good energy level?: Other  How would you describe your daily physical activity?: Moderate  How would you describe your current health state?: Fair  How do you maintain positive mental well-being?: Visiting Family  On a scale of 0 to 10, with 0 being no pain and 10 being severe pain, what is your pain level?: 8 - (Severe)  In the past  six months, have you experienced urine leakage?: 1-Yes  At any time do you feel concerned for the safety/well-being of yourself and/or your children, in your home or elsewhere?: No  Have you had any immunizations at another office such as Influenza, Hepatitis B, Tetanus, or Pneumococcal?: No    Health Maintenance   Topic Date Due    Zoster Vaccines (1 of 2) Never done    Annual Well Visit  2025    COVID-19 Vaccine ( season) 2025 (Originally 2024)    Influenza Vaccine (1) 2025 (Originally 10/1/2024)    DEXA Scan  2026    Annual Depression Screening  Completed    Fall Risk Screening (Annual)  Completed    Pneumococcal Vaccine: 50+ Years  Completed    Meningococcal B Vaccine  Aged Out            [1]   Social History  Tobacco Use   Smoking Status Former    Current packs/day: 0.00    Types: Cigarettes    Quit date: 1992    Years since quittin.0   Smokeless Tobacco Never      no

## 2025-03-20 RX ORDER — FUROSEMIDE 40 MG/1
40 TABLET ORAL DAILY
Qty: 90 TABLET | Refills: 1 | Status: SHIPPED | OUTPATIENT
Start: 2025-03-20

## 2025-03-21 ENCOUNTER — PATIENT OUTREACH (OUTPATIENT)
Dept: CASE MANAGEMENT | Age: 85
End: 2025-03-21

## 2025-03-21 ENCOUNTER — TELEPHONE (OUTPATIENT)
Dept: FAMILY MEDICINE CLINIC | Facility: CLINIC | Age: 85
End: 2025-03-21

## 2025-03-21 NOTE — PROGRESS NOTES
Spoke to Diana for Chronic Care Management.      Updates to patient care team/comments: UTD   Patient reported changes in medications: reports no changes   Med Adherence  Comment: reports adherence      Health Maintenance:   Health Maintenance   Topic Date Due    Zoster Vaccines (1 of 2) Never done    Annual Well Visit  01/01/2025    COVID-19 Vaccine (4 - 2024-25 season) 04/12/2025 (Originally 9/1/2024)    Influenza Vaccine (1) 06/30/2025 (Originally 10/1/2024)    DEXA Scan  06/12/2026    Annual Depression Screening  Completed    Fall Risk Screening (Annual)  Completed    Pneumococcal Vaccine: 50+ Years  Completed    Meningococcal B Vaccine  Aged Out       Patient updates/concerns:   Spoke with patient.  Patient relates doing better.  Mood good/stable.  Fell and no falls since last. Persistent cough still present but stable. Has regained strength. Blood pressure and pulse stable. Continues to monitor. Continues to have PT/INR checked.. Followed by cardiology. Breathing stable/same. Recently seen pulm. Edema stable. Elevating feet as needed. Vertigo stable. Vision stable continues to do injections and tolerating. Chronic back pain continues to be on and off. Doing stretches/exercise at home with some improvement. Appetite the same. Weight stable. No other questions or concerns.    Encouraged patient:   Self care: Take the time to do the things you love.   Nutrition:  Good nutrition helps us to maintain our weight, fight off infection, and help reduce the risk of developing other chronic issues.   Physical activity:  Physical activity is important to help maintain independence and improve quality of life.     Goals/Action Plan:    Active goal from previous outreach: Staying healthy, maintain independence, and stability    Patient reported progress towards goals: progressing                - What: continues to follow up on health conditions            - Where/When/How: seeing PCP and specialist  Patient Reported  Barriers and Concerns: as per above                    - Plan for overcoming barriers: encouraged patient to call with any questions or concerns.     Care Managers Interventions: Continue to provide encouragement and education for healthy coping and diagnosis.      Future Appointments: No future appointments.      Next Care Manager Follow Up Date: 1 month     Reason For Follow Up: review progress and or barriers towards patient's goals.     Time Spent This Encounter Total: 16 min medical record review, telephone communication, care plan updates where needed, education, goals, and action plan recreation/update. Provided acknowledgment and validation to patient's concerns.   Monthly Minute Total including today: 16 min  Physical assessment, complete health history, and need for CCM established by SHERIF SÁNCHEZ MD.

## 2025-03-21 NOTE — TELEPHONE ENCOUNTER
Called Brooke DIANE [facility] to advise ok for verbal order for PT. She confirms understanding.

## 2025-03-21 NOTE — TELEPHONE ENCOUNTER
Dr Monterroso: Ok for verbal of physical therapy plan?  Received call from Brooke, physical therapist, at [facility].   Physical therapy will see patient once a week for the next 2 weeks with a plan to discharge the patient the week of 3/31

## 2025-04-22 ENCOUNTER — PATIENT OUTREACH (OUTPATIENT)
Dept: CASE MANAGEMENT | Age: 85
End: 2025-04-22

## 2025-04-30 RX ORDER — PRAVASTATIN SODIUM 40 MG
40 TABLET ORAL NIGHTLY
Qty: 90 TABLET | Refills: 3 | Status: SHIPPED | OUTPATIENT
Start: 2025-04-30

## 2025-05-01 ENCOUNTER — TELEPHONE (OUTPATIENT)
Dept: FAMILY MEDICINE CLINIC | Facility: CLINIC | Age: 85
End: 2025-05-01

## 2025-05-01 NOTE — TELEPHONE ENCOUNTER
Pt calling to request paperwork for DNA documentation, states she needs one copy for her apartment building and one copy to keep in her purse.  She has upcoming OV 5/14, asking if this can be completed then or does she need separate OV to fill out form?  Will need pt and MD signature.    Dr. Monterroso- please advise if code status paperwork can be filled out at upcoming OV, or if she needs to be seen for separate visit?    Future Appointments   Date Time Provider Department Center   5/14/2025 10:00 AM Symone Monterroso MD [facility]        calm

## 2025-05-02 ENCOUNTER — LAB ENCOUNTER (OUTPATIENT)
Dept: LAB | Age: 85
End: 2025-05-02
Attending: FAMILY MEDICINE
Payer: MEDICARE

## 2025-05-02 DIAGNOSIS — E03.9 ACQUIRED HYPOTHYROIDISM: ICD-10-CM

## 2025-05-02 DIAGNOSIS — N18.32 STAGE 3B CHRONIC KIDNEY DISEASE (HCC): ICD-10-CM

## 2025-05-02 DIAGNOSIS — I48.20 CHRONIC ATRIAL FIBRILLATION (HCC): ICD-10-CM

## 2025-05-02 LAB
ALBUMIN SERPL-MCNC: 4.7 G/DL (ref 3.2–4.8)
ALBUMIN/GLOB SERPL: 1.8 {RATIO} (ref 1–2)
ALP LIVER SERPL-CCNC: 86 U/L (ref 55–142)
ALT SERPL-CCNC: 11 U/L (ref 10–49)
ANION GAP SERPL CALC-SCNC: 10 MMOL/L (ref 0–18)
AST SERPL-CCNC: 20 U/L (ref ?–34)
BASOPHILS # BLD AUTO: 0.04 X10(3) UL (ref 0–0.2)
BASOPHILS NFR BLD AUTO: 0.3 %
BILIRUB SERPL-MCNC: 0.3 MG/DL (ref 0.2–1.1)
BUN BLD-MCNC: 36 MG/DL (ref 9–23)
CALCIUM BLD-MCNC: 9.8 MG/DL (ref 8.7–10.6)
CHLORIDE SERPL-SCNC: 104 MMOL/L (ref 98–112)
CO2 SERPL-SCNC: 29 MMOL/L (ref 21–32)
CREAT BLD-MCNC: 1.61 MG/DL (ref 0.55–1.02)
EGFRCR SERPLBLD CKD-EPI 2021: 31 ML/MIN/1.73M2 (ref 60–?)
EOSINOPHIL # BLD AUTO: 0.71 X10(3) UL (ref 0–0.7)
EOSINOPHIL NFR BLD AUTO: 5.8 %
ERYTHROCYTE [DISTWIDTH] IN BLOOD BY AUTOMATED COUNT: 13.2 %
FASTING STATUS PATIENT QL REPORTED: NO
GLOBULIN PLAS-MCNC: 2.6 G/DL (ref 2–3.5)
GLUCOSE BLD-MCNC: 98 MG/DL (ref 70–99)
HCT VFR BLD AUTO: 43.8 % (ref 35–48)
HGB BLD-MCNC: 13.8 G/DL (ref 12–16)
IMM GRANULOCYTES # BLD AUTO: 0.03 X10(3) UL (ref 0–1)
IMM GRANULOCYTES NFR BLD: 0.2 %
LYMPHOCYTES # BLD AUTO: 4.26 X10(3) UL (ref 1–4)
LYMPHOCYTES NFR BLD AUTO: 34.7 %
MCH RBC QN AUTO: 28.8 PG (ref 26–34)
MCHC RBC AUTO-ENTMCNC: 31.5 G/DL (ref 31–37)
MCV RBC AUTO: 91.4 FL (ref 80–100)
MONOCYTES # BLD AUTO: 1.22 X10(3) UL (ref 0.1–1)
MONOCYTES NFR BLD AUTO: 10 %
NEUTROPHILS # BLD AUTO: 6 X10 (3) UL (ref 1.5–7.7)
NEUTROPHILS # BLD AUTO: 6 X10(3) UL (ref 1.5–7.7)
NEUTROPHILS NFR BLD AUTO: 49 %
OSMOLALITY SERPL CALC.SUM OF ELEC: 304 MOSM/KG (ref 275–295)
PLATELET # BLD AUTO: 353 10(3)UL (ref 150–450)
POTASSIUM SERPL-SCNC: 5.1 MMOL/L (ref 3.5–5.1)
PROT SERPL-MCNC: 7.3 G/DL (ref 5.7–8.2)
RBC # BLD AUTO: 4.79 X10(6)UL (ref 3.8–5.3)
SODIUM SERPL-SCNC: 143 MMOL/L (ref 136–145)
T4 FREE SERPL-MCNC: 1.4 NG/DL (ref 0.8–1.7)
TSI SER-ACNC: 2.17 UIU/ML (ref 0.55–4.78)
WBC # BLD AUTO: 12.3 X10(3) UL (ref 4–11)

## 2025-05-02 PROCEDURE — 85025 COMPLETE CBC W/AUTO DIFF WBC: CPT

## 2025-05-02 PROCEDURE — 80053 COMPREHEN METABOLIC PANEL: CPT

## 2025-05-02 PROCEDURE — 84439 ASSAY OF FREE THYROXINE: CPT

## 2025-05-02 PROCEDURE — 84443 ASSAY THYROID STIM HORMONE: CPT

## 2025-05-02 PROCEDURE — 36415 COLL VENOUS BLD VENIPUNCTURE: CPT

## 2025-05-06 RX ORDER — ALENDRONATE SODIUM 35 MG/1
35 TABLET ORAL
Qty: 12 TABLET | Refills: 3 | Status: SHIPPED | OUTPATIENT
Start: 2025-05-06

## 2025-05-06 NOTE — TELEPHONE ENCOUNTER
Please review: medication fails/has no protocol attached.    Future Appointments   Date Time Provider Department Center   5/14/2025 10:00 AM Symone Monterroso MD [facility] [place]

## 2025-05-14 ENCOUNTER — HOSPITAL ENCOUNTER (OUTPATIENT)
Dept: GENERAL RADIOLOGY | Age: 85
Discharge: HOME | End: 2025-05-14
Attending: FAMILY MEDICINE
Payer: MEDICARE

## 2025-05-14 ENCOUNTER — LAB ENCOUNTER (OUTPATIENT)
Dept: LAB | Age: 85
End: 2025-05-14
Attending: FAMILY MEDICINE
Payer: MEDICARE

## 2025-05-14 ENCOUNTER — OFFICE VISIT (OUTPATIENT)
Dept: FAMILY MEDICINE CLINIC | Facility: CLINIC | Age: 85
End: 2025-05-14
Payer: MEDICARE

## 2025-05-14 VITALS
HEART RATE: 64 BPM | DIASTOLIC BLOOD PRESSURE: 64 MMHG | OXYGEN SATURATION: 98 % | HEIGHT: 59 IN | SYSTOLIC BLOOD PRESSURE: 118 MMHG | BODY MASS INDEX: 30.44 KG/M2 | WEIGHT: 151 LBS

## 2025-05-14 DIAGNOSIS — J44.9 CHRONIC OBSTRUCTIVE PULMONARY DISEASE, UNSPECIFIED COPD TYPE (HCC): Primary | ICD-10-CM

## 2025-05-14 DIAGNOSIS — E66.01 SEVERE OBESITY (BMI 35.0-39.9) WITH COMORBIDITY (HCC): ICD-10-CM

## 2025-05-14 DIAGNOSIS — J44.9 CHRONIC OBSTRUCTIVE PULMONARY DISEASE, UNSPECIFIED COPD TYPE (HCC): ICD-10-CM

## 2025-05-14 DIAGNOSIS — R63.0 DECREASED APPETITE: ICD-10-CM

## 2025-05-14 DIAGNOSIS — N18.32 STAGE 3B CHRONIC KIDNEY DISEASE (HCC): ICD-10-CM

## 2025-05-14 PROBLEM — N18.4 CKD (CHRONIC KIDNEY DISEASE) STAGE 4, GFR 15-29 ML/MIN (HCC): Chronic | Status: RESOLVED | Noted: 2019-10-20 | Resolved: 2025-05-14

## 2025-05-14 LAB
ANION GAP SERPL CALC-SCNC: 8 MMOL/L (ref 0–18)
BASOPHILS # BLD AUTO: 0.03 X10(3) UL (ref 0–0.2)
BASOPHILS NFR BLD AUTO: 0.3 %
BUN BLD-MCNC: 44 MG/DL (ref 9–23)
CALCIUM BLD-MCNC: 9.6 MG/DL (ref 8.7–10.6)
CHLORIDE SERPL-SCNC: 104 MMOL/L (ref 98–112)
CO2 SERPL-SCNC: 30 MMOL/L (ref 21–32)
CREAT BLD-MCNC: 1.61 MG/DL (ref 0.55–1.02)
EGFRCR SERPLBLD CKD-EPI 2021: 31 ML/MIN/1.73M2 (ref 60–?)
EOSINOPHIL # BLD AUTO: 0.62 X10(3) UL (ref 0–0.7)
EOSINOPHIL NFR BLD AUTO: 5.7 %
ERYTHROCYTE [DISTWIDTH] IN BLOOD BY AUTOMATED COUNT: 13.3 %
FASTING STATUS PATIENT QL REPORTED: YES
GLUCOSE BLD-MCNC: 104 MG/DL (ref 70–99)
HCT VFR BLD AUTO: 45.5 % (ref 35–48)
HGB BLD-MCNC: 14.4 G/DL (ref 12–16)
IMM GRANULOCYTES # BLD AUTO: 0.04 X10(3) UL (ref 0–1)
IMM GRANULOCYTES NFR BLD: 0.4 %
LYMPHOCYTES # BLD AUTO: 3.79 X10(3) UL (ref 1–4)
LYMPHOCYTES NFR BLD AUTO: 34.6 %
MCH RBC QN AUTO: 29.3 PG (ref 26–34)
MCHC RBC AUTO-ENTMCNC: 31.6 G/DL (ref 31–37)
MCV RBC AUTO: 92.7 FL (ref 80–100)
MONOCYTES # BLD AUTO: 0.98 X10(3) UL (ref 0.1–1)
MONOCYTES NFR BLD AUTO: 9 %
NEUTROPHILS # BLD AUTO: 5.48 X10 (3) UL (ref 1.5–7.7)
NEUTROPHILS # BLD AUTO: 5.48 X10(3) UL (ref 1.5–7.7)
NEUTROPHILS NFR BLD AUTO: 50 %
OSMOLALITY SERPL CALC.SUM OF ELEC: 305 MOSM/KG (ref 275–295)
PLATELET # BLD AUTO: 322 10(3)UL (ref 150–450)
POTASSIUM SERPL-SCNC: 4.9 MMOL/L (ref 3.5–5.1)
RBC # BLD AUTO: 4.91 X10(6)UL (ref 3.8–5.3)
SODIUM SERPL-SCNC: 142 MMOL/L (ref 136–145)
WBC # BLD AUTO: 10.9 X10(3) UL (ref 4–11)

## 2025-05-14 PROCEDURE — 85025 COMPLETE CBC W/AUTO DIFF WBC: CPT

## 2025-05-14 PROCEDURE — 99214 OFFICE O/P EST MOD 30 MIN: CPT | Performed by: FAMILY MEDICINE

## 2025-05-14 PROCEDURE — 80048 BASIC METABOLIC PNL TOTAL CA: CPT

## 2025-05-14 PROCEDURE — G2211 COMPLEX E/M VISIT ADD ON: HCPCS | Performed by: FAMILY MEDICINE

## 2025-05-14 PROCEDURE — 71046 X-RAY EXAM CHEST 2 VIEWS: CPT | Performed by: FAMILY MEDICINE

## 2025-05-14 PROCEDURE — 36415 COLL VENOUS BLD VENIPUNCTURE: CPT

## 2025-05-14 NOTE — PROGRESS NOTES
Subjective:   Diana Eisenberg is a 85 year old female who presents for Follow - Up (Lab results )       History/Other:   History of Present Illness    Leukocytosis on recent labs.   She has a history of COPD and was previously prescribed ciprofloxacin on 3/24/25 following sputum culture   but symptoms have persisted. She was advised to take Mucinex for mucus management, but she is uncertain if she is taking it regularly and sometimes forgets things.    She experiences a sensation in her throat that makes it difficult to talk and occasionally feels like it obstructs her windpipe. This is accompanied by mucus that drips through her mouth and sometimes causes persistent coughing. She has not had a fever recently and denies overt chills.     She reports a decreased appetite, stating she feels better without food, and has experienced some weight loss. No significant stomach pain, but mentions occasional constipation, which she manages with a stool softener.    She experiences frequent dizziness and has difficulty remembering things, including people's names. She also reports some difficulty with vision, particularly in recognizing faces from a distance.    She lives in an apartment and does not use air conditioning until temperatures exceed 100 degrees due to feeling cold from blood thinners.       Chief Complaint Reviewed and Verified  Nursing Notes Reviewed and   Verified  Tobacco Reviewed  Allergies Reviewed  Medications Reviewed    Problem List Reviewed  Medical History Reviewed  Surgical History   Reviewed  Family History Reviewed  Social History Reviewed         Tobacco:  She smoked tobacco in the past but quit greater than 12 months ago.  Tobacco Use[1]     Current Medications[2]      Review of Systems:  Review of Systems   Constitutional:  Positive for appetite change and fatigue. Negative for activity change, chills, diaphoresis, fever and unexpected weight change.   HENT: Negative.     Eyes:  Negative.    Respiratory:  Positive for cough. Negative for wheezing.    Cardiovascular:  Negative for chest pain and palpitations.   Gastrointestinal:  Negative for abdominal pain, blood in stool, constipation and nausea.   Genitourinary: Negative.    Musculoskeletal:  Positive for back pain.   Skin:  Negative for color change, pallor and rash.   Neurological:  Positive for light-headedness. Negative for tremors, syncope, facial asymmetry, speech difficulty, numbness and headaches.         Objective:   /64   Pulse 64   Ht 4' 11\" (1.499 m)   Wt 151 lb (68.5 kg)   SpO2 98%   BMI 30.50 kg/m²  Estimated body mass index is 30.5 kg/m² as calculated from the following:    Height as of this encounter: 4' 11\" (1.499 m).    Weight as of this encounter: 151 lb (68.5 kg).  Physical Exam  Constitutional:       General: She is not in acute distress.     Appearance: She is not ill-appearing.   Eyes:      General: No scleral icterus.     Conjunctiva/sclera: Conjunctivae normal.   Cardiovascular:      Rate and Rhythm: Normal rate. Rhythm irregular.   Pulmonary:      Effort: No tachypnea, prolonged expiration, respiratory distress or retractions.      Breath sounds: Transmitted upper airway sounds present. Examination of the left-middle field reveals decreased breath sounds. Examination of the left-lower field reveals decreased breath sounds. Decreased breath sounds present. No wheezing, rhonchi or rales.   Abdominal:      General: Abdomen is flat. Bowel sounds are normal. There is no distension.      Palpations: Abdomen is soft. There is no mass.      Tenderness: There is no abdominal tenderness. There is no guarding.   Musculoskeletal:      Right lower leg: No edema.      Left lower leg: No edema.   Lymphadenopathy:      Cervical: No cervical adenopathy.   Skin:     General: Skin is warm.      Coloration: Skin is not jaundiced or pale.      Findings: Bruising (extermities, c/w blood thinners) present. No lesion.        Results      Assessment & Plan:   1. Chronic obstructive pulmonary disease, unspecified COPD type (HCC) (Primary)  -     CBC With Differential With Platelet; Future; Expected date: 2025  -     Basic Metabolic Panel (8); Future; Expected date: 2025  -     XR CHEST PA + LAT CHEST (CPT=71046); Future; Expected date: 2025  2. Severe obesity (BMI 35.0-39.9) with comorbidity (HCC)  3. Decreased appetite  4. Stage 3b chronic kidney disease (HCC)  -     Basic Metabolic Panel (8); Future; Expected date: 2025    Assessment & Plan  COPD with possible flare-up  Possible COPD flare-up or pneumonia indicated by symptoms and blood work. No fever reported.  - Order chest x-ray to evaluate for COPD flare-up or pneumonia.  - afebrile   - emphasized Mucinex twice daily.  - supportive care - flutter device, +/- pulmonary rehab     Weight loss  Unintentional weight loss with decreased appetite. Further investigation needed to identify underlying causes.  - Order additional lab tests.    Constipation  Chronic constipation managed with stool softeners. No significant abdominal pain.  - Continue stool softeners as needed.    Goals of Care  DNR order on file from . She wishes to distribute copies to relevant parties.  - Provide updated copies of DNR form for distribution.      Return in about 2 months (around 2025).      SHERIF SÁNCHEZ MD, 2025, 10:36 AM              [1]   Social History  Tobacco Use   Smoking Status Former    Current packs/day: 0.00    Types: Cigarettes    Quit date: 1992    Years since quittin.0   Smokeless Tobacco Never   [2]   Current Outpatient Medications   Medication Sig Dispense Refill    alendronate 35 MG Oral Tab Take 1 tablet (35 mg total) by mouth every 7 days. 12 tablet 3    pravastatin 40 MG Oral Tab Take 1 tablet (40 mg total) by mouth nightly. 90 tablet 3    furosemide 40 MG Oral Tab TAKE 1 TABLET EVERY DAY 90 tablet 1    fluticasone-umeclidin-vilant  (RESHMA ELLIPTA) 200-62.5-25 MCG/ACT Inhalation Aerosol Powder, Breath Activated Inhale 1 puff into the lungs daily. 60 each 0    LEVOTHYROXINE 88 MCG Oral Tab TAKE 1 TABLET EVERY DAY 90 tablet 3    guaiFENesin  MG Oral Tablet 12 Hr Take 1 tablet (600 mg total) by mouth 2 (two) times daily.      Benadryl/Maalox/Lidocaine 1:1:1 solution Take 5-10 mL by mouth TID AC&HS. Swish and Smallow or Swish and Spit 150 mL 0    ipratropium-albuterol 0.5-2.5 (3) MG/3ML Inhalation Solution Take 3 mL by nebulization every 6 (six) hours as needed. 120 each 1    dilTIAZem  MG Oral Capsule SR 24 Hr Take 1 capsule (240 mg total) by mouth in the morning and 1 capsule (240 mg total) before bedtime. 60 capsule 1    albuterol 108 (90 Base) MCG/ACT Inhalation Aero Soln Inhale 2 puffs into the lungs every 6 (six) hours as needed for Wheezing or Shortness of Breath. 54 g 1    MECLIZINE 25 MG Oral Tab Take 1 tablet (25 mg total) by mouth 3 (three) times daily as needed for Dizziness. 30 tablet 2    clobetasol 0.05 % External Cream Apply 1 Application topically 2 (two) times daily as needed. 30 g 2    tiZANidine 2 MG Oral Tab Take 1 tablet (2 mg total) by mouth every 8 (eight) hours as needed (muscle spasm or back pain). Do not drive while taking 60 tablet 0    ondansetron 4 MG Oral Tablet Dispersible Take 1 tablet (4 mg total) by mouth every 8 (eight) hours as needed for Nausea. 30 tablet 0    famotidine 20 MG Oral Tab Take 1 tablet (20 mg total) by mouth 2 (two) times daily as needed (acid stomach). 60 tablet 1    aspirin 81 MG Oral Tab EC Take 1 tablet (81 mg total) by mouth daily.      warfarin 4 MG Oral Tab Take 1 tablet (4 mg total) by mouth at bedtime.      furosemide 20 MG Oral Tab Take 1 tablet (20 mg total) by mouth daily as needed (In addition to 40 mg daily). For leg swelling      Multiple Vitamins-Minerals (ICAPS AREDS 2 OR) Take 1 tablet by mouth 2 (two) times daily.      metoprolol succinate  MG Oral Tablet 24  Hr Take 1 tablet (100 mg total) by mouth 2 (two) times daily.      Multiple Vitamins-Minerals (COMPLETE DAILY/LUTEIN) Oral Tab Take 1 tablet by mouth daily.        Calcium Carb-Cholecalciferol (CALCIUM 600+D3 OR) Take 1 tablet by mouth daily.        Omega-3 Fatty Acids (FISH OIL) 1200 MG Oral Capsule Delayed Release Take 1 capsule by mouth daily.

## 2025-05-14 NOTE — PATIENT INSTRUCTIONS
Make sure you take mucinex twice a day     Use the flutter device to loosen up mucous too     You should try pulmonary rehab - to help improve lung function     Ask Dr. Lee if you need a compression vest

## 2025-05-14 NOTE — PROGRESS NOTES
The following individual(s) verbally consented to be recorded using ambient AI listening technology and understand that they can each withdraw their consent to this listening technology at any point by asking the clinician to turn off or pause the recording:    Patient name: Diana Spangler Elgin  Additional names:

## 2025-05-19 ENCOUNTER — PATIENT OUTREACH (OUTPATIENT)
Dept: CASE MANAGEMENT | Age: 85
End: 2025-05-19

## 2025-06-03 ENCOUNTER — NURSE TRIAGE (OUTPATIENT)
Dept: FAMILY MEDICINE CLINIC | Facility: CLINIC | Age: 85
End: 2025-06-03

## 2025-06-03 NOTE — TELEPHONE ENCOUNTER
Action Requested: Summary for Provider     []  Critical Lab, Recommendations Needed  [] Need Additional Advice  []   FYI    []   Need Orders  [] Need Medications Sent to Pharmacy  []  Other     SUMMARY: Patient called stating that she has been feeling dizzy for 2 days now. States that she feels dizzy all the time but worse when she gets up. She denies headache, blurry vision, or recent fall, vomiting or diarrhea. States that she has a hard time walking due to dizziness. States that she has also had gray phlegm that started 2-3 weeks ago. States that she is slight short of breath but nothing new than her chronic COPD symptom. Denies chest pain. Has been trying to stay hydrated and is eating normally. States that she had nasal congestion for months. Advised patient for ED evaluation, patient refuses to go to ED states that it will take too long. Informed that she can go to IC if she would like but advised ED. Information given to [facility].     Reason for call: Dizziness (/)  Onset: 2 days ago                     Reason for Disposition   MODERATE dizziness (e.g., interferes with normal activities)  (Exception: Dizziness caused by heat exposure, sudden standing, or poor fluid intake.)    Protocols used: Dizziness-A-OH

## 2025-06-04 ENCOUNTER — HOSPITAL ENCOUNTER (OUTPATIENT)
Age: 85
Discharge: ANOTHER HOSPITAL | End: 2025-06-04
Payer: MEDICARE

## 2025-06-04 VITALS
OXYGEN SATURATION: 93 % | TEMPERATURE: 98 F | DIASTOLIC BLOOD PRESSURE: 93 MMHG | RESPIRATION RATE: 20 BRPM | HEART RATE: 76 BPM | SYSTOLIC BLOOD PRESSURE: 135 MMHG

## 2025-06-04 DIAGNOSIS — R42 DIZZINESS: Primary | ICD-10-CM

## 2025-06-04 PROCEDURE — 99205 OFFICE O/P NEW HI 60 MIN: CPT | Performed by: NURSE PRACTITIONER

## 2025-06-04 NOTE — ED PROVIDER NOTES
Patient Seen in: [facility]        History  Chief Complaint   Patient presents with    Dizziness    Difficulty Breathing     Stated Complaint: Dizziness, sob    Subjective:   HPI            85-year-old female, with history of PAF on Coumadin, COPD CAD reflux HLD, HTN CEA presents with dizziness that began 3 days ago. C/O  difficulty walking secondary to dizziness and states she has been holding the wall to move about at home.  She does not feel as if the room is spinning.  She did take \"a pill for vertigo\"which did not work.  C/o worsening shortness of breath today. No chest pain.  +nasal congestion for months.  She did contact her primary care doctor yesterday who advised the emergency department but patient refused.      Objective:     Past Medical History:    A-fib (HCC)    Arrhythmia    Atherosclerosis of coronary artery    Atrial fibrillation with rapid ventricular response (HCC)    Back pain    Back problem    COPD (chronic obstructive pulmonary disease) (HCC)    Coronary atherosclerosis    Disorder of thyroid    Esophageal reflux    Essential hypertension    Hearing impairment    uses hearing aide    High blood pressure    High cholesterol    Hyperlipidemia    Hypothyroidism    Leg hematoma, right, subsequent encounter    Osteoarthritis    Visual impairment              Past Surgical History:   Procedure Laterality Date    Carotid endarterectomy Right 2015    Colonoscopy      Hernia surgery      Other surgical history      lower right leg, skin graph    Other surgical history      cataract surgery                Social History     Socioeconomic History    Marital status:    Tobacco Use    Smoking status: Former     Current packs/day: 0.00     Types: Cigarettes     Quit date: 1992     Years since quittin.1    Smokeless tobacco: Never   Vaping Use    Vaping status: Never Used   Substance and Sexual Activity    Alcohol use: Not Currently    Drug use: No     Social Drivers  of Health     Food Insecurity: No Food Insecurity (12/29/2024)    Food Insecurity     Food Insecurity: Never true   Transportation Needs: No Transportation Needs (1/9/2025)    Transportation Needs     Lack of Transportation: No   Housing Stability: Low Risk  (12/29/2024)    Housing Stability     Housing Instability: No              Review of Systems    Positive for stated complaint: Dizziness, sob  Other systems are as noted in HPI.  Constitutional and vital signs reviewed.      All other systems reviewed and negative except as noted above.                  Physical Exam    ED Triage Vitals [06/04/25 0955]   BP (!) 135/93   Pulse 76   Resp 20   Temp 97.8 °F (36.6 °C)   Temp src Oral   SpO2 93 %   O2 Device None (Room air)       Current Vitals:   Vital Signs  BP: (!) 135/93  Pulse: 76  Resp: 20  Temp: 97.8 °F (36.6 °C)  Temp src: Oral    Oxygen Therapy  SpO2: 93 %  O2 Device: None (Room air)            Physical Exam  Vitals and nursing note reviewed.   Constitutional:       Appearance: She is well-developed.   Eyes:      Extraocular Movements: Extraocular movements intact.      Pupils: Pupils are equal, round, and reactive to light.   Cardiovascular:      Rate and Rhythm: Rhythm irregular.   Pulmonary:      Effort: Pulmonary effort is normal.      Breath sounds: Rhonchi present.   Abdominal:      Comments: Bilat pedal edema R>L   Skin:     General: Skin is warm and dry.   Neurological:      Mental Status: She is alert and oriented to person, place, and time.      Cranial Nerves: No cranial nerve deficit.                 ED Course  Labs Reviewed - No data to display                         MDM     85-year-old female with the above history on Coumadin presents with dizziness that began 3 days ago/difficulty ambulating/no relief with home meds  Differential diagnosis included for causes of vertigo including central causes such as benign positional vertigo, Ménière's disease, viral labyrinthitis and central causes such  as CVA, tumor weakness including electrolyte abnormality, depression, anxiety,  CVA, spinal cord abnormality, infectious causes    Or the limitations of [facility] immediate care advise evaluation in the ER EKG prior to transport declines EMS states she would like to just leave and go with her house caretaker she will go to [facility]      Medical Decision Making  Amount and/or Complexity of Data Reviewed  ECG/medicine tests: ordered.     Details: Decline- going to ER        Disposition and Plan     Clinical Impression:  1. Dizziness         Disposition:  Ic to ed  6/4/2025 10:13 am    Follow-up:  No follow-up provider specified.        Medications Prescribed:  Discharge Medication List as of 6/4/2025 10:09 AM                Supplementary Documentation:

## 2025-06-04 NOTE — ED INITIAL ASSESSMENT (HPI)
Pt c/o dizziness for the last few days.  Pt states unable to take a deep breath since this morning.  Pt denies fevers, headaches or chest pain.

## 2025-07-16 ENCOUNTER — HOSPITAL ENCOUNTER (OUTPATIENT)
Dept: GENERAL RADIOLOGY | Facility: HOSPITAL | Age: 85
Discharge: HOME | End: 2025-07-16
Attending: OTOLARYNGOLOGY
Payer: MEDICARE

## 2025-07-16 DIAGNOSIS — R13.12 DYSPHAGIA, OROPHARYNGEAL: ICD-10-CM

## 2025-07-16 PROCEDURE — 92611 MOTION FLUOROSCOPY/SWALLOW: CPT

## 2025-07-16 PROCEDURE — 74230 X-RAY XM SWLNG FUNCJ C+: CPT | Performed by: OTOLARYNGOLOGY

## 2025-07-16 NOTE — SLP NOTE
ADULT VIDEOFLUOROSCOPIC SWALLOWING STUDY    Admission Date: 7/16/2025  Evaluation Date: 07/16/25  Radiologist: Dr. ENID Eid    RECOMMENDATIONS   Diet Recommendations - Solids: Regular  Diet Recommendations - Liquids: Thin Liquids    Further Follow-up:  Reports episodes of coughing on solids with sensation of food getting caught in her throat.      Medication Administration Recommendations: No restrictions  Treatment Plan/Recommendations:  (No further skilled dysphagia therapy warranted)    HISTORY   Background/Objective Information:    Problem List  Active Problems:    * No active hospital problems. *      Past Medical History  Past Medical History[1]    Current Diet Consistency: Regular, Thin liquids  Prior Level of Function: Independent  Prior Living Situation: Home alone  History of Recent: Upper respiratory infection     Imaging results:     Reason for Referral: R/O aspiration      Family/Patient Goals:  To eat and drink     ASSESSMENT   DYSPHAGIA ASSESSMENT  Test completed in conjunction with Radiologist.  Patient Positioned: Upright, Midline (in bed).  Patient Viewed: Lateral.   .  Consistencies Presented: Thin liquids, Puree, Hard solid to assess oropharyngeal swallow function and assess for compensatory strategies to improve safe swallow function.    THIN LIQUIDS  Oral Phase of Swallow (VFSS - Thin Liquids): Within Functional Limits  Triggered at: Valleculae  Laryngeal Penetration: None  Tracheal Aspiration: None        PUREE  Oral Phase of Swallow (VFSS - Puree): Within Functional Limits  Triggered at: Valleculae  Laryngeal Penetration: None  Tracheal Aspiration: None     HARD SOLID  Oral Phase of Swallow (VFSS - Hard Solid): Within Functional Limits  Triggered at: Valleculae  Laryngeal Penetration: None  Tracheal Aspiration: None  Penetration Aspiration Scale Score: Score 1: Material does not enter airway       Overall Impression:     Videofluoroscopic swallow study was completed in conjunction with the  radiologist.  Patient was assessed with thin liquids by spoon and cup, puree and solid consistencies. Patient demonstrated a functional oropharyngeal swallow without any laryngeal penetration or aspiration during the controlled conditions of this exam.    Oral phase revealed intact retrieval from spoon and cup, cohesive bolus hold, timely and efficient mastication, complete and timely clearing of the oral cavity.   Pharyngeal phase revealed complete superior movement of thyroid cartilage, complete anterior hyoid excursion, complete epiglottic inversion and recoil, complete laryngeal closure at the height of the swallow, complete distension and duration of pharyngoesophageal segment opening and intact tongue base retraction. No obstruction for flow of bolus and no residue following swallow.  Patient with safe and efficient oral and pharyngeal phase of swallow. No further skilled dysphagia intervention warranted.     PLAN:   No further skilled oral/pharyngeal dysphagia intervention warranted.     EDUCATION/INSTRUCTION  Reviewed results and recommendations with patient/family/caregiver.  Agreement/Understanding verbalized and all questions answered to their apparent satisfaction.    INTERDISCIPLINARY COMMUNICATION  Reviewed results with Radiologist; agreement verbalized.    Patient Experiencing Pain: No         FOLLOW UP  Treatment Plan/Recommendations:  (No further skilled dysphagia therapy warranted)         Thank you for your referral.   If you have any questions, please contact KEYA Shah         [1]   Past Medical History:   A-fib (HCC)    Arrhythmia    Atherosclerosis of coronary artery    Atrial fibrillation with rapid ventricular response (HCC)    Back pain    Back problem    COPD (chronic obstructive pulmonary disease) (HCC)    Coronary atherosclerosis    Disorder of thyroid    Esophageal reflux    Essential hypertension    Hearing impairment    uses hearing aide    High blood pressure    High cholesterol     Hyperlipidemia    Hypothyroidism    Leg hematoma, right, subsequent encounter    Osteoarthritis    Visual impairment

## 2025-07-16 NOTE — PLAN OF CARE
Assumed care patient following shift report. Patient is alert and seems to be mostly oriented, but frequently hallucinating and attempting to get out of bed. Non-redirectable, so unfortunately ended up in restraints. MD at bedside, haldol given. Patient denies pain. Refusing some medications. Monitoring closely, see MAR & flowsheets following shift report.       Problem: CARDIOVASCULAR - ADULT  Goal: Maintains optimal cardiac output and hemodynamic stability  Description: INTERVENTIONS:  - Monitor vital signs, rhythm, and trends  - Monitor for bleeding, hypotension and signs of decreased cardiac output  - Evaluate effectiveness of vasoactive medications to optimize hemodynamic stability  - Monitor arterial and/or venous puncture sites for bleeding and/or hematoma  - Assess quality of pulses, skin color and temperature  - Assess for signs of decreased coronary artery perfusion - ex. Angina  - Evaluate fluid balance, assess for edema, trend weights  Outcome: Progressing  Goal: Absence of cardiac arrhythmias or at baseline  Description: INTERVENTIONS:  - Continuous cardiac monitoring, monitor vital signs, obtain 12 lead EKG if indicated  - Evaluate effectiveness of antiarrhythmic and heart rate control medications as ordered  - Initiate emergency measures for life threatening arrhythmias  - Monitor electrolytes and administer replacement therapy as ordered  Outcome: Progressing     Problem: RESPIRATORY - ADULT  Goal: Achieves optimal ventilation and oxygenation  Description: INTERVENTIONS:  - Assess for changes in respiratory status  - Assess for changes in mentation and behavior  - Position to facilitate oxygenation and minimize respiratory effort  - Oxygen supplementation based on oxygen saturation or ABGs  - Provide Smoking Cessation handout, if applicable  - Encourage broncho-pulmonary hygiene including cough, deep breathe, Incentive Spirometry  - Assess the need for suctioning and perform as needed  - Assess and  [facility] Medicine  Progress Note    Patient Name: Yazan Hathaway  MRN: [id number]  Patient Class: IP- Inpatient   Admission Date: 7/15/2025  Length of Stay: 0 days  Attending Physician: David Pike MD  Primary Care Provider: Simi Johnson MD        Subjective     Principal Problem:Acute hypoxic respiratory failure        HPI:  Mr. Hathaway is a 51-year-old male with past medical history of hyperlipidemia, asthma, diabetes, and tobacco abuse.  Patient admits to smoking 1 pack per day and was recently seen by a allergist who feels he likely has COPD for which the patient has been referred to pulmonology but follow up appointment is in 1 month.  He states his shortness a breath had started approximately 6 months ago but had worsened over the last 2 days hence prompting him to come to the ER.  Patient was noted to have a low-grade fever of 100.7 on arrival.  Labs on arrival are unremarkable and chest x-ray showed There is a poorly visualized 9 mm oval shaped density projected over the right upper lobe.  If additional imaging evaluation is clinically indicated, I recommend consideration of a CT examination of the thorax without IV contrast in 3 months.  CT chest was ordered and showed moderate to severe emphysema.  Worrisome spiculated right upper lobe pulmonary nodule, 1 cm.  Pulmonary is consulted for evaluation as patient is high-risk with a notable upper lobe nodule that may need to be biopsied.  Patient has been started on IV steroids and breathing treatments as well as continued inhaler.  An echocardiogram was considered but patient was noted to have had a stress test on 06/03/2025 which noted an EF of 65% and BNP is less than 10.  Hospital medicine will admit for observation for emphysema exacerbation.    Overview/Hospital Course:  Patient admitted with new diagnosis of emphysema and pulmonary nodule.  He is still requiring nasal cannula oxygen to maintain sats greater than  88%.  He has been weaned down to 1 L at this time but is tachypneic and has increase work of breathing.  Patient was given 1 dose of xanax as he appears to have an anxiety component effecting his breathing as well.  Will check home o2 eval in AM if patient is unable to wean to room air in the morning.  Patient was seen by pulmonology for pulmonary nodule for which he recommended follow up in clinic therefore referral will be place.    Interval History:  Follow up emphysema and pulmonary nodule.  Patient is continued on steroids, empiric antibiotics, and breathing treatments.  He has been weaned to 1 L nasal cannula O2, satting to>90%    Review of Systems  Objective:     Vital Signs (Most Recent):  Temp: 98 °F (36.7 °C) (07/16/25 1152)  Pulse: 86 (07/16/25 1315)  Resp: 20 (07/16/25 1315)  BP: 120/74 (07/16/25 1152)  SpO2: (!) 93 % (07/16/25 1315) Vital Signs (24h Range):  Temp:  [97.5 °F (36.4 °C)-99.9 °F (37.7 °C)] 98 °F (36.7 °C)  Pulse:  [65-97] 86  Resp:  [17-24] 20  SpO2:  [91 %-98 %] 93 %  BP: (108-133)/(66-78) 120/74     Weight: 65.9 kg (145 lb 4.5 oz)  Body mass index is 28.37 kg/m².    Intake/Output Summary (Last 24 hours) at 7/16/2025 1531  Last data filed at 7/16/2025 0837  Gross per 24 hour   Intake 240 ml   Output 500 ml   Net -260 ml         Physical Exam  Vitals and nursing note reviewed.   Constitutional:       Appearance: Normal appearance.   HENT:      Head: Normocephalic and atraumatic.      Nose: Nose normal.      Mouth/Throat:      Mouth: Mucous membranes are moist.   Eyes:      Extraocular Movements: Extraocular movements intact.      Conjunctiva/sclera: Conjunctivae normal.   Cardiovascular:      Rate and Rhythm: Normal rate and regular rhythm.      Pulses: Normal pulses.      Heart sounds: Normal heart sounds.   Pulmonary:      Effort: Pulmonary effort is normal.      Breath sounds: Normal breath sounds.      Comments: Muffled breath sounds bilaterally  Abdominal:      General: Abdomen is  instruct to report SOB or any respiratory difficulty  - Respiratory Therapy support as indicated  - Manage/alleviate anxiety  - Monitor for signs/symptoms of CO2 retention  Outcome: Progressing     Problem: Delirium  Goal: Minimize duration of delirium  Description: Interventions:  - Encourage use of hearing aids, eye glasses  - Promote highest level of mobility daily  - Provide frequent reorientation  - Promote wakefulness i.e. lights on, blinds open  - Promote sleep, encourage patient's normal rest cycle i.e. lights off, TV off, minimize noise and interruptions  - Encourage family to assist in orientation and promotion of home routines  Outcome: Progressing     Problem: Safety Risk - Non-Violent Restraints  Goal: Patient will remain free from self-harm  Description: INTERVENTIONS:  - Apply the least restrictive restraint to prevent harm  - Notify patient and family of reasons restraints applied  - Assess for any contributing factors to confusion (electrolyte disturbances, delirium, medications)  - Discontinue any unnecessary medical devices as soon as possible  - Assess the patient's physical comfort, circulation, skin condition, hydration, nutrition and elimination needs   - Reorient and redirection as needed  - Assess for the need to continue restraints  Outcome: Progressing      flat. Bowel sounds are normal.      Palpations: Abdomen is soft.   Musculoskeletal:         General: No swelling. Normal range of motion.      Cervical back: Normal range of motion and neck supple.   Skin:     General: Skin is warm.      Capillary Refill: Capillary refill takes less than 2 seconds.   Neurological:      Mental Status: He is alert and oriented to person, place, and time. Mental status is at baseline.   Psychiatric:         Mood and Affect: Mood normal.         Behavior: Behavior normal.               Significant Labs: All pertinent labs within the past 24 hours have been reviewed.  Recent Lab Results  (Last 5 results in the past 24 hours)        07/16/25  1146   07/16/25  0512   07/16/25  0415   07/15/25  2033   07/15/25  1846        Allens Test         Pass       Anion Gap     12           Baso #     0.01           Basophil %     0.2           Site         RR       BUN     13           Calcium     9.2           Chloride     104           CO2     21           Creatinine     0.7           DelSys         Nasal Can       eGFR     >60  Comment: Estimated GFR calculated using the CKD-EPI creatinine (2021) equation.           Eos #     0.00           Eos %     0.0           Flow         3       Glucose     160           Gran # (ANC)     3.63           Hematocrit     49.0           Hemoglobin     15.7           Immature Grans (Abs)     0.02  Comment: Mild elevation in immature granulocytes is non specific and can be seen in a variety of conditions including stress response, acute inflammation, trauma and pregnancy. Correlation with other laboratory and clinical findings is essential.           Immature Granulocytes     0.4           Lymph #     0.71           Lymph %     15.3           Magnesium      2.1           MCH     28.7           MCHC     32.0           MCV     90           Mode         SPONT       Mono #     0.26           Mono %     5.6           MPV     10.4           Neut %     78.5           nRBC      0           Phosphorus Level     4.2           Platelet Count     157           POC BE         1       POC HCO3         25.9       POC PCO2         44.6       POC PH         7.371       POC PO2         110       POC SATURATED O2         98       POCT Glucose 161   173     214         Potassium     4.8           RBC     5.47           RDW     13.9           Sample         ARTERIAL       Sodium     137           WBC     4.63                                  Significant Imaging: I have reviewed all pertinent imaging results/findings within the past 24 hours.      Assessment & Plan  Acute hypoxic respiratory failure  Patient with Hypoxic Respiratory failure which is Acute on chronic.  he is not on home oxygen. Supplemental oxygen was provided and noted- Oxygen Concentration (%):  [24-28] 24    .   Signs/symptoms of respiratory failure include- tachypnea and increased work of breathing. Contributing diagnoses includes - COPD Labs and images were reviewed. Patient Has not had a recent ABG, but ordered. Will treat underlying causes and adjust management of respiratory failure as follows- steroids, DuoNebs, Trelegy, Pulmonary consult    -new diagnosis of COPD.  CT chest shows emphysema and pulmonary nodule.  -echocardiogram was considered but patient had a stress test 1 month ago that showed an EF of 65% and BNP <10  Pulmonary nodule 1 cm or greater in diameter  -patient has a 1 pack per day history of smoking   -pulmonary consulted for possible need for tissue biopsy.  Recommended outpatient follow up.  -patient does have an appointment in 1 month to establish care with pulmonology   -Wife inquired about a sooner appt with pulm therefore referral sent through [employer].    Hyperlipidemia  -continue home statin    Type 2 diabetes mellitus without complication, without long-term current use of insulin  -A1c 1 month ago was 6.5 but patient does not appear to be on any home diabetic medications   -SSI and Accu-Cheks   -diabetic  diet   -monitor glucose closely as patient will be on steroids for his COPD exacerbation  -consult nutrition and diabetic educator      Tobacco use disorder  -patient counseled on smoking cessation >10 minutes  -nicotine patch ordered    VTE Risk Mitigation (From admission, onward)           Ordered     IP VTE HIGH RISK PATIENT  Once         07/15/25 1738     Place sequential compression device  Until discontinued         07/15/25 1738     Place sequential compression device  Until discontinued         07/15/25 1738                    Discharge Planning   MEL: 7/17/2025     Code Status: Full Code   Medical Readiness for Discharge Date:   Discharge Plan A: Home with family                        David Pike MD  Department of Hospital Medicine   [facility] - Telemetry ([facility])

## 2025-08-13 RX ORDER — FUROSEMIDE 40 MG/1
40 TABLET ORAL DAILY
Qty: 90 TABLET | Refills: 1 | Status: SHIPPED | OUTPATIENT
Start: 2025-08-13

## (undated) NOTE — Clinical Note
Pt has blister with surrounding cellulitis to left toe. Is scheduled to follow up with you for wound check in 3-4 days. Was not able to get in with Dr. Chelsy Samuels. Thanks!

## (undated) NOTE — LETTER
Date: 4/26/2022    Patient Name: Eleni Conte          To Whom it may concern: This letter has been written at the patient's request. The above patient was seen at the [facility] for treatment of a medical condition. This patient should be excused from attending work/school from *** through ***. The patient may return to work/school on *** with the following limitations ***.         Sincerely,    Francisco J Griffin MD

## (undated) NOTE — LETTER
12/18/17        Jaimee Puga [address] [place] [address]      Dear James Lynn,    [facility] records indicate that you have outstanding lab work and or testing that was ordered for you and has not yet been completed:          Occult Blood, Fecal, Im

## (undated) NOTE — LETTER
03/14/23    Dear Winsome Barvo,    Just a friendly reminder you are due for your Medicare Advantage Wellness Visit. Unlike regular appointments for medication refills or care of an acute illness, this time is uniquely dedicated to prevention, screening and coordination of care. There is simply not enough time at routine medical follow-up visits to cover all of these important topics. At a well visit, I will discuss your day-to-day functioning, update your health history and medication list, I would screen for vision, hearing, memory problems, depression, cancer, osteoporosis and heart attack or stroke risks. This is also the time to review and update vaccines, if necessary, to prevent pneumonia or flu. I can also offer advice on healthful eating and exercise, advice on weight loss if needed, and steps you can take to prevent falls or other injuries. Lastly, and optionally, wellness visits allow time to discuss advance directives such as living will or health care power of . I want to make sure that your personal wishes for end-of-life care are documented and respected in case you develop future health problems that make you unable to express your desires for life-sustaining care. As you see, this wellness benefit, created only in the last few years by Medicare, offers a unique opportunity for a thorough review of your current health status, and a chance for me to work with you to improve your health and set goals for your health care. I strongly encourage you to schedule a visit at your soonest opportunity. Please call the office at [phone]  to schedule your Medicare Advantage Wellness Visit or for your convenience you can schedule thru mychart.     Sincerely,  Dr Briana Kaplan

## (undated) NOTE — IP AVS SNAPSHOT
Patient Demographics     Address  [address] [place] [address] Phone  [phone] (Home) *Preferred*  [phone] (Work)  [phone] [facility]) E-mail Address  [email or web address]      Emergency Contact(s)     Name Relation Home Work Mo Albuterol Sulfate  (90 Base) MCG/ACT Aers      Inhale 2 puffs into the lungs every 6 (six) hours as needed for Wheezing or Shortness of Breath.    Artie Rush MD         amiodarone HCl 200 MG Tabs  Commonly known as:  PACERONE      Take 1 tablet (20 If you are unsure how to take this medication, talk to your nurse or doctor. Original instructions: Take 1 tablet (3 mg total) by mouth nightly. Recheck INR in 2 days at rehab.   Stop taking on:  8/30/2019   DANNA Hooker               Where to Get Resulted: 08/01/19 0629, Result status: Final result   Ordering provider:  DANNA Tavera  07/31/19 3319 Resulting lab:  [facility]    Specimen Information    Type Source Collected On   Blood — 08/01/19 0523          Components    Component Value Re -----------         ------                     CBC W/ DIFFERENTIAL[[id number]]          Abnormal            Final result                 Please view results for these tests on the individual orders.     Specimen Information    Type Source Collected On   Bloo rapid ventricular response ([facility] [address] 3/12/2019   • Back problem    • COPD (chronic obstructive pulmonary disease) (HCC)    • Coronary atherosclerosis    • Disorder of thyroid    • Esophageal reflux    • Essential hypertension    • Hearing impairment     uses h 0.5-2.5 (3) MG/3ML Inhalation Solution Take 3 mL by nebulization every 4 (four) hours as needed. Disp: 50 vial Rfl: 1   Pravastatin Sodium 40 MG Oral Tab Take 1 tablet (40 mg total) by mouth nightly.  Disp: 90 tablet Rfl: 0   furosemide 20 MG Oral Tab Take CNII-XII grossly intact. Musculoskeletal: Moves all extremities. Extremities: No edema or cyanosis. Integument: No rashes or lesions. Psychiatric: Appropriate mood and affect.       Diagnostic Data:      Labs:  Recent Labs   Lab 07/23/19  1625 07/29/19 from 7/29/2019  5:59 PM through 8/1/2019  5:59 PM)      Physical Therapy Note signed by Mei Snow PT at 7/31/2019 11:08 AM  Version 1 of 1    Author:  Mei Snow PT Service:  Rehab Author Type:  Physical Therapist    Filed:  7/31/2019 11:08 HISTORY      lower right leg, skin graph   • OTHER SURGICAL HISTORY      cataract surgery       HOME SITUATION  Type of Home: House   Home Layout: Multi-level  Stairs to Enter : 2  Railing: No  Stairs to Bedroom: 10(5 x 2 with landing)  Railing: Yes    Kristi patient currently have. ..  -   Turning over in bed (including adjusting bedclothes, sheets and blankets)?: A Little   -   Sitting down on and standing up from a chair with arms (e.g., wheelchair, bedside commode, etc.): A Little   -   Moving from lying on Patient is a [de-identified]year old female admitted on 7/29/2019 for elevated INR, anemia, fall 2 weeks ago. Pertinent comorbidities and personal factors impacting therapy include A-fib, COPD, HTN, OA, s/p right carotid endarterectomy (2015).   In this PT evaluation, railing and supervision. Goal #5    Goal #6    Goal Comments: Goals established on 7/31/2019[JM.1]     Attribution Wells    JM. 1 - Jamison Palomino, PT on 7/31/2019 10:53 AM               Physical Therapy Note signed by Luis A Gamez, PT at 7/30/2019 Acetabular labrum tear, right, initial encounter      Past Medical History  Past Medical History:   Diagnosis Date   • A-fib [facility])    • Arrhythmia    • Atrial fibrillation with rapid ventricular response ([facility] [address] 3/12/2019   • Back problem    • COPD (chronic SENSATION  Light touch:  intact    Communication: wfl    Behavioral/Emotional/Social: wfl    RANGE OF MOTION AND STRENGTH ASSESSMENT  Upper extremity ROM is within functional limits     Upper extremity strength is within functional limits     COORDINATION addressed; Family present; Discussed recommendations with /    ASSESSMENT     Patient is a [de-identified]year old female admitted on 7/29/2019 for anemia. Complete medical history and occupational profile noted above.  Functional outcome measure strengthening/ROM; Endurance training;Patient/Family training;Patient/Family education; Compensatory technique education  Rehab Potential : Good  Frequency (Obs): 5x/week  Number of Visits to Meet Established Goals: 5    ADL Goals   Patient will perform lowe

## (undated) NOTE — LETTER
Patient Name: Scarlet Tee  : 1940  MRN: [id number]  Patient Address: [address] [place] [address]      Coronavirus Disease 2019 (COVID-19)     [facility] is committed to the safety and well-being of our patients, m carefully. If your symptoms get worse, call your healthcare provider immediately. 3. Get rest and stay hydrated.    4. If you have a medical appointment, call the healthcare provider ahead of time and tell them that you have or may have COVID-19.  5. For m of fever-reducing medications; and  · Improvement in respiratory symptoms (e.g., cough, shortness of breath); and  · At least 10 days have passed since symptoms first appeared OR if asymptomatic patient or date of symptom onset is unclear then use 10 days donors must:    · Have had a confirmed diagnosis of COVID-19  · Be symptom-free for at least 14 days*    *Some people will be required to have a repeat COVID-19 test in order to be eligible to donate.  If you’re instructed by Kristyn that a repeat test is r

## (undated) NOTE — Clinical Note
5/19/2025              Diana Eisenberg        [address]         [facility] [address]         Dear Diana,    ***    Sincerely,    CELESTE Dale          Document electronically generated by:  CELESTE Dale

## (undated) NOTE — LETTER
12/05/17        Candis [address]uga [address] [place] [address]      Dear Jenn Cuadra,    [facility] records indicate that you have outstanding lab work and or testing that was ordered for you and has not yet been completed:          Prothrombin Time (PT) [

## (undated) NOTE — LETTER
05/19/25    Diana Eisenberg  [address]  [place] [address]      Dear Diana:    You had previously enrolled in our Chronic Care Management program and had been speaking with your Health .   Currently, we are graduating patients who have been maintaining health. We are so proud of your dedication and hard work in achieving your health goals!   There are no enrollment or cancellation fees.    If you have any questions or concerns, or if you find that you need additional support, please call your Primary Care Doctor.     It was a pleasure working with you,    [facility]  Care Management Department

## (undated) NOTE — LETTER
11/10/18        Magy Puga [address] [place] [address]      Dear Nafisa Cullen,    [facility] records indicate that you have outstanding lab work and or testing that was ordered for you and has not yet been completed:  Orders Placed This Encounter

## (undated) NOTE — LETTER
05/11/18        Scarlet Puga [address] [place] [address]      Dear Kleber Pro,    [facility] records indicate that you have outstanding lab work and or testing that was ordered for you and has not yet been completed:          CBC W Differential W Pl

## (undated) NOTE — LETTER
Date: 4/26/2022    Patient Name: Arely Silver          To Whom it may concern: The above patient was seen at the [facility] for treatment of a medical condition. This patient was seen in office on 4/26/22 and patient is okay to participate in balance exercises.        Sincerely,    Hugh Carlos MD

## (undated) NOTE — LETTER
August 14, 2023    Jovanny Cardenas [address] [facility]    Dear Sudheer Forbes:  It was a pleasure speaking with you over the phone recently. To follow up, I wanted to send you some contact information to utilize when you have a question and or need some assistance in deciding to enroll. If you were to enroll in this program your own dedicated care manager will be available to provide you with the support and education needed to keep you healthy. Together we will work on:  Connecting you with resources, education, and support regarding your health   Medications: review, educate, and discuss any concerns or issues you may have  Teaching disease specific skills and promoting behavior change to meet your own health goals. What you need to know:  As we discussed, cost sharing applies to these services. If your deductible has been met you may only see an approximate $8 remainder. However, if you have a secondary insurance this is usually picked up. Only one provider can render these services to you. As needed, we will share your health information electronically only with others involved in your care. Please rest assured that we continue to comply with all laws related to the privacy and security of your health information  Our goal is to provide you with the best possible care. Together with your physician, these services, among others, will help you take control of your health and provide you with optimal quality care. We know your time and your health is valuable and we hope you will consider to participate in this program.    We will be reaching out to follow up with you in the next couple of weeks. Please don't hesitate to call with any questions or concerns. Below is the contact information mentioned:     Yared Pizarro [address] Melchor Strong   Jac Steven, [address] [facility] Office [phone]  [email or web address]

## (undated) NOTE — ED AVS SNAPSHOT
Candis Barrera   MRN: [id number]    Department:  THE [facility] Emergency Department in [place]   Date of Visit:  7/18/2019           Disclosure     Insurance plans vary and the physician(s) referred by the ER may not be covered by your plan.  Please con this physician (or your personal doctor if your instructions are to return to your personal doctor) about any new or lasting problems. The primary care or specialist physician will see patients referred from the [facility] Emergency Department.  Follow

## (undated) NOTE — LETTER
09/10/19        Seng Pereyra  [address] [place] [id number]      Dear Allan Cavazos,    [address] [facility] records indicate that you have outstanding lab work and or testing that was ordered for you and has not yet been completed:  Orders Placed This Encount